# Patient Record
Sex: MALE | Race: WHITE | NOT HISPANIC OR LATINO | Employment: FULL TIME | URBAN - METROPOLITAN AREA
[De-identification: names, ages, dates, MRNs, and addresses within clinical notes are randomized per-mention and may not be internally consistent; named-entity substitution may affect disease eponyms.]

---

## 2017-10-10 ENCOUNTER — OFFICE VISIT (OUTPATIENT)
Dept: URGENT CARE | Facility: CLINIC | Age: 67
End: 2017-10-10
Payer: MEDICARE

## 2017-10-10 VITALS
TEMPERATURE: 98 F | HEART RATE: 99 BPM | DIASTOLIC BLOOD PRESSURE: 61 MMHG | OXYGEN SATURATION: 97 % | SYSTOLIC BLOOD PRESSURE: 92 MMHG

## 2017-10-10 DIAGNOSIS — S61.209A AVULSION, FINGER TIP, INITIAL ENCOUNTER: Primary | ICD-10-CM

## 2017-10-10 PROCEDURE — 99203 OFFICE O/P NEW LOW 30 MIN: CPT | Mod: 25,S$GLB,, | Performed by: PHYSICIAN ASSISTANT

## 2017-10-10 PROCEDURE — 12001 RPR S/N/AX/GEN/TRNK 2.5CM/<: CPT | Mod: S$GLB,,, | Performed by: PHYSICIAN ASSISTANT

## 2017-10-10 RX ORDER — ESCITALOPRAM OXALATE 10 MG/1
1 TABLET ORAL DAILY
COMMUNITY
Start: 2017-08-16 | End: 2017-10-31 | Stop reason: SDUPTHER

## 2017-10-10 RX ORDER — BUDESONIDE AND FORMOTEROL FUMARATE DIHYDRATE 160; 4.5 UG/1; UG/1
2 AEROSOL RESPIRATORY (INHALATION) EVERY 12 HOURS
COMMUNITY
Start: 2017-08-22

## 2017-10-10 RX ORDER — IRBESARTAN 300 MG/1
1 TABLET ORAL DAILY
Status: ON HOLD | COMMUNITY
Start: 2017-10-10 | End: 2020-06-30

## 2017-10-10 NOTE — LETTER
October 10, 2017      [facility] [place]  [address]  [place] [address]  Phone: [phone]  Fax: [phone]       Patient: Blaine Multani   YOB: 1950  Date of Visit: 10/10/2017    To Whom It May Concern:    Alden Multani  was at [facility] on 10/10/2017. He may return to work/school on 10/11/17 with restrictions. Limited Use left hand, keep wound covered, no direct pressure to 4th fingertip x 1 week.  If you have any questions or concerns, or if I can be of further assistance, please do not hesitate to contact me.    Sincerely,    CHELI Lal

## 2017-10-10 NOTE — PROGRESS NOTES
Subjective:       Patient ID: Blaine Multani is a 66 y.o. male.    Vitals:  temperature is 98.3 °F (36.8 °C). His blood pressure is 92/61 and his pulse is 99. His oxygen saturation is 97%.     Chief Complaint: Extremity Laceration (left ring finger)    Laceration to left ring finger      Review of Systems   Constitution: Negative for weakness and malaise/fatigue.   HENT: Negative for nosebleeds.    Cardiovascular: Negative for chest pain and syncope.   Respiratory: Negative for shortness of breath.    Skin:        Laceration to left ring finger   Musculoskeletal: Negative for back pain, joint pain and neck pain.   Gastrointestinal: Negative for abdominal pain.   Genitourinary: Negative for hematuria.   Neurological: Negative for dizziness and numbness.       Objective:      Physical Exam   Constitutional: He is oriented to person, place, and time. He appears well-developed and well-nourished.   HENT:   Head: Normocephalic and atraumatic.   Eyes: Conjunctivae and EOM are normal.   Cardiovascular: Normal rate, regular rhythm, normal heart sounds and intact distal pulses.    Pulmonary/Chest: Effort normal and breath sounds normal.   Musculoskeletal: Normal range of motion. He exhibits no tenderness.   Neurological: He is alert and oriented to person, place, and time.   Skin: Skin is warm and dry. No erythema.   Avulsion type laceration to the tip of the left 4th digit   Psychiatric: He has a normal mood and affect. His behavior is normal.     Laceration Repair  Date/Time: 10/10/2017 12:44 PM  Performed by: RASHEEDA RIOS  Authorized by: RASHEEDA RIOS   Consent Done: Yes  Consent: Verbal consent obtained.  Risks and benefits: risks, benefits and alternatives were discussed  Consent given by: patient  Patient understanding: patient states understanding of the procedure being performed  Patient consent: the patient's understanding of the procedure matches consent given  Procedure consent: procedure consent matches  procedure scheduled  Body area: upper extremity  Location details: left ring finger  Laceration length: 2 cm  Foreign bodies: no foreign bodies  Tendon involvement: none  Nerve involvement: superficial  Vascular damage: no  Anesthesia: digital block    Anesthesia:  Local Anesthetic: lidocaine 2% without epinephrine  Anesthetic total: 8 mL  Preparation: Patient was prepped and draped in the usual sterile fashion.  Irrigation solution: saline  Amount of cleaning: standard  Debridement: none  Wound skin closure material used: chemical cautery.  Approximation difficulty: simple  Dressing: pressure dressing and splint for protection  Patient tolerance: Patient tolerated the procedure well with no immediate complications        Assessment:       1. Avulsion, finger tip, initial encounter        Plan:         Avulsion, finger tip, initial encounter  -     LACERATION REPAIR

## 2017-10-10 NOTE — PATIENT INSTRUCTIONS
Skin Tear (Skin Avulsion)  A skin avulsion is a tearing of the top layer of skin. This commonly happens after a fall or other injury. It also tends to be more common in older people, or those taking blood thinners or steroids for long periods of time.  Home care  These guidelines will help you care for your wound at home:  · Keep the wound clean and dry for the first 24 to 48 hours, or as your healthcare provider advises.  · If there is a dressing or bandage, change it when it gets wet or dirty. Otherwise, leave it on for the first 24 hours, then change it once a day or as often as the doctor says.  · If stitches or staples were used, check the wound every day.  · After taking off the dressing, wash the area gently with soap and water. Clean as close to the stitches as you can. Avoid washing or rubbing the stitches directly.  · After 3 days you can keep the bandages off the wound, unless told otherwise, or there is continued drainage.  Allow the wound to be open to the air.  · Keep a thin layer of antibiotic ointment on the cut. This will keep the wound clean, make it easier to remove the stitches, and reduce scarring.  · If your wound is oozing, you can put a nonstick dressing over it. Then, reapply the bandage or dressing as you were told.  · You can shower as usual after the first 24 hours, but don't soak the area in water (no baths or swimming) until the stitches or staples are taken out.  · If surgical tape was used, keep the area clean and dry. If it becomes wet, blot it dry with a clean towel.  · If skin glue was used, don't put any creams, lotions, or antibiotic ointments on it. These can dissolve the glue. Usually the glue will flake off in about 5 to 10 days by itself. Try to resist picking it off before that so the wound doesn't open up. When it gets wet, pat it dry.  Here is some information about medicine:  · You may use over-the-counter medicine such as acetaminophen or ibuprofen to control pain,  unless another pain medicine was given. If you have chronic liver or kidney disease or ever had a stomach ulcer or gastrointestinal bleeding, talk with your doctor before using these medicines.  · If you were given antibiotics, take them until they are all used up. It is important to finish the antibiotics even if the wound looks better. This will ensure that the infection has cleared.  Follow-up care  Follow up with your healthcare provider, or as advised.  · Watch for any signs of infection, such as increasing redness, swelling, or pus coming out. If this happens, don't wait for your scheduled visit. Instead, see a doctor sooner.  · Stitches or staples are usually taken out within 5 to 14 days. This varies depending on what part of your body they are on, and the type of wound. The doctor will tell you how long stitches should be left in.   · If surgical tape was used, it is usually left on for 7 to 10 days. You can remove surgical tape after that unless you were told otherwise. If you try to remove it, and it is too hard, soaking can help. Surgical tape strips will eventually fall off on their own. If the edges of the cut pull apart, stop removing the tape or strips and follow up with your doctor  · As mentioned above, skin glue will flake off by itself in 5 to 10 days, so you don't need to pull it off.  If any X-rays were done, you will be notified of any changes that may affect your care.  When to seek medical advice  Call your healthcare provider right away if any of these occur:  · Increasing pain in the wound  · Redness, swelling, or pus coming from the wound  · Fever of 100.4ºF (38ºC) or higher, or as directed by your healthcare provider  · Sutures or staples come apart or fall out before your next appointment and the wound edges look as if they will re-open  · Surgical tape closures fall off before 7 days, and the wound edges look as if they will re-open  · Bleeding not controlled by direct pressure  Date  Last Reviewed: 9/1/2016  © 3330-9296 [employer] [address] [place] [address], [place] [address]. All rights reserved. This information is not intended as a substitute for professional medical care. Always follow your healthcare professional's instructions.

## 2017-11-09 PROBLEM — I73.9 PAD (PERIPHERAL ARTERY DISEASE): Status: ACTIVE | Noted: 2017-11-09

## 2017-11-09 PROBLEM — L97.929 NONHEALING ULCER OF LEFT LOWER EXTREMITY: Status: ACTIVE | Noted: 2017-11-09

## 2017-11-09 PROBLEM — I73.9 CLAUDICATION: Status: ACTIVE | Noted: 2017-11-09

## 2020-06-25 ENCOUNTER — HOSPITAL ENCOUNTER (INPATIENT)
Facility: HOSPITAL | Age: 70
LOS: 10 days | Discharge: HOME | DRG: 215 | End: 2020-07-05
Attending: HOSPITALIST | Admitting: HOSPITALIST
Payer: MEDICARE

## 2020-06-25 DIAGNOSIS — I25.10 CAD (CORONARY ARTERY DISEASE): ICD-10-CM

## 2020-06-25 DIAGNOSIS — Z01.818 PRE-OP EVALUATION: ICD-10-CM

## 2020-06-25 DIAGNOSIS — R07.9 CHEST PAIN: ICD-10-CM

## 2020-06-25 DIAGNOSIS — L97.929 NON-HEALING ULCER OF LOWER EXTREMITY, LEFT, WITH UNSPECIFIED SEVERITY: ICD-10-CM

## 2020-06-25 DIAGNOSIS — I21.4 NSTEMI (NON-ST ELEVATED MYOCARDIAL INFARCTION): Primary | ICD-10-CM

## 2020-06-25 DIAGNOSIS — D69.6 THROMBOCYTOPENIA: ICD-10-CM

## 2020-06-25 DIAGNOSIS — Z98.890 STATUS POST CARDIAC CATHETERIZATION: ICD-10-CM

## 2020-06-25 DIAGNOSIS — I10 HYPERTENSION: ICD-10-CM

## 2020-06-25 DIAGNOSIS — I20.0 UNSTABLE ANGINA PECTORIS: ICD-10-CM

## 2020-06-25 DIAGNOSIS — I73.9 PAD (PERIPHERAL ARTERY DISEASE): ICD-10-CM

## 2020-06-25 DIAGNOSIS — I10 ESSENTIAL HYPERTENSION: ICD-10-CM

## 2020-06-25 LAB
ALBUMIN SERPL BCP-MCNC: 3.4 G/DL (ref 3.5–5.2)
ALP SERPL-CCNC: 87 U/L (ref 55–135)
ALT SERPL W/O P-5'-P-CCNC: 21 U/L (ref 10–44)
ANION GAP SERPL CALC-SCNC: 10 MMOL/L (ref 8–16)
AST SERPL-CCNC: 26 U/L (ref 10–40)
BASOPHILS # BLD AUTO: 0.04 K/UL (ref 0–0.2)
BASOPHILS NFR BLD: 0.6 % (ref 0–1.9)
BILIRUB SERPL-MCNC: 1.1 MG/DL (ref 0.1–1)
BNP SERPL-MCNC: 1354 PG/ML (ref 0–99)
BUN SERPL-MCNC: 29 MG/DL (ref 6–30)
BUN SERPL-MCNC: 32 MG/DL (ref 8–23)
CALCIUM SERPL-MCNC: 8.8 MG/DL (ref 8.7–10.5)
CHLORIDE SERPL-SCNC: 102 MMOL/L (ref 95–110)
CHLORIDE SERPL-SCNC: 106 MMOL/L (ref 95–110)
CO2 SERPL-SCNC: 23 MMOL/L (ref 23–29)
CREAT SERPL-MCNC: 1.4 MG/DL (ref 0.5–1.4)
CREAT SERPL-MCNC: 1.5 MG/DL (ref 0.5–1.4)
DIFFERENTIAL METHOD: ABNORMAL
EOSINOPHIL # BLD AUTO: 0.1 K/UL (ref 0–0.5)
EOSINOPHIL NFR BLD: 1.8 % (ref 0–8)
ERYTHROCYTE [DISTWIDTH] IN BLOOD BY AUTOMATED COUNT: 15.2 % (ref 11.5–14.5)
EST. GFR  (AFRICAN AMERICAN): 58.8 ML/MIN/1.73 M^2
EST. GFR  (NON AFRICAN AMERICAN): 50.9 ML/MIN/1.73 M^2
GLUCOSE SERPL-MCNC: 107 MG/DL (ref 70–110)
GLUCOSE SERPL-MCNC: 107 MG/DL (ref 70–110)
HCT VFR BLD AUTO: 34.6 % (ref 40–54)
HCT VFR BLD CALC: 35 %PCV (ref 36–54)
HGB BLD-MCNC: 11.3 G/DL (ref 14–18)
IMM GRANULOCYTES # BLD AUTO: 0.02 K/UL (ref 0–0.04)
IMM GRANULOCYTES NFR BLD AUTO: 0.3 % (ref 0–0.5)
INR PPP: 1.8 (ref 0.8–1.2)
LYMPHOCYTES # BLD AUTO: 1.2 K/UL (ref 1–4.8)
LYMPHOCYTES NFR BLD: 18 % (ref 18–48)
MCH RBC QN AUTO: 34.9 PG (ref 27–31)
MCHC RBC AUTO-ENTMCNC: 32.7 G/DL (ref 32–36)
MCV RBC AUTO: 107 FL (ref 82–98)
MONOCYTES # BLD AUTO: 0.6 K/UL (ref 0.3–1)
MONOCYTES NFR BLD: 8.7 % (ref 4–15)
NEUTROPHILS # BLD AUTO: 4.8 K/UL (ref 1.8–7.7)
NEUTROPHILS NFR BLD: 70.6 % (ref 38–73)
NRBC BLD-RTO: 0 /100 WBC
PLATELET # BLD AUTO: 172 K/UL (ref 150–350)
PMV BLD AUTO: 10.9 FL (ref 9.2–12.9)
POC IONIZED CALCIUM: 1.22 MMOL/L (ref 1.06–1.42)
POC TCO2 (MEASURED): 24 MMOL/L (ref 23–29)
POTASSIUM BLD-SCNC: 3.9 MMOL/L (ref 3.5–5.1)
POTASSIUM SERPL-SCNC: 4 MMOL/L (ref 3.5–5.1)
PROT SERPL-MCNC: 6.4 G/DL (ref 6–8.4)
PROTHROMBIN TIME: 17.7 SEC (ref 9–12.5)
RBC # BLD AUTO: 3.24 M/UL (ref 4.6–6.2)
SAMPLE: ABNORMAL
SARS-COV-2 RDRP RESP QL NAA+PROBE: NEGATIVE
SODIUM BLD-SCNC: 139 MMOL/L (ref 136–145)
SODIUM SERPL-SCNC: 139 MMOL/L (ref 136–145)
TROPONIN I SERPL DL<=0.01 NG/ML-MCNC: 2.16 NG/ML (ref 0–0.03)
WBC # BLD AUTO: 6.79 K/UL (ref 3.9–12.7)

## 2020-06-25 PROCEDURE — 80053 COMPREHEN METABOLIC PANEL: CPT

## 2020-06-25 PROCEDURE — 84484 ASSAY OF TROPONIN QUANT: CPT

## 2020-06-25 PROCEDURE — 99285 EMERGENCY DEPT VISIT HI MDM: CPT | Mod: ,,, | Performed by: EMERGENCY MEDICINE

## 2020-06-25 PROCEDURE — 99285 EMERGENCY DEPT VISIT HI MDM: CPT | Mod: 25

## 2020-06-25 PROCEDURE — U0002 COVID-19 LAB TEST NON-CDC: HCPCS

## 2020-06-25 PROCEDURE — 86920 COMPATIBILITY TEST SPIN: CPT

## 2020-06-25 PROCEDURE — 85025 COMPLETE CBC W/AUTO DIFF WBC: CPT

## 2020-06-25 PROCEDURE — 93010 EKG 12-LEAD: ICD-10-PCS | Mod: ,,, | Performed by: INTERNAL MEDICINE

## 2020-06-25 PROCEDURE — 86850 RBC ANTIBODY SCREEN: CPT

## 2020-06-25 PROCEDURE — 83880 ASSAY OF NATRIURETIC PEPTIDE: CPT

## 2020-06-25 PROCEDURE — 93005 ELECTROCARDIOGRAM TRACING: CPT

## 2020-06-25 PROCEDURE — 99285 PR EMERGENCY DEPT VISIT,LEVEL V: ICD-10-PCS | Mod: ,,, | Performed by: EMERGENCY MEDICINE

## 2020-06-25 PROCEDURE — 93010 ELECTROCARDIOGRAM REPORT: CPT | Mod: ,,, | Performed by: INTERNAL MEDICINE

## 2020-06-25 PROCEDURE — 85610 PROTHROMBIN TIME: CPT

## 2020-06-25 PROCEDURE — 12000002 HC ACUTE/MED SURGE SEMI-PRIVATE ROOM

## 2020-06-25 RX ORDER — ASPIRIN 325 MG
325 TABLET ORAL
Status: DISCONTINUED | OUTPATIENT
Start: 2020-06-25 | End: 2020-06-25

## 2020-06-25 NOTE — Clinical Note
The site was marked. Prepped: groin, right chest and right neck. Prepped with: ChloraPrep. The site was clipped.

## 2020-06-25 NOTE — Clinical Note
Prepped: groin and left chest. Prepped with: ChloraPrep. The site was clipped. The patient was draped. LT axillary

## 2020-06-25 NOTE — Clinical Note
The catheter is inserted in to the  proximal left subclavian. THROMBECTOMY PERFORMED L SUBCLAVIAN- L AXILLARY ART. REMOVED.

## 2020-06-25 NOTE — Clinical Note
236 ml injected throughout the case. 164 mL total wasted during the case. 400 mL total used in the case.

## 2020-06-26 PROBLEM — I10 HTN (HYPERTENSION): Status: ACTIVE | Noted: 2020-06-26

## 2020-06-26 PROBLEM — F32.A DEPRESSION: Status: ACTIVE | Noted: 2020-06-26

## 2020-06-26 PROBLEM — I48.91 A-FIB: Status: ACTIVE | Noted: 2020-06-26

## 2020-06-26 LAB
ABO + RH BLD: NORMAL
ALBUMIN SERPL BCP-MCNC: 3.3 G/DL (ref 3.5–5.2)
ALBUMIN SERPL BCP-MCNC: 3.3 G/DL (ref 3.5–5.2)
ALLENS TEST: ABNORMAL
ALLENS TEST: ABNORMAL
ALP SERPL-CCNC: 83 U/L (ref 55–135)
ALP SERPL-CCNC: 85 U/L (ref 55–135)
ALT SERPL W/O P-5'-P-CCNC: 18 U/L (ref 10–44)
ALT SERPL W/O P-5'-P-CCNC: 19 U/L (ref 10–44)
ANION GAP SERPL CALC-SCNC: 10 MMOL/L (ref 8–16)
ANION GAP SERPL CALC-SCNC: 8 MMOL/L (ref 8–16)
APTT BLDCRRT: 31.7 SEC (ref 21–32)
APTT BLDCRRT: 44.6 SEC (ref 21–32)
APTT BLDCRRT: 68.1 SEC (ref 21–32)
ASCENDING AORTA: 3.36 CM
AST SERPL-CCNC: 23 U/L (ref 10–40)
AST SERPL-CCNC: 31 U/L (ref 10–40)
AV INDEX (PROSTH): 0.49
AV MEAN GRADIENT: 4 MMHG
AV PEAK GRADIENT: 5 MMHG
AV VALVE AREA: 1.53 CM2
AV VELOCITY RATIO: 0.47
BASOPHILS # BLD AUTO: 0.04 K/UL (ref 0–0.2)
BASOPHILS # BLD AUTO: 0.04 K/UL (ref 0–0.2)
BASOPHILS NFR BLD: 0.5 % (ref 0–1.9)
BASOPHILS NFR BLD: 0.6 % (ref 0–1.9)
BILIRUB SERPL-MCNC: 1.1 MG/DL (ref 0.1–1)
BILIRUB SERPL-MCNC: 1.3 MG/DL (ref 0.1–1)
BLD GP AB SCN CELLS X3 SERPL QL: NORMAL
BUN SERPL-MCNC: 30 MG/DL (ref 8–23)
BUN SERPL-MCNC: 33 MG/DL (ref 8–23)
CALCIUM SERPL-MCNC: 8.7 MG/DL (ref 8.7–10.5)
CALCIUM SERPL-MCNC: 8.8 MG/DL (ref 8.7–10.5)
CHLORIDE SERPL-SCNC: 104 MMOL/L (ref 95–110)
CHLORIDE SERPL-SCNC: 106 MMOL/L (ref 95–110)
CO2 SERPL-SCNC: 23 MMOL/L (ref 23–29)
CO2 SERPL-SCNC: 26 MMOL/L (ref 23–29)
CREAT SERPL-MCNC: 1.2 MG/DL (ref 0.5–1.4)
CREAT SERPL-MCNC: 1.3 MG/DL (ref 0.5–1.4)
CV ECHO LV RWT: 0.28 CM
DIFFERENTIAL METHOD: ABNORMAL
DIFFERENTIAL METHOD: ABNORMAL
DOP CALC AO PEAK VEL: 1.16 M/S
DOP CALC AO VTI: 20.31 CM
DOP CALC LVOT AREA: 3.1 CM2
DOP CALC LVOT DIAMETER: 2 CM
DOP CALC LVOT PEAK VEL: 0.54 M/S
DOP CALC LVOT STROKE VOLUME: 31.05 CM3
DOP CALCLVOT PEAK VEL VTI: 9.89 CM
ECHO LV POSTERIOR WALL: 0.77 CM (ref 0.6–1.1)
EOSINOPHIL # BLD AUTO: 0.1 K/UL (ref 0–0.5)
EOSINOPHIL # BLD AUTO: 0.1 K/UL (ref 0–0.5)
EOSINOPHIL NFR BLD: 0.9 % (ref 0–8)
EOSINOPHIL NFR BLD: 1.9 % (ref 0–8)
ERYTHROCYTE [DISTWIDTH] IN BLOOD BY AUTOMATED COUNT: 15.1 % (ref 11.5–14.5)
ERYTHROCYTE [DISTWIDTH] IN BLOOD BY AUTOMATED COUNT: 15.2 % (ref 11.5–14.5)
EST. GFR  (AFRICAN AMERICAN): >60 ML/MIN/1.73 M^2
EST. GFR  (AFRICAN AMERICAN): >60 ML/MIN/1.73 M^2
EST. GFR  (NON AFRICAN AMERICAN): 55.7 ML/MIN/1.73 M^2
EST. GFR  (NON AFRICAN AMERICAN): >60 ML/MIN/1.73 M^2
ESTIMATED AVG GLUCOSE: 108 MG/DL (ref 68–131)
FRACTIONAL SHORTENING: 18 % (ref 28–44)
GLUCOSE SERPL-MCNC: 101 MG/DL (ref 70–110)
GLUCOSE SERPL-MCNC: 120 MG/DL (ref 70–110)
HBA1C MFR BLD HPLC: 5.4 % (ref 4–5.6)
HCO3 UR-SCNC: 27.2 MMOL/L (ref 24–28)
HCO3 UR-SCNC: 27.9 MMOL/L (ref 24–28)
HCT VFR BLD AUTO: 33.3 % (ref 40–54)
HCT VFR BLD AUTO: 35.3 % (ref 40–54)
HGB BLD-MCNC: 10.9 G/DL (ref 14–18)
HGB BLD-MCNC: 11.1 G/DL (ref 14–18)
IMM GRANULOCYTES # BLD AUTO: 0.02 K/UL (ref 0–0.04)
IMM GRANULOCYTES # BLD AUTO: 0.03 K/UL (ref 0–0.04)
IMM GRANULOCYTES NFR BLD AUTO: 0.3 % (ref 0–0.5)
IMM GRANULOCYTES NFR BLD AUTO: 0.4 % (ref 0–0.5)
INR PPP: 1.8 (ref 0.8–1.2)
INTERVENTRICULAR SEPTUM: 0.89 CM (ref 0.6–1.1)
LA MAJOR: 5.73 CM
LA MINOR: 5.81 CM
LA WIDTH: 3.93 CM
LACTATE SERPL-SCNC: 1.7 MMOL/L (ref 0.5–2.2)
LDH SERPL L TO P-CCNC: 352 U/L (ref 110–260)
LEFT ATRIUM SIZE: 5.3 CM
LEFT ATRIUM VOLUME: 102.15 CM3
LEFT INTERNAL DIMENSION IN SYSTOLE: 4.59 CM (ref 2.1–4)
LEFT VENTRICLE DIASTOLIC VOLUME: 152.92 ML
LEFT VENTRICLE SYSTOLIC VOLUME: 96.86 ML
LEFT VENTRICULAR INTERNAL DIMENSION IN DIASTOLE: 5.59 CM (ref 3.5–6)
LEFT VENTRICULAR MASS: 172.33 G
LYMPHOCYTES # BLD AUTO: 0.8 K/UL (ref 1–4.8)
LYMPHOCYTES # BLD AUTO: 1.2 K/UL (ref 1–4.8)
LYMPHOCYTES NFR BLD: 10.1 % (ref 18–48)
LYMPHOCYTES NFR BLD: 19.1 % (ref 18–48)
MAGNESIUM SERPL-MCNC: 2 MG/DL (ref 1.6–2.6)
MCH RBC QN AUTO: 33.7 PG (ref 27–31)
MCH RBC QN AUTO: 34.3 PG (ref 27–31)
MCHC RBC AUTO-ENTMCNC: 31.4 G/DL (ref 32–36)
MCHC RBC AUTO-ENTMCNC: 32.7 G/DL (ref 32–36)
MCV RBC AUTO: 105 FL (ref 82–98)
MCV RBC AUTO: 107 FL (ref 82–98)
MONOCYTES # BLD AUTO: 0.6 K/UL (ref 0.3–1)
MONOCYTES # BLD AUTO: 0.6 K/UL (ref 0.3–1)
MONOCYTES NFR BLD: 7.5 % (ref 4–15)
MONOCYTES NFR BLD: 8.5 % (ref 4–15)
NEUTROPHILS # BLD AUTO: 4.5 K/UL (ref 1.8–7.7)
NEUTROPHILS # BLD AUTO: 6.1 K/UL (ref 1.8–7.7)
NEUTROPHILS NFR BLD: 69.6 % (ref 38–73)
NEUTROPHILS NFR BLD: 80.6 % (ref 38–73)
NRBC BLD-RTO: 0 /100 WBC
NRBC BLD-RTO: 0 /100 WBC
PCO2 BLDA: 44.5 MMHG (ref 35–45)
PCO2 BLDA: 45.9 MMHG (ref 35–45)
PH SMN: 7.38 [PH] (ref 7.35–7.45)
PH SMN: 7.41 [PH] (ref 7.35–7.45)
PHOSPHATE SERPL-MCNC: 3 MG/DL (ref 2.7–4.5)
PLATELET # BLD AUTO: 165 K/UL (ref 150–350)
PLATELET # BLD AUTO: 181 K/UL (ref 150–350)
PMV BLD AUTO: 11.4 FL (ref 9.2–12.9)
PMV BLD AUTO: 11.5 FL (ref 9.2–12.9)
PO2 BLDA: 30 MMHG (ref 40–60)
PO2 BLDA: 32 MMHG (ref 40–60)
POC BE: 2 MMOL/L
POC BE: 3 MMOL/L
POC SATURATED O2: 57 % (ref 95–100)
POC SATURATED O2: 61 % (ref 95–100)
POC TCO2: 29 MMOL/L (ref 24–29)
POC TCO2: 29 MMOL/L (ref 24–29)
POTASSIUM SERPL-SCNC: 3.9 MMOL/L (ref 3.5–5.1)
POTASSIUM SERPL-SCNC: 4.7 MMOL/L (ref 3.5–5.1)
PROT SERPL-MCNC: 6.2 G/DL (ref 6–8.4)
PROT SERPL-MCNC: 6.4 G/DL (ref 6–8.4)
PROTHROMBIN TIME: 17 SEC (ref 9–12.5)
RA MAJOR: 5.44 CM
RA PRESSURE: 15 MMHG
RA WIDTH: 4.23 CM
RBC # BLD AUTO: 3.18 M/UL (ref 4.6–6.2)
RBC # BLD AUTO: 3.29 M/UL (ref 4.6–6.2)
RIGHT VENTRICULAR END-DIASTOLIC DIMENSION: 3.36 CM
SAMPLE: ABNORMAL
SAMPLE: ABNORMAL
SINUS: 3.07 CM
SITE: ABNORMAL
SITE: ABNORMAL
SODIUM SERPL-SCNC: 138 MMOL/L (ref 136–145)
SODIUM SERPL-SCNC: 139 MMOL/L (ref 136–145)
STJ: 3.31 CM
TDI LATERAL: 0.08 M/S
TDI SEPTAL: 0.03 M/S
TDI: 0.06 M/S
TRICUSPID ANNULAR PLANE SYSTOLIC EXCURSION: 1.33 CM
TROPONIN I SERPL DL<=0.01 NG/ML-MCNC: 1.91 NG/ML (ref 0–0.03)
WBC # BLD AUTO: 6.44 K/UL (ref 3.9–12.7)
WBC # BLD AUTO: 7.55 K/UL (ref 3.9–12.7)

## 2020-06-26 PROCEDURE — 99152 MOD SED SAME PHYS/QHP 5/>YRS: CPT | Performed by: INTERNAL MEDICINE

## 2020-06-26 PROCEDURE — 84100 ASSAY OF PHOSPHORUS: CPT

## 2020-06-26 PROCEDURE — 92978 ENDOLUMINL IVUS OCT C 1ST: CPT | Mod: LD | Performed by: INTERNAL MEDICINE

## 2020-06-26 PROCEDURE — 83605 ASSAY OF LACTIC ACID: CPT

## 2020-06-26 PROCEDURE — 93503 INSERT/PLACE HEART CATHETER: CPT

## 2020-06-26 PROCEDURE — 36215 PLACE CATHETER IN ARTERY: CPT | Mod: 59,51,, | Performed by: INTERNAL MEDICINE

## 2020-06-26 PROCEDURE — C1874 STENT, COATED/COV W/DEL SYS: HCPCS | Performed by: INTERNAL MEDICINE

## 2020-06-26 PROCEDURE — 99152 PR MOD CONSCIOUS SEDATION, SAME PHYS, 5+ YRS, FIRST 15 MIN: ICD-10-PCS | Mod: ,,, | Performed by: INTERNAL MEDICINE

## 2020-06-26 PROCEDURE — 85347 COAGULATION TIME ACTIVATED: CPT | Performed by: INTERNAL MEDICINE

## 2020-06-26 PROCEDURE — 36215 PR PLACE CATH SELECTIVE ART,NECK: ICD-10-PCS | Mod: 59,51,, | Performed by: INTERNAL MEDICINE

## 2020-06-26 PROCEDURE — 33990 PR INSERT, VAD, PERCUT, LT HEART, ART ACCESS ONLY: ICD-10-PCS | Mod: 51,,, | Performed by: INTERNAL MEDICINE

## 2020-06-26 PROCEDURE — 25000242 PHARM REV CODE 250 ALT 637 W/ HCPCS: Performed by: STUDENT IN AN ORGANIZED HEALTH CARE EDUCATION/TRAINING PROGRAM

## 2020-06-26 PROCEDURE — 25000003 PHARM REV CODE 250: Performed by: STUDENT IN AN ORGANIZED HEALTH CARE EDUCATION/TRAINING PROGRAM

## 2020-06-26 PROCEDURE — 27800903 OPTIME MED/SURG SUP & DEVICES OTHER IMPLANTS: Performed by: INTERNAL MEDICINE

## 2020-06-26 PROCEDURE — 99900035 HC TECH TIME PER 15 MIN (STAT)

## 2020-06-26 PROCEDURE — 85610 PROTHROMBIN TIME: CPT

## 2020-06-26 PROCEDURE — 63600175 PHARM REV CODE 636 W HCPCS: Performed by: STUDENT IN AN ORGANIZED HEALTH CARE EDUCATION/TRAINING PROGRAM

## 2020-06-26 PROCEDURE — 92933 PR STENT & ATHERECTOMY: ICD-10-PCS | Mod: 51,LM,, | Performed by: INTERNAL MEDICINE

## 2020-06-26 PROCEDURE — 99223 1ST HOSP IP/OBS HIGH 75: CPT | Mod: AI,GC,, | Performed by: STUDENT IN AN ORGANIZED HEALTH CARE EDUCATION/TRAINING PROGRAM

## 2020-06-26 PROCEDURE — 93005 ELECTROCARDIOGRAM TRACING: CPT

## 2020-06-26 PROCEDURE — 85730 THROMBOPLASTIN TIME PARTIAL: CPT | Mod: 91

## 2020-06-26 PROCEDURE — C1894 INTRO/SHEATH, NON-LASER: HCPCS | Performed by: INTERNAL MEDICINE

## 2020-06-26 PROCEDURE — 75710 PR  ANGIO EXTREMITY UNILAT: ICD-10-PCS | Mod: 26,59,LT, | Performed by: INTERNAL MEDICINE

## 2020-06-26 PROCEDURE — C1751 CATH, INF, PER/CENT/MIDLINE: HCPCS

## 2020-06-26 PROCEDURE — 36215 PLACE CATHETER IN ARTERY: CPT | Performed by: INTERNAL MEDICINE

## 2020-06-26 PROCEDURE — C1725 CATH, TRANSLUMIN NON-LASER: HCPCS | Performed by: INTERNAL MEDICINE

## 2020-06-26 PROCEDURE — 27201423 OPTIME MED/SURG SUP & DEVICES STERILE SUPPLY: Performed by: INTERNAL MEDICINE

## 2020-06-26 PROCEDURE — 75710 ARTERY X-RAYS ARM/LEG: CPT | Mod: 59,LT | Performed by: INTERNAL MEDICINE

## 2020-06-26 PROCEDURE — 93451 PR RIGHT HEART CATH O2 SATURATION & CARDIAC OUTPUT: ICD-10-PCS | Mod: 26,51,, | Performed by: INTERNAL MEDICINE

## 2020-06-26 PROCEDURE — 84484 ASSAY OF TROPONIN QUANT: CPT

## 2020-06-26 PROCEDURE — 93010 EKG 12-LEAD: ICD-10-PCS | Mod: ,,, | Performed by: INTERNAL MEDICINE

## 2020-06-26 PROCEDURE — 85730 THROMBOPLASTIN TIME PARTIAL: CPT

## 2020-06-26 PROCEDURE — 92933 PRQ TRLML C ATHRC ST ANGIOP1: CPT | Mod: LC,,, | Performed by: INTERNAL MEDICINE

## 2020-06-26 PROCEDURE — 99152 MOD SED SAME PHYS/QHP 5/>YRS: CPT | Mod: ,,, | Performed by: INTERNAL MEDICINE

## 2020-06-26 PROCEDURE — C1894 INTRO/SHEATH, NON-LASER: HCPCS

## 2020-06-26 PROCEDURE — 33990 INSJ PERQ VAD L HRT ARTERIAL: CPT | Mod: 51,,, | Performed by: INTERNAL MEDICINE

## 2020-06-26 PROCEDURE — 75710 ARTERY X-RAYS ARM/LEG: CPT | Mod: 26,59,LT, | Performed by: INTERNAL MEDICINE

## 2020-06-26 PROCEDURE — 51702 INSERT TEMP BLADDER CATH: CPT

## 2020-06-26 PROCEDURE — 27000426 HC IMPELLA SET UP

## 2020-06-26 PROCEDURE — 25000003 PHARM REV CODE 250

## 2020-06-26 PROCEDURE — C9602 PERC D-E COR STENT ATHER S: HCPCS | Mod: LC | Performed by: INTERNAL MEDICINE

## 2020-06-26 PROCEDURE — 80053 COMPREHEN METABOLIC PANEL: CPT | Mod: 91

## 2020-06-26 PROCEDURE — 92978 PR IVUS, CORONARY, 1ST VESSEL: ICD-10-PCS | Mod: 26,LD,, | Performed by: INTERNAL MEDICINE

## 2020-06-26 PROCEDURE — C1887 CATHETER, GUIDING: HCPCS | Performed by: INTERNAL MEDICINE

## 2020-06-26 PROCEDURE — 93010 ELECTROCARDIOGRAM REPORT: CPT | Mod: ,,, | Performed by: INTERNAL MEDICINE

## 2020-06-26 PROCEDURE — 99223 PR INITIAL HOSPITAL CARE,LEVL III: ICD-10-PCS | Mod: AI,GC,, | Performed by: STUDENT IN AN ORGANIZED HEALTH CARE EDUCATION/TRAINING PROGRAM

## 2020-06-26 PROCEDURE — 83036 HEMOGLOBIN GLYCOSYLATED A1C: CPT

## 2020-06-26 PROCEDURE — 93451 RIGHT HEART CATH: CPT | Mod: 26,51,, | Performed by: INTERNAL MEDICINE

## 2020-06-26 PROCEDURE — 36415 COLL VENOUS BLD VENIPUNCTURE: CPT

## 2020-06-26 PROCEDURE — 20000000 HC ICU ROOM

## 2020-06-26 PROCEDURE — 33990 INSJ PERQ VAD L HRT ARTERIAL: CPT | Performed by: INTERNAL MEDICINE

## 2020-06-26 PROCEDURE — 63600175 PHARM REV CODE 636 W HCPCS: Performed by: INTERNAL MEDICINE

## 2020-06-26 PROCEDURE — 83735 ASSAY OF MAGNESIUM: CPT

## 2020-06-26 PROCEDURE — 27202094 HC TUBING, IMPELLA

## 2020-06-26 PROCEDURE — 83615 LACTATE (LD) (LDH) ENZYME: CPT

## 2020-06-26 PROCEDURE — 99153 MOD SED SAME PHYS/QHP EA: CPT | Performed by: INTERNAL MEDICINE

## 2020-06-26 PROCEDURE — 92978 ENDOLUMINL IVUS OCT C 1ST: CPT | Mod: 26,LD,, | Performed by: INTERNAL MEDICINE

## 2020-06-26 PROCEDURE — 80053 COMPREHEN METABOLIC PANEL: CPT

## 2020-06-26 PROCEDURE — 25000003 PHARM REV CODE 250: Performed by: INTERNAL MEDICINE

## 2020-06-26 PROCEDURE — C1724 CATH, TRANS ATHEREC,ROTATION: HCPCS | Performed by: INTERNAL MEDICINE

## 2020-06-26 PROCEDURE — C1760 CLOSURE DEV, VASC: HCPCS | Performed by: INTERNAL MEDICINE

## 2020-06-26 PROCEDURE — 93451 RIGHT HEART CATH: CPT | Performed by: INTERNAL MEDICINE

## 2020-06-26 PROCEDURE — C1751 CATH, INF, PER/CENT/MIDLINE: HCPCS | Performed by: INTERNAL MEDICINE

## 2020-06-26 PROCEDURE — 85025 COMPLETE CBC W/AUTO DIFF WBC: CPT | Mod: 91

## 2020-06-26 PROCEDURE — 25500020 PHARM REV CODE 255: Performed by: INTERNAL MEDICINE

## 2020-06-26 PROCEDURE — C1769 GUIDE WIRE: HCPCS | Performed by: INTERNAL MEDICINE

## 2020-06-26 PROCEDURE — C1753 CATH, INTRAVAS ULTRASOUND: HCPCS | Performed by: INTERNAL MEDICINE

## 2020-06-26 DEVICE — STENT RONYX40018UX RESOLUTE ONYX 4.00X18
Type: IMPLANTABLE DEVICE | Site: CORONARY | Status: FUNCTIONAL
Brand: RESOLUTE ONYX™

## 2020-06-26 DEVICE — STENT RONYX35018UX RESOLUTE ONYX 3.50X18
Type: IMPLANTABLE DEVICE | Site: CORONARY | Status: FUNCTIONAL
Brand: RESOLUTE ONYX™

## 2020-06-26 DEVICE — STENT RONYX35034UX RESOLUTE ONYX 3.50X34
Type: IMPLANTABLE DEVICE | Site: CORONARY | Status: FUNCTIONAL
Brand: RESOLUTE ONYX™

## 2020-06-26 DEVICE — STENT RONYX40008UX RESOLUTE ONYX 4.00X08
Type: IMPLANTABLE DEVICE | Site: CORONARY | Status: FUNCTIONAL
Brand: RESOLUTE ONYX™

## 2020-06-26 DEVICE — STENT RONYX30030UX RESOLUTE ONYX 3.00X30
Type: IMPLANTABLE DEVICE | Site: CORONARY | Status: FUNCTIONAL
Brand: RESOLUTE ONYX™

## 2020-06-26 DEVICE — STENT RONYX40022UX RESOLUTE ONYX 4.00X22
Type: IMPLANTABLE DEVICE | Site: CORONARY | Status: FUNCTIONAL
Brand: RESOLUTE ONYX™

## 2020-06-26 DEVICE — STENT RONYX30018UX RESOLUTE ONYX 3.00X18
Type: IMPLANTABLE DEVICE | Site: CORONARY | Status: FUNCTIONAL
Brand: RESOLUTE ONYX™

## 2020-06-26 RX ORDER — ATORVASTATIN CALCIUM 20 MG/1
80 TABLET, FILM COATED ORAL DAILY
Status: DISCONTINUED | OUTPATIENT
Start: 2020-06-26 | End: 2020-07-05 | Stop reason: HOSPADM

## 2020-06-26 RX ORDER — SODIUM CHLORIDE 0.9 % (FLUSH) 0.9 %
10 SYRINGE (ML) INJECTION
Status: DISCONTINUED | OUTPATIENT
Start: 2020-06-26 | End: 2020-07-05 | Stop reason: HOSPADM

## 2020-06-26 RX ORDER — GLUCAGON 1 MG
1 KIT INJECTION
Status: DISCONTINUED | OUTPATIENT
Start: 2020-06-26 | End: 2020-07-05 | Stop reason: HOSPADM

## 2020-06-26 RX ORDER — CLOPIDOGREL 300 MG/1
600 TABLET, FILM COATED ORAL ONCE
Status: COMPLETED | OUTPATIENT
Start: 2020-06-26 | End: 2020-06-26

## 2020-06-26 RX ORDER — CEFAZOLIN SODIUM 1 G/3ML
INJECTION, POWDER, FOR SOLUTION INTRAMUSCULAR; INTRAVENOUS
Status: DISCONTINUED | OUTPATIENT
Start: 2020-06-26 | End: 2020-06-26 | Stop reason: HOSPADM

## 2020-06-26 RX ORDER — IPRATROPIUM BROMIDE AND ALBUTEROL SULFATE 2.5; .5 MG/3ML; MG/3ML
3 SOLUTION RESPIRATORY (INHALATION) EVERY 6 HOURS PRN
Status: DISCONTINUED | OUTPATIENT
Start: 2020-06-26 | End: 2020-07-05 | Stop reason: HOSPADM

## 2020-06-26 RX ORDER — CARVEDILOL 12.5 MG/1
12.5 TABLET ORAL 2 TIMES DAILY
Status: DISCONTINUED | OUTPATIENT
Start: 2020-06-26 | End: 2020-06-26

## 2020-06-26 RX ORDER — DEXTROSE 50 % IN WATER (D50W) INTRAVENOUS SYRINGE
12.5
Status: DISCONTINUED | OUTPATIENT
Start: 2020-06-26 | End: 2020-07-05 | Stop reason: HOSPADM

## 2020-06-26 RX ORDER — IBUPROFEN 200 MG
16 TABLET ORAL
Status: DISCONTINUED | OUTPATIENT
Start: 2020-06-26 | End: 2020-07-05 | Stop reason: HOSPADM

## 2020-06-26 RX ORDER — OXYCODONE HYDROCHLORIDE 5 MG/1
5 TABLET ORAL EVERY 6 HOURS PRN
Status: DISCONTINUED | OUTPATIENT
Start: 2020-06-26 | End: 2020-07-01

## 2020-06-26 RX ORDER — NAPROXEN SODIUM 220 MG/1
81 TABLET, FILM COATED ORAL DAILY
Status: DISCONTINUED | OUTPATIENT
Start: 2020-06-27 | End: 2020-07-05 | Stop reason: HOSPADM

## 2020-06-26 RX ORDER — FENTANYL CITRATE 50 UG/ML
INJECTION, SOLUTION INTRAMUSCULAR; INTRAVENOUS
Status: DISCONTINUED | OUTPATIENT
Start: 2020-06-26 | End: 2020-06-26 | Stop reason: HOSPADM

## 2020-06-26 RX ORDER — SODIUM CHLORIDE 0.9 % (FLUSH) 0.9 %
10 SYRINGE (ML) INJECTION
Status: DISCONTINUED | OUTPATIENT
Start: 2020-06-26 | End: 2020-07-04

## 2020-06-26 RX ORDER — ATROPINE SULFATE 0.1 MG/ML
INJECTION INTRAVENOUS
Status: DISCONTINUED | OUTPATIENT
Start: 2020-06-26 | End: 2020-06-26 | Stop reason: HOSPADM

## 2020-06-26 RX ORDER — DEXTROSE 50 % IN WATER (D50W) INTRAVENOUS SYRINGE
25
Status: DISCONTINUED | OUTPATIENT
Start: 2020-06-26 | End: 2020-07-05 | Stop reason: HOSPADM

## 2020-06-26 RX ORDER — HEPARIN SODIUM 1000 [USP'U]/ML
INJECTION, SOLUTION INTRAVENOUS; SUBCUTANEOUS
Status: DISCONTINUED | OUTPATIENT
Start: 2020-06-26 | End: 2020-06-26 | Stop reason: HOSPADM

## 2020-06-26 RX ORDER — SODIUM NITROPRUSSIDE 25 MG/ML
INJECTION INTRAVENOUS
Status: COMPLETED
Start: 2020-06-26 | End: 2020-06-26

## 2020-06-26 RX ORDER — ASPIRIN 325 MG
325 TABLET ORAL DAILY
Status: DISCONTINUED | OUTPATIENT
Start: 2020-06-26 | End: 2020-06-26

## 2020-06-26 RX ORDER — DIPHENHYDRAMINE HCL 25 MG
50 CAPSULE ORAL
Status: DISCONTINUED | OUTPATIENT
Start: 2020-06-26 | End: 2020-06-26 | Stop reason: HOSPADM

## 2020-06-26 RX ORDER — ESCITALOPRAM OXALATE 5 MG/1
10 TABLET ORAL DAILY
Status: DISCONTINUED | OUTPATIENT
Start: 2020-06-26 | End: 2020-07-05 | Stop reason: HOSPADM

## 2020-06-26 RX ORDER — FLUTICASONE FUROATE AND VILANTEROL 100; 25 UG/1; UG/1
1 POWDER RESPIRATORY (INHALATION) DAILY
Status: DISCONTINUED | OUTPATIENT
Start: 2020-06-26 | End: 2020-07-05 | Stop reason: HOSPADM

## 2020-06-26 RX ORDER — MIDAZOLAM HYDROCHLORIDE 1 MG/ML
INJECTION, SOLUTION INTRAMUSCULAR; INTRAVENOUS
Status: DISCONTINUED | OUTPATIENT
Start: 2020-06-26 | End: 2020-06-26 | Stop reason: HOSPADM

## 2020-06-26 RX ORDER — IBUPROFEN 200 MG
24 TABLET ORAL
Status: DISCONTINUED | OUTPATIENT
Start: 2020-06-26 | End: 2020-07-05 | Stop reason: HOSPADM

## 2020-06-26 RX ORDER — CLOPIDOGREL BISULFATE 75 MG/1
75 TABLET ORAL DAILY
Status: DISCONTINUED | OUTPATIENT
Start: 2020-06-27 | End: 2020-07-05 | Stop reason: HOSPADM

## 2020-06-26 RX ORDER — HEPARIN SODIUM 200 [USP'U]/100ML
INJECTION, SOLUTION INTRAVENOUS
Status: DISCONTINUED | OUTPATIENT
Start: 2020-06-26 | End: 2020-07-01

## 2020-06-26 RX ORDER — NITROGLYCERIN 5 MG/ML
INJECTION, SOLUTION INTRAVENOUS
Status: DISCONTINUED | OUTPATIENT
Start: 2020-06-26 | End: 2020-06-26 | Stop reason: HOSPADM

## 2020-06-26 RX ORDER — LIDOCAINE HYDROCHLORIDE 20 MG/ML
INJECTION, SOLUTION INFILTRATION; PERINEURAL
Status: DISCONTINUED | OUTPATIENT
Start: 2020-06-26 | End: 2020-06-26 | Stop reason: HOSPADM

## 2020-06-26 RX ORDER — FUROSEMIDE 10 MG/ML
INJECTION INTRAMUSCULAR; INTRAVENOUS
Status: DISCONTINUED | OUTPATIENT
Start: 2020-06-26 | End: 2020-06-26 | Stop reason: HOSPADM

## 2020-06-26 RX ORDER — PANTOPRAZOLE SODIUM 40 MG/1
40 TABLET, DELAYED RELEASE ORAL DAILY
Status: DISCONTINUED | OUTPATIENT
Start: 2020-06-26 | End: 2020-07-05 | Stop reason: HOSPADM

## 2020-06-26 RX ORDER — SODIUM CHLORIDE 9 MG/ML
INJECTION, SOLUTION INTRAVENOUS ONCE
Status: COMPLETED | OUTPATIENT
Start: 2020-06-26 | End: 2020-06-26

## 2020-06-26 RX ADMIN — FLUTICASONE FUROATE AND VILANTEROL TRIFENATATE 1 PUFF: 100; 25 POWDER RESPIRATORY (INHALATION) at 09:06

## 2020-06-26 RX ADMIN — AMPICILLIN SODIUM AND SULBACTAM SODIUM 3 G: 2; 1 INJECTION, POWDER, FOR SOLUTION INTRAMUSCULAR; INTRAVENOUS at 10:06

## 2020-06-26 RX ADMIN — HEPARIN SODIUM: 5000 INJECTION INTRAVENOUS; SUBCUTANEOUS at 08:06

## 2020-06-26 RX ADMIN — ASPIRIN 325 MG ORAL TABLET 325 MG: 325 PILL ORAL at 09:06

## 2020-06-26 RX ADMIN — ATORVASTATIN CALCIUM 80 MG: 20 TABLET, FILM COATED ORAL at 09:06

## 2020-06-26 RX ADMIN — AMPICILLIN SODIUM AND SULBACTAM SODIUM 3 G: 2; 1 INJECTION, POWDER, FOR SOLUTION INTRAMUSCULAR; INTRAVENOUS at 11:06

## 2020-06-26 RX ADMIN — CARVEDILOL 12.5 MG: 12.5 TABLET, FILM COATED ORAL at 09:06

## 2020-06-26 RX ADMIN — PANTOPRAZOLE SODIUM 40 MG: 40 TABLET, DELAYED RELEASE ORAL at 09:06

## 2020-06-26 RX ADMIN — HUMAN ALBUMIN MICROSPHERES AND PERFLUTREN 0.66 MG: 10; .22 INJECTION, SOLUTION INTRAVENOUS at 09:06

## 2020-06-26 RX ADMIN — ESCITALOPRAM OXALATE 10 MG: 5 TABLET, FILM COATED ORAL at 09:06

## 2020-06-26 RX ADMIN — SODIUM NITROPRUSSIDE 50 MG: 25 INJECTION, SOLUTION, CONCENTRATE INTRAVENOUS at 08:06

## 2020-06-26 RX ADMIN — CLOPIDOGREL BISULFATE 600 MG: 300 TABLET, FILM COATED ORAL at 12:06

## 2020-06-26 RX ADMIN — OXYCODONE HYDROCHLORIDE 5 MG: 5 TABLET ORAL at 08:06

## 2020-06-26 RX ADMIN — SODIUM NITROPRUSSIDE 0.3 MCG/KG/MIN: 25 INJECTION, SOLUTION, CONCENTRATE INTRAVENOUS at 08:06

## 2020-06-26 RX ADMIN — DIPHENHYDRAMINE HYDROCHLORIDE 50 MG: 25 CAPSULE ORAL at 12:06

## 2020-06-26 NOTE — HPI
Blaine Multani is a 69 y.o. male with history of CAD s/p CABG x3 (1998), HFrEF (20%), CKD III, afib on coumadin, PVD, HTN, asthma, smoking, who presents as transfer for interventional cardiology evaluation of severe L main and three vessel disease / high risk PCI. Patient initially presented to [facility] 6/22 complaining of chest pain across his anterior chest, without radiations. Per patient pain had been intermittent for the prior three days but became constant the day prior prompting his presentation. Pain was described as pressure and burning pain, but he thought he had indigestion. The pain was exacerbated by walking. He had associated shortness of breath. He was given NTG and ASA by EMS pta and his chest pain improved. Patient also recently completed 7 day course keflex for dental abscess (R max, completed day of transfer). EKG without stemi; patient was not anticoagulated initially due to being therapeutic on warfarin, but then ACS protocol was initiated. He was taken for angiogram which revealed 80% distal L main stenosis, 80% pLAD stenosis, 80% LCx, RCA totally occluded, LIMAx1 & SVG x2 totally occluded. Patient was not felt to be a surgical candidate; subsequent venous mapping did not show viable bypass vessels. Patient was subsequently transferred to [facility] for high risk L main / circumflex stenting with Impella support. He is COVID negative. He currently denies chest pain or shortness of breath.

## 2020-06-26 NOTE — ASSESSMENT & PLAN NOTE
69 y.o. male with history of CAD s/p CABG x3 (1998), HFrEF (35-40%), CKD III, afib on coumadin, PVD, HTN, asthma, smoking, who presents as transfer for interventional cardiology evaluation of severe L main and three vessel disease / high risk PCI.   - LHC: 80% distal L main stenosis, 80% pLAD stenosis, 80% LCx, RCA totally occluded, LIMAx1 & SVG x2 totally occluded. Patient was not felt to be a surgical candidate; subsequent venous mapping did not show viable bypass vessels.   - transferred to [facility] for high risk L main / circumflex stenting with Impella support.     Plan:  - Interventional cardiology consulted, NPO  - f/u Echo  - Last received pm lovenox dose 100 mg @1640, will hold for possible intervention as above

## 2020-06-26 NOTE — HPI
69 y.o. male with history of CAD s/p CABG x3 (LIMA-LAD, SVG-OM, SVG-D in 1998), HFrEF (35-40%), CKD III, afib on coumadin (last dose on Sunday), PAD s/p L fem-pop (occluded), BL SFA stenting, HTN, asthma, smoking, who presents as transfer for interventional cardiology evaluation of severe L main and three vessel disease / high risk PCI.      Patient initially presented to [facility] 6/22 complaining of intermittent angina and WYLIE for four days. His angina resolved with ASA and NTG. He was found to have a NSTEMI and underwent LHC but was found to have dominant L circulation, 80% stenoses of the LM, pLAD, and pLCX. His RCA was totally occluded, as well as all his grafts. His descending aortography showed a 60-70% R iliac stenosis with mild disease in the remaining of the artery. He was found to have L iliac venous stent.     Venous mapping showed absent L greater saphenous vein, patent R greater saphenous vein from the groin to the distal thigh, BL calcified lesser saphenous veins, bypass grafts in the right thigh which both appear occluded with one possibly acutely occluded while the other appears to be chronically occluded.     He was kept on medical management, and has not had angina since admitted to OSH on Monday.Patient was [place] [facility] [place] L main / circumflex stenting with Impella support. He is COVID negative. He currently denies chest pain or shortness of breath and is lying comfortably in bed. Creatinine is 1.3

## 2020-06-26 NOTE — ED NOTES
Hob up 40 degrees.AAO . Denies any chest pain . Feels sob ( sat.s 97-98%). Waiting for admit bed. Cardiology here . Maintained on cardiac monitor in atrial fib .

## 2020-06-26 NOTE — ED PROVIDER NOTES
Encounter Date: 2020       History     Chief Complaint   Patient presents with    Tx for Cardiology Consult     Pt has hx of CABG, A-Fib. Pt accepted by MD Anthony for PCI. Pt denies CP.     COVID-19 Concerns     Pt tx from [facility] for Covid testing.      68 yo M with pmhx PAD, CAD s/p CABG (), CKD III, afib, HTN, HLD presents as transfer from a  for interventional cardiology.  Patient initially presented to  for chest pain and shortness of breath.  He had a catheterization performed  that revealed severe left main disease as well as multivessel CAD.  [place] decision [place] [facility] [place] Impella device.  Patient was sent to the emergency department for a COVID test.  He reports being chest pain and shortness of breath free.  No cough.  Otherwise complains of pain at his right maxillary molars from a dental abscess.        Review of patient's allergies indicates:  No Known Allergies  Past Medical History:   Diagnosis Date    A-fib     Asthma     Bronchitis     Colon polyps     Hemorrhoids     HLD (hyperlipidemia)     Hypertension     PVD (peripheral vascular disease)      Past Surgical History:   Procedure Laterality Date    angiogram with stents Left     leg    CHOLECYSTECTOMY      COLONOSCOPY W/ POLYPECTOMY      CORONARY ARTERY BYPASS GRAFT  1998    x3    TOTAL HIP ARTHROPLASTY Right      Family History   Problem Relation Age of Onset    Cancer Mother     Cancer Sister      Social History     Tobacco Use    Smoking status: Former Smoker     Quit date:      Years since quittin.4    Smokeless tobacco: Never Used   Substance Use Topics    Alcohol use: Yes     Alcohol/week: 12.0 standard drinks     Types: 12 Cans of beer per week    Drug use: No     Review of Systems   Constitutional: Negative for fever.   HENT: Negative for sore throat.         Positive for dentalgia   Respiratory: Negative for shortness of breath.    Cardiovascular:  Negative for chest pain.   Gastrointestinal: Negative for nausea.   Genitourinary: Negative for dysuria.   Musculoskeletal: Negative for back pain.   Skin: Negative for rash.   Neurological: Negative for weakness.   Hematological: Does not bruise/bleed easily.       Physical Exam     Initial Vitals [06/25/20 2233]   BP Pulse Resp Temp SpO2   115/70 65 16 98.3 °F (36.8 °C) 99 %      MAP       --         Physical Exam    Nursing note and vitals reviewed.  Constitutional: He appears well-developed and well-nourished. He is not diaphoretic. No distress.   HENT:   Head: Normocephalic and atraumatic.   Eyes: Conjunctivae and EOM are normal.   Neck: Normal range of motion. Neck supple. No JVD present.   Cardiovascular: Intact distal pulses. An irregularly irregular rhythm present.  Exam reveals no gallop and no friction rub.    No murmur heard.  Pulmonary/Chest: Breath sounds normal. No respiratory distress. He has no wheezes. He has no rhonchi. He has no rales. He exhibits no tenderness.   Well-healed midline sternotomy incision   Abdominal: Soft. Bowel sounds are normal. He exhibits no distension and no mass. There is no abdominal tenderness. There is no rebound and no guarding.   Musculoskeletal: Normal range of motion. No tenderness or edema.   Lymphadenopathy:     He has no cervical adenopathy.   Neurological: He is alert and oriented to person, place, and time. He has normal strength. No sensory deficit.   Skin: Skin is warm and dry. Capillary refill takes less than 2 seconds.   Psychiatric: He has a normal mood and affect.         ED Course   Procedures  Labs Reviewed   CBC W/ AUTO DIFFERENTIAL - Abnormal; Notable for the following components:       Result Value    RBC 3.24 (*)     Hemoglobin 11.3 (*)     Hematocrit 34.6 (*)     Mean Corpuscular Volume 107 (*)     Mean Corpuscular Hemoglobin 34.9 (*)     RDW 15.2 (*)     All other components within normal limits   ISTAT PROCEDURE - Abnormal; Notable for the  following components:    POC Creatinine 1.5 (*)     POC Hematocrit 35 (*)     All other components within normal limits   SARS-COV-2 RNA AMPLIFICATION, QUAL   COMPREHENSIVE METABOLIC PANEL   TROPONIN I   B-TYPE NATRIURETIC PEPTIDE   PROTIME-INR   TYPE & SCREEN   ISTAT CHEM8     EKG Readings: (Independently Interpreted)   Rhythm: Atrial Fibrillation. Heart Rate: 64. ST Segments: Normal ST Segments. T Waves Flipped: II, III, AVF, V4, V5 and V6. Axis: Right Axis Deviation.       Imaging Results    None          Medical Decision Making:   Initial Assessment:   70 yo M with pmhx PAD, CAD s/p CABG (1990s), CKD III, afib, HTN, HLD presents as transfer from a  for interventional cardiology eval of a Non-STEMI.  Differential Diagnosis:   Non-STEMI, COVID  Clinical Tests:   Lab Tests: Ordered  Radiological Study: Ordered  Medical Tests: Ordered  ED Management:  Discussed with Cardiology who recommend interventional cardiology evaluation in morning.  Patient is chest pain-free.  Patient will be admitted to hospital Medicine.                                 Clinical Impression:       ICD-10-CM ICD-9-CM   1. NSTEMI (non-ST elevated myocardial infarction)  I21.4 410.70   2. Chest pain  R07.9 786.50         Disposition:   Disposition: Admitted     ED Disposition Condition    Admit                           Jean Alexandra MD  06/25/20 0644

## 2020-06-26 NOTE — PLAN OF CARE
(Physician in Lead of Transfers)   Outside Transfer Acceptance Note / Regional Referral Center      Upon patient arrival to floor, please call extension [phone] (if no answer, this will flip to a beeper, so enter your call back number) for Hospital Medicine admit team assignment and for additional admit orders for the patient.  Do not page the attending physician associated with the patient on arrival (this physician may not be on duty at the time of arrival).  Rather, always call [phone] to reach the triage physician for orders and team assignment.      Transferring Physician: Dr. Londono     Accepting Physician: Meenu Tapia MD    Date of Acceptance: 06/25/2020    Transferring Facility: [facility]     Reason for Transfer: needs cardiology     Report from Transferring Physician/Hospital course: The patient is a 70 y/o male with PMH of CAD, CABG, PVD, a-fib, and tobacco use who presented with NSTEMI to . Transfer is being requested for cardiology services for cath with possible impella support. The case was discussed with Dr. JARRED Cruz in interventional cards and he would like the patient admitted to hospital medicine. He underwent LHC on 6/23 which showed severe left main and 3 vessel disease. Patient otherwise hemodynamically stable. Patient will need to come to the ER for covid testing then be admitted.       Labs & Radiographs: see transfer records      To Do List:   1) Telemetry  2) Interventional cardiology consult (Dr. Cruz)      Meenu Tapia MD  Hospital Medicine Staff

## 2020-06-26 NOTE — H&P
[facility] Hospital Medicine  History & Physical    Patient Name: Blaine Multani  MRN: [id number]  Admission Date: 6/25/2020  Attending Physician: Mansoor Deleon MD   Primary Care Provider: Nay Alcantar MD    [facility] Medicine Team: Networked reference to record PCT  Puma Santos MD     Patient information was obtained from patient, past medical records and ER records.     Subjective:     Principal Problem:NSTEMI (non-ST elevated myocardial infarction)    Chief Complaint:   Chief Complaint   Patient presents with    Tx for Cardiology Consult     Pt has hx of CABG, A-Fib. Pt accepted by MD Anthony for PCI. Pt denies CP.     COVID-19 Concerns     Pt tx from [facility] for Covid testing.         HPI: Blaine Multani is a 69 y.o. male with history of CAD s/p CABG x3 (1998), HFrEF (35-40%), CKD III, afib on coumadin, PVD, HTN, asthma, smoking, who presents as transfer for interventional cardiology evaluation of severe L main and three vessel disease / high risk PCI. Patient initially presented to [facility] 6/22 complaining of chest pain across his anterior chest, without radiations. Per patient pain had been intermittent for the prior three days but became constant the day prior prompting his presentation. Pain was described as pressure and burning pain, but he thought he had indigestion. The pain was exacerbated by walking. He had associated shortness of breath. He was given NTG and ASA by EMS pta and his chest pain improved. Patient also recently completed 7 day course keflex for dental abscess (R max, completed day of transfer). EKG without stemi; patient was not anticoagulated initially due to being therapeutic on warfarin, but then ACS protocol was initiated. He was taken for angiogram which revealed 80% distal L main stenosis, 80% pLAD stenosis, 80% LCx, RCA totally occluded, LIMAx1 & SVG x2 totally occluded. Patient was not felt to be a surgical candidate; subsequent  venous mapping did not show viable bypass vessels. Patient was subsequently transferred to [facility] for high risk L main / circumflex stenting with Impella support. He is COVID negative. He currently denies chest pain or shortness of breath.     Past Medical History:   Diagnosis Date    A-fib     Asthma     Bronchitis     Colon polyps     Hemorrhoids     HLD (hyperlipidemia)     Hypertension     PVD (peripheral vascular disease)        Past Surgical History:   Procedure Laterality Date    angiogram with stents Left     leg    CHOLECYSTECTOMY      COLONOSCOPY W/ POLYPECTOMY      CORONARY ARTERY BYPASS GRAFT  1998    x3    TOTAL HIP ARTHROPLASTY Right        Review of patient's allergies indicates:  No Known Allergies    No current facility-administered medications on file prior to encounter.      Current Outpatient Medications on File Prior to Encounter   Medication Sig    aspirin (ECOTRIN) 81 MG EC tablet Take 1 tablet (81 mg total) by mouth once daily.    atorvastatin (LIPITOR) 80 MG tablet Take 80 mg by mouth once daily.    benzonatate (TESSALON) 200 MG capsule Take 200 mg by mouth 3 (three) times daily as needed.    carvedilol (COREG) 3.125 MG tablet Take 3.125 mg by mouth 2 (two) times daily with meals.    cilostazol (PLETAL) 100 MG Tab Take 50 mg by mouth 2 (two) times daily.    clopidogrel (PLAVIX) 75 mg tablet Take 1 tablet (75 mg total) by mouth once daily.    escitalopram oxalate (LEXAPRO) 10 MG tablet Take 10 mg by mouth once daily.    irbesartan (AVAPRO) 300 MG tablet Take 1 tablet by mouth once daily.    omeprazole (PRILOSEC) 40 MG capsule Take 40 mg by mouth once daily.    phytonadione, vitamin K1, (MEPHYTON) 5 mg Tab take 1 tablet by mouth once    SYMBICORT 160-4.5 mcg/actuation HFAA     warfarin (COUMADIN) 5 MG tablet Take 7.5 mg by mouth once daily.     Family History     Problem Relation (Age of Onset)    Cancer Mother, Sister        Tobacco Use    Smoking status: Former Smoker      Quit date:      Years since quittin.4    Smokeless tobacco: Never Used   Substance and Sexual Activity    Alcohol use: Yes     Alcohol/week: 12.0 standard drinks     Types: 12 Cans of beer per week    Drug use: No    Sexual activity: Not on file     Review of Systems   Constitutional: Positive for activity change. Negative for chills and fever.   HENT: Negative for congestion and sore throat.    Respiratory: Negative for cough, chest tightness and shortness of breath.    Cardiovascular: Negative for chest pain and leg swelling.   Gastrointestinal: Negative for abdominal pain, nausea and vomiting.   Genitourinary: Negative for dysuria, frequency and urgency.   Musculoskeletal: Negative for arthralgias and myalgias.   Neurological: Negative for dizziness, light-headedness and headaches.   Psychiatric/Behavioral: Negative for agitation and confusion.     Objective:     Vital Signs (Most Recent):  Temp: 98.3 °F (36.8 °C) (20 2233)  Pulse: 61 (20 0000)  Resp: 16 (20 0000)  BP: (!) 140/73 (20 0000)  SpO2: 95 % (20 0000) Vital Signs (24h Range):  Temp:  [97.6 °F (36.4 °C)-98.9 °F (37.2 °C)] 98.3 °F (36.8 °C)  Pulse:  [61-72] 61  Resp:  [16-19] 16  SpO2:  [95 %-99 %] 95 %  BP: (104-140)/(68-79) 140/73        There is no height or weight on file to calculate BMI.    Physical Exam  Constitutional:       General: He is not in acute distress.     Appearance: Normal appearance. He is not diaphoretic.   HENT:      Head: Normocephalic and atraumatic.   Eyes:      Conjunctiva/sclera: Conjunctivae normal.   Cardiovascular:      Rate and Rhythm: Normal rate and regular rhythm.      Heart sounds: No murmur. No friction rub. No gallop.    Pulmonary:      Effort: Pulmonary effort is normal. No respiratory distress.      Breath sounds: Wheezing present.   Abdominal:      General: Abdomen is flat. There is no distension.      Palpations: Abdomen is soft.      Tenderness: There is no abdominal  tenderness.   Musculoskeletal: Normal range of motion.         General: No swelling or tenderness.   Skin:     General: Skin is warm and dry.      Comments: Trophic changes BLE   Neurological:      Mental Status: He is alert. Mental status is at baseline.            Assessment/Plan:     * NSTEMI (non-ST elevated myocardial infarction)  69 y.o. male with history of CAD s/p CABG x3 (1998), HFrEF (35-40%), CKD III, afib on coumadin, PVD, HTN, asthma, smoking, who presents as transfer for interventional cardiology evaluation of severe L main and three vessel disease / high risk PCI.   - LHC: 80% distal L main stenosis, 80% pLAD stenosis, 80% LCx, RCA totally occluded, LIMAx1 & SVG x2 totally occluded. Patient was not felt to be a surgical candidate; subsequent venous mapping did not show viable bypass vessels.   - transferred to [facility] for high risk L main / circumflex stenting with Impella support.     Plan:  - Interventional cardiology consulted, NPO  - f/u Echo  - Last received pm lovenox dose 100 mg @1640, will hold for possible intervention as above    Depression  Continue lexapro      A-fib  See PVD      HTN (hypertension)  Hold ACEi  Resume coreg as tolerated      PAD (peripheral artery disease)  Continue lipitor  Hold warfarin as above  Daily INR        VTE Risk Mitigation (From admission, onward)         Ordered     Place sequential compression device  Until discontinued      06/26/20 0022     IP VTE LOW RISK PATIENT  Once      06/26/20 0022                   Puma Santos MD  Department of Hospital Medicine   [facility]

## 2020-06-26 NOTE — HPI
69 y.o. male with history of CAD s/p CABG x3 (LIMA-LAD, SVG-OM, SVG-D in 1998), HFrEF (35-40%), CKD III, afib on coumadin (last dose on Sunday), PAD s/p L fem-pop (occluded), BL SFA stenting, HTN, asthma, smoking, who presents as transfer for interventional cardiology evaluation of severe L main and three vessel disease / high risk PCI.     Patient initially presented to [facility] 6/22 complaining of intermittent angina and WYLIE for four days. His angina resolved with ASA and NTG. He was found to have a NSTEMI and underwent LHC but was found to have dominant L circulation, 80% stenoses of the LM, pLAD, and pLCX. His RCA was totally occluded, as well as all his grafts. His descending aortography showed a 60-70% R iliac stenosis with mild disease in the remaining of the artery. He was found to have L iliac venous stent.    Venous mapping showed absent L greater saphenous vein, patent R greater saphenous vein from the groin to the distal thigh, BL calcified lesser saphenous veins, bypass grafts in the right thigh which both appear occluded with one possibly acutely occluded while the other appears to be chronically occluded.    He was kept on medical management, and has not had angina since admitted to OSH on Monday.Patient was subsequently transferred to [facility] for high risk L main / circumflex stenting with Impella support. He is COVID negative. He currently denies chest pain or shortness of breath and is lying comfortably in bed. Creatinine is 1.3

## 2020-06-26 NOTE — ED NOTES
Hourly rounding complete. Patient resting in stretcher and is in NAD at this time. Pt is awake alert and oriented x4. Pt updated on POC. Bed low and locked, SR up x2, call bell in patient reach. Pt remains on continuous cardiac monitor. Urinal at bedside.

## 2020-06-26 NOTE — SUBJECTIVE & OBJECTIVE
Past Medical History:   Diagnosis Date    A-fib     Asthma     Bronchitis     Colon polyps     Hemorrhoids     HLD (hyperlipidemia)     Hypertension     PVD (peripheral vascular disease)        Past Surgical History:   Procedure Laterality Date    angiogram with stents Left     leg    CHOLECYSTECTOMY      COLONOSCOPY W/ POLYPECTOMY      CORONARY ARTERY BYPASS GRAFT  1998    x3    TOTAL HIP ARTHROPLASTY Right        Review of patient's allergies indicates:  No Known Allergies    No current facility-administered medications on file prior to encounter.      Current Outpatient Medications on File Prior to Encounter   Medication Sig    aspirin (ECOTRIN) 81 MG EC tablet Take 1 tablet (81 mg total) by mouth once daily.    atorvastatin (LIPITOR) 80 MG tablet Take 80 mg by mouth once daily.    benzonatate (TESSALON) 200 MG capsule Take 200 mg by mouth 3 (three) times daily as needed.    carvedilol (COREG) 3.125 MG tablet Take 3.125 mg by mouth 2 (two) times daily with meals.    cilostazol (PLETAL) 100 MG Tab Take 50 mg by mouth 2 (two) times daily.    clopidogrel (PLAVIX) 75 mg tablet Take 1 tablet (75 mg total) by mouth once daily.    escitalopram oxalate (LEXAPRO) 10 MG tablet Take 10 mg by mouth once daily.    irbesartan (AVAPRO) 300 MG tablet Take 1 tablet by mouth once daily.    omeprazole (PRILOSEC) 40 MG capsule Take 40 mg by mouth once daily.    phytonadione, vitamin K1, (MEPHYTON) 5 mg Tab take 1 tablet by mouth once    SYMBICORT 160-4.5 mcg/actuation HFAA     warfarin (COUMADIN) 5 MG tablet Take 7.5 mg by mouth once daily.     Family History     Problem Relation (Age of Onset)    Cancer Mother, Sister        Tobacco Use    Smoking status: Former Smoker     Quit date:      Years since quittin.4    Smokeless tobacco: Never Used   Substance and Sexual Activity    Alcohol use: Yes     Alcohol/week: 12.0 standard drinks     Types: 12 Cans of beer per week    Drug use: No    Sexual  activity: Not on file     Review of Systems   Constitution: Negative for chills and decreased appetite.   HENT: Negative for congestion, ear discharge and ear pain.    Eyes: Negative for blurred vision and discharge.   Cardiovascular: Positive for dyspnea on exertion and orthopnea. Negative for chest pain, claudication and cyanosis.   Respiratory: Negative for cough, hemoptysis and shortness of breath.    Endocrine: Negative for cold intolerance and heat intolerance.   Skin: Negative for color change and nail changes.   Musculoskeletal: Negative for arthritis and back pain.   Gastrointestinal: Negative for bloating and abdominal pain.   Genitourinary: Negative for bladder incontinence and decreased libido.   Neurological: Negative for aphonia and brief paralysis.   Psychiatric/Behavioral: Negative for altered mental status.     Objective:     Vital Signs (Most Recent):  Temp: 97.7 °F (36.5 °C) (06/26/20 1113)  Pulse: 62 (06/26/20 1113)  Resp: 14 (06/26/20 1113)  BP: 132/89 (06/26/20 1113)  SpO2: 98 % (06/26/20 1113) Vital Signs (24h Range):  Temp:  [97.7 °F (36.5 °C)-98.9 °F (37.2 °C)] 97.7 °F (36.5 °C)  Pulse:  [61-72] 62  Resp:  [14-20] 14  SpO2:  [95 %-99 %] 98 %  BP: (110-156)/(68-99) 132/89        There is no height or weight on file to calculate BMI.    SpO2: 98 %  O2 Device (Oxygen Therapy): nasal cannula      Intake/Output Summary (Last 24 hours) at 6/26/2020 1327  Last data filed at 6/26/2020 1216  Gross per 24 hour   Intake 100 ml   Output --   Net 100 ml       Lines/Drains/Airways     Peripheral Intravenous Line                 Peripheral IV - Single Lumen 06/25/20 2255 20 G Left Antecubital less than 1 day         Peripheral IV - Single Lumen 06/26/20 0002 22 G Left Hand less than 1 day                Physical Exam   Constitutional: He is oriented to person, place, and time. He appears well-developed and well-nourished.   HENT:   Head: Normocephalic and atraumatic.   Nose: Nose normal.   Mouth/Throat: No  oropharyngeal exudate.   Eyes: Right eye exhibits no discharge. Left eye exhibits no discharge. No scleral icterus.   Neck: Normal range of motion. Neck supple. No JVD present.   Cardiovascular: Normal rate, regular rhythm, S1 normal and S2 normal. Exam reveals no gallop, no S3, no S4, no distant heart sounds, no friction rub, no midsystolic click and no opening snap.   No murmur heard.  Pulses:       Radial pulses are 2+ on the right side and 2+ on the left side.        Femoral pulses are 2+ on the right side and 2+ on the left side.  Pulmonary/Chest: Effort normal and breath sounds normal. No respiratory distress. He has no wheezes. He has no rales. He exhibits no tenderness.   Abdominal: Soft. Bowel sounds are normal. He exhibits no distension. There is no abdominal tenderness. There is no rebound.   Musculoskeletal: Normal range of motion.         General: No tenderness, deformity or edema.   Lymphadenopathy:     He has no cervical adenopathy.   Neurological: He is alert and oriented to person, place, and time. No cranial nerve deficit.   Skin: Skin is warm and dry.   Chronic venous statis changes on BLE, superficial dilated veins noted   Psychiatric: He has a normal mood and affect. His behavior is normal.       Significant Labs:   CMP   Recent Labs   Lab 06/25/20 2310 06/26/20  0322    139   K 4.0 3.9    106   CO2 23 23    101   BUN 32* 33*   CREATININE 1.4 1.3   CALCIUM 8.8 8.8   PROT 6.4 6.2   ALBUMIN 3.4* 3.3*   BILITOT 1.1* 1.1*   ALKPHOS 87 83   AST 26 23   ALT 21 18   ANIONGAP 10 10   ESTGFRAFRICA 58.8* >60.0   EGFRNONAA 50.9* 55.7*   , CBC   Recent Labs   Lab 06/25/20 2310 06/26/20  0322   WBC 6.79  --  6.44   HGB 11.3*  --  10.9*   HCT 34.6*   < > 33.3*     --  165    < > = values in this interval not displayed.   , Troponin   Recent Labs   Lab 06/25/20 2310 06/26/20  0322   TROPONINI 2.159* 1.911*    and All pertinent lab results from the last 24 hours have been  reviewed.

## 2020-06-26 NOTE — CONSULTS
Consult received, unable to see patient as he is in cath lab this afternoon. Recent dental abscess, concerns by cardio that he will need impella after high risk PCI, requesting abx recommendations. Discussed w/ primary, recommend starting unasyn. Will see patient for formal consult tomorrow.      Mikala Davis DO  General ID  Infectious Disease Fellow  C: [phone]  P: [phone]

## 2020-06-26 NOTE — ASSESSMENT & PLAN NOTE
69 y.o. male with history of CAD s/p CABG x3 (LIMA-LAD, SVG-OM, SVG-D in 1998), HFrEF (35-40%), CKD III, afib on coumadin (last dose on Sunday), PAD s/p L fem-pop (occluded), BL SFA stenting, HTN, asthma, smoking, who presents as transfer for interventional cardiology evaluation of severe L main and three vessel disease / high risk PCI     LHC at OSH: Dominant L circulation, 80% stenoses of the LM, pLAD, and pLCX. His RCA was totally occluded, as well as all his grafts. Multivessel CAD s/p CABG with all grafts occluded    -Continue BB, high-intensity statin  -Start ASA (with loading dose 325 mg)  -Load with plavix 600 once, followed by plavix 75 daily  -Control BP to a goal of <130/80  - On schedule for LHC +PCI and possible Impella today

## 2020-06-26 NOTE — ASSESSMENT & PLAN NOTE
69 y.o. male with history of CAD s/p CABG x3 (LIMA-LAD, SVG-OM, SVG-D in 1998), HFrEF (35-40%), CKD III, afib on coumadin (last dose on Sunday), PAD s/p L fem-pop (occluded), BL SFA stenting, HTN, asthma, smoking, who presents as transfer for interventional cardiology evaluation of severe L main and three vessel disease / high risk PCI    Multivessel CAD s/p CABG with all grafts occluded    Dominant L circulation, 80% stenoses of the LM, pLAD, and pLCX. His RCA was totally occluded, as well as all his grafts. His descending aortography showed a 60-70% R iliac stenosis with mild disease in the remaining of the artery. He was found to have L iliac venous stent.    -Continue BB, high-intensity statin  -Start ASA (with loading dose  -Control BP to a goal of <130/80  -Keep NPO  -Rest of plan pending rounds

## 2020-06-26 NOTE — BRIEF OP NOTE
Brief Operative Note:    : Bruno Cruz MD     [facility]     All Operators: Surgeon(s):  MD Vince Ochoa MD Madhav Prakash Upadhyaya, MD Adrian Jose Da Silva de Abreu, MD     Preoperative Diagnosis: NSTEMI (non-ST elevated myocardial infarction) [I21.4]  CAD (coronary artery disease) [I25.10]  PAD (peripheral artery disease) [I73.9]  Non-healing ulcer of lower extremity, left, with unspecified severity [L97.929]     Postop Diagnosis: NSTEMI (non-ST elevated myocardial infarction) [I21.4]  CAD (coronary artery disease) [I25.10]  PAD (peripheral artery disease) [I73.9]  Non-healing ulcer of lower extremity, left, with unspecified severity [L97.929]    Treatments/Procedures: Procedure(s) (LRB):  INSERTION, IMPELLA (N/A)  Left heart cath (Left)  Stent, Drug Eluting, Multi Vessel, Coronary    Findings: High-degree stenosis of the distal LM, proximal LAD and LCx which received PCI. See catheterization report for full details.    Estimated Blood loss: 20 cc    Specimens removed: No    Recommendations: No  -DCed BB  -Continue DAPT and high-intensity statin  -Heparin for Impella  -Dopplers of LUE Q1h. If no Doppler is detected, call directly either Dr Bruno Cruz or on-call Interventional fellow   -Consulted Cardiac Rehab     Check out given to on call fellow

## 2020-06-26 NOTE — CONSULTS
[facility]  Interventional Cardiology  Consult Note    Patient Name: Blaine Multani  MRN: [id number]  Admission Date: 6/25/2020  Hospital Length of Stay: 1 days  Code Status: No Order   Attending Provider: Mansoor Deleon MD  Consulting Provider: Christian Rubio MD  Primary Care Physician: Nay Alcantar MD  Principal Problem:NSTEMI (non-ST elevated myocardial infarction)    Patient information was obtained from patient, past medical records and ER records.     Inpatient consult to Interventional Cardiology  Consult performed by: Christian Rubio MD  Consult ordered by: Puma Santos MD        Subjective:     Chief Complaint:  NSTEMI due to multi-vessel CAD     HPI:  69 y.o. male with history of CAD s/p CABG x3 (LIMA-LAD, SVG-OM, SVG-D in 1998), HFrEF (35-40%), CKD III, afib on coumadin (last dose on Sunday), PAD s/p L fem-pop (occluded), BL SFA stenting, HTN, asthma, smoking, who presents as transfer for interventional cardiology evaluation of severe L main and three vessel disease / high risk PCI.     Patient initially presented to [facility] 6/22 complaining of intermittent angina and WYLIE for four days. His angina resolved with ASA and NTG. He was found to have a NSTEMI and underwent LHC but was found to have dominant L circulation, 80% stenoses of the LM, pLAD, and pLCX. His RCA was totally occluded, as well as all his grafts. His descending aortography showed a 60-70% R iliac stenosis with mild disease in the remaining of the artery. He was found to have L iliac venous stent.    Venous mapping showed absent L greater saphenous vein, patent R greater saphenous vein from the groin to the distal thigh, BL calcified lesser saphenous veins, bypass grafts in the right thigh which both appear occluded with one possibly acutely occluded while the other appears to be chronically occluded.    He was kept on medical management, and has not had angina  since admitted to OSH on Monday.Patient was subsequently transferred to [facility] for high risk L main / circumflex stenting with Impella support. He is COVID negative. He currently denies chest pain or shortness of breath and is lying comfortably in bed. Creatinine is 1.3    Past Medical History:   Diagnosis Date    A-fib     Asthma     Bronchitis     Colon polyps     Hemorrhoids     HLD (hyperlipidemia)     Hypertension     PVD (peripheral vascular disease)        Past Surgical History:   Procedure Laterality Date    angiogram with stents Left     leg    CHOLECYSTECTOMY      COLONOSCOPY W/ POLYPECTOMY      CORONARY ARTERY BYPASS GRAFT  1998    x3    TOTAL HIP ARTHROPLASTY Right        Review of patient's allergies indicates:  No Known Allergies    (Not in a hospital admission)    Family History     Problem Relation (Age of Onset)    Cancer Mother, Sister        Tobacco Use    Smoking status: Former Smoker     Quit date:      Years since quittin.4    Smokeless tobacco: Never Used   Substance and Sexual Activity    Alcohol use: Yes     Alcohol/week: 12.0 standard drinks     Types: 12 Cans of beer per week    Drug use: No    Sexual activity: Not on file     Review of Systems   Constitution: Negative for chills and decreased appetite.   HENT: Negative for congestion, ear discharge and ear pain.    Eyes: Negative for blurred vision and discharge.   Cardiovascular: Positive for chest pain and dyspnea on exertion. Negative for claudication and cyanosis.   Respiratory: Negative for cough and hemoptysis.    Endocrine: Negative for cold intolerance and heat intolerance.   Skin: Negative for color change and nail changes.   Musculoskeletal: Negative for arthritis and back pain.   Gastrointestinal: Negative for bloating and abdominal pain.   Genitourinary: Negative for bladder incontinence and decreased libido.   Neurological: Negative for aphonia and brief paralysis.     Objective:     Vital Signs (Most  Recent):  Temp: 98.1 °F (36.7 °C) (06/26/20 0723)  Pulse: 66 (06/26/20 0723)  Resp: 20 (06/26/20 0723)  BP: (!) 142/88 (06/26/20 0723)  SpO2: 98 % (06/26/20 0723) Vital Signs (24h Range):  Temp:  [97.6 °F (36.4 °C)-98.9 °F (37.2 °C)] 98.1 °F (36.7 °C)  Pulse:  [61-72] 66  Resp:  [16-20] 20  SpO2:  [95 %-99 %] 98 %  BP: (104-144)/(68-89) 142/88        There is no height or weight on file to calculate BMI.    SpO2: 98 %  O2 Device (Oxygen Therapy): room air    No intake or output data in the 24 hours ending 06/26/20 0826    Lines/Drains/Airways     Peripheral Intravenous Line                 Peripheral IV - Single Lumen 06/25/20 2255 20 G Left Antecubital less than 1 day         Peripheral IV - Single Lumen 06/26/20 0002 22 G Left Hand less than 1 day                Physical Exam   Constitutional: He is oriented to person, place, and time. He appears well-developed and well-nourished.   HENT:   Head: Normocephalic and atraumatic.   Nose: Nose normal.   Mouth/Throat: No oropharyngeal exudate.   Eyes: Right eye exhibits no discharge. Left eye exhibits no discharge. No scleral icterus.   Neck: Normal range of motion. Neck supple. No JVD present.   Cardiovascular: Normal rate, regular rhythm, S1 normal and S2 normal. Exam reveals no gallop, no S3, no S4, no distant heart sounds, no friction rub, no midsystolic click and no opening snap.   No murmur heard.  Pulses:       Radial pulses are 2+ on the right side and 2+ on the left side.        Femoral pulses are 2+ on the right side and 2+ on the left side.       L DP and R PT Dopplers are soft.   Pulmonary/Chest: Effort normal and breath sounds normal. No respiratory distress. He has no wheezes. He has no rales. He exhibits no tenderness.   Abdominal: Soft. Bowel sounds are normal. He exhibits no distension. There is no abdominal tenderness. There is no rebound.   Musculoskeletal: Normal range of motion.         General: No tenderness, deformity or edema.   Lymphadenopathy:      He has no cervical adenopathy.   Neurological: He is alert and oriented to person, place, and time. No cranial nerve deficit.   Skin: Skin is warm and dry.   Psychiatric: He has a normal mood and affect. His behavior is normal.       Significant Labs: All pertinent lab results from the last 24 hours have been reviewed.    Significant Imaging: All pertinent images from the last 24h have been reviewed.    Assessment and Plan:     * NSTEMI (non-ST elevated myocardial infarction)  69 y.o. male with history of CAD s/p CABG x3 (LIMA-LAD, SVG-OM, SVG-D in 1998), HFrEF (35-40%), CKD III, afib on coumadin (last dose on Sunday), PAD s/p L fem-pop (occluded), BL SFA stenting, HTN, asthma, smoking, who presents as transfer for interventional cardiology evaluation of severe L main and three vessel disease / high risk PCI    Multivessel CAD s/p CABG with all grafts occluded    Dominant L circulation, 80% stenoses of the LM, pLAD, and pLCX. His RCA was totally occluded, as well as all his grafts. His descending aortography showed a 60-70% R iliac stenosis with mild disease in the remaining of the artery. He was found to have L iliac venous stent.    -Continue BB, high-intensity statin  -Start ASA (with loading dose 325 mg)  -Load with plavix 600 once, followed by plavix 75 daily  -Control BP to a goal of <130/80  -Keep NPO  -Planning to pursue LHC +PCI and Impella later today        VTE Risk Mitigation (From admission, onward)         Ordered     Place sequential compression device  Until discontinued      06/26/20 0022     IP VTE LOW RISK PATIENT  Once      06/26/20 0022                Christian Aldana MD  Interventional Cardiology   [facility]

## 2020-06-26 NOTE — ED NOTES
Received call from cath lab, states coming to ED to get patient and to give benadryl and plavix, awaiting meds from pharmacy.

## 2020-06-26 NOTE — SUBJECTIVE & OBJECTIVE
Past Medical History:   Diagnosis Date    A-fib     Asthma     Bronchitis     Colon polyps     Hemorrhoids     HLD (hyperlipidemia)     Hypertension     PVD (peripheral vascular disease)        Past Surgical History:   Procedure Laterality Date    angiogram with stents Left     leg    CHOLECYSTECTOMY      COLONOSCOPY W/ POLYPECTOMY      CORONARY ARTERY BYPASS GRAFT  1998    x3    TOTAL HIP ARTHROPLASTY Right        Review of patient's allergies indicates:  No Known Allergies    No current facility-administered medications on file prior to encounter.      Current Outpatient Medications on File Prior to Encounter   Medication Sig    aspirin (ECOTRIN) 81 MG EC tablet Take 1 tablet (81 mg total) by mouth once daily.    atorvastatin (LIPITOR) 80 MG tablet Take 80 mg by mouth once daily.    benzonatate (TESSALON) 200 MG capsule Take 200 mg by mouth 3 (three) times daily as needed.    carvedilol (COREG) 3.125 MG tablet Take 3.125 mg by mouth 2 (two) times daily with meals.    cilostazol (PLETAL) 100 MG Tab Take 50 mg by mouth 2 (two) times daily.    clopidogrel (PLAVIX) 75 mg tablet Take 1 tablet (75 mg total) by mouth once daily.    escitalopram oxalate (LEXAPRO) 10 MG tablet Take 10 mg by mouth once daily.    irbesartan (AVAPRO) 300 MG tablet Take 1 tablet by mouth once daily.    omeprazole (PRILOSEC) 40 MG capsule Take 40 mg by mouth once daily.    phytonadione, vitamin K1, (MEPHYTON) 5 mg Tab take 1 tablet by mouth once    SYMBICORT 160-4.5 mcg/actuation HFAA     warfarin (COUMADIN) 5 MG tablet Take 7.5 mg by mouth once daily.     Family History     Problem Relation (Age of Onset)    Cancer Mother, Sister        Tobacco Use    Smoking status: Former Smoker     Quit date:      Years since quittin.4    Smokeless tobacco: Never Used   Substance and Sexual Activity    Alcohol use: Yes     Alcohol/week: 12.0 standard drinks     Types: 12 Cans of beer per week    Drug use: No    Sexual  activity: Not on file     Review of Systems   Constitutional: Positive for activity change. Negative for chills and fever.   HENT: Negative for congestion and sore throat.    Respiratory: Negative for cough, chest tightness and shortness of breath.    Cardiovascular: Negative for chest pain and leg swelling.   Gastrointestinal: Negative for abdominal pain, nausea and vomiting.   Genitourinary: Negative for dysuria, frequency and urgency.   Musculoskeletal: Negative for arthralgias and myalgias.   Neurological: Negative for dizziness, light-headedness and headaches.   Psychiatric/Behavioral: Negative for agitation and confusion.     Objective:     Vital Signs (Most Recent):  Temp: 98.3 °F (36.8 °C) (06/25/20 2233)  Pulse: 61 (06/26/20 0000)  Resp: 16 (06/26/20 0000)  BP: (!) 140/73 (06/26/20 0000)  SpO2: 95 % (06/26/20 0000) Vital Signs (24h Range):  Temp:  [97.6 °F (36.4 °C)-98.9 °F (37.2 °C)] 98.3 °F (36.8 °C)  Pulse:  [61-72] 61  Resp:  [16-19] 16  SpO2:  [95 %-99 %] 95 %  BP: (104-140)/(68-79) 140/73        There is no height or weight on file to calculate BMI.    Physical Exam  Constitutional:       General: He is not in acute distress.     Appearance: Normal appearance. He is not diaphoretic.   HENT:      Head: Normocephalic and atraumatic.   Eyes:      Conjunctiva/sclera: Conjunctivae normal.   Cardiovascular:      Rate and Rhythm: Normal rate and regular rhythm.      Heart sounds: No murmur. No friction rub. No gallop.    Pulmonary:      Effort: Pulmonary effort is normal. No respiratory distress.      Breath sounds: Wheezing present.   Abdominal:      General: Abdomen is flat. There is no distension.      Palpations: Abdomen is soft.      Tenderness: There is no abdominal tenderness.   Musculoskeletal: Normal range of motion.         General: No swelling or tenderness.   Skin:     General: Skin is warm and dry.      Comments: Trophic changes BLE   Neurological:      Mental Status: He is alert. Mental status  is at baseline.

## 2020-06-26 NOTE — ED NOTES
Care assumed.    Patient identifiers verified verbally with patient and correct for Blaine Multani.    LOC/ APPEARANCE: The patient is AAOx4. Pt is speaking appropriately, no slurred speech. Pt in hospital gown. Continuous cardiac monitor on patient. Pt is clean and well groomed. No JVD visible. Pt updated on POC. Bed low and locked with side rails up x2, call bell in pt reach. Pt c/o right jaw pain s/t dental abscess, requesting pain medication, will page team.  SKIN: Skin is warm dry and intact, and color is consistent with ethnicity. Capillary refill <3 seconds. Mucus membranes moist, acyanotic.  RESPIRATORY: Airway is open and patent. Respirations-spontaneous, unlabored, regular rate, equal bilaterally on inspiration and expiration. No accessory muscle use noted. Lungs clear to auscultation in all fields bilaterally anterior and posterior.   CARDIAC: Patient has regular heart rate.  No peripheral edema noted, and patient has no c/o chest pain. Peripheral pulses present equal and strong throughout.  ABDOMEN: Soft and non-tender to palpation with no distention noted.   NEUROLOGIC: Eyes open spontaneously and facial expression symmetrical. Pt behavior appropriate to situation, and pt follows commands. Pt reports sensation present in all extremities when touched with a finger, denies any numbness or tingling. PERRLA  MUSCULOSKELETAL: Spontaneous movement noted to all extremities.

## 2020-06-26 NOTE — ASSESSMENT & PLAN NOTE
[id number] of 3. Conferring a stroke risk of 3.2% per year    - currently with rate controlled afib  - holding warfarin for cath

## 2020-06-26 NOTE — SUBJECTIVE & OBJECTIVE
Past Medical History:   Diagnosis Date    A-fib     Asthma     Bronchitis     Colon polyps     Hemorrhoids     HLD (hyperlipidemia)     Hypertension     PVD (peripheral vascular disease)        Past Surgical History:   Procedure Laterality Date    angiogram with stents Left     leg    CHOLECYSTECTOMY      COLONOSCOPY W/ POLYPECTOMY      CORONARY ARTERY BYPASS GRAFT  1998    x3    TOTAL HIP ARTHROPLASTY Right        Review of patient's allergies indicates:  No Known Allergies    (Not in a hospital admission)    Family History     Problem Relation (Age of Onset)    Cancer Mother, Sister        Tobacco Use    Smoking status: Former Smoker     Quit date:      Years since quittin.4    Smokeless tobacco: Never Used   Substance and Sexual Activity    Alcohol use: Yes     Alcohol/week: 12.0 standard drinks     Types: 12 Cans of beer per week    Drug use: No    Sexual activity: Not on file     Review of Systems   Constitution: Negative for chills and decreased appetite.   HENT: Negative for congestion, ear discharge and ear pain.    Eyes: Negative for blurred vision and discharge.   Cardiovascular: Positive for chest pain and dyspnea on exertion. Negative for claudication and cyanosis.   Respiratory: Negative for cough and hemoptysis.    Endocrine: Negative for cold intolerance and heat intolerance.   Skin: Negative for color change and nail changes.   Musculoskeletal: Negative for arthritis and back pain.   Gastrointestinal: Negative for bloating and abdominal pain.   Genitourinary: Negative for bladder incontinence and decreased libido.   Neurological: Negative for aphonia and brief paralysis.     Objective:     Vital Signs (Most Recent):  Temp: 98.1 °F (36.7 °C) (20)  Pulse: 66 (20)  Resp: 20 (20)  BP: (!) 142/88 (20)  SpO2: 98 % (20) Vital Signs (24h Range):  Temp:  [97.6 °F (36.4 °C)-98.9 °F (37.2 °C)] 98.1 °F (36.7 °C)  Pulse:  [61-72]  66  Resp:  [16-20] 20  SpO2:  [95 %-99 %] 98 %  BP: (104-144)/(68-89) 142/88        There is no height or weight on file to calculate BMI.    SpO2: 98 %  O2 Device (Oxygen Therapy): room air    No intake or output data in the 24 hours ending 06/26/20 0826    Lines/Drains/Airways     Peripheral Intravenous Line                 Peripheral IV - Single Lumen 06/25/20 2255 20 G Left Antecubital less than 1 day         Peripheral IV - Single Lumen 06/26/20 0002 22 G Left Hand less than 1 day                Physical Exam   Constitutional: He is oriented to person, place, and time. He appears well-developed and well-nourished.   HENT:   Head: Normocephalic and atraumatic.   Nose: Nose normal.   Mouth/Throat: No oropharyngeal exudate.   Eyes: Right eye exhibits no discharge. Left eye exhibits no discharge. No scleral icterus.   Neck: Normal range of motion. Neck supple. No JVD present.   Cardiovascular: Normal rate, regular rhythm, S1 normal and S2 normal. Exam reveals no gallop, no S3, no S4, no distant heart sounds, no friction rub, no midsystolic click and no opening snap.   No murmur heard.  Pulses:       Radial pulses are 2+ on the right side and 2+ on the left side.        Femoral pulses are 2+ on the right side and 2+ on the left side.       Dorsalis pedis pulses are 1+ on the right side and 1+ on the left side.        Posterior tibial pulses are 1+ on the right side and 1+ on the left side.   Pulmonary/Chest: Effort normal and breath sounds normal. No respiratory distress. He has no wheezes. He has no rales. He exhibits no tenderness.   Abdominal: Soft. Bowel sounds are normal. He exhibits no distension. There is no abdominal tenderness. There is no rebound.   Musculoskeletal: Normal range of motion.         General: No tenderness, deformity or edema.   Lymphadenopathy:     He has no cervical adenopathy.   Neurological: He is alert and oriented to person, place, and time. No cranial nerve deficit.   Skin: Skin is  warm and dry.   Psychiatric: He has a normal mood and affect. His behavior is normal.       Significant Labs: All pertinent lab results from the last 24 hours have been reviewed.    Significant Imaging: All pertinent images from the last 24h have been reviewed.

## 2020-06-26 NOTE — H&P
[facility]  Cardiology  History and Physical     Patient Name: Blaine Multani  MRN: [id number]  Admission Date: 6/25/2020  Code Status: Full Code   Attending Provider: London Loera III, MD   Primary Care Physician: Nay Alcantar MD  Principal Problem:NSTEMI (non-ST elevated myocardial infarction)    Patient information was obtained from patient, past medical records and ER records.     Subjective:     Chief Complaint:  Chest pain      HPI:  69 y.o. male with history of CAD s/p CABG x3 (LIMA-LAD, SVG-OM, SVG-D in 1998), HFrEF (35-40%), CKD III, afib on coumadin (last dose on Sunday), PAD s/p L fem-pop (occluded), BL SFA stenting, HTN, asthma, smoking, who presents as transfer for interventional cardiology evaluation of severe L main and three vessel disease / high risk PCI.      Patient initially presented to [facility] 6/22 complaining of intermittent angina and WYLIE for four days. His angina resolved with ASA and NTG. He was found to have a NSTEMI and underwent LHC but was found to have dominant L circulation, 80% stenoses of the LM, pLAD, and pLCX. His RCA was totally occluded, as well as all his grafts. His descending aortography showed a 60-70% R iliac stenosis with mild disease in the remaining of the artery. He was found to have L iliac venous stent.     Venous mapping showed absent L greater saphenous vein, patent R greater saphenous vein from the groin to the distal thigh, BL calcified lesser saphenous veins, bypass grafts in the right thigh which both appear occluded with one possibly acutely occluded while the other appears to be chronically occluded.     He was kept on medical management, and has not had angina since admitted to OSH on Monday.Patient was subsequently transferred to [facility] for high risk L main / circumflex stenting with Impella support. He is COVID negative. He currently denies chest pain or shortness of breath and is lying comfortably in bed. Creatinine is  1.3    Past Medical History:   Diagnosis Date    A-fib     Asthma     Bronchitis     Colon polyps     Hemorrhoids     HLD (hyperlipidemia)     Hypertension     PVD (peripheral vascular disease)        Past Surgical History:   Procedure Laterality Date    angiogram with stents Left     leg    CHOLECYSTECTOMY      COLONOSCOPY W/ POLYPECTOMY      CORONARY ARTERY BYPASS GRAFT  1998    x3    TOTAL HIP ARTHROPLASTY Right        Review of patient's allergies indicates:  No Known Allergies    No current facility-administered medications on file prior to encounter.      Current Outpatient Medications on File Prior to Encounter   Medication Sig    aspirin (ECOTRIN) 81 MG EC tablet Take 1 tablet (81 mg total) by mouth once daily.    atorvastatin (LIPITOR) 80 MG tablet Take 80 mg by mouth once daily.    benzonatate (TESSALON) 200 MG capsule Take 200 mg by mouth 3 (three) times daily as needed.    carvedilol (COREG) 3.125 MG tablet Take 3.125 mg by mouth 2 (two) times daily with meals.    cilostazol (PLETAL) 100 MG Tab Take 50 mg by mouth 2 (two) times daily.    clopidogrel (PLAVIX) 75 mg tablet Take 1 tablet (75 mg total) by mouth once daily.    escitalopram oxalate (LEXAPRO) 10 MG tablet Take 10 mg by mouth once daily.    irbesartan (AVAPRO) 300 MG tablet Take 1 tablet by mouth once daily.    omeprazole (PRILOSEC) 40 MG capsule Take 40 mg by mouth once daily.    phytonadione, vitamin K1, (MEPHYTON) 5 mg Tab take 1 tablet by mouth once    SYMBICORT 160-4.5 mcg/actuation HFAA     warfarin (COUMADIN) 5 MG tablet Take 7.5 mg by mouth once daily.     Family History     Problem Relation (Age of Onset)    Cancer Mother, Sister        Tobacco Use    Smoking status: Former Smoker     Quit date:      Years since quittin.4    Smokeless tobacco: Never Used   Substance and Sexual Activity    Alcohol use: Yes     Alcohol/week: 12.0 standard drinks     Types: 12 Cans of beer per week    Drug use: No     Sexual activity: Not on file     Review of Systems   Constitution: Negative for chills and decreased appetite.   HENT: Negative for congestion, ear discharge and ear pain.    Eyes: Negative for blurred vision and discharge.   Cardiovascular: Positive for dyspnea on exertion and orthopnea. Negative for chest pain, claudication and cyanosis.   Respiratory: Negative for cough, hemoptysis and shortness of breath.    Endocrine: Negative for cold intolerance and heat intolerance.   Skin: Negative for color change and nail changes.   Musculoskeletal: Negative for arthritis and back pain.   Gastrointestinal: Negative for bloating and abdominal pain.   Genitourinary: Negative for bladder incontinence and decreased libido.   Neurological: Negative for aphonia and brief paralysis.   Psychiatric/Behavioral: Negative for altered mental status.     Objective:     Vital Signs (Most Recent):  Temp: 97.7 °F (36.5 °C) (06/26/20 1113)  Pulse: 62 (06/26/20 1113)  Resp: 14 (06/26/20 1113)  BP: 132/89 (06/26/20 1113)  SpO2: 98 % (06/26/20 1113) Vital Signs (24h Range):  Temp:  [97.7 °F (36.5 °C)-98.9 °F (37.2 °C)] 97.7 °F (36.5 °C)  Pulse:  [61-72] 62  Resp:  [14-20] 14  SpO2:  [95 %-99 %] 98 %  BP: (110-156)/(68-99) 132/89        There is no height or weight on file to calculate BMI.    SpO2: 98 %  O2 Device (Oxygen Therapy): nasal cannula      Intake/Output Summary (Last 24 hours) at 6/26/2020 1327  Last data filed at 6/26/2020 1216  Gross per 24 hour   Intake 100 ml   Output --   Net 100 ml       Lines/Drains/Airways     Peripheral Intravenous Line                 Peripheral IV - Single Lumen 06/25/20 2255 20 G Left Antecubital less than 1 day         Peripheral IV - Single Lumen 06/26/20 0002 22 G Left Hand less than 1 day                Physical Exam   Constitutional: He is oriented to person, place, and time. He appears well-developed and well-nourished.   HENT:   Head: Normocephalic and atraumatic.   Nose: Nose normal.    Mouth/Throat: No oropharyngeal exudate.   Eyes: Right eye exhibits no discharge. Left eye exhibits no discharge. No scleral icterus.   Neck: Normal range of motion. Neck supple. No JVD present.   Cardiovascular: Normal rate, regular rhythm, S1 normal and S2 normal. Exam reveals no gallop, no S3, no S4, no distant heart sounds, no friction rub, no midsystolic click and no opening snap.   No murmur heard.  Pulses:       Radial pulses are 2+ on the right side and 2+ on the left side.        Femoral pulses are 2+ on the right side and 2+ on the left side.  Pulmonary/Chest: Effort normal and breath sounds normal. No respiratory distress. He has no wheezes. He has no rales. He exhibits no tenderness.   Abdominal: Soft. Bowel sounds are normal. He exhibits no distension. There is no abdominal tenderness. There is no rebound.   Musculoskeletal: Normal range of motion.         General: No tenderness, deformity or edema.   Lymphadenopathy:     He has no cervical adenopathy.   Neurological: He is alert and oriented to person, place, and time. No cranial nerve deficit.   Skin: Skin is warm and dry.   Chronic venous statis changes on BLE, superficial dilated veins noted   Psychiatric: He has a normal mood and affect. His behavior is normal.       Significant Labs:   CMP   Recent Labs   Lab 06/25/20 2310 06/26/20  0322    139   K 4.0 3.9    106   CO2 23 23    101   BUN 32* 33*   CREATININE 1.4 1.3   CALCIUM 8.8 8.8   PROT 6.4 6.2   ALBUMIN 3.4* 3.3*   BILITOT 1.1* 1.1*   ALKPHOS 87 83   AST 26 23   ALT 21 18   ANIONGAP 10 10   ESTGFRAFRICA 58.8* >60.0   EGFRNONAA 50.9* 55.7*   , CBC   Recent Labs   Lab 06/25/20 2310 06/26/20  0322   WBC 6.79  --  6.44   HGB 11.3*  --  10.9*   HCT 34.6*   < > 33.3*     --  165    < > = values in this interval not displayed.   , Troponin   Recent Labs   Lab 06/25/20 2310 06/26/20  0322   TROPONINI 2.159* 1.911*    and All pertinent lab results from the last 24 hours  have been reviewed.        Assessment and Plan:     * NSTEMI (non-ST elevated myocardial infarction)  69 y.o. male with history of CAD s/p CABG x3 (LIMA-LAD, SVG-OM, SVG-D in 1998), HFrEF (35-40%), CKD III, afib on coumadin (last dose on Sunday), PAD s/p L fem-pop (occluded), BL SFA stenting, HTN, asthma, smoking, who presents as transfer for interventional cardiology evaluation of severe L main and three vessel disease / high risk PCI     LHC at OSH: Dominant L circulation, 80% stenoses of the LM, pLAD, and pLCX. His RCA was totally occluded, as well as all his grafts. Multivessel CAD s/p CABG with all grafts occluded    -Continue BB, high-intensity statin  -Start ASA (with loading dose 325 mg)  -Load with plavix 600 once, followed by plavix 75 daily  -Control BP to a goal of <130/80  - On schedule for LHC +PCI and possible Impella today    Depression  - Continue lexapro    A-fib  [id number] of 3. Conferring a stroke risk of 3.2% per year    - currently with rate controlled afib  - holding warfarin for cath    HTN (hypertension)  - BP stable   - continue coreg  - holding arb    PAD (peripheral artery disease)  - Continue asa and lipitor        VTE Risk Mitigation (From admission, onward)         Ordered     heparin infusion 1,000 units/500 ml in 0.9% NaCl (pressure line flush)  Intra-op continuous PRN      06/26/20 1328     IP VTE LOW RISK PATIENT  Once      06/26/20 1114     Place TATA hose  Until discontinued      06/26/20 1114     Place sequential compression device  Until discontinued      06/26/20 0022                Brenden Dyson MD  Cardiology   [facility]

## 2020-06-27 PROBLEM — K04.7 DENTAL ABSCESS: Status: ACTIVE | Noted: 2020-06-27

## 2020-06-27 LAB
ALBUMIN SERPL BCP-MCNC: 3.2 G/DL (ref 3.5–5.2)
ALLENS TEST: ABNORMAL
ALP SERPL-CCNC: 78 U/L (ref 55–135)
ALT SERPL W/O P-5'-P-CCNC: 16 U/L (ref 10–44)
ANION GAP SERPL CALC-SCNC: 10 MMOL/L (ref 8–16)
APTT BLDCRRT: 29.7 SEC (ref 21–32)
APTT BLDCRRT: 30.5 SEC (ref 21–32)
APTT BLDCRRT: 40.1 SEC (ref 21–32)
APTT BLDCRRT: 53 SEC (ref 21–32)
AST SERPL-CCNC: 37 U/L (ref 10–40)
BASOPHILS # BLD AUTO: 0.02 K/UL (ref 0–0.2)
BASOPHILS # BLD AUTO: 0.03 K/UL (ref 0–0.2)
BASOPHILS # BLD AUTO: 0.03 K/UL (ref 0–0.2)
BASOPHILS NFR BLD: 0.3 % (ref 0–1.9)
BASOPHILS NFR BLD: 0.4 % (ref 0–1.9)
BASOPHILS NFR BLD: 0.4 % (ref 0–1.9)
BILIRUB SERPL-MCNC: 1.9 MG/DL (ref 0.1–1)
BILIRUB SERPL-MCNC: 1.9 MG/DL (ref 0.1–1)
BLD PROD TYP BPU: NORMAL
BLOOD UNIT EXPIRATION DATE: NORMAL
BLOOD UNIT TYPE CODE: 6200
BLOOD UNIT TYPE: NORMAL
BUN SERPL-MCNC: 30 MG/DL (ref 8–23)
CALCIUM SERPL-MCNC: 8.4 MG/DL (ref 8.7–10.5)
CHLORIDE SERPL-SCNC: 103 MMOL/L (ref 95–110)
CO2 SERPL-SCNC: 26 MMOL/L (ref 23–29)
CODING SYSTEM: NORMAL
CREAT SERPL-MCNC: 1.2 MG/DL (ref 0.5–1.4)
DELSYS: ABNORMAL
DIFFERENTIAL METHOD: ABNORMAL
DISPENSE STATUS: NORMAL
EOSINOPHIL # BLD AUTO: 0 K/UL (ref 0–0.5)
EOSINOPHIL # BLD AUTO: 0 K/UL (ref 0–0.5)
EOSINOPHIL # BLD AUTO: 0.1 K/UL (ref 0–0.5)
EOSINOPHIL NFR BLD: 0.1 % (ref 0–8)
EOSINOPHIL NFR BLD: 0.4 % (ref 0–8)
EOSINOPHIL NFR BLD: 1.5 % (ref 0–8)
ERYTHROCYTE [DISTWIDTH] IN BLOOD BY AUTOMATED COUNT: 15.1 % (ref 11.5–14.5)
ERYTHROCYTE [DISTWIDTH] IN BLOOD BY AUTOMATED COUNT: 15.1 % (ref 11.5–14.5)
ERYTHROCYTE [DISTWIDTH] IN BLOOD BY AUTOMATED COUNT: 16.2 % (ref 11.5–14.5)
EST. GFR  (AFRICAN AMERICAN): >60 ML/MIN/1.73 M^2
EST. GFR  (NON AFRICAN AMERICAN): >60 ML/MIN/1.73 M^2
FIO2: 28
FLOW: 2
GLUCOSE SERPL-MCNC: 105 MG/DL (ref 70–110)
HCO3 UR-SCNC: 28.3 MMOL/L (ref 24–28)
HCO3 UR-SCNC: 28.3 MMOL/L (ref 24–28)
HCO3 UR-SCNC: 28.4 MMOL/L (ref 24–28)
HCO3 UR-SCNC: 28.4 MMOL/L (ref 24–28)
HCO3 UR-SCNC: 28.7 MMOL/L (ref 24–28)
HCT VFR BLD AUTO: 27.5 % (ref 40–54)
HCT VFR BLD AUTO: 28.5 % (ref 40–54)
HCT VFR BLD AUTO: 29.8 % (ref 40–54)
HGB BLD-MCNC: 8.7 G/DL (ref 14–18)
HGB BLD-MCNC: 9.5 G/DL (ref 14–18)
HGB BLD-MCNC: 9.9 G/DL (ref 14–18)
IMM GRANULOCYTES # BLD AUTO: 0.01 K/UL (ref 0–0.04)
IMM GRANULOCYTES # BLD AUTO: 0.03 K/UL (ref 0–0.04)
IMM GRANULOCYTES # BLD AUTO: 0.04 K/UL (ref 0–0.04)
IMM GRANULOCYTES NFR BLD AUTO: 0.1 % (ref 0–0.5)
IMM GRANULOCYTES NFR BLD AUTO: 0.4 % (ref 0–0.5)
IMM GRANULOCYTES NFR BLD AUTO: 0.6 % (ref 0–0.5)
INR PPP: 1.4 (ref 0.8–1.2)
LACTATE SERPL-SCNC: 1 MMOL/L (ref 0.5–2.2)
LDH SERPL L TO P-CCNC: 449 U/L (ref 110–260)
LYMPHOCYTES # BLD AUTO: 0.6 K/UL (ref 1–4.8)
LYMPHOCYTES # BLD AUTO: 0.7 K/UL (ref 1–4.8)
LYMPHOCYTES # BLD AUTO: 0.9 K/UL (ref 1–4.8)
LYMPHOCYTES NFR BLD: 10.5 % (ref 18–48)
LYMPHOCYTES NFR BLD: 12.1 % (ref 18–48)
LYMPHOCYTES NFR BLD: 6.7 % (ref 18–48)
MAGNESIUM SERPL-MCNC: 1.8 MG/DL (ref 1.6–2.6)
MCH RBC QN AUTO: 33.6 PG (ref 27–31)
MCH RBC QN AUTO: 33.6 PG (ref 27–31)
MCH RBC QN AUTO: 34.6 PG (ref 27–31)
MCHC RBC AUTO-ENTMCNC: 31.6 G/DL (ref 32–36)
MCHC RBC AUTO-ENTMCNC: 33.2 G/DL (ref 32–36)
MCHC RBC AUTO-ENTMCNC: 33.3 G/DL (ref 32–36)
MCV RBC AUTO: 101 FL (ref 82–98)
MCV RBC AUTO: 104 FL (ref 82–98)
MCV RBC AUTO: 106 FL (ref 82–98)
MODE: ABNORMAL
MONOCYTES # BLD AUTO: 0.7 K/UL (ref 0.3–1)
MONOCYTES # BLD AUTO: 0.8 K/UL (ref 0.3–1)
MONOCYTES # BLD AUTO: 0.8 K/UL (ref 0.3–1)
MONOCYTES NFR BLD: 10.1 % (ref 4–15)
MONOCYTES NFR BLD: 11.2 % (ref 4–15)
MONOCYTES NFR BLD: 9 % (ref 4–15)
NEUTROPHILS # BLD AUTO: 5.3 K/UL (ref 1.8–7.7)
NEUTROPHILS # BLD AUTO: 5.5 K/UL (ref 1.8–7.7)
NEUTROPHILS # BLD AUTO: 7 K/UL (ref 1.8–7.7)
NEUTROPHILS NFR BLD: 74.2 % (ref 38–73)
NEUTROPHILS NFR BLD: 78.6 % (ref 38–73)
NEUTROPHILS NFR BLD: 83.4 % (ref 38–73)
NRBC BLD-RTO: 0 /100 WBC
NUM UNITS TRANS PACKED RBC: NORMAL
PCO2 BLDA: 41.3 MMHG (ref 35–45)
PCO2 BLDA: 43.3 MMHG (ref 35–45)
PCO2 BLDA: 44.7 MMHG (ref 35–45)
PCO2 BLDA: 44.7 MMHG (ref 35–45)
PCO2 BLDA: 45 MMHG (ref 35–45)
PH SMN: 7.41 [PH] (ref 7.35–7.45)
PH SMN: 7.41 [PH] (ref 7.35–7.45)
PH SMN: 7.42 [PH] (ref 7.35–7.45)
PH SMN: 7.42 [PH] (ref 7.35–7.45)
PH SMN: 7.44 [PH] (ref 7.35–7.45)
PHOSPHATE SERPL-MCNC: 4.3 MG/DL (ref 2.7–4.5)
PLATELET # BLD AUTO: 122 K/UL (ref 150–350)
PLATELET # BLD AUTO: 136 K/UL (ref 150–350)
PLATELET # BLD AUTO: 153 K/UL (ref 150–350)
PMV BLD AUTO: 10.8 FL (ref 9.2–12.9)
PMV BLD AUTO: 10.8 FL (ref 9.2–12.9)
PMV BLD AUTO: 11.4 FL (ref 9.2–12.9)
PO2 BLDA: 28 MMHG (ref 40–60)
PO2 BLDA: 29 MMHG (ref 40–60)
PO2 BLDA: 32 MMHG (ref 40–60)
POC BE: 4 MMOL/L
POC SATURATED O2: 52 % (ref 95–100)
POC SATURATED O2: 54 % (ref 95–100)
POC SATURATED O2: 54 % (ref 95–100)
POC SATURATED O2: 58 % (ref 95–100)
POC SATURATED O2: 62 % (ref 95–100)
POC TCO2: 30 MMOL/L (ref 24–29)
POTASSIUM SERPL-SCNC: 3.8 MMOL/L (ref 3.5–5.1)
PROT SERPL-MCNC: 5.9 G/DL (ref 6–8.4)
PROTHROMBIN TIME: 14.2 SEC (ref 9–12.5)
RBC # BLD AUTO: 2.59 M/UL (ref 4.6–6.2)
RBC # BLD AUTO: 2.83 M/UL (ref 4.6–6.2)
RBC # BLD AUTO: 2.86 M/UL (ref 4.6–6.2)
SAMPLE: ABNORMAL
SITE: ABNORMAL
SODIUM SERPL-SCNC: 139 MMOL/L (ref 136–145)
WBC # BLD AUTO: 7.02 K/UL (ref 3.9–12.7)
WBC # BLD AUTO: 7.17 K/UL (ref 3.9–12.7)
WBC # BLD AUTO: 8.37 K/UL (ref 3.9–12.7)

## 2020-06-27 PROCEDURE — 27000203 HC IMPELLA ADD'L DAY (CL)

## 2020-06-27 PROCEDURE — 94640 AIRWAY INHALATION TREATMENT: CPT

## 2020-06-27 PROCEDURE — 83735 ASSAY OF MAGNESIUM: CPT

## 2020-06-27 PROCEDURE — 93503 INSERT/PLACE HEART CATHETER: CPT

## 2020-06-27 PROCEDURE — 25000242 PHARM REV CODE 250 ALT 637 W/ HCPCS: Performed by: STUDENT IN AN ORGANIZED HEALTH CARE EDUCATION/TRAINING PROGRAM

## 2020-06-27 PROCEDURE — 85025 COMPLETE CBC W/AUTO DIFF WBC: CPT

## 2020-06-27 PROCEDURE — 82803 BLOOD GASES ANY COMBINATION: CPT

## 2020-06-27 PROCEDURE — 25000003 PHARM REV CODE 250: Performed by: STUDENT IN AN ORGANIZED HEALTH CARE EDUCATION/TRAINING PROGRAM

## 2020-06-27 PROCEDURE — 85730 THROMBOPLASTIN TIME PARTIAL: CPT | Mod: 91

## 2020-06-27 PROCEDURE — P9016 RBC LEUKOCYTES REDUCED: HCPCS

## 2020-06-27 PROCEDURE — 83615 LACTATE (LD) (LDH) ENZYME: CPT

## 2020-06-27 PROCEDURE — 84100 ASSAY OF PHOSPHORUS: CPT

## 2020-06-27 PROCEDURE — C1751 CATH, INF, PER/CENT/MIDLINE: HCPCS

## 2020-06-27 PROCEDURE — 85730 THROMBOPLASTIN TIME PARTIAL: CPT

## 2020-06-27 PROCEDURE — 36415 COLL VENOUS BLD VENIPUNCTURE: CPT

## 2020-06-27 PROCEDURE — 20000000 HC ICU ROOM

## 2020-06-27 PROCEDURE — 80053 COMPREHEN METABOLIC PANEL: CPT

## 2020-06-27 PROCEDURE — 83605 ASSAY OF LACTIC ACID: CPT

## 2020-06-27 PROCEDURE — 63600175 PHARM REV CODE 636 W HCPCS: Performed by: STUDENT IN AN ORGANIZED HEALTH CARE EDUCATION/TRAINING PROGRAM

## 2020-06-27 PROCEDURE — 27202094 HC TUBING, IMPELLA

## 2020-06-27 PROCEDURE — 85610 PROTHROMBIN TIME: CPT

## 2020-06-27 PROCEDURE — 27000221 HC OXYGEN, UP TO 24 HOURS

## 2020-06-27 PROCEDURE — 99900035 HC TECH TIME PER 15 MIN (STAT)

## 2020-06-27 PROCEDURE — 63600175 PHARM REV CODE 636 W HCPCS: Performed by: INTERNAL MEDICINE

## 2020-06-27 PROCEDURE — 99223 1ST HOSP IP/OBS HIGH 75: CPT | Mod: GC,,, | Performed by: INTERNAL MEDICINE

## 2020-06-27 PROCEDURE — C1894 INTRO/SHEATH, NON-LASER: HCPCS

## 2020-06-27 PROCEDURE — 99223 PR INITIAL HOSPITAL CARE,LEVL III: ICD-10-PCS | Mod: GC,,, | Performed by: INTERNAL MEDICINE

## 2020-06-27 PROCEDURE — 51702 INSERT TEMP BLADDER CATH: CPT

## 2020-06-27 PROCEDURE — 82600 ASSAY OF CYANIDE: CPT

## 2020-06-27 PROCEDURE — 94761 N-INVAS EAR/PLS OXIMETRY MLT: CPT

## 2020-06-27 RX ORDER — HEPARIN SODIUM 10000 [USP'U]/100ML
7 INJECTION, SOLUTION INTRAVENOUS CONTINUOUS
Status: DISCONTINUED | OUTPATIENT
Start: 2020-06-27 | End: 2020-06-30

## 2020-06-27 RX ORDER — TALC
6 POWDER (GRAM) TOPICAL NIGHTLY PRN
Status: DISCONTINUED | OUTPATIENT
Start: 2020-06-27 | End: 2020-07-05 | Stop reason: HOSPADM

## 2020-06-27 RX ORDER — MAGNESIUM SULFATE HEPTAHYDRATE 40 MG/ML
2 INJECTION, SOLUTION INTRAVENOUS ONCE
Status: COMPLETED | OUTPATIENT
Start: 2020-06-27 | End: 2020-06-27

## 2020-06-27 RX ORDER — HYDROCODONE BITARTRATE AND ACETAMINOPHEN 500; 5 MG/1; MG/1
TABLET ORAL
Status: DISCONTINUED | OUTPATIENT
Start: 2020-06-27 | End: 2020-07-05 | Stop reason: HOSPADM

## 2020-06-27 RX ORDER — FUROSEMIDE 10 MG/ML
40 INJECTION INTRAMUSCULAR; INTRAVENOUS ONCE
Status: COMPLETED | OUTPATIENT
Start: 2020-06-27 | End: 2020-06-27

## 2020-06-27 RX ORDER — POTASSIUM CHLORIDE 20 MEQ/1
20 TABLET, EXTENDED RELEASE ORAL ONCE
Status: COMPLETED | OUTPATIENT
Start: 2020-06-27 | End: 2020-06-27

## 2020-06-27 RX ADMIN — ATORVASTATIN CALCIUM 80 MG: 20 TABLET, FILM COATED ORAL at 09:06

## 2020-06-27 RX ADMIN — CLOPIDOGREL BISULFATE 75 MG: 75 TABLET ORAL at 09:06

## 2020-06-27 RX ADMIN — PANTOPRAZOLE SODIUM 40 MG: 40 TABLET, DELAYED RELEASE ORAL at 09:06

## 2020-06-27 RX ADMIN — OXYCODONE HYDROCHLORIDE 5 MG: 5 TABLET ORAL at 07:06

## 2020-06-27 RX ADMIN — ESCITALOPRAM OXALATE 10 MG: 5 TABLET, FILM COATED ORAL at 09:06

## 2020-06-27 RX ADMIN — AMPICILLIN SODIUM AND SULBACTAM SODIUM 3 G: 2; 1 INJECTION, POWDER, FOR SOLUTION INTRAMUSCULAR; INTRAVENOUS at 11:06

## 2020-06-27 RX ADMIN — POTASSIUM CHLORIDE 20 MEQ: 1500 TABLET, EXTENDED RELEASE ORAL at 06:06

## 2020-06-27 RX ADMIN — MAGNESIUM SULFATE IN WATER 2 G: 40 INJECTION, SOLUTION INTRAVENOUS at 06:06

## 2020-06-27 RX ADMIN — FUROSEMIDE 40 MG: 10 INJECTION, SOLUTION INTRAMUSCULAR; INTRAVENOUS at 01:06

## 2020-06-27 RX ADMIN — ASPIRIN 81 MG CHEWABLE TABLET 81 MG: 81 TABLET CHEWABLE at 09:06

## 2020-06-27 RX ADMIN — CEFTRIAXONE 2 G: 2 INJECTION, SOLUTION INTRAVENOUS at 01:06

## 2020-06-27 RX ADMIN — Medication 6 MG: at 07:06

## 2020-06-27 RX ADMIN — SODIUM CHLORIDE 250 ML: 9 INJECTION, SOLUTION INTRAVENOUS at 10:06

## 2020-06-27 RX ADMIN — HEPARIN SODIUM AND DEXTROSE 8 UNITS/KG/HR: 10000; 5 INJECTION INTRAVENOUS at 08:06

## 2020-06-27 RX ADMIN — FLUTICASONE FUROATE AND VILANTEROL TRIFENATATE 1 PUFF: 100; 25 POWDER RESPIRATORY (INHALATION) at 08:06

## 2020-06-27 RX ADMIN — SODIUM CHLORIDE 250 ML: 0.9 INJECTION, SOLUTION INTRAVENOUS at 10:06

## 2020-06-27 RX ADMIN — SODIUM NITROPRUSSIDE 1 MCG/KG/MIN: 25 INJECTION, SOLUTION, CONCENTRATE INTRAVENOUS at 09:06

## 2020-06-27 RX ADMIN — AMPICILLIN SODIUM AND SULBACTAM SODIUM 3 G: 2; 1 INJECTION, POWDER, FOR SOLUTION INTRAMUSCULAR; INTRAVENOUS at 03:06

## 2020-06-27 NOTE — PROGRESS NOTES
[facility]  Cardiology  Progress Note    Patient Name: Blaine Multani  MRN: [id number]  Admission Date: 6/25/2020  Hospital Length of Stay: 2 days  Code Status: Full Code   Attending Physician: London Loera III, MD   Primary Care Physician: Nay Alcantar MD  Expected Discharge Date:   Principal Problem:NSTEMI (non-ST elevated myocardial infarction)    Subjective:     Hospital Course:   NSTEMI and underwent LHC at OSH but was found to have dominant L circulation, 80% stenoses of the LM, pLAD, and pLCX. His RCA was totally occluded, as well as all his grafts. Transferred here for higher level of care. Repeat LHC on 6/26: High-degree stenosis of the distal LM, proximal LAD and LCx which received PCI. Impella placed. Post procedure HD consistent with cardiogenic shock and a high systemic vascular resistance. Impella was increased to P6 and he started afterload reduction with sodium nitroprusside.    Interval History:   Patient did well overnight. Left radial pulse biphasic with doppler. Biphasic and strong left DP, left PT, and right DP. Right TP absent (even prior to impella)     CVP 9, MVo2 54 --> CO 6.3, CI 2.97, , wedge pressure 13.      Decreased nipride from 1 to 0.7 mcg/kg/min. aPTT 40.1, increasing systemic heparin from 8 U/hr to 10 U/hr. Goal aPTT 45-65.    Notified by nursing staff regarding significant blood loss via peripheral IV (left AC vein) through which systemic heparin was infusing. Suction alarm occurred breifly x 3, give IVFs and 1u pRBC. Bedside echo demonstrates well seated device in the LV cavity (NOT touching septum or MV apparatus) and measuring ~ 3.8 cm from annulus to inlet area. MVO2 of 65% and stable MAPs so impella decreased from P6 to P5.       Review of Systems   Constitution: Negative for chills and decreased appetite.   HENT: Negative for congestion, ear discharge and ear pain.    Eyes: Negative for blurred vision and discharge.   Cardiovascular:  Positive for orthopnea. Negative for chest pain, claudication, cyanosis and dyspnea on exertion.   Respiratory: Negative for cough, hemoptysis and shortness of breath.    Endocrine: Negative for cold intolerance and heat intolerance.   Skin: Negative for color change and nail changes.   Musculoskeletal: Negative for arthritis and back pain.   Gastrointestinal: Negative for bloating and abdominal pain.   Genitourinary: Negative for bladder incontinence and decreased libido.   Neurological: Negative for aphonia and brief paralysis.   Psychiatric/Behavioral: Negative for altered mental status.     Objective:     Vital Signs (Most Recent):  Temp: 98.7 °F (37.1 °C) (06/27/20 1500)  Pulse: 78 (06/27/20 1600)  Resp: 20 (06/27/20 1600)  BP: 109/68 (06/27/20 1600)  SpO2: (!) 92 % (06/27/20 1600) Vital Signs (24h Range):  Temp:  [96.6 °F (35.9 °C)-98.7 °F (37.1 °C)] 98.7 °F (37.1 °C)  Pulse:  [67-87] 78  Resp:  [11-29] 20  SpO2:  [90 %-100 %] 92 %  BP: ()/(56-99) 109/68  Arterial Line BP: ()/(50-85) 102/59     Weight: 89 kg (196 lb 3.4 oz)  Body mass index is 27.37 kg/m².     SpO2: (!) 92 %  O2 Device (Oxygen Therapy): nasal cannula      Intake/Output Summary (Last 24 hours) at 6/27/2020 1657  Last data filed at 6/27/2020 1600  Gross per 24 hour   Intake 3286.09 ml   Output 4950 ml   Net -1663.91 ml       Lines/Drains/Airways     Central Venous Catheter Line            Introducer 06/26/20 right femoral vein;right femoral 1 day    Pulmonary Artery Catheter Assessment  06/26/20 1502 right femoral vein;right femoral 1 day          Drain                 Sheath 06/26/20 1300 Left lateral 1 day         Urethral Catheter 06/26/20 1805 16 Fr. less than 1 day          Peripheral Intravenous Line                 Peripheral IV - Single Lumen 06/26/20 0002 22 G Left Hand 1 day         Peripheral IV - Single Lumen 06/27/20 1043 20 G Right;Posterior Hand less than 1 day                Physical Exam   Constitutional: He is  oriented to person, place, and time. He appears well-developed and well-nourished.   HENT:   Head: Normocephalic and atraumatic.   Nose: Nose normal.   Mouth/Throat: No oropharyngeal exudate.   Right upper molars with poor dentition and likely dental caries. No evidence of abscess    Eyes: Right eye exhibits no discharge. Left eye exhibits no discharge. No scleral icterus.   Neck: Normal range of motion. Neck supple. No JVD present.   Cardiovascular: Normal rate, regular rhythm, S1 normal and S2 normal. Exam reveals no gallop, no S3, no S4, no distant heart sounds, no friction rub, no midsystolic click and no opening snap.   No murmur heard.  L axillary impella in place, Left radial pulse biphasic with doppler. Biphasic and strong left DP, left PT, and right DP. Right TP absent   Pulmonary/Chest: Effort normal and breath sounds normal. No respiratory distress. He has no wheezes. He has no rales. He exhibits no tenderness.   Abdominal: Soft. Bowel sounds are normal. He exhibits no distension. There is no abdominal tenderness. There is no rebound.   Musculoskeletal: Normal range of motion.         General: No tenderness, deformity or edema.   Lymphadenopathy:     He has no cervical adenopathy.   Neurological: He is alert and oriented to person, place, and time. No cranial nerve deficit.   Skin: Skin is warm and dry.   Chronic venous statis changes on BLE, superficial dilated veins noted. superficial skin tear to left elbow.    Right femoral swan adrianna   Psychiatric: He has a normal mood and affect. His behavior is normal.       Significant Labs:   CMP   Recent Labs   Lab 06/26/20  0322 06/26/20  1945 06/27/20  0330    138 139   K 3.9 4.7 3.8    104 103   CO2 23 26 26    120* 105   BUN 33* 30* 30*   CREATININE 1.3 1.2 1.2   CALCIUM 8.8 8.7 8.4*   PROT 6.2 6.4 5.9*   ALBUMIN 3.3* 3.3* 3.2*   BILITOT 1.1* 1.3* 1.9*  1.9*   ALKPHOS 83 85 78   AST 23 31 37   ALT 18 19 16   ANIONGAP 10 8 10   ESTGFRAFRICA  >60.0 >60.0 >60.0   EGFRNONAA 55.7* >60.0 >60.0   , CBC   Recent Labs   Lab 06/27/20  0330 06/27/20  1002 06/27/20  1505   WBC 8.37 7.02 7.17   HGB 9.9* 8.7* 9.5*   HCT 29.8* 27.5* 28.5*    136* 122*   , INR   Recent Labs   Lab 06/25/20  2310 06/26/20  0322 06/27/20  0330   INR 1.8* 1.8* 1.4*   , Troponin   Recent Labs   Lab 06/25/20  2310 06/26/20  0322   TROPONINI 2.159* 1.911*    and All pertinent lab results from the last 24 hours have been reviewed.    Assessment and Plan:       * NSTEMI (non-ST elevated myocardial infarction)  69 y.o. male with history of CAD s/p CABG x3 (LIMA-LAD, SVG-OM, SVG-D in 1998), HFrEF (35-40%), CKD III, afib on coumadin (last dose on Sunday), PAD s/p L fem-pop (occluded), BL SFA stenting, HTN, asthma, smoking, who presents as transfer for interventional cardiology evaluation of severe L main and three vessel disease / high risk PCI. NSTEMI and underwent LHC at OSH but was found to have dominant L circulation, 80% stenoses of the LM, pLAD, and pLCX. His RCA was totally occluded, as well as all his grafts. Transferred here for higher level of care. Repeat LHC on 6/26: High-degree stenosis of the distal LM, proximal LAD and LCx which received PCI. Impella placed. Post procedure HD consistent with cardiogenic shock and a high systemic vascular resistance. Impella was increased to P6 and he started afterload reduction with sodium nitroprusside.     - impella at P5  - good extremity pulses, q1 hour nursing checks with doppler   - On 6/27 at 1500: CO 6.3, CI 2.97, , wedge pressure 13. Decreased nipride from 1 to 0.7 mcg/kg/min.   - Purge aPTT not therapeutic this morning, started on systemic heparin. Goal aPTT 45-65.   -Continue BB, high-intensity statin  -Continue ASA   - continue plavix 75 daily  -Control BP to a goal of <130/80    Dental abscess  Right upper molars with poor dentition and likely dental caries. No evidence of abscess     - ID consulted: changed unasyn to  ceftriaxone 2g Q24H x 7 days.     Depression  - Continue lexapro    A-fib  [id number] of 3. Conferring a stroke risk of 3.2% per year    - currently with rate controlled afib  - on heparin drip, holding home warfarin     HTN (hypertension)  - holding home BP meds    PAD (peripheral artery disease)  - Continue asa and lipitor        VTE Risk Mitigation (From admission, onward)         Ordered     heparin 25,000 units in dextrose 5% 250 mL (100 units/mL) infusion (heparin infusion - NO NOMOGRAM)  Continuous      06/27/20 0819     heparin (porcine) 25,000 Units in dextrose 5 % 500 mL infusion  Continuous      06/26/20 2016     heparin infusion 1,000 units/500 ml in 0.9% NaCl (pressure line flush)  Intra-op continuous PRN      06/26/20 1328     IP VTE LOW RISK PATIENT  Once      06/26/20 1114     Place TATA hose  Until discontinued      06/26/20 1114     Place sequential compression device  Until discontinued      06/26/20 0022                Brenden Dyson MD  Cardiology  [facility]

## 2020-06-27 NOTE — ASSESSMENT & PLAN NOTE
Right upper molars with poor dentition and likely dental caries. No evidence of abscess     - ID consulted: changed unasyn to ceftriaxone 2g Q24H x 7 days.

## 2020-06-27 NOTE — ASSESSMENT & PLAN NOTE
[id number] of 3. Conferring a stroke risk of 3.2% per year    - currently with rate controlled afib  - on heparin drip, holding home warfarin

## 2020-06-27 NOTE — CONSULTS
[facility] [address]  Infectious Disease  Consult Note    Patient Name: Blaine Multani  MRN: [id number]  Admission Date: 6/25/2020  Hospital Length of Stay: 2 days  Attending Physician: London Loera III, MD  Primary Care Provider: Nay Alcantar MD     Isolation Status: No active isolations    Patient information was obtained from patient and ER records.      Consults  Assessment/Plan:     Dental abscess  69M PMH significant for severe CAD s/p CABG, admitted for LM and triple vessel high risk PCI. Recent dental infection, completed 7 days of keflex w/o complete resolution of symptoms. Started on unasyn on admission. Multiple lines and impella in place post-PCI on 6/26. ID consulted for recommendations.    Recommendations:  - change unasyn to ceftriaxone 2g Q24H  - continue IV antibiotics while impella is in place  - recommend 7 days total of abx, OK to transition to PO augmentin once cardiac devices are removed  - needs follow up w/ dentist after hospital discharge    Will sign off, please call back w/ questions        Thank you for your consult. I will sign off. Please contact us if you have any additional questions.      Mikala Davis DO  General ID  Infectious Disease Fellow  C: [phone]  P: [phone]      Subjective:     Principal Problem: NSTEMI (non-ST elevated myocardial infarction)    HPI: 69M PMH CAD s/p CABG, HFrEF, CKD, AF on coumadin, transferred to [facility] for high risk PCI for LM and triple vessel disease from . He was recently treated for a R maxillary dental abscess w/ a 7 day course of keflex. He was taken for cath yesterday, after PCI, impella was placed. ID consulted for assistance w/ abx given recent dental abscess in the setting of high risk PCI and cardiac support device. He did have an episode of bleeding this AM after an IV was inadvertently pulled, which is now controlled. He states his tooth has continued to cause him pain, mostly when biting down. He feels this has  improved over the last day on unasyn. No fevers or chills. WBC 7.     Past Medical History:   Diagnosis Date    A-fib     Asthma     Bronchitis     Colon polyps     Hemorrhoids     HLD (hyperlipidemia)     Hypertension     PVD (peripheral vascular disease)        Past Surgical History:   Procedure Laterality Date    angiogram with stents Left     leg    CHOLECYSTECTOMY      COLONOSCOPY W/ POLYPECTOMY      CORONARY ARTERY BYPASS GRAFT  1998    x3    TOTAL HIP ARTHROPLASTY Right        Review of patient's allergies indicates:  No Known Allergies    Medications:  Medications Prior to Admission   Medication Sig    aspirin (ECOTRIN) 81 MG EC tablet Take 1 tablet (81 mg total) by mouth once daily.    atorvastatin (LIPITOR) 80 MG tablet Take 80 mg by mouth once daily.    benzonatate (TESSALON) 200 MG capsule Take 200 mg by mouth 3 (three) times daily as needed.    carvedilol (COREG) 3.125 MG tablet Take 3.125 mg by mouth 2 (two) times daily with meals.    cilostazol (PLETAL) 100 MG Tab Take 50 mg by mouth 2 (two) times daily.    clopidogrel (PLAVIX) 75 mg tablet Take 1 tablet (75 mg total) by mouth once daily.    escitalopram oxalate (LEXAPRO) 10 MG tablet Take 10 mg by mouth once daily.    irbesartan (AVAPRO) 300 MG tablet Take 1 tablet by mouth once daily.    omeprazole (PRILOSEC) 40 MG capsule Take 40 mg by mouth once daily.    phytonadione, vitamin K1, (MEPHYTON) 5 mg Tab take 1 tablet by mouth once    SYMBICORT 160-4.5 mcg/actuation HFAA     warfarin (COUMADIN) 5 MG tablet Take 7.5 mg by mouth once daily.     Antibiotics (From admission, onward)    Start     Stop Route Frequency Ordered    06/26/20 1039  ampicillin-sulbactam 3 g in sodium chloride 0.9 % 100 mL IVPB (ready to mix system)      -- IV Every 6 hours (non-standard times) 06/26/20 1039        Antifungals (From admission, onward)    None        Antivirals (From admission, onward)    None             There is no immunization history on  file for this patient.    Family History     Problem Relation (Age of Onset)    Cancer Mother, Sister        Social History     Socioeconomic History    Marital status:      Spouse name: Not on file    Number of children: Not on file    Years of education: Not on file    Highest education level: Not on file   Occupational History    Not on file   Social Needs    Financial resource strain: Not on file    Food insecurity     Worry: Not on file     Inability: Not on file    Transportation needs     Medical: Not on file     Non-medical: Not on file   Tobacco Use    Smoking status: Former Smoker     Quit date:      Years since quittin.4    Smokeless tobacco: Never Used   Substance and Sexual Activity    Alcohol use: Yes     Alcohol/week: 12.0 standard drinks     Types: 12 Cans of beer per week    Drug use: No    Sexual activity: Not on file   Lifestyle    Physical activity     Days per week: Not on file     Minutes per session: Not on file    Stress: Not on file   Relationships    Social connections     Talks on phone: Not on file     Gets together: Not on file     Attends Anglican service: Not on file     Active member of club or organization: Not on file     Attends meetings of clubs or organizations: Not on file     Relationship status: Not on file   Other Topics Concern    Not on file   Social History Narrative    Not on file     Review of Systems   Constitutional: Negative for activity change, appetite change, chills and fever.   HENT: Positive for dental problem. Negative for congestion, ear pain and rhinorrhea.    Eyes: Negative for pain and discharge.   Respiratory: Negative for cough and shortness of breath.    Cardiovascular: Negative for chest pain and leg swelling.   Gastrointestinal: Negative for abdominal distention, abdominal pain, constipation, diarrhea, nausea and vomiting.   Genitourinary: Negative for difficulty urinating and dysuria.   Musculoskeletal: Negative for  arthralgias, back pain and joint swelling.   Skin: Negative for rash and wound.   Neurological: Negative for dizziness, light-headedness and headaches.   Psychiatric/Behavioral: Negative for behavioral problems and confusion.     Objective:     Vital Signs (Most Recent):  Temp: 98.5 °F (36.9 °C) (06/27/20 1110)  Pulse: 73 (06/27/20 1200)  Resp: 15 (06/27/20 1200)  BP: 105/61 (06/27/20 1200)  SpO2: 95 % (06/27/20 1200) Vital Signs (24h Range):  Temp:  [96.6 °F (35.9 °C)-98.7 °F (37.1 °C)] 98.5 °F (36.9 °C)  Pulse:  [72-87] 73  Resp:  [11-29] 15  SpO2:  [92 %-100 %] 95 %  BP: ()/(56-99) 105/61  Arterial Line BP: ()/(50-85) 99/51     Weight: 89 kg (196 lb 3.4 oz)  Body mass index is 27.37 kg/m².    Estimated Creatinine Clearance: 61.9 mL/min (based on SCr of 1.2 mg/dL).    Physical Exam  Constitutional:       General: He is not in acute distress.     Appearance: He is well-developed. He is not diaphoretic.   HENT:      Head: Atraumatic.      Comments: Poor dentition w/ R upper tooth w/ evidence of caries and decay     Right Ear: External ear normal.      Left Ear: External ear normal.      Nose: Nose normal.   Neck:      Musculoskeletal: Normal range of motion and neck supple.   Cardiovascular:      Heart sounds: Normal heart sounds. No murmur.      Comments: impella present  Pulmonary:      Effort: Pulmonary effort is normal. No respiratory distress.      Breath sounds: Normal breath sounds. No wheezing.   Abdominal:      General: Bowel sounds are normal. There is no distension.      Palpations: Abdomen is soft.      Tenderness: There is no abdominal tenderness.   Musculoskeletal: Normal range of motion.         General: No deformity.   Skin:     General: Skin is warm and dry.      Findings: No erythema or rash.      Comments: Multiple lines in place    Neurological:      Mental Status: He is alert and oriented to person, place, and time.      Cranial Nerves: No cranial nerve deficit.   Psychiatric:          Behavior: Behavior normal.         Significant Labs:   CBC:   Recent Labs   Lab 06/26/20 1945 06/27/20  0330 06/27/20  1002   WBC 7.55 8.37 7.02   HGB 11.1* 9.9* 8.7*   HCT 35.3* 29.8* 27.5*    153 136*     CMP:   Recent Labs   Lab 06/26/20  0322 06/26/20 1945 06/27/20  0330    138 139   K 3.9 4.7 3.8    104 103   CO2 23 26 26    120* 105   BUN 33* 30* 30*   CREATININE 1.3 1.2 1.2   CALCIUM 8.8 8.7 8.4*   PROT 6.2 6.4 5.9*   ALBUMIN 3.3* 3.3* 3.2*   BILITOT 1.1* 1.3* 1.9*  1.9*   ALKPHOS 83 85 78   AST 23 31 37   ALT 18 19 16   ANIONGAP 10 8 10   EGFRNONAA 55.7* >60.0 >60.0     Microbiology Results (last 7 days)     ** No results found for the last 168 hours. **          Significant Imaging: I have reviewed all pertinent imaging results/findings within the past 24 hours.

## 2020-06-27 NOTE — NURSING
Pt transported from the cath lab with three nurses from the cath lab. Upon assessment it was noted pt did not have a pulse to the LUE, which is also the site where an arterial sheath and Impella are located. Pt does have sensation to the LUE and he is able to squeeze my hand with a strong grasp. Difficulty obtaining pulses using a Doppler to the bilateral lower extremities. Three nurses attempted to get pulses and were unsuccessful. Dr ZACK Small and Dr Cruz were at the bedside and also assessed pt and his pulses. Dr Cruz ordered a heating blanket, neurovascular checks, and a SaO2 monitor placed on the LUE.Placed a pressure dressing aboved the Impella site as ordered by Dr Small. Obtained ordered labs and applied heating blanket. Patients progress will continuously be monitored.

## 2020-06-27 NOTE — NURSING
10:00 AM: Left AC 18 G found pulled out, site bleeding. Manual pressure held, CBC sent. Suction alarm on Impella. AYLA Latif MD notified and 250 NS bolus ordered.     10:22 AM: Bleeding from IV site stopped. Another suction alarm. AYLA Latif MD notified and another 250 BS bolus ordered.     11:23 AM: Fluid boluses complete and blood transfusion infusing. 3rd suction alarm, AYLA Latif notified and coming to bedside.

## 2020-06-27 NOTE — ASSESSMENT & PLAN NOTE
69 y.o. male with history of CAD s/p CABG x3 (LIMA-LAD, SVG-OM, SVG-D in 1998), HFrEF (35-40%), CKD III, afib on coumadin (last dose on Sunday), PAD s/p L fem-pop (occluded), BL SFA stenting, HTN, asthma, smoking, who presents as transfer for interventional cardiology evaluation of severe L main and three vessel disease / high risk PCI. NSTEMI and underwent LHC at OSH but was found to have dominant L circulation, 80% stenoses of the LM, pLAD, and pLCX. His RCA was totally occluded, as well as all his grafts. Transferred here for higher level of care. Repeat LHC on 6/26: High-degree stenosis of the distal LM, proximal LAD and LCx which received PCI. Impella placed. Post procedure HD consistent with cardiogenic shock and a high systemic vascular resistance. Impella was increased to P6 and he started afterload reduction with sodium nitroprusside.     - impella at P5  - good extremity pulses, q1 hour nursing checks with doppler   - On 6/27: CO 6.3, CI 2.97, , wedge pressure 13. Decreased nipride from 1 to 0.7 mcg/kg/min.   - Purge aPTT not therapeutic this morning, started on systemic heparin. Goal aPTT 45-65.   -Continue BB, high-intensity statin  -Continue ASA   -Load with plavix 600 once, followed by plavix 75 daily  -Control BP to a goal of <130/80

## 2020-06-27 NOTE — PLAN OF CARE
CMICU DAILY GOALS       A: Awake    RASS: Goal -    Actual - RASS (Sánchez Agitation-Sedation Scale): -3-->moderate sedation   Restraint necessity:    B: Breath   SBT: NA   C: Coordinate A & B, analgesics/sedatives   Pain: managed    SAT: NA  D: Delirium   CAM-ICU:    E: Early Mobility   MOVE Screen: Fail   Activity: Activity Management: bedrest maintained per order  FAS: Feeding/Nutrition   Diet order:  ,   Fluid restriction:    T: Thrombus   DVT prophylaxis:    H: HOB Elevation   Head of Bed (HOB): HOB flat, HOB not elevated due to instrumentation present  U: Ulcer Prophylaxis   GI: no  G: Glucose control   managed    S: Skin   Bundle compliance: yes   Bathing/Skin Care: back care, bath, chlorhexidine, bath, complete, dressed/undressed, foot care, incontinence care, linen changed Date: 06/27/2020[unfilled]  B: Bowel Function   no issues   I: Indwelling Catheters   Alberto necessity:     CVC necessity: Yes   IPAD offered: No  D: De-escalation Antibx   No  Plan for the day   Wean Nipride              Wean Impella              Control pain              Monitor pedal pulses  Family/Goals of care/Code Status   Code Status: Full Code     Pt admitted at change of shift to CMICU from cath lab. Pt remains drowsy but oriented x4. Overnight SvO2 52-54%. Pt started on Nipride, infusion was titrated up per MD order. By the end of the shift able to Doppler LUE pulse and 3/4 pulses in bilateral lower extremities, Dr Cruz and Dr Small are aware. UOP remained >200ml/h after administering 40mg Lasix. Administered pain medication for pain to the bilateral lower extremities. Pt tolerated PO medications without difficulty. Replaced electrolytes as ordered. VS and assessment per flow sheet, patient progressing towards goals as tolerated, plan of care reviewed with Blaine Multani, all concerns addressed, will continue to monitor.

## 2020-06-27 NOTE — HPI
69M PMH CAD s/p CABG, HFrEF, CKD, AF on coumadin, [place] [facility] for LM and triple vessel disease from . He was recently treated for a R maxillary dental abscess w/ a 7 day course of keflex. He was taken for cath yesterday, after PCI, impella was placed. ID consulted for assistance w/ abx given recent dental abscess in the setting of high risk PCI and cardiac support device. He did have an episode of bleeding this AM after an IV was inadvertently pulled, which is now controlled. He states his tooth has continued to cause him pain, mostly when biting down. He feels this has improved over the last day on unasyn. No fevers or chills. WBC 7.

## 2020-06-27 NOTE — CARE UPDATE
CVP 9, MVo2 54 --> CO 6.3, CI 2.97, , wedge pressure 13.     Decreased nipride from 1 to 0.7 mcg/kg/min. aPTT 40.1, increasing systemic heparin from 8 U/hr to 10 U/hr. Goal aPTT 45-65.

## 2020-06-27 NOTE — PLAN OF CARE
[id number]: Pt transferred from cath lab to ICU. Immediately post-procedure SvO2 33, CVP 14, CO 3.23, CI 1.5, SCR 2100    2000: Nipride started at 0.3 mcg/kg/min    2100: SvO2 57, CVP 9, MAP 97 calculated CO 5.03, CI 2.38, SVR 1399. PA 40/24/31 calculated Nasir 1.14. UOP  250-300cc/hr    2130: nipride increased to 1.0 mcg/kg/min.   Labs: CBC, CMP wnl, LA 1.7. PTT 68.1. Will repeat PTT in 2 hours and start heparin as needed. Goal PTT 45-65    2330: SvO2 61, CVP 7, MAP 90s. Calculated CO 5.57, CI: 2.64, SVR 1163. PA 57/25 Nasir 4.57  UOP still remains 200-240/hr.  Left radial pulses are dopplerable. Left leg has DP & PT pulses (dopplerable). Right leg has PT pulse (dopplerable)  Will increase nipride to 1.3 mcg/kg/min.  PTT 44.6. Will continue to monitor and aim for PTT goal 45-65    6/27/20  0130: pt given IV lasix 40mg x1 due to decreased UOP < /hr    0430 SvO2 54, CVP 7, MAP 72s. Calculated CO 5.15, CI: 2.44, SVR 1102. PA 38/18 Nasir 2.86  -230/hr. Pulses unchanged  AM PTT pending. LA 1.7 -> 1.0.  Will continue to monitor and decreased nipride to 1.0 mcg/kg/min when SVR < 1000.     Plan of care discussed with Dr. Cruz.    María Small MD  Cardiology, PGY IV  Pager: [phone]

## 2020-06-27 NOTE — NURSING
Upon assessment at this hour, able to Doppler LUE pulse. LUE is warm and pt still has sensation and strength. Able to Doppler bilateral dorsalis pedis and LLE posterior tibial. Dr Josh Cruz at the bedside assessing pt and verifying pulses.

## 2020-06-27 NOTE — ASSESSMENT & PLAN NOTE
69M PMH significant for severe CAD s/p CABG, admitted for LM and triple vessel high risk PCI. Recent dental infection, completed 7 days of keflex w/o complete resolution of symptoms. Started on unasyn on admission. Multiple lines and impella in place post-PCI on 6/26. ID consulted for recommendations.    Recommendations:  - change unasyn to ceftriaxone 2g Q24H  - continue IV antibiotics while impella is in place  - recommend 7 days total of abx, OK to transition to PO augmentin once cardiac devices are removed  - needs follow up w/ dentist after hospital discharge    Will sign off, please call back w/ questions

## 2020-06-27 NOTE — SUBJECTIVE & OBJECTIVE
Interval History:   Patient did well overnight. Left radial pulse biphasic with doppler. Biphasic and strong left DP, left PT, and right DP. Right TP absent (even prior to impella)     CVP 9, MVo2 54 --> CO 6.3, CI 2.97, , wedge pressure 13.      Decreased nipride from 1 to 0.7 mcg/kg/min. aPTT 40.1, increasing systemic heparin from 8 U/hr to 10 U/hr. Goal aPTT 45-65.    Notified by nursing staff regarding significant blood loss via peripheral IV (left AC vein) through which systemic heparin was infusing. Suction alarm occurred breifly x 3, give IVFs and 1u pRBC. Bedside echo demonstrates well seated device in the LV cavity (NOT touching septum or MV apparatus) and measuring ~ 3.8 cm from annulus to inlet area. MVO2 of 65% and stable MAPs so impella decreased from P6 to P5.       Review of Systems   Constitution: Negative for chills and decreased appetite.   HENT: Negative for congestion, ear discharge and ear pain.    Eyes: Negative for blurred vision and discharge.   Cardiovascular: Positive for orthopnea. Negative for chest pain, claudication, cyanosis and dyspnea on exertion.   Respiratory: Negative for cough, hemoptysis and shortness of breath.    Endocrine: Negative for cold intolerance and heat intolerance.   Skin: Negative for color change and nail changes.   Musculoskeletal: Negative for arthritis and back pain.   Gastrointestinal: Negative for bloating and abdominal pain.   Genitourinary: Negative for bladder incontinence and decreased libido.   Neurological: Negative for aphonia and brief paralysis.   Psychiatric/Behavioral: Negative for altered mental status.     Objective:     Vital Signs (Most Recent):  Temp: 98.7 °F (37.1 °C) (06/27/20 1500)  Pulse: 78 (06/27/20 1600)  Resp: 20 (06/27/20 1600)  BP: 109/68 (06/27/20 1600)  SpO2: (!) 92 % (06/27/20 1600) Vital Signs (24h Range):  Temp:  [96.6 °F (35.9 °C)-98.7 °F (37.1 °C)] 98.7 °F (37.1 °C)  Pulse:  [67-87] 78  Resp:  [11-29] 20  SpO2:  [90  %-100 %] 92 %  BP: ()/(56-99) 109/68  Arterial Line BP: ()/(50-85) 102/59     Weight: 89 kg (196 lb 3.4 oz)  Body mass index is 27.37 kg/m².     SpO2: (!) 92 %  O2 Device (Oxygen Therapy): nasal cannula      Intake/Output Summary (Last 24 hours) at 6/27/2020 1657  Last data filed at 6/27/2020 1600  Gross per 24 hour   Intake 3286.09 ml   Output 4950 ml   Net -1663.91 ml       Lines/Drains/Airways     Central Venous Catheter Line            Introducer 06/26/20 right femoral vein;right femoral 1 day    Pulmonary Artery Catheter Assessment  06/26/20 1502 right femoral vein;right femoral 1 day          Drain                 Sheath 06/26/20 1300 Left lateral 1 day         Urethral Catheter 06/26/20 1805 16 Fr. less than 1 day          Peripheral Intravenous Line                 Peripheral IV - Single Lumen 06/26/20 0002 22 G Left Hand 1 day         Peripheral IV - Single Lumen 06/27/20 1043 20 G Right;Posterior Hand less than 1 day                Physical Exam   Constitutional: He is oriented to person, place, and time. He appears well-developed and well-nourished.   HENT:   Head: Normocephalic and atraumatic.   Nose: Nose normal.   Mouth/Throat: No oropharyngeal exudate.   Right upper molars with poor dentition and likely dental caries. No evidence of abscess    Eyes: Right eye exhibits no discharge. Left eye exhibits no discharge. No scleral icterus.   Neck: Normal range of motion. Neck supple. No JVD present.   Cardiovascular: Normal rate, regular rhythm, S1 normal and S2 normal. Exam reveals no gallop, no S3, no S4, no distant heart sounds, no friction rub, no midsystolic click and no opening snap.   No murmur heard.  L axillary impella in place, Left radial pulse biphasic with doppler. Biphasic and strong left DP, left PT, and right DP. Right TP absent   Pulmonary/Chest: Effort normal and breath sounds normal. No respiratory distress. He has no wheezes. He has no rales. He exhibits no tenderness.    Abdominal: Soft. Bowel sounds are normal. He exhibits no distension. There is no abdominal tenderness. There is no rebound.   Musculoskeletal: Normal range of motion.         General: No tenderness, deformity or edema.   Lymphadenopathy:     He has no cervical adenopathy.   Neurological: He is alert and oriented to person, place, and time. No cranial nerve deficit.   Skin: Skin is warm and dry.   Chronic venous statis changes on BLE, superficial dilated veins noted. superficial skin tear to left elbow.    Right femoral swan adrianna   Psychiatric: He has a normal mood and affect. His behavior is normal.       Significant Labs:   CMP   Recent Labs   Lab 06/26/20 0322 06/26/20 1945 06/27/20 0330    138 139   K 3.9 4.7 3.8    104 103   CO2 23 26 26    120* 105   BUN 33* 30* 30*   CREATININE 1.3 1.2 1.2   CALCIUM 8.8 8.7 8.4*   PROT 6.2 6.4 5.9*   ALBUMIN 3.3* 3.3* 3.2*   BILITOT 1.1* 1.3* 1.9*  1.9*   ALKPHOS 83 85 78   AST 23 31 37   ALT 18 19 16   ANIONGAP 10 8 10   ESTGFRAFRICA >60.0 >60.0 >60.0   EGFRNONAA 55.7* >60.0 >60.0   , CBC   Recent Labs   Lab 06/27/20  0330 06/27/20  1002 06/27/20  1505   WBC 8.37 7.02 7.17   HGB 9.9* 8.7* 9.5*   HCT 29.8* 27.5* 28.5*    136* 122*   , INR   Recent Labs   Lab 06/25/20 2310 06/26/20 0322 06/27/20  0330   INR 1.8* 1.8* 1.4*   , Troponin   Recent Labs   Lab 06/25/20 2310 06/26/20 0322   TROPONINI 2.159* 1.911*    and All pertinent lab results from the last 24 hours have been reviewed.

## 2020-06-27 NOTE — CARE UPDATE
CCU update plan of care  6/27/20, 8:30 AM      Patient assessed at bedside this morning. Reports he is chest pain free. Describes intermittent periods of shortness of breath but currently comfortable and w/o dyspnea. Further denies LUE discomfort. L radial pulse dopplerable. LLE DP/PT pulses dopplerable. RLE PT pulse dopplerable; RLE DP absent.     L axillary impella in place at P6 ; patient also on Nipride 1.3 mcg/kg/min     Hemodynamics assessed:  CVP 8, PA 36/16, SVO2 58, MAP 70     Calculated CO 6.29, CI 2.98,     Plan:  - Decrease Nipride to 1 mcg/kg/min  - Continue Impella support at P6  - Repeat CVP, SVO2 in 4 hours (around 12:30 pm)  - Systemic heparin infusion added this morning due to subtherapeutic PTT (goal PTT 45-65)  - Monitor pulses Q1H (millie L radial) and alert IC stat for changes     Juliane Latif MD (ext [phone])  Cardiology Fellow, PGY-5      Discussed with Dr Cruz

## 2020-06-27 NOTE — HOSPITAL COURSE
NSTEMI and underwent LHC at OSH but was found to have dominant L circulation, 80% stenoses of the LM, pLAD, and pLCX. His RCA was totally occluded, as well as all his grafts. Transferred here for higher level of care. Repeat LHC on 6/26: High-degree stenosis of the distal LM, proximal LAD and LCx underwent complex PCI. Impella placed. Post procedure HD consistent with cardiogenic shock and a high systemic vascular resistance. He was started afterload reduction with nitroprusside. Impella settings slowly weaned while tirating nitroprusside gtt. On 6/30, impella removal. Patient tolerated well. Heparin stopped. Weaned off nitroprusside, started on hydralazine-isosorbide dinitrate, Coreg, Spironolactone. US renal artery obtained, but study was inconclusive. Stepped down from ICU on 7/3. PT/OT consulted, recommended HH. Eliquis restarted in addition to ASA + plavix. Plan for triple therapy x 1 month, followed by Eliquis + plavix for 1 year. Defer Entresto to be started as an outpatient. Transfused pRBCs for anemia. Patient to f/u with Dr. Cruz in 2 weeks. Discussed results and plan with patient. Patient verbalized understanding and agreed to plan. Patient stable for discharge. Discharged with [facility].

## 2020-06-27 NOTE — SUBJECTIVE & OBJECTIVE
Past Medical History:   Diagnosis Date    A-fib     Asthma     Bronchitis     Colon polyps     Hemorrhoids     HLD (hyperlipidemia)     Hypertension     PVD (peripheral vascular disease)        Past Surgical History:   Procedure Laterality Date    angiogram with stents Left     leg    CHOLECYSTECTOMY      COLONOSCOPY W/ POLYPECTOMY      CORONARY ARTERY BYPASS GRAFT  1998    x3    TOTAL HIP ARTHROPLASTY Right        Review of patient's allergies indicates:  No Known Allergies    Medications:  Medications Prior to Admission   Medication Sig    aspirin (ECOTRIN) 81 MG EC tablet Take 1 tablet (81 mg total) by mouth once daily.    atorvastatin (LIPITOR) 80 MG tablet Take 80 mg by mouth once daily.    benzonatate (TESSALON) 200 MG capsule Take 200 mg by mouth 3 (three) times daily as needed.    carvedilol (COREG) 3.125 MG tablet Take 3.125 mg by mouth 2 (two) times daily with meals.    cilostazol (PLETAL) 100 MG Tab Take 50 mg by mouth 2 (two) times daily.    clopidogrel (PLAVIX) 75 mg tablet Take 1 tablet (75 mg total) by mouth once daily.    escitalopram oxalate (LEXAPRO) 10 MG tablet Take 10 mg by mouth once daily.    irbesartan (AVAPRO) 300 MG tablet Take 1 tablet by mouth once daily.    omeprazole (PRILOSEC) 40 MG capsule Take 40 mg by mouth once daily.    phytonadione, vitamin K1, (MEPHYTON) 5 mg Tab take 1 tablet by mouth once    SYMBICORT 160-4.5 mcg/actuation HFAA     warfarin (COUMADIN) 5 MG tablet Take 7.5 mg by mouth once daily.     Antibiotics (From admission, onward)    Start     Stop Route Frequency Ordered    06/26/20 1039  ampicillin-sulbactam 3 g in sodium chloride 0.9 % 100 mL IVPB (ready to mix system)      -- IV Every 6 hours (non-standard times) 06/26/20 1039        Antifungals (From admission, onward)    None        Antivirals (From admission, onward)    None             There is no immunization history on file for this patient.    Family History     Problem Relation (Age of  Onset)    Cancer Mother, Sister        Social History     Socioeconomic History    Marital status:      Spouse name: Not on file    Number of children: Not on file    Years of education: Not on file    Highest education level: Not on file   Occupational History    Not on file   Social Needs    Financial resource strain: Not on file    Food insecurity     Worry: Not on file     Inability: Not on file    Transportation needs     Medical: Not on file     Non-medical: Not on file   Tobacco Use    Smoking status: Former Smoker     Quit date:      Years since quittin.4    Smokeless tobacco: Never Used   Substance and Sexual Activity    Alcohol use: Yes     Alcohol/week: 12.0 standard drinks     Types: 12 Cans of beer per week    Drug use: No    Sexual activity: Not on file   Lifestyle    Physical activity     Days per week: Not on file     Minutes per session: Not on file    Stress: Not on file   Relationships    Social connections     Talks on phone: Not on file     Gets together: Not on file     Attends Latter day service: Not on file     Active member of club or organization: Not on file     Attends meetings of clubs or organizations: Not on file     Relationship status: Not on file   Other Topics Concern    Not on file   Social History Narrative    Not on file     Review of Systems   Constitutional: Negative for activity change, appetite change, chills and fever.   HENT: Positive for dental problem. Negative for congestion, ear pain and rhinorrhea.    Eyes: Negative for pain and discharge.   Respiratory: Negative for cough and shortness of breath.    Cardiovascular: Negative for chest pain and leg swelling.   Gastrointestinal: Negative for abdominal distention, abdominal pain, constipation, diarrhea, nausea and vomiting.   Genitourinary: Negative for difficulty urinating and dysuria.   Musculoskeletal: Negative for arthralgias, back pain and joint swelling.   Skin: Negative for rash and  wound.   Neurological: Negative for dizziness, light-headedness and headaches.   Psychiatric/Behavioral: Negative for behavioral problems and confusion.     Objective:     Vital Signs (Most Recent):  Temp: 98.5 °F (36.9 °C) (06/27/20 1110)  Pulse: 73 (06/27/20 1200)  Resp: 15 (06/27/20 1200)  BP: 105/61 (06/27/20 1200)  SpO2: 95 % (06/27/20 1200) Vital Signs (24h Range):  Temp:  [96.6 °F (35.9 °C)-98.7 °F (37.1 °C)] 98.5 °F (36.9 °C)  Pulse:  [72-87] 73  Resp:  [11-29] 15  SpO2:  [92 %-100 %] 95 %  BP: ()/(56-99) 105/61  Arterial Line BP: ()/(50-85) 99/51     Weight: 89 kg (196 lb 3.4 oz)  Body mass index is 27.37 kg/m².    Estimated Creatinine Clearance: 61.9 mL/min (based on SCr of 1.2 mg/dL).    Physical Exam  Constitutional:       General: He is not in acute distress.     Appearance: He is well-developed. He is not diaphoretic.   HENT:      Head: Atraumatic.      Comments: Poor dentition w/ R upper tooth w/ evidence of caries and decay     Right Ear: External ear normal.      Left Ear: External ear normal.      Nose: Nose normal.   Neck:      Musculoskeletal: Normal range of motion and neck supple.   Cardiovascular:      Heart sounds: Normal heart sounds. No murmur.      Comments: impella present  Pulmonary:      Effort: Pulmonary effort is normal. No respiratory distress.      Breath sounds: Normal breath sounds. No wheezing.   Abdominal:      General: Bowel sounds are normal. There is no distension.      Palpations: Abdomen is soft.      Tenderness: There is no abdominal tenderness.   Musculoskeletal: Normal range of motion.         General: No deformity.   Skin:     General: Skin is warm and dry.      Findings: No erythema or rash.      Comments: Multiple lines in place    Neurological:      Mental Status: He is alert and oriented to person, place, and time.      Cranial Nerves: No cranial nerve deficit.   Psychiatric:         Behavior: Behavior normal.         Significant Labs:   CBC:   Recent  Labs   Lab 06/26/20 1945 06/27/20  0330 06/27/20  1002   WBC 7.55 8.37 7.02   HGB 11.1* 9.9* 8.7*   HCT 35.3* 29.8* 27.5*    153 136*     CMP:   Recent Labs   Lab 06/26/20  0322 06/26/20 1945 06/27/20  0330    138 139   K 3.9 4.7 3.8    104 103   CO2 23 26 26    120* 105   BUN 33* 30* 30*   CREATININE 1.3 1.2 1.2   CALCIUM 8.8 8.7 8.4*   PROT 6.2 6.4 5.9*   ALBUMIN 3.3* 3.3* 3.2*   BILITOT 1.1* 1.3* 1.9*  1.9*   ALKPHOS 83 85 78   AST 23 31 37   ALT 18 19 16   ANIONGAP 10 8 10   EGFRNONAA 55.7* >60.0 >60.0     Microbiology Results (last 7 days)     ** No results found for the last 168 hours. **          Significant Imaging: I have reviewed all pertinent imaging results/findings within the past 24 hours.

## 2020-06-27 NOTE — CARE UPDATE
CCU update plan of care   6/27/20, 10 AM - 11:30 AM (delayed entry note)      ~10 AM: Notified by nursing staff regarding significant blood loss via peripheral IV (left AC vein) through which systemic heparin was infusing. Peripheral IV found pulled out. Bed sheet and gown profoundly saturated with blood. IV replaced to other site. Manual compression held over L AC until hemostasis achieved. Dr Cruz notified --> advised to NOT apply compression band/device over L AC area to avoid causing any issues with distal hypoperfusion to LUE. Of note transient suction alarm occurred on Impella. Ordered 250 cc IVF bolus x1 STAT and 1 U PRBCs STAT.     ~10:30 AM: Notified by nursing staff that 2nd transient suction alarm occurred on device (lasted briefly, seconds). Ordered additional 250 cc IVF bolus x1 STAT while awaiting PRBCs.    ~11:30 AM: Fluid bolus infusions completed and PRBC transfusion infusing. Patient had 3rd suction alarm on device (lasted briefly, seconds). Bedside echo demonstrates well seated device in the LV cavity (NOT touching septum or MV apparatus) and measuring ~ 3.8 cm from annulus to inlet area. Estimated CVP based on IVC (2.2 cm and >50% collapse with sniff) is ~ 8 mmHg ; confirmed to be ~9 by Liguori Ibeth catheter. Suspect suction alarms secondary to preload dependence. Discussed with Dr Cruz --> due to stable MAP 70-75s and estimated SVO2 ~ 65% on flow track monitor decreased Impella support to P5 from P6. Continue to monitor hemodynamics closely, repeat CVP, SVO2 in 2 hours. Also with recheck CBC at 3-4 PM to ensure stable H/H following acute blood loss from PIV site.      Continue to monitor UE and LE pulses Q1H and alert IC Fellow or Dr Cruz with any changes/concerns.     Juliane Latif MD   Cardiology Fellow, PGY-5       Discussed with Dr Cruz.     Signed out to on call CCU fellow.

## 2020-06-28 PROBLEM — D62 ACUTE BLOOD LOSS ANEMIA: Status: ACTIVE | Noted: 2020-06-28

## 2020-06-28 PROBLEM — E78.5 DYSLIPIDEMIA: Status: ACTIVE | Noted: 2020-06-28

## 2020-06-28 PROBLEM — I50.21 ACUTE SYSTOLIC CONGESTIVE HEART FAILURE: Status: ACTIVE | Noted: 2020-06-28

## 2020-06-28 LAB
ABO + RH BLD: NORMAL
ALBUMIN SERPL BCP-MCNC: 2.6 G/DL (ref 3.5–5.2)
ALLENS TEST: ABNORMAL
ALP SERPL-CCNC: 64 U/L (ref 55–135)
ALT SERPL W/O P-5'-P-CCNC: 14 U/L (ref 10–44)
ANION GAP SERPL CALC-SCNC: 7 MMOL/L (ref 8–16)
APTT BLDCRRT: 38.7 SEC (ref 21–32)
APTT BLDCRRT: 41 SEC (ref 21–32)
APTT BLDCRRT: 56.1 SEC (ref 21–32)
APTT BLDCRRT: 63.4 SEC (ref 21–32)
APTT BLDCRRT: 77.9 SEC (ref 21–32)
AST SERPL-CCNC: 39 U/L (ref 10–40)
BASOPHILS # BLD AUTO: 0.03 K/UL (ref 0–0.2)
BASOPHILS NFR BLD: 0.4 % (ref 0–1.9)
BILIRUB SERPL-MCNC: 1.1 MG/DL (ref 0.1–1)
BILIRUB SERPL-MCNC: 1.1 MG/DL (ref 0.1–1)
BLD GP AB SCN CELLS X3 SERPL QL: NORMAL
BUN SERPL-MCNC: 25 MG/DL (ref 8–23)
CALCIUM SERPL-MCNC: 7.5 MG/DL (ref 8.7–10.5)
CHLORIDE SERPL-SCNC: 103 MMOL/L (ref 95–110)
CO2 SERPL-SCNC: 26 MMOL/L (ref 23–29)
CREAT SERPL-MCNC: 1 MG/DL (ref 0.5–1.4)
DELSYS: ABNORMAL
DIFFERENTIAL METHOD: ABNORMAL
EOSINOPHIL # BLD AUTO: 0.1 K/UL (ref 0–0.5)
EOSINOPHIL NFR BLD: 2.1 % (ref 0–8)
ERYTHROCYTE [DISTWIDTH] IN BLOOD BY AUTOMATED COUNT: 16.2 % (ref 11.5–14.5)
EST. GFR  (AFRICAN AMERICAN): >60 ML/MIN/1.73 M^2
EST. GFR  (NON AFRICAN AMERICAN): >60 ML/MIN/1.73 M^2
FLOW: 2
GLUCOSE SERPL-MCNC: 104 MG/DL (ref 70–110)
HCO3 UR-SCNC: 24.9 MMOL/L (ref 24–28)
HCO3 UR-SCNC: 25.2 MMOL/L (ref 24–28)
HCO3 UR-SCNC: 25.5 MMOL/L (ref 24–28)
HCO3 UR-SCNC: 25.5 MMOL/L (ref 24–28)
HCO3 UR-SCNC: 25.9 MMOL/L (ref 24–28)
HCO3 UR-SCNC: 26.1 MMOL/L (ref 24–28)
HCO3 UR-SCNC: 27 MMOL/L (ref 24–28)
HCT VFR BLD AUTO: 27.3 % (ref 40–54)
HGB BLD-MCNC: 8.9 G/DL (ref 14–18)
IMM GRANULOCYTES # BLD AUTO: 0.02 K/UL (ref 0–0.04)
IMM GRANULOCYTES NFR BLD AUTO: 0.3 % (ref 0–0.5)
INR PPP: 1.4 (ref 0.8–1.2)
LDH SERPL L TO P-CCNC: 395 U/L (ref 110–260)
LYMPHOCYTES # BLD AUTO: 1.1 K/UL (ref 1–4.8)
LYMPHOCYTES NFR BLD: 15.5 % (ref 18–48)
MAGNESIUM SERPL-MCNC: 2.2 MG/DL (ref 1.6–2.6)
MCH RBC QN AUTO: 34 PG (ref 27–31)
MCHC RBC AUTO-ENTMCNC: 32.6 G/DL (ref 32–36)
MCV RBC AUTO: 104 FL (ref 82–98)
MODE: ABNORMAL
MONOCYTES # BLD AUTO: 0.8 K/UL (ref 0.3–1)
MONOCYTES NFR BLD: 11.2 % (ref 4–15)
NEUTROPHILS # BLD AUTO: 4.8 K/UL (ref 1.8–7.7)
NEUTROPHILS NFR BLD: 70.5 % (ref 38–73)
NRBC BLD-RTO: 0 /100 WBC
PCO2 BLDA: 39.6 MMHG (ref 35–45)
PCO2 BLDA: 40.2 MMHG (ref 35–45)
PCO2 BLDA: 40.3 MMHG (ref 35–45)
PCO2 BLDA: 41.5 MMHG (ref 35–45)
PCO2 BLDA: 41.7 MMHG (ref 35–45)
PCO2 BLDA: 45 MMHG (ref 35–45)
PCO2 BLDA: 48.1 MMHG (ref 35–45)
PH SMN: 7.36 [PH] (ref 7.35–7.45)
PH SMN: 7.37 [PH] (ref 7.35–7.45)
PH SMN: 7.39 [PH] (ref 7.35–7.45)
PH SMN: 7.4 [PH] (ref 7.35–7.45)
PH SMN: 7.41 [PH] (ref 7.35–7.45)
PHOSPHATE SERPL-MCNC: 3.6 MG/DL (ref 2.7–4.5)
PLATELET # BLD AUTO: 109 K/UL (ref 150–350)
PMV BLD AUTO: 11.3 FL (ref 9.2–12.9)
PO2 BLDA: 23 MMHG (ref 40–60)
PO2 BLDA: 25 MMHG (ref 40–60)
PO2 BLDA: 26 MMHG (ref 40–60)
PO2 BLDA: 27 MMHG (ref 40–60)
PO2 BLDA: 27 MMHG (ref 40–60)
PO2 BLDA: 30 MMHG (ref 40–60)
PO2 BLDA: 33 MMHG (ref 40–60)
POC BE: 0 MMOL/L
POC BE: 0 MMOL/L
POC BE: 1 MMOL/L
POC SATURATED O2: 42 % (ref 95–100)
POC SATURATED O2: 45 % (ref 95–100)
POC SATURATED O2: 48 % (ref 95–100)
POC SATURATED O2: 49 % (ref 95–100)
POC SATURATED O2: 51 % (ref 95–100)
POC SATURATED O2: 56 % (ref 95–100)
POC SATURATED O2: 61 % (ref 95–100)
POC TCO2: 26 MMOL/L (ref 24–29)
POC TCO2: 26 MMOL/L (ref 24–29)
POC TCO2: 27 MMOL/L (ref 24–29)
POC TCO2: 28 MMOL/L (ref 24–29)
POTASSIUM SERPL-SCNC: 3.7 MMOL/L (ref 3.5–5.1)
POTASSIUM SERPL-SCNC: 4.2 MMOL/L (ref 3.5–5.1)
PROT SERPL-MCNC: 5 G/DL (ref 6–8.4)
PROTHROMBIN TIME: 14 SEC (ref 9–12.5)
RBC # BLD AUTO: 2.62 M/UL (ref 4.6–6.2)
SAMPLE: ABNORMAL
SITE: ABNORMAL
SODIUM SERPL-SCNC: 136 MMOL/L (ref 136–145)
WBC # BLD AUTO: 6.76 K/UL (ref 3.9–12.7)

## 2020-06-28 PROCEDURE — 25000003 PHARM REV CODE 250: Performed by: STUDENT IN AN ORGANIZED HEALTH CARE EDUCATION/TRAINING PROGRAM

## 2020-06-28 PROCEDURE — 94640 AIRWAY INHALATION TREATMENT: CPT

## 2020-06-28 PROCEDURE — 63600175 PHARM REV CODE 636 W HCPCS: Performed by: INTERNAL MEDICINE

## 2020-06-28 PROCEDURE — 83615 LACTATE (LD) (LDH) ENZYME: CPT

## 2020-06-28 PROCEDURE — 86850 RBC ANTIBODY SCREEN: CPT

## 2020-06-28 PROCEDURE — 99233 SBSQ HOSP IP/OBS HIGH 50: CPT | Mod: ,,, | Performed by: INTERNAL MEDICINE

## 2020-06-28 PROCEDURE — 99233 SBSQ HOSP IP/OBS HIGH 50: CPT | Mod: GC,,, | Performed by: INTERNAL MEDICINE

## 2020-06-28 PROCEDURE — 27000221 HC OXYGEN, UP TO 24 HOURS

## 2020-06-28 PROCEDURE — 63600175 PHARM REV CODE 636 W HCPCS: Performed by: STUDENT IN AN ORGANIZED HEALTH CARE EDUCATION/TRAINING PROGRAM

## 2020-06-28 PROCEDURE — 99233 PR SUBSEQUENT HOSPITAL CARE,LEVL III: ICD-10-PCS | Mod: GC,,, | Performed by: INTERNAL MEDICINE

## 2020-06-28 PROCEDURE — 20000000 HC ICU ROOM

## 2020-06-28 PROCEDURE — 25000003 PHARM REV CODE 250: Performed by: INTERNAL MEDICINE

## 2020-06-28 PROCEDURE — 36415 COLL VENOUS BLD VENIPUNCTURE: CPT

## 2020-06-28 PROCEDURE — 85730 THROMBOPLASTIN TIME PARTIAL: CPT

## 2020-06-28 PROCEDURE — 84132 ASSAY OF SERUM POTASSIUM: CPT

## 2020-06-28 PROCEDURE — 83735 ASSAY OF MAGNESIUM: CPT

## 2020-06-28 PROCEDURE — 99233 PR SUBSEQUENT HOSPITAL CARE,LEVL III: ICD-10-PCS | Mod: ,,, | Performed by: INTERNAL MEDICINE

## 2020-06-28 PROCEDURE — 99900035 HC TECH TIME PER 15 MIN (STAT)

## 2020-06-28 PROCEDURE — 85025 COMPLETE CBC W/AUTO DIFF WBC: CPT

## 2020-06-28 PROCEDURE — 27000203 HC IMPELLA ADD'L DAY (CL)

## 2020-06-28 PROCEDURE — 82803 BLOOD GASES ANY COMBINATION: CPT

## 2020-06-28 PROCEDURE — 80053 COMPREHEN METABOLIC PANEL: CPT

## 2020-06-28 PROCEDURE — 94761 N-INVAS EAR/PLS OXIMETRY MLT: CPT

## 2020-06-28 PROCEDURE — 84100 ASSAY OF PHOSPHORUS: CPT

## 2020-06-28 PROCEDURE — 85610 PROTHROMBIN TIME: CPT

## 2020-06-28 RX ORDER — LANOLIN ALCOHOL/MO/W.PET/CERES
800 CREAM (GRAM) TOPICAL
Status: DISCONTINUED | OUTPATIENT
Start: 2020-06-28 | End: 2020-07-04

## 2020-06-28 RX ORDER — POTASSIUM CHLORIDE 20 MEQ/15ML
40 SOLUTION ORAL
Status: DISCONTINUED | OUTPATIENT
Start: 2020-06-28 | End: 2020-07-04

## 2020-06-28 RX ORDER — ACETAMINOPHEN 325 MG/1
650 TABLET ORAL EVERY 6 HOURS PRN
Status: DISCONTINUED | OUTPATIENT
Start: 2020-06-28 | End: 2020-07-05 | Stop reason: HOSPADM

## 2020-06-28 RX ORDER — AMOXICILLIN 250 MG
1 CAPSULE ORAL DAILY PRN
Status: DISCONTINUED | OUTPATIENT
Start: 2020-06-28 | End: 2020-07-05 | Stop reason: HOSPADM

## 2020-06-28 RX ORDER — CALCIUM CARBONATE 200(500)MG
500 TABLET,CHEWABLE ORAL DAILY PRN
Status: DISCONTINUED | OUTPATIENT
Start: 2020-06-28 | End: 2020-07-05 | Stop reason: HOSPADM

## 2020-06-28 RX ORDER — POTASSIUM CHLORIDE 20 MEQ/15ML
60 SOLUTION ORAL
Status: DISCONTINUED | OUTPATIENT
Start: 2020-06-28 | End: 2020-07-04

## 2020-06-28 RX ORDER — POTASSIUM CHLORIDE 20 MEQ/1
40 TABLET, EXTENDED RELEASE ORAL ONCE
Status: COMPLETED | OUTPATIENT
Start: 2020-06-28 | End: 2020-06-28

## 2020-06-28 RX ORDER — SODIUM,POTASSIUM PHOSPHATES 280-250MG
2 POWDER IN PACKET (EA) ORAL
Status: DISCONTINUED | OUTPATIENT
Start: 2020-06-28 | End: 2020-07-04

## 2020-06-28 RX ORDER — ONDANSETRON 8 MG/1
8 TABLET, ORALLY DISINTEGRATING ORAL EVERY 8 HOURS PRN
Status: DISCONTINUED | OUTPATIENT
Start: 2020-06-28 | End: 2020-07-05 | Stop reason: HOSPADM

## 2020-06-28 RX ADMIN — POTASSIUM CHLORIDE 40 MEQ: 1500 TABLET, EXTENDED RELEASE ORAL at 05:06

## 2020-06-28 RX ADMIN — ESCITALOPRAM OXALATE 10 MG: 5 TABLET, FILM COATED ORAL at 09:06

## 2020-06-28 RX ADMIN — OXYCODONE HYDROCHLORIDE 5 MG: 5 TABLET ORAL at 06:06

## 2020-06-28 RX ADMIN — PANTOPRAZOLE SODIUM 40 MG: 40 TABLET, DELAYED RELEASE ORAL at 09:06

## 2020-06-28 RX ADMIN — ATORVASTATIN CALCIUM 80 MG: 20 TABLET, FILM COATED ORAL at 09:06

## 2020-06-28 RX ADMIN — SODIUM NITROPRUSSIDE 0.4 MCG/KG/MIN: 25 INJECTION, SOLUTION, CONCENTRATE INTRAVENOUS at 06:06

## 2020-06-28 RX ADMIN — OXYCODONE HYDROCHLORIDE 5 MG: 5 TABLET ORAL at 07:06

## 2020-06-28 RX ADMIN — Medication 6 MG: at 07:06

## 2020-06-28 RX ADMIN — CLOPIDOGREL BISULFATE 75 MG: 75 TABLET ORAL at 09:06

## 2020-06-28 RX ADMIN — HEPARIN SODIUM: 5000 INJECTION INTRAVENOUS; SUBCUTANEOUS at 01:06

## 2020-06-28 RX ADMIN — OXYCODONE HYDROCHLORIDE 5 MG: 5 TABLET ORAL at 01:06

## 2020-06-28 RX ADMIN — CEFTRIAXONE 2 G: 2 INJECTION, SOLUTION INTRAVENOUS at 01:06

## 2020-06-28 RX ADMIN — ASPIRIN 81 MG CHEWABLE TABLET 81 MG: 81 TABLET CHEWABLE at 09:06

## 2020-06-28 RX ADMIN — SODIUM CHLORIDE 500 ML: 0.9 INJECTION, SOLUTION INTRAVENOUS at 06:06

## 2020-06-28 RX ADMIN — FLUTICASONE FUROATE AND VILANTEROL TRIFENATATE 1 PUFF: 100; 25 POWDER RESPIRATORY (INHALATION) at 08:06

## 2020-06-28 NOTE — SIGNIFICANT EVENT
Attempted to wean Impella CP this morning from P4 to P3 and Nipride from 0.4 to 0.2 mcg/kg/min. CO on Impella console dropped after change, and repeat MvO2 was 42 (down from 56 this morning). Will increase Impella support back to P4 and Nipride back to 0.4 mcg/kg/min. Continue to monitor.    Signed:  Dennis Daniels MD  Interventional Cardiology Fellow - PGY7  Pager: [phone]  6/28/2020 11:42 AM

## 2020-06-28 NOTE — NURSING
1815: Impella having suction alarms. LILIANA Metcalf MD notified, at bedside and ordered 500 cc NS bolus.       1835: Bolus infusing. Impella continues to have suction alarms. Impella position in aorta alarm, LILIANA Metcalf notified. No new orders.

## 2020-06-28 NOTE — ASSESSMENT & PLAN NOTE
[id number] of 3. Conferring a stroke risk of 3.2% per year    - currently with rate controlled afib  - on heparin drip, holding home warfarin

## 2020-06-28 NOTE — CARE UPDATE
MVO2 56, CVP 9, MAP 76, SpO2 94% --> CO 6.77, CI 3.2, .     Overnight, pulses have been unchanged (left radial pulse biphasic on doppler). 250 ml NaCl bolus x 2 for suction alarm and impella decreased to P4 from P5. Alarm going off again this morning but self resolved without intervention.     IC updated on current hemodynamics. Will hold off on further fluids.

## 2020-06-28 NOTE — PROGRESS NOTES
[facility]  Cardiology  Progress Note    Patient Name: Blaine Multani  MRN: [id number]  Admission Date: 6/25/2020  Hospital Length of Stay: 3 days  Code Status: Full Code   Attending Physician: London Loera III, MD   Primary Care Physician: Nay Alcantar MD  Expected Discharge Date:   Principal Problem:NSTEMI (non-ST elevated myocardial infarction)    Subjective:     Hospital Course:   NSTEMI and underwent LHC at OSH but was found to have dominant L circulation, 80% stenoses of the LM, pLAD, and pLCX. His RCA was totally occluded, as well as all his grafts. Transferred here for higher level of care. Repeat LHC on 6/26: High-degree stenosis of the distal LM, proximal LAD and LCx which received PCI. Impella placed. Post procedure HD consistent with cardiogenic shock and a high systemic vascular resistance. Impella was increased to P6 and he started afterload reduction with sodium nitroprusside. On 6/28, pt was stable and weaned down to P4, continued on nitro drip.     Interval History:  Overnight, pulses have been unchanged (left radial pulse biphasic on doppler). 250 ml NaCl bolus x 2 for suction alarm and impella decreased to P4 from P5. Alarm going off again this morning but self resolved without intervention.      This afternoon IC attempted to wean Impella from P4 to P3 and Nipride from 0.4 to 0.2 mcg/kg/min; however, CO and MVO2 dropped dropped. Impella increased back to P4 and Nipride back to 0.4 mcg/kg/min.     At 1430: MVO2 49, CVP 9, MAP 70, Spo2 93 --> Co 5.85, CI 2.77,      Continued on P4 and nipride at 0.4 mcg/kg/min. No more suction alarms this afternoon. Continues to have good left radial pulse.     Fever recorded overnight. WBC WNL. Per nursing pts temp was taken through swan adrianna while bear hugger was on - apparently warmed to palpate pulses better. No evidence of worsening infection. On ctx for dental luis miguel. Will hold off on repeating infectious workup    Review of  Systems   Constitution: Negative for chills and decreased appetite.   HENT: Negative for congestion, ear discharge and ear pain.    Eyes: Negative for blurred vision and discharge.   Cardiovascular: Negative for chest pain, claudication, cyanosis, dyspnea on exertion and orthopnea.   Respiratory: Negative for cough, hemoptysis and shortness of breath.    Endocrine: Negative for cold intolerance and heat intolerance.   Skin: Negative for color change and nail changes.   Musculoskeletal: Negative for arthritis and back pain.   Gastrointestinal: Negative for bloating and abdominal pain.   Genitourinary: Negative for bladder incontinence and decreased libido.   Neurological: Negative for aphonia and brief paralysis.   Psychiatric/Behavioral: Negative for altered mental status.     Objective:     Vital Signs (Most Recent):  Temp: 98.2 °F (36.8 °C) (06/28/20 1500)  Pulse: 75 (06/28/20 1600)  Resp: (!) 23 (06/28/20 1600)  BP: 112/63 (06/27/20 1900)  SpO2: (!) 93 % (06/28/20 1600) Vital Signs (24h Range):  Temp:  [97.8 °F (36.6 °C)-100.9 °F (38.3 °C)] 98.2 °F (36.8 °C)  Pulse:  [64-77] 75  Resp:  [11-27] 23  SpO2:  [93 %-99 %] 93 %  BP: (104-112)/(62-66) 112/63  Arterial Line BP: ()/(51-64) 105/53     Weight: 89 kg (196 lb 3.4 oz)  Body mass index is 27.37 kg/m².     SpO2: (!) 93 %  O2 Device (Oxygen Therapy): room air      Intake/Output Summary (Last 24 hours) at 6/28/2020 1652  Last data filed at 6/28/2020 1600  Gross per 24 hour   Intake 1910.78 ml   Output 955 ml   Net 955.78 ml       Lines/Drains/Airways     Central Venous Catheter Line            Introducer 06/26/20 right femoral vein;right femoral 2 days    Pulmonary Artery Catheter Assessment  06/26/20 1502 right femoral vein;right femoral 2 days          Drain                 Sheath 06/26/20 1300 Left lateral 2 days         Urethral Catheter 06/26/20 1805 16 Fr. 1 day          Peripheral Intravenous Line                 Peripheral IV - Single Lumen 06/26/20  0002 22 G Left Hand 2 days         Peripheral IV - Single Lumen 06/27/20 1043 20 G Right;Posterior Hand 1 day                Physical Exam   Constitutional: He is oriented to person, place, and time. He appears well-developed and well-nourished.   HENT:   Head: Normocephalic and atraumatic.   Nose: Nose normal.   Mouth/Throat: No oropharyngeal exudate.   Right upper molars with poor dentition and likely dental caries. No evidence of abscess    Eyes: Right eye exhibits no discharge. Left eye exhibits no discharge. No scleral icterus.   Neck: Normal range of motion. Neck supple. No JVD present.   Cardiovascular: Normal rate, regular rhythm, S1 normal and S2 normal. Exam reveals no gallop, no S3, no S4, no distant heart sounds, no friction rub, no midsystolic click and no opening snap.   No murmur heard.  Pulses:       Radial pulses are 2+ on the right side and 2+ on the left side.        Femoral pulses are 2+ on the right side and 2+ on the left side.  L axillary impella in place. Some blood oozing around insertion site with small overlying hematoma. Left radial pulse biphasic with doppler. Biphasic and strong left DP, left PT, and right DP. Right TP absent (unchanged from prior to impella)   Pulmonary/Chest: Effort normal and breath sounds normal. No respiratory distress. He has no wheezes. He has no rales. He exhibits no tenderness.   Abdominal: Soft. Bowel sounds are normal. He exhibits no distension. There is no abdominal tenderness. There is no rebound.   Musculoskeletal: Normal range of motion.         General: No tenderness, deformity or edema.   Lymphadenopathy:     He has no cervical adenopathy.   Neurological: He is alert and oriented to person, place, and time. No cranial nerve deficit.   Skin: Skin is warm and dry.   Chronic venous statis changes on BLE, superficial dilated veins noted. superficial skin tear to left elbow.    Right femoral swan adrianna   Psychiatric: He has a normal mood and affect. His  behavior is normal.       Significant Labs:   Blood Culture: No results for input(s): LABBLOO in the last 48 hours., BMP:   Recent Labs   Lab 06/26/20 1945 06/27/20 0330 06/28/20  0105   * 105 104    139 136   K 4.7 3.8 3.7    103 103   CO2 26 26 26   BUN 30* 30* 25*   CREATININE 1.2 1.2 1.0   CALCIUM 8.7 8.4* 7.5*   MG  --  1.8 2.2   , CMP   Recent Labs   Lab 06/26/20 1945 06/27/20 0330 06/28/20  0105    139 136   K 4.7 3.8 3.7    103 103   CO2 26 26 26   * 105 104   BUN 30* 30* 25*   CREATININE 1.2 1.2 1.0   CALCIUM 8.7 8.4* 7.5*   PROT 6.4 5.9* 5.0*   ALBUMIN 3.3* 3.2* 2.6*   BILITOT 1.3* 1.9*  1.9* 1.1*  1.1*   ALKPHOS 85 78 64   AST 31 37 39   ALT 19 16 14   ANIONGAP 8 10 7*   ESTGFRAFRICA >60.0 >60.0 >60.0   EGFRNONAA >60.0 >60.0 >60.0   , INR   Recent Labs   Lab 06/27/20 0330 06/28/20 0105   INR 1.4* 1.4*    and All pertinent lab results from the last 24 hours have been reviewed.      Assessment and Plan:       * NSTEMI (non-ST elevated myocardial infarction)  69 y.o. male with history of CAD s/p CABG x3 (LIMA-LAD, SVG-OM, SVG-D in 1998), HFrEF (35-40%), CKD III, afib on coumadin (last dose on Sunday), PAD s/p L fem-pop (occluded), BL SFA stenting, HTN, asthma, smoking, who presents as transfer for interventional cardiology evaluation of severe L main and three vessel disease / high risk PCI. Presented with NSTEMI and underwent LHC at OSH but was found to have dominant L circulation, 80% stenoses of the LM, pLAD, and pLCX. His RCA was totally occluded, as well as all his grafts. Transferred here for higher level of care. Repeat LHC on 6/26 at [facility]: High-degree stenosis of the distal LM, proximal LAD and LCx which received PCI. Impella placed. Post procedure HD consistent with cardiogenic shock and a high systemic vascular resistance. Impella was increased to P6 and he started afterload reduction with sodium nitroprusside.     - please see interval history   -  impella at P4, weaning as tolerated.   - good extremity pulses, q1 hour nursing checks with doppler   - aPTT not therapeutic on purge, started on systemic heparin. Goal aPTT 45-60.   -Continue BB, high-intensity statin  -Continue ASA   -Loaded with plavix 600 once, followed by plavix 75 daily  -Control BP to a goal of <130/80    Dental abscess  Right upper molars with poor dentition and likely dental caries. No evidence of abscess     - ID consulted: changed unasyn to ceftriaxone 2g Q24H x 7 days.     Depression  - Continue lexapro    A-fib  [id number] of 3. Conferring a stroke risk of 3.2% per year    - currently with rate controlled afib  - on heparin drip, holding home warfarin     HTN (hypertension)  - holding home BP meds    PAD (peripheral artery disease)  - Continue asa and lipitor        VTE Risk Mitigation (From admission, onward)         Ordered     heparin 25,000 units in dextrose 5% 250 mL (100 units/mL) infusion (heparin infusion - NO NOMOGRAM)  Continuous      06/27/20 0819     heparin (porcine) 25,000 Units in dextrose 5 % 500 mL infusion  Continuous      06/26/20 2016     heparin infusion 1,000 units/500 ml in 0.9% NaCl (pressure line flush)  Intra-op continuous PRN      06/26/20 1328     IP VTE LOW RISK PATIENT  Once      06/26/20 1114     Place TATA hose  Until discontinued      06/26/20 1114     Place sequential compression device  Until discontinued      06/26/20 0022                Brenden Dyson MD  Cardiology  [facility]

## 2020-06-28 NOTE — SUBJECTIVE & OBJECTIVE
Interval History:  Overnight, pulses have been unchanged (left radial pulse biphasic on doppler). 250 ml NaCl bolus x 2 for suction alarm and impella decreased to P4 from P5. Alarm going off again this morning but self resolved without intervention.      This afternoon IC attempted to wean Impella from P4 to P3 and Nipride from 0.4 to 0.2 mcg/kg/min; however, CO and MVO2 dropped dropped. Impella increased back to P4 and Nipride back to 0.4 mcg/kg/min.     At 1430: MVO2 49, CVP 9, MAP 70, Spo2 93 --> Co 5.85, CI 2.77,      Continued on P4 and nipride at 0.4 mcg/kg/min. No more suction alarms this afternoon. Continues to have good left radial pulse.     Fever recorded overnight. WBC WNL. Per nursing pts temp was taken through swan adrianna while bear hugger was on - apparently warmed to palpate pulses better. No evidence of worsening infection. On ctx for dental nabil. Will hold off on repeating infectious workup    Review of Systems   Constitution: Negative for chills and decreased appetite.   HENT: Negative for congestion, ear discharge and ear pain.    Eyes: Negative for blurred vision and discharge.   Cardiovascular: Negative for chest pain, claudication, cyanosis, dyspnea on exertion and orthopnea.   Respiratory: Negative for cough, hemoptysis and shortness of breath.    Endocrine: Negative for cold intolerance and heat intolerance.   Skin: Negative for color change and nail changes.   Musculoskeletal: Negative for arthritis and back pain.   Gastrointestinal: Negative for bloating and abdominal pain.   Genitourinary: Negative for bladder incontinence and decreased libido.   Neurological: Negative for aphonia and brief paralysis.   Psychiatric/Behavioral: Negative for altered mental status.     Objective:     Vital Signs (Most Recent):  Temp: 98.2 °F (36.8 °C) (06/28/20 1500)  Pulse: 75 (06/28/20 1600)  Resp: (!) 23 (06/28/20 1600)  BP: 112/63 (06/27/20 1900)  SpO2: (!) 93 % (06/28/20 1600) Vital Signs (24h  Range):  Temp:  [97.8 °F (36.6 °C)-100.9 °F (38.3 °C)] 98.2 °F (36.8 °C)  Pulse:  [64-77] 75  Resp:  [11-27] 23  SpO2:  [93 %-99 %] 93 %  BP: (104-112)/(62-66) 112/63  Arterial Line BP: ()/(51-64) 105/53     Weight: 89 kg (196 lb 3.4 oz)  Body mass index is 27.37 kg/m².     SpO2: (!) 93 %  O2 Device (Oxygen Therapy): room air      Intake/Output Summary (Last 24 hours) at 6/28/2020 1652  Last data filed at 6/28/2020 1600  Gross per 24 hour   Intake 1910.78 ml   Output 955 ml   Net 955.78 ml       Lines/Drains/Airways     Central Venous Catheter Line            Introducer 06/26/20 right femoral vein;right femoral 2 days    Pulmonary Artery Catheter Assessment  06/26/20 1502 right femoral vein;right femoral 2 days          Drain                 Sheath 06/26/20 1300 Left lateral 2 days         Urethral Catheter 06/26/20 1805 16 Fr. 1 day          Peripheral Intravenous Line                 Peripheral IV - Single Lumen 06/26/20 0002 22 G Left Hand 2 days         Peripheral IV - Single Lumen 06/27/20 1043 20 G Right;Posterior Hand 1 day                Physical Exam   Constitutional: He is oriented to person, place, and time. He appears well-developed and well-nourished.   HENT:   Head: Normocephalic and atraumatic.   Nose: Nose normal.   Mouth/Throat: No oropharyngeal exudate.   Right upper molars with poor dentition and likely dental caries. No evidence of abscess    Eyes: Right eye exhibits no discharge. Left eye exhibits no discharge. No scleral icterus.   Neck: Normal range of motion. Neck supple. No JVD present.   Cardiovascular: Normal rate, regular rhythm, S1 normal and S2 normal. Exam reveals no gallop, no S3, no S4, no distant heart sounds, no friction rub, no midsystolic click and no opening snap.   No murmur heard.  Pulses:       Radial pulses are 2+ on the right side and 2+ on the left side.        Femoral pulses are 2+ on the right side and 2+ on the left side.  L axillary impella in place. Some blood  oozing around insertion site with small overlying hematoma. Left radial pulse biphasic with doppler. Biphasic and strong left DP, left PT, and right DP. Right TP absent (unchanged from prior to impella)   Pulmonary/Chest: Effort normal and breath sounds normal. No respiratory distress. He has no wheezes. He has no rales. He exhibits no tenderness.   Abdominal: Soft. Bowel sounds are normal. He exhibits no distension. There is no abdominal tenderness. There is no rebound.   Musculoskeletal: Normal range of motion.         General: No tenderness, deformity or edema.   Lymphadenopathy:     He has no cervical adenopathy.   Neurological: He is alert and oriented to person, place, and time. No cranial nerve deficit.   Skin: Skin is warm and dry.   Chronic venous statis changes on BLE, superficial dilated veins noted. superficial skin tear to left elbow.    Right femoral swan adrianna   Psychiatric: He has a normal mood and affect. His behavior is normal.       Significant Labs:   Blood Culture: No results for input(s): LABBLOO in the last 48 hours., BMP:   Recent Labs   Lab 06/26/20 1945 06/27/20 0330 06/28/20  0105   * 105 104    139 136   K 4.7 3.8 3.7    103 103   CO2 26 26 26   BUN 30* 30* 25*   CREATININE 1.2 1.2 1.0   CALCIUM 8.7 8.4* 7.5*   MG  --  1.8 2.2   , CMP   Recent Labs   Lab 06/26/20 1945 06/27/20 0330 06/28/20  0105    139 136   K 4.7 3.8 3.7    103 103   CO2 26 26 26   * 105 104   BUN 30* 30* 25*   CREATININE 1.2 1.2 1.0   CALCIUM 8.7 8.4* 7.5*   PROT 6.4 5.9* 5.0*   ALBUMIN 3.3* 3.2* 2.6*   BILITOT 1.3* 1.9*  1.9* 1.1*  1.1*   ALKPHOS 85 78 64   AST 31 37 39   ALT 19 16 14   ANIONGAP 8 10 7*   ESTGFRAFRICA >60.0 >60.0 >60.0   EGFRNONAA >60.0 >60.0 >60.0   , INR   Recent Labs   Lab 06/27/20 0330 06/28/20  0105   INR 1.4* 1.4*    and All pertinent lab results from the last 24 hours have been reviewed.

## 2020-06-28 NOTE — NURSING
Notified Dr. Daniels patient was complaining of numbness/tingling in left fingers. Left radial pulse dopplerable. Pt stated numbness/tingling has resolved.

## 2020-06-28 NOTE — ASSESSMENT & PLAN NOTE
69 y.o. male with history of CAD s/p CABG x3 (LIMA-LAD, SVG-OM, SVG-D in 1998), HFrEF (35-40%), CKD III, afib on coumadin (last dose on Sunday), PAD s/p L fem-pop (occluded), BL SFA stenting, HTN, asthma, smoking, who presents as transfer for interventional cardiology evaluation of severe L main and three vessel disease / high risk PCI. NSTEMI and underwent LHC at OSH but was found to have dominant L circulation, 80% stenoses of the LM, pLAD, and pLCX. His RCA was totally occluded, as well as all his grafts. Transferred here for higher level of care. Repeat LHC on 6/26: High-degree stenosis of the distal LM, proximal LAD and LCx which received PCI. Impella placed. Post procedure HD consistent with cardiogenic shock and a high systemic vascular resistance. Impella was increased to P6 and he started afterload reduction with sodium nitroprusside.     - please see interval history   - impella at P4, weaning as tolerated.   - good extremity pulses, q1 hour nursing checks with doppler   - aPTT not therapeutic on purge, started on systemic heparin. Goal aPTT 45-60.   -Continue BB, high-intensity statin  -Continue ASA   -Loaded with plavix 600 once, followed by plavix 75 daily  -Control BP to a goal of <130/80

## 2020-06-28 NOTE — CARE UPDATE
MVO2 49, CVP 9, MAP 70, Spo2 93 --> Co 5.85, CI 2.77,     Currently on P4, nipride at 0.4 mcg/kg/min. No suction alarms this afternoon. Continues to have good left radial pulse.

## 2020-06-28 NOTE — PLAN OF CARE
[facility] DAILY GOALS       A: Awake    RASS: Goal -    Actual - RASS (Sánchez Agitation-Sedation Scale): 0-->alert and calm   Restraint necessity:    B: Breath   SBT: Not intubated   C: Coordinate A & B, analgesics/sedatives   Pain: managed    SAT: Not intubated  D: Delirium   CAM-ICU: Overall CAM-ICU: Negative  E: Early Mobility   MOVE Screen: Fail   Activity: Activity Management: bedrest maintained per order  FAS: Feeding/Nutrition   Diet order: Diet/Nutrition Received: low saturated fat/low cholesterol, 2 gram sodium,   Fluid restriction:    T: Thrombus   DVT prophylaxis: VTE Required Core Measure: Pharmacological prophylaxis initiated/maintained  H: HOB Elevation   Head of Bed (HOB): HOB at 15 degrees  U: Ulcer Prophylaxis   GI: no  G: Glucose control   managed    S: Skin   Bundle compliance: yes   Bathing/Skin Care: bath, partial, dressed/undressed, linen changed Date: [unfilled]  B: Bowel Function   constipation   I: Indwelling Catheters   Alberto necessity:      Urethral Catheter 06/26/20 1805 16 Fr.-Reason for Continuing Urinary Catheterization: Critically ill in ICU requiring intensive monitoring   CVC necessity: Yes   IPAD offered: No  D: De-escalation Antibx   No  Plan for the day   Weaned impella from p6 to p5, nipride weaned to 0.7  Family/Goals of care/Code Status   Code Status: Full Code     No acute events throughout day, VS and assessment per flow sheet, patient progressing towards goals as tolerated, plan of care reviewed with Blaine Multani and family, all concerns addressed, will continue to monitor.

## 2020-06-28 NOTE — PROGRESS NOTES
CCU Cross Cover:    Latest Hemodynamics:   svO2) 2 62 -61  CO) 5.62  CI)  2.66  SVR)  1025    Nipride decreased to 0.4 around midnight. Pulses have been unchanged. 250 ml NaCl bolus x 2 for suction alarm and impella decreased to P4 from P5. Has tolerated overnight. Maps remain >70. Additional alarm this morning with another bolus of 250ml.     Discussed with Dr. Cruz.    Estrellita Nolan MD  Cardiology Fellow  PGY 4  [facility] - [phone]

## 2020-06-29 LAB
ALBUMIN SERPL BCP-MCNC: 2.8 G/DL (ref 3.5–5.2)
ALLENS TEST: ABNORMAL
ALP SERPL-CCNC: 69 U/L (ref 55–135)
ALT SERPL W/O P-5'-P-CCNC: 16 U/L (ref 10–44)
ANION GAP SERPL CALC-SCNC: 8 MMOL/L (ref 8–16)
APTT BLDCRRT: 46.7 SEC (ref 21–32)
APTT BLDCRRT: 46.9 SEC (ref 21–32)
APTT BLDCRRT: 50.4 SEC (ref 21–32)
AST SERPL-CCNC: 36 U/L (ref 10–40)
BASOPHILS # BLD AUTO: 0.02 K/UL (ref 0–0.2)
BASOPHILS # BLD AUTO: 0.02 K/UL (ref 0–0.2)
BASOPHILS NFR BLD: 0.3 % (ref 0–1.9)
BASOPHILS NFR BLD: 0.3 % (ref 0–1.9)
BILIRUB SERPL-MCNC: 1.3 MG/DL (ref 0.1–1)
BILIRUB SERPL-MCNC: 1.3 MG/DL (ref 0.1–1)
BUN SERPL-MCNC: 20 MG/DL (ref 8–23)
CALCIUM SERPL-MCNC: 7.7 MG/DL (ref 8.7–10.5)
CHLORIDE SERPL-SCNC: 103 MMOL/L (ref 95–110)
CO2 SERPL-SCNC: 25 MMOL/L (ref 23–29)
CREAT SERPL-MCNC: 1 MG/DL (ref 0.5–1.4)
DELSYS: ABNORMAL
DIFFERENTIAL METHOD: ABNORMAL
DIFFERENTIAL METHOD: ABNORMAL
EOSINOPHIL # BLD AUTO: 0.2 K/UL (ref 0–0.5)
EOSINOPHIL # BLD AUTO: 0.2 K/UL (ref 0–0.5)
EOSINOPHIL NFR BLD: 2.2 % (ref 0–8)
EOSINOPHIL NFR BLD: 3 % (ref 0–8)
ERYTHROCYTE [DISTWIDTH] IN BLOOD BY AUTOMATED COUNT: 15.9 % (ref 11.5–14.5)
ERYTHROCYTE [DISTWIDTH] IN BLOOD BY AUTOMATED COUNT: 15.9 % (ref 11.5–14.5)
EST. GFR  (AFRICAN AMERICAN): >60 ML/MIN/1.73 M^2
EST. GFR  (NON AFRICAN AMERICAN): >60 ML/MIN/1.73 M^2
GLUCOSE SERPL-MCNC: 103 MG/DL (ref 70–110)
HCO3 UR-SCNC: 26.8 MMOL/L (ref 24–28)
HCO3 UR-SCNC: 27 MMOL/L (ref 24–28)
HCO3 UR-SCNC: 27.6 MMOL/L (ref 24–28)
HCO3 UR-SCNC: 27.7 MMOL/L (ref 24–28)
HCO3 UR-SCNC: 30 MMOL/L (ref 24–28)
HCO3 UR-SCNC: 30.9 MMOL/L (ref 24–28)
HCT VFR BLD AUTO: 27 % (ref 40–54)
HCT VFR BLD AUTO: 27.7 % (ref 40–54)
HGB BLD-MCNC: 8.8 G/DL (ref 14–18)
HGB BLD-MCNC: 9 G/DL (ref 14–18)
IMM GRANULOCYTES # BLD AUTO: 0.03 K/UL (ref 0–0.04)
IMM GRANULOCYTES # BLD AUTO: 0.03 K/UL (ref 0–0.04)
IMM GRANULOCYTES NFR BLD AUTO: 0.4 % (ref 0–0.5)
IMM GRANULOCYTES NFR BLD AUTO: 0.4 % (ref 0–0.5)
INR PPP: 1.2 (ref 0.8–1.2)
LDH SERPL L TO P-CCNC: 297 U/L (ref 110–260)
LYMPHOCYTES # BLD AUTO: 0.7 K/UL (ref 1–4.8)
LYMPHOCYTES # BLD AUTO: 1.1 K/UL (ref 1–4.8)
LYMPHOCYTES NFR BLD: 10.9 % (ref 18–48)
LYMPHOCYTES NFR BLD: 14.7 % (ref 18–48)
MAGNESIUM SERPL-MCNC: 2.1 MG/DL (ref 1.6–2.6)
MCH RBC QN AUTO: 33.5 PG (ref 27–31)
MCH RBC QN AUTO: 33.7 PG (ref 27–31)
MCHC RBC AUTO-ENTMCNC: 32.5 G/DL (ref 32–36)
MCHC RBC AUTO-ENTMCNC: 32.6 G/DL (ref 32–36)
MCV RBC AUTO: 103 FL (ref 82–98)
MCV RBC AUTO: 103 FL (ref 82–98)
MODE: ABNORMAL
MONOCYTES # BLD AUTO: 0.8 K/UL (ref 0.3–1)
MONOCYTES # BLD AUTO: 0.8 K/UL (ref 0.3–1)
MONOCYTES NFR BLD: 10.9 % (ref 4–15)
MONOCYTES NFR BLD: 12.1 % (ref 4–15)
NEUTROPHILS # BLD AUTO: 4.9 K/UL (ref 1.8–7.7)
NEUTROPHILS # BLD AUTO: 5.2 K/UL (ref 1.8–7.7)
NEUTROPHILS NFR BLD: 71.5 % (ref 38–73)
NEUTROPHILS NFR BLD: 73.3 % (ref 38–73)
NRBC BLD-RTO: 0 /100 WBC
NRBC BLD-RTO: 0 /100 WBC
PCO2 BLDA: 40.2 MMHG (ref 35–45)
PCO2 BLDA: 40.8 MMHG (ref 35–45)
PCO2 BLDA: 41.2 MMHG (ref 35–45)
PCO2 BLDA: 42.3 MMHG (ref 35–45)
PCO2 BLDA: 42.7 MMHG (ref 35–45)
PCO2 BLDA: 43.2 MMHG (ref 35–45)
PH SMN: 7.4 [PH] (ref 7.35–7.45)
PH SMN: 7.41 [PH] (ref 7.35–7.45)
PH SMN: 7.44 [PH] (ref 7.35–7.45)
PH SMN: 7.44 [PH] (ref 7.35–7.45)
PH SMN: 7.47 [PH] (ref 7.35–7.45)
PH SMN: 7.47 [PH] (ref 7.35–7.45)
PHOSPHATE SERPL-MCNC: 3.1 MG/DL (ref 2.7–4.5)
PLATELET # BLD AUTO: 89 K/UL (ref 150–350)
PLATELET # BLD AUTO: 97 K/UL (ref 150–350)
PMV BLD AUTO: 11.1 FL (ref 9.2–12.9)
PMV BLD AUTO: 11.7 FL (ref 9.2–12.9)
PO2 BLDA: 24 MMHG (ref 40–60)
PO2 BLDA: 25 MMHG (ref 40–60)
PO2 BLDA: 25 MMHG (ref 40–60)
PO2 BLDA: 26 MMHG (ref 40–60)
PO2 BLDA: 27 MMHG (ref 40–60)
PO2 BLDA: 27 MMHG (ref 40–60)
POC ACTIVATED CLOTTING TIME K: 213 SEC (ref 74–137)
POC ACTIVATED CLOTTING TIME K: 219 SEC (ref 74–137)
POC ACTIVATED CLOTTING TIME K: 219 SEC (ref 74–137)
POC ACTIVATED CLOTTING TIME K: 241 SEC (ref 74–137)
POC ACTIVATED CLOTTING TIME K: 246 SEC (ref 74–137)
POC ACTIVATED CLOTTING TIME K: 257 SEC (ref 74–137)
POC BE: 2 MMOL/L
POC BE: 2 MMOL/L
POC BE: 3 MMOL/L
POC BE: 3 MMOL/L
POC BE: 6 MMOL/L
POC BE: 7 MMOL/L
POC SATURATED O2: 42 % (ref 95–100)
POC SATURATED O2: 45 % (ref 95–100)
POC SATURATED O2: 48 % (ref 95–100)
POC SATURATED O2: 51 % (ref 95–100)
POC SATURATED O2: 52 % (ref 95–100)
POC SATURATED O2: 54 % (ref 95–100)
POC TCO2: 28 MMOL/L (ref 24–29)
POC TCO2: 28 MMOL/L (ref 24–29)
POC TCO2: 29 MMOL/L (ref 24–29)
POC TCO2: 29 MMOL/L (ref 24–29)
POC TCO2: 31 MMOL/L (ref 24–29)
POC TCO2: 32 MMOL/L (ref 24–29)
POTASSIUM SERPL-SCNC: 3.9 MMOL/L (ref 3.5–5.1)
PROT SERPL-MCNC: 5.4 G/DL (ref 6–8.4)
PROTHROMBIN TIME: 12.3 SEC (ref 9–12.5)
RBC # BLD AUTO: 2.61 M/UL (ref 4.6–6.2)
RBC # BLD AUTO: 2.69 M/UL (ref 4.6–6.2)
SAMPLE: ABNORMAL
SITE: ABNORMAL
SODIUM SERPL-SCNC: 136 MMOL/L (ref 136–145)
WBC # BLD AUTO: 6.72 K/UL (ref 3.9–12.7)
WBC # BLD AUTO: 7.28 K/UL (ref 3.9–12.7)

## 2020-06-29 PROCEDURE — 25000003 PHARM REV CODE 250: Performed by: STUDENT IN AN ORGANIZED HEALTH CARE EDUCATION/TRAINING PROGRAM

## 2020-06-29 PROCEDURE — 85730 THROMBOPLASTIN TIME PARTIAL: CPT

## 2020-06-29 PROCEDURE — 20000000 HC ICU ROOM

## 2020-06-29 PROCEDURE — 99900035 HC TECH TIME PER 15 MIN (STAT)

## 2020-06-29 PROCEDURE — 82803 BLOOD GASES ANY COMBINATION: CPT

## 2020-06-29 PROCEDURE — 82600 ASSAY OF CYANIDE: CPT

## 2020-06-29 PROCEDURE — 94640 AIRWAY INHALATION TREATMENT: CPT

## 2020-06-29 PROCEDURE — 63600175 PHARM REV CODE 636 W HCPCS: Performed by: INTERNAL MEDICINE

## 2020-06-29 PROCEDURE — 27000203 HC IMPELLA ADD'L DAY (CL)

## 2020-06-29 PROCEDURE — 63600175 PHARM REV CODE 636 W HCPCS

## 2020-06-29 PROCEDURE — 84100 ASSAY OF PHOSPHORUS: CPT

## 2020-06-29 PROCEDURE — 85025 COMPLETE CBC W/AUTO DIFF WBC: CPT

## 2020-06-29 PROCEDURE — 94761 N-INVAS EAR/PLS OXIMETRY MLT: CPT

## 2020-06-29 PROCEDURE — 80053 COMPREHEN METABOLIC PANEL: CPT

## 2020-06-29 PROCEDURE — 83615 LACTATE (LD) (LDH) ENZYME: CPT

## 2020-06-29 PROCEDURE — 85610 PROTHROMBIN TIME: CPT

## 2020-06-29 PROCEDURE — 63600175 PHARM REV CODE 636 W HCPCS: Performed by: STUDENT IN AN ORGANIZED HEALTH CARE EDUCATION/TRAINING PROGRAM

## 2020-06-29 PROCEDURE — 83735 ASSAY OF MAGNESIUM: CPT

## 2020-06-29 PROCEDURE — 99232 SBSQ HOSP IP/OBS MODERATE 35: CPT | Mod: GC,,, | Performed by: INTERNAL MEDICINE

## 2020-06-29 PROCEDURE — 99232 PR SUBSEQUENT HOSPITAL CARE,LEVL II: ICD-10-PCS | Mod: GC,,, | Performed by: INTERNAL MEDICINE

## 2020-06-29 RX ORDER — FUROSEMIDE 10 MG/ML
INJECTION INTRAMUSCULAR; INTRAVENOUS
Status: COMPLETED
Start: 2020-06-29 | End: 2020-06-29

## 2020-06-29 RX ORDER — POTASSIUM CHLORIDE 20 MEQ/1
20 TABLET, EXTENDED RELEASE ORAL ONCE
Status: COMPLETED | OUTPATIENT
Start: 2020-06-29 | End: 2020-06-29

## 2020-06-29 RX ORDER — FUROSEMIDE 10 MG/ML
40 INJECTION INTRAMUSCULAR; INTRAVENOUS ONCE
Status: COMPLETED | OUTPATIENT
Start: 2020-06-29 | End: 2020-06-29

## 2020-06-29 RX ADMIN — ESCITALOPRAM OXALATE 10 MG: 5 TABLET, FILM COATED ORAL at 09:06

## 2020-06-29 RX ADMIN — PANTOPRAZOLE SODIUM 40 MG: 40 TABLET, DELAYED RELEASE ORAL at 09:06

## 2020-06-29 RX ADMIN — Medication 6 MG: at 08:06

## 2020-06-29 RX ADMIN — POTASSIUM CHLORIDE 20 MEQ: 1500 TABLET, EXTENDED RELEASE ORAL at 07:06

## 2020-06-29 RX ADMIN — OXYCODONE HYDROCHLORIDE 5 MG: 5 TABLET ORAL at 01:06

## 2020-06-29 RX ADMIN — FUROSEMIDE 40 MG: 10 INJECTION, SOLUTION INTRAMUSCULAR; INTRAVENOUS at 01:06

## 2020-06-29 RX ADMIN — FUROSEMIDE 40 MG: 10 INJECTION INTRAMUSCULAR; INTRAVENOUS at 01:06

## 2020-06-29 RX ADMIN — FUROSEMIDE 40 MG: 10 INJECTION, SOLUTION INTRAMUSCULAR; INTRAVENOUS at 12:06

## 2020-06-29 RX ADMIN — CEFTRIAXONE 2 G: 2 INJECTION, SOLUTION INTRAVENOUS at 01:06

## 2020-06-29 RX ADMIN — CLOPIDOGREL BISULFATE 75 MG: 75 TABLET ORAL at 09:06

## 2020-06-29 RX ADMIN — ATORVASTATIN CALCIUM 80 MG: 20 TABLET, FILM COATED ORAL at 09:06

## 2020-06-29 RX ADMIN — OXYCODONE HYDROCHLORIDE 5 MG: 5 TABLET ORAL at 06:06

## 2020-06-29 RX ADMIN — FLUTICASONE FUROATE AND VILANTEROL TRIFENATATE 1 PUFF: 100; 25 POWDER RESPIRATORY (INHALATION) at 09:06

## 2020-06-29 RX ADMIN — ASPIRIN 81 MG CHEWABLE TABLET 81 MG: 81 TABLET CHEWABLE at 09:06

## 2020-06-29 RX ADMIN — SODIUM NITROPRUSSIDE 0.9 MCG/KG/MIN: 25 INJECTION, SOLUTION, CONCENTRATE INTRAVENOUS at 01:06

## 2020-06-29 NOTE — PROGRESS NOTES
Repeat hemodynamics for 1500    CVP 8. Over 1 L urine output since 40 mg Lasix IV at 1230.   SVO2 52. Remains on Nipride 0.9 mcg/kg/min.

## 2020-06-29 NOTE — CARE UPDATE
Hemodynamics at 1500:  CVP 8, SVO2 52  CO 5.78, CI 2.74,     CVP improved from 13 to 8 after lasix 40 IV. Continue nipride at 0.9 mcg/kg/min. Will decrease impella from P3 to P2. Repeat HD in 1 hour    After pump is removed will hold on ace/arb as patient with high MAP and low CO concerning for renal artery stenosis.     Discussed with Dr. Cruz

## 2020-06-29 NOTE — PROGRESS NOTES
[facility]  Cardiology  Progress Note    Patient Name: Blaine Multani  MRN: [id number]  Admission Date: 6/25/2020  Hospital Length of Stay: 4 days  Code Status: Full Code   Attending Physician: London Loera III, MD   Primary Care Physician: Nay Alcantar MD  Expected Discharge Date:   Principal Problem:NSTEMI (non-ST elevated myocardial infarction)    Subjective:     Hospital Course:   NSTEMI and underwent LHC at OSH but was found to have dominant L circulation, 80% stenoses of the LM, pLAD, and pLCX. His RCA was totally occluded, as well as all his grafts. Transferred here for higher level of care. Repeat LHC on 6/26: High-degree stenosis of the distal LM, proximal LAD and LCx which received PCI. Impella placed. Post procedure HD consistent with cardiogenic shock and a high systemic vascular resistance. Impella was increased to P6 and he started afterload reduction with nitroprusside. Patient continued to be weaned down on impella settings while tirating nitroprusside gtt. Plans for possible impella removal tomorrow     Interval History:   Overnight fellow notified of suction alarms. He was given 500 cc NS bolus. Later, noted to have Impella position in aorta alarm after having a bath. Bedside ECHO revealed Impella inlet to aortic annulus measurement of 2.9cm. Impella was advanced under ECHO guidance to a depth of 61cm (approximately 0.5cm). Repeat bedside ECHO confirmed  Impella inlet to aortic annulus measurement of 3.5cm.     Hemodynamics at 1500:  CVP 8, SVO2 52  CO 5.78, CI 2.74,      CVP improved from 13 to 8 after lasix 40 IV. Continue nipride at 0.9 mcg/kg/min. Continue impella at P3    2.5L UOP yesterday and overnight, net negative 1L for admit. Continued to have good pulses on doppler (please see physical exam). No chest pain. Labs with no evidence of hemolysis. H/H stable       Review of Systems   Constitution: Negative for chills and decreased appetite.   HENT:  Negative for congestion, ear discharge and ear pain.    Eyes: Negative for blurred vision and discharge.   Cardiovascular: Negative for chest pain, claudication, cyanosis, dyspnea on exertion and orthopnea.   Respiratory: Negative for cough, hemoptysis and shortness of breath.    Endocrine: Negative for cold intolerance and heat intolerance.   Skin: Negative for color change and nail changes.   Musculoskeletal: Negative for arthritis and back pain.   Gastrointestinal: Negative for bloating and abdominal pain.   Genitourinary: Negative for bladder incontinence and decreased libido.   Neurological: Negative for aphonia and brief paralysis.   Psychiatric/Behavioral: Negative for altered mental status.     Objective:     Vital Signs (Most Recent):  Temp: 98.3 °F (36.8 °C) (06/29/20 1500)  Pulse: 74 (06/29/20 1506)  Resp: 19 (06/29/20 1506)  BP: 112/63 (06/27/20 1900)  SpO2: 98 % (06/29/20 1506) Vital Signs (24h Range):  Temp:  [98.3 °F (36.8 °C)-99.9 °F (37.7 °C)] 98.3 °F (36.8 °C)  Pulse:  [66-80] 74  Resp:  [12-33] 19  SpO2:  [93 %-99 %] 98 %  Arterial Line BP: (105-142)/(53-93) 112/62     Weight: 89 kg (196 lb 3.4 oz)  Body mass index is 27.37 kg/m².     SpO2: 98 %  O2 Device (Oxygen Therapy): room air      Intake/Output Summary (Last 24 hours) at 6/29/2020 1533  Last data filed at 6/29/2020 1500  Gross per 24 hour   Intake 2367.96 ml   Output 4040 ml   Net -1672.04 ml       Lines/Drains/Airways     Central Venous Catheter Line            Introducer 06/26/20 right femoral vein;right femoral 3 days    Pulmonary Artery Catheter Assessment  06/26/20 1502 right femoral vein;right femoral 3 days          Drain                 Sheath 06/26/20 1300 Left lateral 3 days         Urethral Catheter 06/26/20 1805 16 Fr. 2 days          Line                 VAD 06/26/20 1700 Impella 2 days          Peripheral Intravenous Line                 Peripheral IV - Single Lumen 06/26/20 0002 22 G Left Hand 3 days         Peripheral IV -  Single Lumen 06/27/20 1043 20 G Right;Posterior Hand 2 days                Physical Exam   Constitutional: He is oriented to person, place, and time. He appears well-developed and well-nourished.   HENT:   Head: Normocephalic and atraumatic.   Nose: Nose normal.   Mouth/Throat: No oropharyngeal exudate.   Right upper molars with poor dentition and likely dental caries. No evidence of abscess    Eyes: Right eye exhibits no discharge. Left eye exhibits no discharge. No scleral icterus.   Neck: Normal range of motion. Neck supple. No JVD present.   Cardiovascular: Normal rate, regular rhythm, S1 normal and S2 normal. Exam reveals no gallop, no S3, no S4, no distant heart sounds, no friction rub, no midsystolic click and no opening snap.   No murmur heard.  L axillary impella in place. Left radial pulse biphasic with doppler. Biphasic and strong left DP, left PT, and right DP. Right TP absent (unchanged from prior to impella)   Pulmonary/Chest: Effort normal and breath sounds normal. No respiratory distress. He has no wheezes. He has no rales. He exhibits no tenderness.   Abdominal: Soft. Bowel sounds are normal. He exhibits no distension. There is no abdominal tenderness. There is no rebound.   Musculoskeletal: Normal range of motion.         General: No tenderness, deformity or edema.   Lymphadenopathy:     He has no cervical adenopathy.   Neurological: He is alert and oriented to person, place, and time. No cranial nerve deficit.   Skin: Skin is warm and dry.   Chronic venous statis changes on BLE, superficial dilated veins noted. superficial skin tear to left elbow.    Right femoral swan adrianna   Psychiatric: He has a normal mood and affect. His behavior is normal.       Significant Labs:   Blood Culture: No results for input(s): LABBLOO in the last 48 hours., CMP   Recent Labs   Lab 06/28/20  0105 06/28/20 2037 06/29/20 0229     --  136   K 3.7 4.2 3.9     --  103   CO2 26  --  25     --  103    BUN 25*  --  20   CREATININE 1.0  --  1.0   CALCIUM 7.5*  --  7.7*   PROT 5.0*  --  5.4*   ALBUMIN 2.6*  --  2.8*   BILITOT 1.1*  1.1*  --  1.3*  1.3*   ALKPHOS 64  --  69   AST 39  --  36   ALT 14  --  16   ANIONGAP 7*  --  8   ESTGFRAFRICA >60.0  --  >60.0   EGFRNONAA >60.0  --  >60.0   , CBC   Recent Labs   Lab 06/28/20  0105 06/29/20 0229 06/29/20  0911   WBC 6.76 7.28 6.72   HGB 8.9* 9.0* 8.8*   HCT 27.3* 27.7* 27.0*   * 97* 89*   , INR   Recent Labs   Lab 06/28/20 0105 06/29/20 0229   INR 1.4* 1.2    and All pertinent lab results from the last 24 hours have been reviewed.      Assessment and Plan:       * NSTEMI (non-ST elevated myocardial infarction)  69 y.o. male with history of CAD s/p CABG x3 (LIMA-LAD, SVG-OM, SVG-D in 1998), HFrEF (35-40%), CKD III, afib on coumadin (last dose on Sunday), PAD s/p L fem-pop (occluded), BL SFA stenting, HTN, asthma, smoking, who presents as transfer for interventional cardiology evaluation of severe L main and three vessel disease / high risk PCI. NSTEMI and underwent LHC at OSH but was found to have dominant L circulation, 80% stenoses of the LM, pLAD, and pLCX. His RCA was totally occluded, as well as all his grafts. Transferred here for higher level of care. Repeat LHC on 6/26: High-degree stenosis of the distal LM, proximal LAD and LCx which received PCI. Impella placed. Post procedure HD consistent with cardiogenic shock and a high systemic vascular resistance. Impella was increased to P6 and he started afterload reduction with sodium nitroprusside.     - please see interval history    - impella at P3, nitroprusside at 0.9 mg/kg/min  - Patient continued to be weaned down on impella settings while tirating nitroprusside gtt. Plans for possible impella removal tomorrow   - good extremity pulses, q1 hour nursing checks with doppler   - aPTT not therapeutic on purge, started on systemic heparin. Goal aPTT 45-60.   -Continue BB, high-intensity  statin  -Continue ASA   -Loaded with plavix 600 once, followed by plavix 75 daily  -Control BP to a goal of <130/80    Dental abscess  Right upper molars with poor dentition and likely dental caries. No evidence of abscess     - ID consulted: changed unasyn to ceftriaxone 2g Q24H x 7 days.     Depression  - Continue lexapro    A-fib  [id number] of 3. Conferring a stroke risk of 3.2% per year    - currently with rate controlled afib  - on heparin drip, holding home warfarin     HTN (hypertension)  - holding home BP meds    PAD (peripheral artery disease)  - Continue asa and lipitor        VTE Risk Mitigation (From admission, onward)         Ordered     heparin 25,000 units in dextrose 5% 250 mL (100 units/mL) infusion (heparin infusion - NO NOMOGRAM)  Continuous      06/27/20 0819     heparin (porcine) 25,000 Units in dextrose 5 % 500 mL infusion  Continuous      06/26/20 2016     heparin infusion 1,000 units/500 ml in 0.9% NaCl (pressure line flush)  Intra-op continuous PRN      06/26/20 1328     IP VTE LOW RISK PATIENT  Once      06/26/20 1114     Place TATA hose  Until discontinued      06/26/20 1114     Place sequential compression device  Until discontinued      06/26/20 0022                Brenden Dyson MD  Cardiology  [facility]

## 2020-06-29 NOTE — ASSESSMENT & PLAN NOTE
Impella management discussed above;  If PA diastolic >20 at midnight 6/28/20 then rec lasix 40mg IV  Strict I/O

## 2020-06-29 NOTE — ASSESSMENT & PLAN NOTE
69 y.o. male with history of CAD s/p CABG x3 (LIMA-LAD, SVG-OM, SVG-D in 1998), HFrEF (35-40%), CKD III, afib on coumadin (last dose on Sunday), PAD s/p L fem-pop (occluded), BL SFA stenting, HTN, asthma, smoking, who presents as transfer for interventional cardiology evaluation of severe L main and three vessel disease / high risk PCI. NSTEMI and underwent LHC at OSH but was found to have dominant L circulation, 80% stenoses of the LM, pLAD, and pLCX. His RCA was totally occluded, as well as all his grafts. Transferred here for higher level of care. Repeat LHC on 6/26: High-degree stenosis of the distal LM, proximal LAD and LCx which received PCI. Impella placed. Post procedure HD consistent with cardiogenic shock and a high systemic vascular resistance. Impella was increased to P6 and he started afterload reduction with sodium nitroprusside.     - please see interval history    - impella at P3, nitroprusside at 0.6 mg/kg/min  - Patient continued to be weaned down on impella settings while tirating nitroprusside gtt. Plans for possible impella removal tomorrow   - good extremity pulses, q1 hour nursing checks with doppler   - aPTT not therapeutic on purge, started on systemic heparin. Goal aPTT 45-60.   -Continue BB, high-intensity statin  -Continue ASA   -Loaded with plavix 600 once, followed by plavix 75 daily  -Control BP to a goal of <130/80

## 2020-06-29 NOTE — ASSESSMENT & PLAN NOTE
Patient was transferred from [facility] after presenting there with an NSTEMI. Coronary angiography there demonstrating all his bypass grafts were occluded and he had high grade LM, pLAD, mLAD, pLCx, mLCx, and dLCx stenoes with a left dominant circulation.  On 6/26/20 he underwent successful multi-vessel PCI with Impella support (Impella was placed via left axillary artery due to severe LE PAD).  -continue EC ASA 81mg po qday  -continue Plavix 75mg po qday  -continue PPI for stress ulcer prophylaxis while on MCS and DAPT  -rec hold beta-blocker while on Impella support  -on 6/27/20 Impella was weaned from p6 to 4  -in the morning of 6/28/20 Impella was weaned from p4 to p3, but MVO2 dropped so power was increased back to P4; this evening hemodynamics appeared favorable so Impella was again decreased to P3; no signfiicant change in hemodynamics at time of this note  -continue nipride for afterload reduction; once Impella is explanted, then transition to captopril TID

## 2020-06-29 NOTE — PROGRESS NOTES
Morning SVO2 42 with Impella at P3. Increased Nipride from 0.4 to 0.6 at 0900. Repeat SVO2 at 1200 is 45. Cardiology rounding at bedside with patient. Team notified about steadily decreasing urine output (see I/O flowsheet).

## 2020-06-29 NOTE — SUBJECTIVE & OBJECTIVE
Interval History:Patient denies angina or shorntess of breath.  Reports mild soresness at left axillary artery access site when he elevates his left arm, but can adduct and abduct the arm without pain.  Denies any loss of strength or sensation in the left arm or hand.    Objective:     Vital Signs (Most Recent):  Temp: 98.9 °F (37.2 °C) (06/28/20 1900)  Pulse: 73 (06/28/20 2100)  Resp: 20 (06/28/20 2100)  BP: 112/63 (06/27/20 1900)  SpO2: 95 % (06/28/20 2100) Vital Signs (24h Range):  Temp:  [97.8 °F (36.6 °C)-100.5 °F (38.1 °C)] 98.9 °F (37.2 °C)  Pulse:  [64-79] 73  Resp:  [11-27] 20  SpO2:  [93 %-99 %] 95 %  Arterial Line BP: ()/(53-70) 110/67     Weight: 89 kg (196 lb 3.4 oz)  Body mass index is 27.37 kg/m².    SpO2: 95 %  O2 Device (Oxygen Therapy): room air      Intake/Output Summary (Last 24 hours) at 6/28/2020 2254  Last data filed at 6/28/2020 2100  Gross per 24 hour   Intake 2704.75 ml   Output 870 ml   Net 1834.75 ml       Lines/Drains/Airways     Central Venous Catheter Line            Introducer 06/26/20 right femoral vein;right femoral 2 days    Pulmonary Artery Catheter Assessment  06/26/20 1502 right femoral vein;right femoral 2 days          Drain                 Sheath 06/26/20 1300 Left lateral 2 days         Urethral Catheter 06/26/20 1805 16 Fr. 2 days          Line                 VAD 06/26/20 1700 Impella 2 days          Peripheral Intravenous Line                 Peripheral IV - Single Lumen 06/26/20 0002 22 G Left Hand 2 days         Peripheral IV - Single Lumen 06/27/20 1043 20 G Right;Posterior Hand 1 day                Physical Exam   Constitutional: He appears well-developed and well-nourished.   Eyes: No scleral icterus.   Neck: No JVD present.   Cardiovascular: S1 normal and S2 normal.   Impella access at  left axillary artery clean and dry; no hematoma  [place] access via right CFV clean and dry; no hematoma  Left radial present by doppler and 1+ to palpation  Left dp and pt  present by doppler  Right dp present by doppler  Right pt absent by doppler   Pulmonary/Chest: Effort normal and breath sounds normal.   Abdominal: Soft. Bowel sounds are normal. He exhibits no distension. There is no guarding.   Genitourinary:    Genitourinary Comments: Alberto in penis     Musculoskeletal:         General: No edema.   Skin: Skin is warm and dry.   Bilateral venous stasis dermatitis on both feet and lower legs to the knees   Psychiatric: He has a normal mood and affect. His behavior is normal.

## 2020-06-29 NOTE — CARE UPDATE
MVo2 51, CVP 9--> CO 5.66, CI 2.68, SVR 1068    Impella currently at P3. Nipride at 0.6 mcg/kg/min.

## 2020-06-29 NOTE — ASSESSMENT & PLAN NOTE
Patient had macrocytic anemia on admission; however on 6/27/20 he bled from a peripheral IV site after the IV accidentally was pulled out; he sufficiently bled from his IV site to require a transfusion

## 2020-06-29 NOTE — ASSESSMENT & PLAN NOTE
Patient has severe PAD including the LE extremities and PAD of the upper extremities also  Impella was placed via left axillary artery due to bilateral ikaic artery stenoses as well as severe bilateral infrainguinal PAD  -continue to assess left radial and bilateral pedeal pulses q1hr; call interventional cardiolofy immediately for any change in pulses

## 2020-06-29 NOTE — SUBJECTIVE & OBJECTIVE
Interval History:   Overnight fellow notified of suction alarms. He was given 500 cc NS bolus. Later, noted to have Impella position in aorta alarm after having a bath. Bedside ECHO revealed Impella inlet to aortic annulus measurement of 2.9cm. Impella was advanced under ECHO guidance to a depth of 61cm (approximately 0.5cm). Repeat bedside ECHO confirmed  Impella inlet to aortic annulus measurement of 3.5cm.     Hemodynamics at 1500:  CVP 8, SVO2 52  CO 5.78, CI 2.74,      CVP improved from 13 to 8 after lasix 40 IV. Continue nipride at 0.9 mcg/kg/min. Continue impella at P3    2.5L UOP yesterday and overnight, net negative 1L for admit. Continued to have good pulses on doppler. Please see physical exam. No chest pain. Labs with no evidence of hemolysis. H/H stable       Review of Systems   Constitution: Negative for chills and decreased appetite.   HENT: Negative for congestion, ear discharge and ear pain.    Eyes: Negative for blurred vision and discharge.   Cardiovascular: Negative for chest pain, claudication, cyanosis, dyspnea on exertion and orthopnea.   Respiratory: Negative for cough, hemoptysis and shortness of breath.    Endocrine: Negative for cold intolerance and heat intolerance.   Skin: Negative for color change and nail changes.   Musculoskeletal: Negative for arthritis and back pain.   Gastrointestinal: Negative for bloating and abdominal pain.   Genitourinary: Negative for bladder incontinence and decreased libido.   Neurological: Negative for aphonia and brief paralysis.   Psychiatric/Behavioral: Negative for altered mental status.     Objective:     Vital Signs (Most Recent):  Temp: 98.3 °F (36.8 °C) (06/29/20 1500)  Pulse: 74 (06/29/20 1506)  Resp: 19 (06/29/20 1506)  BP: 112/63 (06/27/20 1900)  SpO2: 98 % (06/29/20 1506) Vital Signs (24h Range):  Temp:  [98.3 °F (36.8 °C)-99.9 °F (37.7 °C)] 98.3 °F (36.8 °C)  Pulse:  [66-80] 74  Resp:  [12-33] 19  SpO2:  [93 %-99 %] 98 %  Arterial Line  BP: (105-142)/(53-93) 112/62     Weight: 89 kg (196 lb 3.4 oz)  Body mass index is 27.37 kg/m².     SpO2: 98 %  O2 Device (Oxygen Therapy): room air      Intake/Output Summary (Last 24 hours) at 6/29/2020 1533  Last data filed at 6/29/2020 1500  Gross per 24 hour   Intake 2367.96 ml   Output 4040 ml   Net -1672.04 ml       Lines/Drains/Airways     Central Venous Catheter Line            Introducer 06/26/20 right femoral vein;right femoral 3 days    Pulmonary Artery Catheter Assessment  06/26/20 1502 right femoral vein;right femoral 3 days          Drain                 Sheath 06/26/20 1300 Left lateral 3 days         Urethral Catheter 06/26/20 1805 16 Fr. 2 days          Line                 VAD 06/26/20 1700 Impella 2 days          Peripheral Intravenous Line                 Peripheral IV - Single Lumen 06/26/20 0002 22 G Left Hand 3 days         Peripheral IV - Single Lumen 06/27/20 1043 20 G Right;Posterior Hand 2 days                Physical Exam   Constitutional: He is oriented to person, place, and time. He appears well-developed and well-nourished.   HENT:   Head: Normocephalic and atraumatic.   Nose: Nose normal.   Mouth/Throat: No oropharyngeal exudate.   Right upper molars with poor dentition and likely dental caries. No evidence of abscess    Eyes: Right eye exhibits no discharge. Left eye exhibits no discharge. No scleral icterus.   Neck: Normal range of motion. Neck supple. No JVD present.   Cardiovascular: Normal rate, regular rhythm, S1 normal and S2 normal. Exam reveals no gallop, no S3, no S4, no distant heart sounds, no friction rub, no midsystolic click and no opening snap.   No murmur heard.  L axillary impella in place. Left radial pulse biphasic with doppler. Biphasic and strong left DP, left PT, and right DP. Right TP absent (unchanged from prior to impella)   Pulmonary/Chest: Effort normal and breath sounds normal. No respiratory distress. He has no wheezes. He has no rales. He exhibits no  tenderness.   Abdominal: Soft. Bowel sounds are normal. He exhibits no distension. There is no abdominal tenderness. There is no rebound.   Musculoskeletal: Normal range of motion.         General: No tenderness, deformity or edema.   Lymphadenopathy:     He has no cervical adenopathy.   Neurological: He is alert and oriented to person, place, and time. No cranial nerve deficit.   Skin: Skin is warm and dry.   Chronic venous statis changes on BLE, superficial dilated veins noted. superficial skin tear to left elbow.    Right femoral swan adrianna   Psychiatric: He has a normal mood and affect. His behavior is normal.       Significant Labs:   Blood Culture: No results for input(s): LABBLOO in the last 48 hours., CMP   Recent Labs   Lab 06/28/20 0105 06/28/20 2037 06/29/20 0229     --  136   K 3.7 4.2 3.9     --  103   CO2 26  --  25     --  103   BUN 25*  --  20   CREATININE 1.0  --  1.0   CALCIUM 7.5*  --  7.7*   PROT 5.0*  --  5.4*   ALBUMIN 2.6*  --  2.8*   BILITOT 1.1*  1.1*  --  1.3*  1.3*   ALKPHOS 64  --  69   AST 39  --  36   ALT 14  --  16   ANIONGAP 7*  --  8   ESTGFRAFRICA >60.0  --  >60.0   EGFRNONAA >60.0  --  >60.0   , CBC   Recent Labs   Lab 06/28/20 0105 06/29/20 0229 06/29/20  0911   WBC 6.76 7.28 6.72   HGB 8.9* 9.0* 8.8*   HCT 27.3* 27.7* 27.0*   * 97* 89*   , INR   Recent Labs   Lab 06/28/20 0105 06/29/20 0229   INR 1.4* 1.2    and All pertinent lab results from the last 24 hours have been reviewed.

## 2020-06-29 NOTE — PROGRESS NOTES
[facility]  Interventional Cardiology  Progress Note    Patient Name: Blaine Multani  MRN: [id number]  Admission Date: 6/25/2020  Hospital Length of Stay: 3 days  Code Status: Full Code   Attending Physician: London Loera III, MD   Primary Care Physician: Nay Alcantar MD  Principal Problem:NSTEMI (non-ST elevated myocardial infarction)    Subjective:     Interval History:Patient denies angina or shorntess of breath.  Reports mild soresness at left axillary artery access site when he elevates his left arm, but can adduct and abduct the arm without pain.  Denies any loss of strength or sensation in the left arm or hand.    Objective:     Vital Signs (Most Recent):  Temp: 98.9 °F (37.2 °C) (06/28/20 1900)  Pulse: 73 (06/28/20 2100)  Resp: 20 (06/28/20 2100)  BP: 112/63 (06/27/20 1900)  SpO2: 95 % (06/28/20 2100) Vital Signs (24h Range):  Temp:  [97.8 °F (36.6 °C)-100.5 °F (38.1 °C)] 98.9 °F (37.2 °C)  Pulse:  [64-79] 73  Resp:  [11-27] 20  SpO2:  [93 %-99 %] 95 %  Arterial Line BP: ()/(53-70) 110/67     Weight: 89 kg (196 lb 3.4 oz)  Body mass index is 27.37 kg/m².    SpO2: 95 %  O2 Device (Oxygen Therapy): room air      Intake/Output Summary (Last 24 hours) at 6/28/2020 2254  Last data filed at 6/28/2020 2100  Gross per 24 hour   Intake 2704.75 ml   Output 870 ml   Net 1834.75 ml       Lines/Drains/Airways     Central Venous Catheter Line            Introducer 06/26/20 right femoral vein;right femoral 2 days    Pulmonary Artery Catheter Assessment  06/26/20 1502 right femoral vein;right femoral 2 days          Drain                 Sheath 06/26/20 1300 Left lateral 2 days         Urethral Catheter 06/26/20 1805 16 Fr. 2 days          Line                 VAD 06/26/20 1700 Impella 2 days          Peripheral Intravenous Line                 Peripheral IV - Single Lumen 06/26/20 0002 22 G Left Hand 2 days         Peripheral IV - Single Lumen 06/27/20 1043 20 G Right;Posterior Hand 1 day                 Physical Exam   Constitutional: He appears well-developed and well-nourished.   Eyes: No scleral icterus.   Neck: No JVD present.   Cardiovascular: S1 normal and S2 normal.   Impella access at  left axillary artery clean and dry; no hematoma  [place]-Ibeth access via right CFV clean and dry; no hematoma  Left radial present by doppler and 1+ to palpation  Left dp and pt present by doppler  Right dp present by doppler  Right pt absent by doppler   Pulmonary/Chest: Effort normal and breath sounds normal.   Abdominal: Soft. Bowel sounds are normal. He exhibits no distension. There is no guarding.   Genitourinary:    Genitourinary Comments: Alberto in penis     Musculoskeletal:         General: No edema.   Skin: Skin is warm and dry.   Bilateral venous stasis dermatitis on both feet and lower legs to the knees   Psychiatric: He has a normal mood and affect. His behavior is normal.         Assessment and Plan:     Patient is a 69 y.o. male presenting with:    * NSTEMI (non-ST elevated myocardial infarction)  Patient was transferred from [facility] after presenting there with an NSTEMI. Coronary angiography there demonstrating all his bypass grafts were occluded and he had high grade LM, pLAD, mLAD, pLCx, mLCx, and dLCx stenoes with a left dominant circulation.  On 6/26/20 he underwent successful multi-vessel PCI with Impella support (Impella was placed via left axillary artery due to severe LE PAD).  -continue EC ASA 81mg po qday  -continue Plavix 75mg po qday  -continue PPI for stress ulcer prophylaxis while on MCS and DAPT  -rec hold beta-blocker while on Impella support  -on 6/27/20 Impella was weaned from p6 to 4  -in the morning of 6/28/20 Impella was weaned from p4 to p3, but MVO2 dropped so power was increased back to P4; this evening hemodynamics appeared favorable so Impella was again decreased to P3; no signfiicant change in hemodynamics at time of this note  -continue nipride for afterload reduction; once  Impella is explanted, then transition to captopril TID        Acute systolic congestive heart failure  Impella management discussed above;  If PA diastolic >20 at midnight 6/28/20 then rec lasix 40mg IV  Strict I/O    PAD (peripheral artery disease)  Patient has severe PAD including the LE extremities and PAD of the upper extremities also  Impella was placed via left axillary artery due to bilateral ikaic artery stenoses as well as severe bilateral infrainguinal PAD  -continue to assess left radial and bilateral pedeal pulses q1hr; call interventional cardiolofy immediately for any change in pulses    A-fib  Adequate rate control at present  -patient is on heparin IV gtt    Dental abscess  ID consult appreciated    Dyslipidemia  -continue high intensity statin as patient presented with an NSTEMI    Acute blood loss anemia  Patient had macrocytic anemia on admission; however on 6/27/20 he bled from a peripheral IV site after the IV accidentally was pulled out; he sufficiently bled from his IV site to require a transfusion    All of the patient's questions were answered.

## 2020-06-29 NOTE — PLAN OF CARE
CM at bedside to discuss discharge planning assessment.   Patient lives with his wife in a raised home with seven steps to entrance.  Independent with ADL and did not use medical equipment. Explained CM services during patient's stay in hospital.        06/29/20 1220   Discharge Assessment   Assessment Type Discharge Planning Assessment   Confirmed/corrected address and phone number on facesheet? Yes   Assessment information obtained from? Patient   Prior to hospitilization cognitive status: Alert/Oriented   Prior to hospitalization functional status: Independent   Current cognitive status: Alert/Oriented   Current Functional Status: Needs Assistance   Facility Arrived From: home   Lives With spouse   Able to Return to Prior Arrangements yes   Is patient able to care for self after discharge? Yes   Who are your caregiver(s) and their phone number(s)? self   Patient's perception of discharge disposition home or selfcare   Readmission Within the Last 30 Days no previous admission in last 30 days   Patient currently being followed by outpatient case management? No   Equipment Currently Used at Home none   Do you have any problems affording any of your prescribed medications? No   Is the patient taking medications as prescribed? yes   Does the patient have transportation home? Yes   Transportation Anticipated family or friend will provide   Does the patient receive services at the [facility]mad[facility] Clinic?   (patient is tested every 2 weeks with [facility])   Discharge Plan A Home with family   Discharge Plan B Home Health   DME Needed Upon Discharge    (TBD)   Patient/Family in Agreement with Plan yes     Nay Alcantar MD  [address] / [place] [address]    [id number] - [place] - [address]  [address]  [place] [address]  Phone: [phone] Fax: [phone]  Extended Emergency Contact Information  Primary Emergency Contact: Belinda Multani  Address: [address]  [address] [place]  Home Phone: [phone]  Mobile Phone: [phone]  Relation: Spouse

## 2020-06-29 NOTE — CARE UPDATE
Notified of Impella having suction alarms. Ordered 500 cc NS bolus.      Later, noted to have Impella position in aorta alarm after having a bath.     Bedside ECHO revealed Impella inlet to aortic annulus measurement of 2.9cm.    Impella was advanced under ECHO guidance to a depth of 61cm (approximately 0.5cm).  Repeat bedside ECHO confirmed  Impella inlet to aortic annulus measurement of 3.5cm.     IC fellow and staff updated.     Fran Metcalf

## 2020-06-30 PROBLEM — I50.42 CHRONIC COMBINED SYSTOLIC AND DIASTOLIC HEART FAILURE: Status: ACTIVE | Noted: 2020-06-30

## 2020-06-30 PROBLEM — D69.6 THROMBOCYTOPENIA: Status: ACTIVE | Noted: 2020-06-30

## 2020-06-30 LAB
ALBUMIN SERPL BCP-MCNC: 2.6 G/DL (ref 3.5–5.2)
ALLENS TEST: ABNORMAL
ALP SERPL-CCNC: 74 U/L (ref 55–135)
ALT SERPL W/O P-5'-P-CCNC: 15 U/L (ref 10–44)
ANION GAP SERPL CALC-SCNC: 9 MMOL/L (ref 8–16)
AST SERPL-CCNC: 28 U/L (ref 10–40)
BASOPHILS # BLD AUTO: 0.03 K/UL (ref 0–0.2)
BASOPHILS NFR BLD: 0.4 % (ref 0–1.9)
BILIRUB SERPL-MCNC: 1.1 MG/DL (ref 0.1–1)
BILIRUB SERPL-MCNC: 1.1 MG/DL (ref 0.1–1)
BUN SERPL-MCNC: 18 MG/DL (ref 8–23)
CALCIUM SERPL-MCNC: 8 MG/DL (ref 8.7–10.5)
CHLORIDE SERPL-SCNC: 99 MMOL/L (ref 95–110)
CO2 SERPL-SCNC: 26 MMOL/L (ref 23–29)
CREAT SERPL-MCNC: 1 MG/DL (ref 0.5–1.4)
CYANIDE BLD-MCNC: <10 UG/DL
DELSYS: ABNORMAL
DIFFERENTIAL METHOD: ABNORMAL
EOSINOPHIL # BLD AUTO: 0.1 K/UL (ref 0–0.5)
EOSINOPHIL NFR BLD: 1.3 % (ref 0–8)
ERYTHROCYTE [DISTWIDTH] IN BLOOD BY AUTOMATED COUNT: 15.6 % (ref 11.5–14.5)
EST. GFR  (AFRICAN AMERICAN): >60 ML/MIN/1.73 M^2
EST. GFR  (NON AFRICAN AMERICAN): >60 ML/MIN/1.73 M^2
GLUCOSE SERPL-MCNC: 102 MG/DL (ref 70–110)
HCO3 UR-SCNC: 25.9 MMOL/L (ref 24–28)
HCO3 UR-SCNC: 26 MMOL/L (ref 24–28)
HCO3 UR-SCNC: 26.4 MMOL/L (ref 24–28)
HCO3 UR-SCNC: 27.9 MMOL/L (ref 24–28)
HCO3 UR-SCNC: 28.1 MMOL/L (ref 24–28)
HCT VFR BLD AUTO: 26.1 % (ref 40–54)
HGB BLD-MCNC: 8.4 G/DL (ref 14–18)
IMM GRANULOCYTES # BLD AUTO: 0.03 K/UL (ref 0–0.04)
IMM GRANULOCYTES NFR BLD AUTO: 0.4 % (ref 0–0.5)
INR PPP: 1.4 (ref 0.8–1.2)
LDH SERPL L TO P-CCNC: 356 U/L (ref 110–260)
LYMPHOCYTES # BLD AUTO: 0.8 K/UL (ref 1–4.8)
LYMPHOCYTES NFR BLD: 11.4 % (ref 18–48)
MAGNESIUM SERPL-MCNC: 2 MG/DL (ref 1.6–2.6)
MCH RBC QN AUTO: 33.3 PG (ref 27–31)
MCHC RBC AUTO-ENTMCNC: 32.2 G/DL (ref 32–36)
MCV RBC AUTO: 104 FL (ref 82–98)
MODE: ABNORMAL
MONOCYTES # BLD AUTO: 0.8 K/UL (ref 0.3–1)
MONOCYTES NFR BLD: 10.7 % (ref 4–15)
NEUTROPHILS # BLD AUTO: 5.3 K/UL (ref 1.8–7.7)
NEUTROPHILS NFR BLD: 75.8 % (ref 38–73)
NRBC BLD-RTO: 0 /100 WBC
PCO2 BLDA: 35.4 MMHG (ref 35–45)
PCO2 BLDA: 41.9 MMHG (ref 35–45)
PCO2 BLDA: 42 MMHG (ref 35–45)
PCO2 BLDA: 43.2 MMHG (ref 35–45)
PCO2 BLDA: 44.2 MMHG (ref 35–45)
PH SMN: 7.38 [PH] (ref 7.35–7.45)
PH SMN: 7.41 [PH] (ref 7.35–7.45)
PH SMN: 7.42 [PH] (ref 7.35–7.45)
PH SMN: 7.43 [PH] (ref 7.35–7.45)
PH SMN: 7.47 [PH] (ref 7.35–7.45)
PHOSPHATE SERPL-MCNC: 3.7 MG/DL (ref 2.7–4.5)
PLATELET # BLD AUTO: 78 K/UL (ref 150–350)
PMV BLD AUTO: 11.4 FL (ref 9.2–12.9)
PO2 BLDA: 25 MMHG (ref 40–60)
PO2 BLDA: 26 MMHG (ref 40–60)
PO2 BLDA: 27 MMHG (ref 40–60)
POC ACTIVATED CLOTTING TIME K: 213 SEC (ref 74–137)
POC BE: 1 MMOL/L
POC BE: 2 MMOL/L
POC BE: 2 MMOL/L
POC BE: 3 MMOL/L
POC BE: 4 MMOL/L
POC SATURATED O2: 43 % (ref 95–100)
POC SATURATED O2: 50 % (ref 95–100)
POC SATURATED O2: 52 % (ref 95–100)
POC TCO2: 27 MMOL/L (ref 24–29)
POC TCO2: 27 MMOL/L (ref 24–29)
POC TCO2: 28 MMOL/L (ref 24–29)
POC TCO2: 29 MMOL/L (ref 24–29)
POC TCO2: 29 MMOL/L (ref 24–29)
POTASSIUM SERPL-SCNC: 3.9 MMOL/L (ref 3.5–5.1)
PROT SERPL-MCNC: 5.3 G/DL (ref 6–8.4)
PROTHROMBIN TIME: 13.7 SEC (ref 9–12.5)
RBC # BLD AUTO: 2.52 M/UL (ref 4.6–6.2)
SAMPLE: ABNORMAL
SARS-COV-2 RDRP RESP QL NAA+PROBE: NEGATIVE
SITE: ABNORMAL
SODIUM SERPL-SCNC: 134 MMOL/L (ref 136–145)
WBC # BLD AUTO: 7.01 K/UL (ref 3.9–12.7)

## 2020-06-30 PROCEDURE — 36215 PR PLACE CATH SELECTIVE ART,NECK: ICD-10-PCS | Mod: 59,51,, | Performed by: INTERNAL MEDICINE

## 2020-06-30 PROCEDURE — 83615 LACTATE (LD) (LDH) ENZYME: CPT

## 2020-06-30 PROCEDURE — 33992 PR REMOVAL, VAD, PERCUT, LT HEART, ART/VENOUS CANNUL: ICD-10-PCS | Mod: 51,,, | Performed by: INTERNAL MEDICINE

## 2020-06-30 PROCEDURE — 63600175 PHARM REV CODE 636 W HCPCS: Performed by: STUDENT IN AN ORGANIZED HEALTH CARE EDUCATION/TRAINING PROGRAM

## 2020-06-30 PROCEDURE — 25000003 PHARM REV CODE 250: Performed by: STUDENT IN AN ORGANIZED HEALTH CARE EDUCATION/TRAINING PROGRAM

## 2020-06-30 PROCEDURE — 75710 ARTERY X-RAYS ARM/LEG: CPT | Mod: 59 | Performed by: INTERNAL MEDICINE

## 2020-06-30 PROCEDURE — 36415 COLL VENOUS BLD VENIPUNCTURE: CPT

## 2020-06-30 PROCEDURE — 36215 PLACE CATHETER IN ARTERY: CPT | Mod: 59 | Performed by: INTERNAL MEDICINE

## 2020-06-30 PROCEDURE — 25000003 PHARM REV CODE 250: Performed by: INTERNAL MEDICINE

## 2020-06-30 PROCEDURE — 37246 PR TRANSLML BALLOON ANGIO, OPEN/PERC, INITIAL ART: ICD-10-PCS | Mod: LT,,, | Performed by: INTERNAL MEDICINE

## 2020-06-30 PROCEDURE — 82600 ASSAY OF CYANIDE: CPT

## 2020-06-30 PROCEDURE — 75710 ARTERY X-RAYS ARM/LEG: CPT | Mod: 26,59,, | Performed by: INTERNAL MEDICINE

## 2020-06-30 PROCEDURE — 80053 COMPREHEN METABOLIC PANEL: CPT

## 2020-06-30 PROCEDURE — C1769 GUIDE WIRE: HCPCS | Performed by: INTERNAL MEDICINE

## 2020-06-30 PROCEDURE — 82803 BLOOD GASES ANY COMBINATION: CPT

## 2020-06-30 PROCEDURE — 20000000 HC ICU ROOM

## 2020-06-30 PROCEDURE — 37186 SEC ART THROMBECTOMY ADD-ON: CPT | Mod: ,,, | Performed by: INTERNAL MEDICINE

## 2020-06-30 PROCEDURE — 63600175 PHARM REV CODE 636 W HCPCS: Performed by: INTERNAL MEDICINE

## 2020-06-30 PROCEDURE — U0002 COVID-19 LAB TEST NON-CDC: HCPCS

## 2020-06-30 PROCEDURE — 33992 RMVL PERQ LEFT HEART VAD: CPT | Mod: 51,,, | Performed by: INTERNAL MEDICINE

## 2020-06-30 PROCEDURE — 99152 MOD SED SAME PHYS/QHP 5/>YRS: CPT | Performed by: INTERNAL MEDICINE

## 2020-06-30 PROCEDURE — C1760 CLOSURE DEV, VASC: HCPCS | Performed by: INTERNAL MEDICINE

## 2020-06-30 PROCEDURE — 37186 SEC ART THROMBECTOMY ADD-ON: CPT | Performed by: INTERNAL MEDICINE

## 2020-06-30 PROCEDURE — 99232 PR SUBSEQUENT HOSPITAL CARE,LEVL II: ICD-10-PCS | Mod: GC,,, | Performed by: INTERNAL MEDICINE

## 2020-06-30 PROCEDURE — 33992 RMVL PERQ LEFT HEART VAD: CPT | Performed by: INTERNAL MEDICINE

## 2020-06-30 PROCEDURE — 94761 N-INVAS EAR/PLS OXIMETRY MLT: CPT

## 2020-06-30 PROCEDURE — 63600175 PHARM REV CODE 636 W HCPCS

## 2020-06-30 PROCEDURE — C1894 INTRO/SHEATH, NON-LASER: HCPCS | Performed by: INTERNAL MEDICINE

## 2020-06-30 PROCEDURE — 27201423 OPTIME MED/SURG SUP & DEVICES STERILE SUPPLY: Performed by: INTERNAL MEDICINE

## 2020-06-30 PROCEDURE — 85610 PROTHROMBIN TIME: CPT

## 2020-06-30 PROCEDURE — 27000203 HC IMPELLA ADD'L DAY (CL)

## 2020-06-30 PROCEDURE — 37246 TRLUML BALO ANGIOP 1ST ART: CPT | Mod: LT | Performed by: INTERNAL MEDICINE

## 2020-06-30 PROCEDURE — 99900035 HC TECH TIME PER 15 MIN (STAT)

## 2020-06-30 PROCEDURE — C1887 CATHETER, GUIDING: HCPCS | Performed by: INTERNAL MEDICINE

## 2020-06-30 PROCEDURE — 84100 ASSAY OF PHOSPHORUS: CPT

## 2020-06-30 PROCEDURE — 93503 INSERT/PLACE HEART CATHETER: CPT | Performed by: INTERNAL MEDICINE

## 2020-06-30 PROCEDURE — 83735 ASSAY OF MAGNESIUM: CPT

## 2020-06-30 PROCEDURE — 99153 MOD SED SAME PHYS/QHP EA: CPT | Performed by: INTERNAL MEDICINE

## 2020-06-30 PROCEDURE — C1757 CATH, THROMBECTOMY/EMBOLECT: HCPCS | Performed by: INTERNAL MEDICINE

## 2020-06-30 PROCEDURE — C1751 CATH, INF, PER/CENT/MIDLINE: HCPCS | Performed by: INTERNAL MEDICINE

## 2020-06-30 PROCEDURE — 93503 PR INSERT/PLACE FLOW DIRECT CATH: ICD-10-PCS | Mod: ,,, | Performed by: INTERNAL MEDICINE

## 2020-06-30 PROCEDURE — 99152 PR MOD CONSCIOUS SEDATION, SAME PHYS, 5+ YRS, FIRST 15 MIN: ICD-10-PCS | Mod: ,,, | Performed by: INTERNAL MEDICINE

## 2020-06-30 PROCEDURE — 99152 MOD SED SAME PHYS/QHP 5/>YRS: CPT | Mod: ,,, | Performed by: INTERNAL MEDICINE

## 2020-06-30 PROCEDURE — 75710 PR  ANGIO EXTREMITY UNILAT: ICD-10-PCS | Mod: 26,59,, | Performed by: INTERNAL MEDICINE

## 2020-06-30 PROCEDURE — 36215 PLACE CATHETER IN ARTERY: CPT | Mod: 59,51,, | Performed by: INTERNAL MEDICINE

## 2020-06-30 PROCEDURE — 37246 TRLUML BALO ANGIOP 1ST ART: CPT | Mod: LT,,, | Performed by: INTERNAL MEDICINE

## 2020-06-30 PROCEDURE — 93503 INSERT/PLACE HEART CATHETER: CPT | Mod: ,,, | Performed by: INTERNAL MEDICINE

## 2020-06-30 PROCEDURE — 37186 PR SECONDARY ART THROMBECTOMY, INCL GUID & RX INJ, ADD ON CODE: ICD-10-PCS | Mod: ,,, | Performed by: INTERNAL MEDICINE

## 2020-06-30 PROCEDURE — C1725 CATH, TRANSLUMIN NON-LASER: HCPCS | Performed by: INTERNAL MEDICINE

## 2020-06-30 PROCEDURE — 99232 SBSQ HOSP IP/OBS MODERATE 35: CPT | Mod: GC,,, | Performed by: INTERNAL MEDICINE

## 2020-06-30 PROCEDURE — 85025 COMPLETE CBC W/AUTO DIFF WBC: CPT

## 2020-06-30 DEVICE — DEVICE CLOSURE ANGIOSEAL 8FR: Type: IMPLANTABLE DEVICE | Site: ARTERIAL | Status: FUNCTIONAL

## 2020-06-30 RX ORDER — FUROSEMIDE 10 MG/ML
40 INJECTION INTRAMUSCULAR; INTRAVENOUS ONCE
Status: COMPLETED | OUTPATIENT
Start: 2020-06-30 | End: 2020-06-30

## 2020-06-30 RX ORDER — FAMOTIDINE 20 MG/1
20 TABLET, FILM COATED ORAL DAILY
Status: ON HOLD | COMMUNITY
End: 2021-08-11 | Stop reason: HOSPADM

## 2020-06-30 RX ORDER — LIDOCAINE HYDROCHLORIDE 20 MG/ML
INJECTION, SOLUTION INFILTRATION; PERINEURAL
Status: DISCONTINUED | OUTPATIENT
Start: 2020-06-30 | End: 2020-06-30 | Stop reason: HOSPADM

## 2020-06-30 RX ORDER — MIDAZOLAM HYDROCHLORIDE 1 MG/ML
INJECTION, SOLUTION INTRAMUSCULAR; INTRAVENOUS
Status: DISCONTINUED | OUTPATIENT
Start: 2020-06-30 | End: 2020-06-30 | Stop reason: HOSPADM

## 2020-06-30 RX ORDER — FENTANYL CITRATE 50 UG/ML
INJECTION, SOLUTION INTRAMUSCULAR; INTRAVENOUS
Status: DISCONTINUED | OUTPATIENT
Start: 2020-06-30 | End: 2020-06-30 | Stop reason: HOSPADM

## 2020-06-30 RX ORDER — SERTRALINE HYDROCHLORIDE 50 MG/1
50 TABLET, FILM COATED ORAL NIGHTLY
COMMUNITY

## 2020-06-30 RX ORDER — FUROSEMIDE 20 MG/1
20 TABLET ORAL DAILY
Status: ON HOLD | COMMUNITY
End: 2020-07-04 | Stop reason: SDUPTHER

## 2020-06-30 RX ORDER — DIPHENHYDRAMINE HCL 50 MG
50 CAPSULE ORAL ONCE
Status: COMPLETED | OUTPATIENT
Start: 2020-06-30 | End: 2020-06-30

## 2020-06-30 RX ORDER — FUROSEMIDE 10 MG/ML
INJECTION INTRAMUSCULAR; INTRAVENOUS
Status: COMPLETED
Start: 2020-06-30 | End: 2020-06-30

## 2020-06-30 RX ORDER — ROSUVASTATIN CALCIUM 40 MG/1
40 TABLET, COATED ORAL NIGHTLY
Status: ON HOLD | COMMUNITY
End: 2021-01-16 | Stop reason: SDUPTHER

## 2020-06-30 RX ORDER — LOSARTAN POTASSIUM 100 MG/1
100 TABLET ORAL DAILY
Status: ON HOLD | COMMUNITY
End: 2020-07-04 | Stop reason: HOSPADM

## 2020-06-30 RX ADMIN — SODIUM NITROPRUSSIDE 0.9 MCG/KG/MIN: 25 INJECTION, SOLUTION, CONCENTRATE INTRAVENOUS at 09:06

## 2020-06-30 RX ADMIN — FUROSEMIDE 40 MG: 10 INJECTION, SOLUTION INTRAMUSCULAR; INTRAVENOUS at 06:06

## 2020-06-30 RX ADMIN — Medication 6 MG: at 10:06

## 2020-06-30 RX ADMIN — ACETAMINOPHEN 650 MG: 325 TABLET ORAL at 06:06

## 2020-06-30 RX ADMIN — HEPARIN SODIUM: 5000 INJECTION INTRAVENOUS; SUBCUTANEOUS at 03:06

## 2020-06-30 RX ADMIN — FUROSEMIDE 40 MG: 10 INJECTION INTRAMUSCULAR; INTRAVENOUS at 06:06

## 2020-06-30 RX ADMIN — DIPHENHYDRAMINE HYDROCHLORIDE 50 MG: 50 CAPSULE ORAL at 11:06

## 2020-06-30 RX ADMIN — OXYCODONE HYDROCHLORIDE 5 MG: 5 TABLET ORAL at 09:06

## 2020-06-30 RX ADMIN — ESCITALOPRAM OXALATE 10 MG: 5 TABLET, FILM COATED ORAL at 08:06

## 2020-06-30 RX ADMIN — PANTOPRAZOLE SODIUM 40 MG: 40 TABLET, DELAYED RELEASE ORAL at 08:06

## 2020-06-30 RX ADMIN — SODIUM NITROPRUSSIDE 0.9 MCG/KG/MIN: 25 INJECTION, SOLUTION, CONCENTRATE INTRAVENOUS at 12:06

## 2020-06-30 RX ADMIN — ATORVASTATIN CALCIUM 80 MG: 20 TABLET, FILM COATED ORAL at 08:06

## 2020-06-30 RX ADMIN — HEPARIN SODIUM AND DEXTROSE 9 UNITS/KG/HR: 10000; 5 INJECTION INTRAVENOUS at 03:06

## 2020-06-30 RX ADMIN — SODIUM NITROPRUSSIDE 0.9 MCG/KG/MIN: 25 INJECTION, SOLUTION, CONCENTRATE INTRAVENOUS at 07:06

## 2020-06-30 RX ADMIN — ASPIRIN 81 MG CHEWABLE TABLET 81 MG: 81 TABLET CHEWABLE at 08:06

## 2020-06-30 RX ADMIN — CLOPIDOGREL BISULFATE 75 MG: 75 TABLET ORAL at 08:06

## 2020-06-30 RX ADMIN — CEFTRIAXONE 2 G: 2 INJECTION, SOLUTION INTRAVENOUS at 05:06

## 2020-06-30 RX ADMIN — OXYCODONE HYDROCHLORIDE 5 MG: 5 TABLET ORAL at 12:06

## 2020-06-30 RX ADMIN — FLUTICASONE FUROATE AND VILANTEROL TRIFENATATE 1 PUFF: 100; 25 POWDER RESPIRATORY (INHALATION) at 08:06

## 2020-06-30 NOTE — SUBJECTIVE & OBJECTIVE
Interval History: Patient reports feeling well today, denies chest pain or dyspnea. Did not sleep very well overnight but reports no specific symptoms or concerns.    Objective:     Vital Signs (Most Recent):  Temp: 98.3 °F (36.8 °C) (06/30/20 0700)  Pulse: 79 (06/30/20 0800)  Resp: (!) 32 (06/30/20 0800)  BP: 112/63 (06/27/20 1900)  SpO2: 98 % (06/30/20 0800) Vital Signs (24h Range):  Temp:  [98.3 °F (36.8 °C)-98.6 °F (37 °C)] 98.3 °F (36.8 °C)  Pulse:  [66-81] 79  Resp:  [12-32] 32  SpO2:  [88 %-98 %] 98 %  Arterial Line BP: (105-142)/(58-93) 122/67     Weight: 89 kg (196 lb 3.4 oz)  Body mass index is 27.37 kg/m².    SpO2: 98 %  O2 Device (Oxygen Therapy): (P) room air      Intake/Output Summary (Last 24 hours) at 6/30/2020 0816  Last data filed at 6/30/2020 0800  Gross per 24 hour   Intake 1744.19 ml   Output 3546 ml   Net -1801.81 ml       Lines/Drains/Airways     Central Venous Catheter Line            Introducer 06/26/20 right femoral vein;right femoral 4 days    Pulmonary Artery Catheter Assessment  06/26/20 1502 right femoral vein;right femoral 3 days          Drain                 Sheath 06/26/20 1300 Left lateral 3 days         Urethral Catheter 06/26/20 1805 16 Fr. 3 days          Line                 VAD 06/26/20 1700 Impella 3 days          Peripheral Intravenous Line                 Peripheral IV - Single Lumen 06/26/20 0002 22 G Left Hand 4 days         Peripheral IV - Single Lumen 06/27/20 1043 20 G Right;Posterior Hand 2 days                Physical Exam   Constitutional: He is oriented to person, place, and time. He appears well-developed and well-nourished. No distress.   HENT:   Head: Normocephalic and atraumatic.   Neck: No JVD present.   Cardiovascular: Normal rate, regular rhythm, normal heart sounds and intact distal pulses. Exam reveals no gallop and no friction rub.   No murmur heard.  Pulses:       Radial pulses are 2+ on the right side and 0 on the left side.        Femoral pulses are 0  on the right side and 0 on the left side.       Dorsalis pedis pulses are 0 on the right side and 0 on the left side.        Posterior tibial pulses are 0 on the right side and 0 on the left side.   Pulses present by doppler   Pulmonary/Chest: Effort normal and breath sounds normal. No respiratory distress. He has no wheezes. He has no rales.   Abdominal: Soft. Bowel sounds are normal. He exhibits no distension. There is no abdominal tenderness.   Musculoskeletal:         General: No edema.   Neurological: He is alert and oriented to person, place, and time.   Skin: He is not diaphoretic.       Significant Labs:     Recent Results (from the past 24 hour(s))   APTT    Collection Time: 06/29/20  8:40 AM   Result Value Ref Range    aPTT 46.9 (H) 21.0 - 32.0 sec   ISTAT PROCEDURE    Collection Time: 06/29/20  8:46 AM   Result Value Ref Range    POC PH 7.403 7.35 - 7.45    POC PCO2 43.2 35 - 45 mmHg    POC PO2 24 (LL) 40 - 60 mmHg    POC HCO3 27.0 24 - 28 mmol/L    POC BE 2 -2 to 2 mmol/L    POC SATURATED O2 42 (L) 95 - 100 %    POC TCO2 28 24 - 29 mmol/L    Sample VENOUS     Site Other     Allens Test N/A     [employer] Room Air     Mode SPONT    CBC auto differential    Collection Time: 06/29/20  9:11 AM   Result Value Ref Range    WBC 6.72 3.90 - 12.70 K/uL    RBC 2.61 (L) 4.60 - 6.20 M/uL    Hemoglobin 8.8 (L) 14.0 - 18.0 g/dL    Hematocrit 27.0 (L) 40.0 - 54.0 %    Mean Corpuscular Volume 103 (H) 82 - 98 fL    Mean Corpuscular Hemoglobin 33.7 (H) 27.0 - 31.0 pg    Mean Corpuscular Hemoglobin Conc 32.6 32.0 - 36.0 g/dL    RDW 15.9 (H) 11.5 - 14.5 %    Platelets 89 (L) 150 - 350 K/uL    MPV 11.1 9.2 - 12.9 fL    Immature Granulocytes 0.4 0.0 - 0.5 %    Gran # (ANC) 4.9 1.8 - 7.7 K/uL    Immature Grans (Abs) 0.03 0.00 - 0.04 K/uL    Lymph # 0.7 (L) 1.0 - 4.8 K/uL    Mono # 0.8 0.3 - 1.0 K/uL    Eos # 0.2 0.0 - 0.5 K/uL    Baso # 0.02 0.00 - 0.20 K/uL    nRBC 0 0 /100 WBC    Gran% 73.3 (H) 38.0 - 73.0 %    Lymph% 10.9 (L)  18.0 - 48.0 %    Mono% 12.1 4.0 - 15.0 %    Eosinophil% 3.0 0.0 - 8.0 %    Basophil% 0.3 0.0 - 1.9 %    Differential Method Automated    ISTAT PROCEDURE    Collection Time: 06/29/20 12:11 PM   Result Value Ref Range    POC PH 7.406 7.35 - 7.45    POC PCO2 42.7 35 - 45 mmHg    POC PO2 25 (LL) 40 - 60 mmHg    POC HCO3 26.8 24 - 28 mmol/L    POC BE 2 -2 to 2 mmol/L    POC SATURATED O2 45 (L) 95 - 100 %    POC TCO2 28 24 - 29 mmol/L    Sample VENOUS     Site Other     Allens Test N/A     [employer] Room Air     Mode SPONT    ISTAT PROCEDURE    Collection Time: 06/29/20  3:06 PM   Result Value Ref Range    POC PH 7.436 7.35 - 7.45    POC PCO2 41.2 35 - 45 mmHg    POC PO2 27 (LL) 40 - 60 mmHg    POC HCO3 27.7 24 - 28 mmol/L    POC BE 3 -2 to 2 mmol/L    POC SATURATED O2 52 (L) 95 - 100 %    POC TCO2 29 24 - 29 mmol/L    Sample VENOUS     Site Other     Allens Test N/A     [employer] Room Air     Mode SPONT    APTT    Collection Time: 06/29/20  7:56 PM   Result Value Ref Range    aPTT 46.7 (H) 21.0 - 32.0 sec   ISTAT PROCEDURE    Collection Time: 06/29/20  8:02 PM   Result Value Ref Range    POC PH 7.471 (H) 7.35 - 7.45    POC PCO2 42.3 35 - 45 mmHg    POC PO2 25 (LL) 40 - 60 mmHg    POC HCO3 30.9 (H) 24 - 28 mmol/L    POC BE 7 -2 to 2 mmol/L    POC SATURATED O2 48 (L) 95 - 100 %    POC TCO2 32 (H) 24 - 29 mmol/L    Sample VENOUS     Site Other     Allens Test N/A    ISTAT PROCEDURE    Collection Time: 06/29/20 10:38 PM   Result Value Ref Range    POC PH 7.474 (H) 7.35 - 7.45    POC PCO2 40.8 35 - 45 mmHg    POC PO2 27 (LL) 40 - 60 mmHg    POC HCO3 30.0 (H) 24 - 28 mmol/L    POC BE 6 -2 to 2 mmol/L    POC SATURATED O2 54 (L) 95 - 100 %    POC TCO2 31 (H) 24 - 29 mmol/L    Sample VENOUS     Site Other     Allens Test N/A    Comprehensive Metabolic Panel (CMP)    Collection Time: 06/30/20  3:07 AM   Result Value Ref Range    Sodium 134 (L) 136 - 145 mmol/L    Potassium 3.9 3.5 - 5.1 mmol/L    Chloride 99 95 - 110 mmol/L    CO2 26 23  - 29 mmol/L    Glucose 102 70 - 110 mg/dL    BUN, Bld 18 8 - 23 mg/dL    Creatinine 1.0 0.5 - 1.4 mg/dL    Calcium 8.0 (L) 8.7 - 10.5 mg/dL    Total Protein 5.3 (L) 6.0 - 8.4 g/dL    Albumin 2.6 (L) 3.5 - 5.2 g/dL    Total Bilirubin 1.1 (H) 0.1 - 1.0 mg/dL    Alkaline Phosphatase 74 55 - 135 U/L    AST 28 10 - 40 U/L    ALT 15 10 - 44 U/L    Anion Gap 9 8 - 16 mmol/L    eGFR if African American >60.0 >60 mL/min/1.73 m^2    eGFR if non African American >60.0 >60 mL/min/1.73 m^2   Magnesium    Collection Time: 06/30/20  3:07 AM   Result Value Ref Range    Magnesium 2.0 1.6 - 2.6 mg/dL   Phosphorus    Collection Time: 06/30/20  3:07 AM   Result Value Ref Range    Phosphorus 3.7 2.7 - 4.5 mg/dL   PT/INR    Collection Time: 06/30/20  3:07 AM   Result Value Ref Range    Prothrombin Time 13.7 (H) 9.0 - 12.5 sec    INR 1.4 (H) 0.8 - 1.2   CBC with Automated Differential    Collection Time: 06/30/20  3:07 AM   Result Value Ref Range    WBC 7.01 3.90 - 12.70 K/uL    RBC 2.52 (L) 4.60 - 6.20 M/uL    Hemoglobin 8.4 (L) 14.0 - 18.0 g/dL    Hematocrit 26.1 (L) 40.0 - 54.0 %    Mean Corpuscular Volume 104 (H) 82 - 98 fL    Mean Corpuscular Hemoglobin 33.3 (H) 27.0 - 31.0 pg    Mean Corpuscular Hemoglobin Conc 32.2 32.0 - 36.0 g/dL    RDW 15.6 (H) 11.5 - 14.5 %    Platelets 78 (L) 150 - 350 K/uL    MPV 11.4 9.2 - 12.9 fL    Immature Granulocytes 0.4 0.0 - 0.5 %    Gran # (ANC) 5.3 1.8 - 7.7 K/uL    Immature Grans (Abs) 0.03 0.00 - 0.04 K/uL    Lymph # 0.8 (L) 1.0 - 4.8 K/uL    Mono # 0.8 0.3 - 1.0 K/uL    Eos # 0.1 0.0 - 0.5 K/uL    Baso # 0.03 0.00 - 0.20 K/uL    nRBC 0 0 /100 WBC    Gran% 75.8 (H) 38.0 - 73.0 %    Lymph% 11.4 (L) 18.0 - 48.0 %    Mono% 10.7 4.0 - 15.0 %    Eosinophil% 1.3 0.0 - 8.0 %    Basophil% 0.4 0.0 - 1.9 %    Differential Method Automated    Lactate dehydrogenase    Collection Time: 06/30/20  3:07 AM   Result Value Ref Range     (H) 110 - 260 U/L   Bilirubin, total    Collection Time: 06/30/20  3:07  AM   Result Value Ref Range    Total Bilirubin 1.1 (H) 0.1 - 1.0 mg/dL   ISTAT PROCEDURE    Collection Time: 06/30/20  4:23 AM   Result Value Ref Range    POC PH 7.434 7.35 - 7.45    POC PCO2 42.0 35 - 45 mmHg    POC PO2 26 (LL) 40 - 60 mmHg    POC HCO3 28.1 (H) 24 - 28 mmol/L    POC BE 4 -2 to 2 mmol/L    POC SATURATED O2 50 (L) 95 - 100 %    POC TCO2 29 24 - 29 mmol/L    Sample VENOUS     Site Other     Allens Test N/A          Significant Imaging:     I have reviewed all pertinent imaging studies

## 2020-06-30 NOTE — PLAN OF CARE
Notified per Zhang @ [facility] that she received a notification for the patient and he is to get a LifeVest.  Needs the facesheet, echo, cath report and MD note to start the referral process faxed to [phone].  Called and talked to Dr Latif to verify that LifeVest ordered.  All information requested above faxed to number listed above.  Received fax confirmations via email.

## 2020-06-30 NOTE — PROGRESS NOTES
06/30/2020  Wanda Knowles    Current provider:  London Loera III, MD      I, Wanda Knowles, rounded on Blaine Multani to ensure all mechanical assist device settings (Impella) were appropriate and all parameters were within limits.  I was able to ensure all back up equipment was present, the staff had no issues, and the Perfusion Department daily rounding was complete.    9:29 AM

## 2020-06-30 NOTE — PROGRESS NOTES
[facility]  Interventional Cardiology  Progress Note    Patient Name: Blaine Multani  MRN: [id number]  Admission Date: 6/25/2020  Hospital Length of Stay: 5 days  Code Status: Full Code   Attending Physician: London Loera III, MD   Primary Care Physician: Nay Alcantar MD  Principal Problem:NSTEMI (non-ST elevated myocardial infarction)    Subjective:     Interval History: Patient reports feeling well today, denies chest pain or dyspnea. Did not sleep very well overnight but reports no specific symptoms or concerns.    Objective:     Vital Signs (Most Recent):  Temp: 98.3 °F (36.8 °C) (06/30/20 0700)  Pulse: 79 (06/30/20 0800)  Resp: (!) 32 (06/30/20 0800)  BP: 112/63 (06/27/20 1900)  SpO2: 98 % (06/30/20 0800) Vital Signs (24h Range):  Temp:  [98.3 °F (36.8 °C)-98.6 °F (37 °C)] 98.3 °F (36.8 °C)  Pulse:  [66-81] 79  Resp:  [12-32] 32  SpO2:  [88 %-98 %] 98 %  Arterial Line BP: (105-142)/(58-93) 122/67     Weight: 89 kg (196 lb 3.4 oz)  Body mass index is 27.37 kg/m².    SpO2: 98 %  O2 Device (Oxygen Therapy): (P) room air      Intake/Output Summary (Last 24 hours) at 6/30/2020 0816  Last data filed at 6/30/2020 0800  Gross per 24 hour   Intake 1744.19 ml   Output 3546 ml   Net -1801.81 ml       Lines/Drains/Airways     Central Venous Catheter Line            Introducer 06/26/20 right femoral vein;right femoral 4 days    Pulmonary Artery Catheter Assessment  06/26/20 1502 right femoral vein;right femoral 3 days          Drain                 Sheath 06/26/20 1300 Left lateral 3 days         Urethral Catheter 06/26/20 1805 16 Fr. 3 days          Line                 VAD 06/26/20 1700 Impella 3 days          Peripheral Intravenous Line                 Peripheral IV - Single Lumen 06/26/20 0002 22 G Left Hand 4 days         Peripheral IV - Single Lumen 06/27/20 1043 20 G Right;Posterior Hand 2 days                Physical Exam   Constitutional: He is oriented to person, place, and time. He  appears well-developed and well-nourished. No distress.   HENT:   Head: Normocephalic and atraumatic.   Neck: No JVD present.   Cardiovascular: Normal rate, regular rhythm, normal heart sounds and intact distal pulses. Exam reveals no gallop and no friction rub.   No murmur heard.  Pulses:       Radial pulses are 2+ on the right side and 0 on the left side.        Femoral pulses are 0 on the right side and 0 on the left side.       Dorsalis pedis pulses are 0 on the right side and 0 on the left side.        Posterior tibial pulses are 0 on the right side and 0 on the left side.   Pulses present by doppler   Pulmonary/Chest: Effort normal and breath sounds normal. No respiratory distress. He has no wheezes. He has no rales.   Abdominal: Soft. Bowel sounds are normal. He exhibits no distension. There is no abdominal tenderness.   Musculoskeletal:         General: No edema.   Neurological: He is alert and oriented to person, place, and time.   Skin: He is not diaphoretic.       Significant Labs:     Recent Results (from the past 24 hour(s))   APTT    Collection Time: 06/29/20  8:40 AM   Result Value Ref Range    aPTT 46.9 (H) 21.0 - 32.0 sec   ISTAT PROCEDURE    Collection Time: 06/29/20  8:46 AM   Result Value Ref Range    POC PH 7.403 7.35 - 7.45    POC PCO2 43.2 35 - 45 mmHg    POC PO2 24 (LL) 40 - 60 mmHg    POC HCO3 27.0 24 - 28 mmol/L    POC BE 2 -2 to 2 mmol/L    POC SATURATED O2 42 (L) 95 - 100 %    POC TCO2 28 24 - 29 mmol/L    Sample VENOUS     Site Other     Allens Test N/A     [employer] Room Air     Mode SPONT    CBC auto differential    Collection Time: 06/29/20  9:11 AM   Result Value Ref Range    WBC 6.72 3.90 - 12.70 K/uL    RBC 2.61 (L) 4.60 - 6.20 M/uL    Hemoglobin 8.8 (L) 14.0 - 18.0 g/dL    Hematocrit 27.0 (L) 40.0 - 54.0 %    Mean Corpuscular Volume 103 (H) 82 - 98 fL    Mean Corpuscular Hemoglobin 33.7 (H) 27.0 - 31.0 pg    Mean Corpuscular Hemoglobin Conc 32.6 32.0 - 36.0 g/dL    RDW 15.9 (H) 11.5  - 14.5 %    Platelets 89 (L) 150 - 350 K/uL    MPV 11.1 9.2 - 12.9 fL    Immature Granulocytes 0.4 0.0 - 0.5 %    Gran # (ANC) 4.9 1.8 - 7.7 K/uL    Immature Grans (Abs) 0.03 0.00 - 0.04 K/uL    Lymph # 0.7 (L) 1.0 - 4.8 K/uL    Mono # 0.8 0.3 - 1.0 K/uL    Eos # 0.2 0.0 - 0.5 K/uL    Baso # 0.02 0.00 - 0.20 K/uL    nRBC 0 0 /100 WBC    Gran% 73.3 (H) 38.0 - 73.0 %    Lymph% 10.9 (L) 18.0 - 48.0 %    Mono% 12.1 4.0 - 15.0 %    Eosinophil% 3.0 0.0 - 8.0 %    Basophil% 0.3 0.0 - 1.9 %    Differential Method Automated    ISTAT PROCEDURE    Collection Time: 06/29/20 12:11 PM   Result Value Ref Range    POC PH 7.406 7.35 - 7.45    POC PCO2 42.7 35 - 45 mmHg    POC PO2 25 (LL) 40 - 60 mmHg    POC HCO3 26.8 24 - 28 mmol/L    POC BE 2 -2 to 2 mmol/L    POC SATURATED O2 45 (L) 95 - 100 %    POC TCO2 28 24 - 29 mmol/L    Sample VENOUS     Site Other     Allens Test N/A     [employer] Room Air     Mode SPONT    ISTAT PROCEDURE    Collection Time: 06/29/20  3:06 PM   Result Value Ref Range    POC PH 7.436 7.35 - 7.45    POC PCO2 41.2 35 - 45 mmHg    POC PO2 27 (LL) 40 - 60 mmHg    POC HCO3 27.7 24 - 28 mmol/L    POC BE 3 -2 to 2 mmol/L    POC SATURATED O2 52 (L) 95 - 100 %    POC TCO2 29 24 - 29 mmol/L    Sample VENOUS     Site Other     Allens Test N/A     [employer] Room Air     Mode SPONT    APTT    Collection Time: 06/29/20  7:56 PM   Result Value Ref Range    aPTT 46.7 (H) 21.0 - 32.0 sec   ISTAT PROCEDURE    Collection Time: 06/29/20  8:02 PM   Result Value Ref Range    POC PH 7.471 (H) 7.35 - 7.45    POC PCO2 42.3 35 - 45 mmHg    POC PO2 25 (LL) 40 - 60 mmHg    POC HCO3 30.9 (H) 24 - 28 mmol/L    POC BE 7 -2 to 2 mmol/L    POC SATURATED O2 48 (L) 95 - 100 %    POC TCO2 32 (H) 24 - 29 mmol/L    Sample VENOUS     Site Other     Allens Test N/A    ISTAT PROCEDURE    Collection Time: 06/29/20 10:38 PM   Result Value Ref Range    POC PH 7.474 (H) 7.35 - 7.45    POC PCO2 40.8 35 - 45 mmHg    POC PO2 27 (LL) 40 - 60 mmHg    POC HCO3  30.0 (H) 24 - 28 mmol/L    POC BE 6 -2 to 2 mmol/L    POC SATURATED O2 54 (L) 95 - 100 %    POC TCO2 31 (H) 24 - 29 mmol/L    Sample VENOUS     Site Other     Allens Test N/A    Comprehensive Metabolic Panel (CMP)    Collection Time: 06/30/20  3:07 AM   Result Value Ref Range    Sodium 134 (L) 136 - 145 mmol/L    Potassium 3.9 3.5 - 5.1 mmol/L    Chloride 99 95 - 110 mmol/L    CO2 26 23 - 29 mmol/L    Glucose 102 70 - 110 mg/dL    BUN, Bld 18 8 - 23 mg/dL    Creatinine 1.0 0.5 - 1.4 mg/dL    Calcium 8.0 (L) 8.7 - 10.5 mg/dL    Total Protein 5.3 (L) 6.0 - 8.4 g/dL    Albumin 2.6 (L) 3.5 - 5.2 g/dL    Total Bilirubin 1.1 (H) 0.1 - 1.0 mg/dL    Alkaline Phosphatase 74 55 - 135 U/L    AST 28 10 - 40 U/L    ALT 15 10 - 44 U/L    Anion Gap 9 8 - 16 mmol/L    eGFR if African American >60.0 >60 mL/min/1.73 m^2    eGFR if non African American >60.0 >60 mL/min/1.73 m^2   Magnesium    Collection Time: 06/30/20  3:07 AM   Result Value Ref Range    Magnesium 2.0 1.6 - 2.6 mg/dL   Phosphorus    Collection Time: 06/30/20  3:07 AM   Result Value Ref Range    Phosphorus 3.7 2.7 - 4.5 mg/dL   PT/INR    Collection Time: 06/30/20  3:07 AM   Result Value Ref Range    Prothrombin Time 13.7 (H) 9.0 - 12.5 sec    INR 1.4 (H) 0.8 - 1.2   CBC with Automated Differential    Collection Time: 06/30/20  3:07 AM   Result Value Ref Range    WBC 7.01 3.90 - 12.70 K/uL    RBC 2.52 (L) 4.60 - 6.20 M/uL    Hemoglobin 8.4 (L) 14.0 - 18.0 g/dL    Hematocrit 26.1 (L) 40.0 - 54.0 %    Mean Corpuscular Volume 104 (H) 82 - 98 fL    Mean Corpuscular Hemoglobin 33.3 (H) 27.0 - 31.0 pg    Mean Corpuscular Hemoglobin Conc 32.2 32.0 - 36.0 g/dL    RDW 15.6 (H) 11.5 - 14.5 %    Platelets 78 (L) 150 - 350 K/uL    MPV 11.4 9.2 - 12.9 fL    Immature Granulocytes 0.4 0.0 - 0.5 %    Gran # (ANC) 5.3 1.8 - 7.7 K/uL    Immature Grans (Abs) 0.03 0.00 - 0.04 K/uL    Lymph # 0.8 (L) 1.0 - 4.8 K/uL    Mono # 0.8 0.3 - 1.0 K/uL    Eos # 0.1 0.0 - 0.5 K/uL    Baso # 0.03 0.00  - 0.20 K/uL    nRBC 0 0 /100 WBC    Gran% 75.8 (H) 38.0 - 73.0 %    Lymph% 11.4 (L) 18.0 - 48.0 %    Mono% 10.7 4.0 - 15.0 %    Eosinophil% 1.3 0.0 - 8.0 %    Basophil% 0.4 0.0 - 1.9 %    Differential Method Automated    Lactate dehydrogenase    Collection Time: 06/30/20  3:07 AM   Result Value Ref Range     (H) 110 - 260 U/L   Bilirubin, total    Collection Time: 06/30/20  3:07 AM   Result Value Ref Range    Total Bilirubin 1.1 (H) 0.1 - 1.0 mg/dL   ISTAT PROCEDURE    Collection Time: 06/30/20  4:23 AM   Result Value Ref Range    POC PH 7.434 7.35 - 7.45    POC PCO2 42.0 35 - 45 mmHg    POC PO2 26 (LL) 40 - 60 mmHg    POC HCO3 28.1 (H) 24 - 28 mmol/L    POC BE 4 -2 to 2 mmol/L    POC SATURATED O2 50 (L) 95 - 100 %    POC TCO2 29 24 - 29 mmol/L    Sample VENOUS     Site Other     Allens Test N/A          Significant Imaging:     I have reviewed all pertinent imaging studies      Assessment and Plan:     Patient is a 69 y.o. male presenting with:    * NSTEMI (non-ST elevated myocardial infarction)  Left axillary impella removal. Relocation of [place] catheter from right CFV to IJ.     1. Left axillary Impella removal, replacement of [place] catheter  2. Antiplatelets: ASA, plavix  3. Access: Right CFA, Right IJ  4. Pt is a POLY candidate and understands the importance of taking plavix for at least one year, understands that in case of receiving a drug coated stent the failure to comply with dual anti-platelet therapy is likely to result in stent clothing, heart attack and death.   5. The risks, benefits, and alternatives of vascular angiography and possible intervention as well as [place] catheter placement were discussed with the patient. All questions were answered and informed consent was obtained. I had a detailed discussion with the patient regarding risk of stroke, MI, bleeding access site complications including limb loss, allergy, kidney failure including dialysis and death.  6. The patient  understands the risks and benefits and wishes to go ahead with the procedure.  7. All patient's questions were answered          VTE Risk Mitigation (From admission, onward)         Ordered     heparin 25,000 units in dextrose 5% 250 mL (100 units/mL) infusion (heparin infusion - NO NOMOGRAM)  Continuous      06/27/20 0819     heparin (porcine) 25,000 Units in dextrose 5 % 500 mL infusion  Continuous      06/26/20 2016     heparin infusion 1,000 units/500 ml in 0.9% NaCl (pressure line flush)  Intra-op continuous PRN      06/26/20 1328     IP VTE LOW RISK PATIENT  Once      06/26/20 1114     Place TATA hose  Until discontinued      06/26/20 1114     Place sequential compression device  Until discontinued      06/26/20 0022                Mansoor Cassidy MD  Interventional Cardiology  [facility]

## 2020-06-30 NOTE — CARE UPDATE
CCU Care Update:    SvO2 decreased to 48 from 52 after decreasing Impella from P3 -> P2. Ordered repeat SvO2 at 2030 and was 54.    Plan:  - Maintain Impella at P2  - Monitor hemodynamics closely    Lisa Bello MD  Cardiology - PGY4  Pager [phone]

## 2020-06-30 NOTE — SUBJECTIVE & OBJECTIVE
Interval History:  Overnight, SvO2 decreased to 48 from 52 after decreasing Impella from P3 -> P2.    HD at 5AM this morning on P2: MVO2 50, MAP 74, CVP 8-->CO 5.9, CI 2.8,     Good pulses by doppler this morning. MAPs of 80 on 0.9 of nipride. Impella removed today, pt tolerated well.     No chest pain. Still making good urine with 3.5L UOP yesterday.         Review of Systems   Constitution: Negative for chills and decreased appetite.   HENT: Negative for congestion, ear discharge and ear pain.    Eyes: Negative for blurred vision and discharge.   Cardiovascular: Negative for chest pain, claudication, cyanosis, dyspnea on exertion and orthopnea.   Respiratory: Negative for cough, hemoptysis and shortness of breath.    Endocrine: Negative for cold intolerance and heat intolerance.   Skin: Negative for color change and nail changes.   Musculoskeletal: Negative for arthritis and back pain.   Gastrointestinal: Negative for bloating and abdominal pain.   Genitourinary: Negative for bladder incontinence and decreased libido.   Neurological: Negative for aphonia and brief paralysis.   Psychiatric/Behavioral: Negative for altered mental status.     Objective:     Vital Signs (Most Recent):  Temp: 98.5 °F (36.9 °C) (06/30/20 1100)  Pulse: 72 (06/30/20 1200)  Resp: (!) 22 (06/30/20 1200)  BP: 112/63 (06/27/20 1900)  SpO2: 98 % (06/30/20 1200) Vital Signs (24h Range):  Temp:  [98.3 °F (36.8 °C)-98.6 °F (37 °C)] 98.5 °F (36.9 °C)  Pulse:  [70-81] 72  Resp:  [14-37] 22  SpO2:  [88 %-98 %] 98 %  Arterial Line BP: (106-129)/(57-73) 111/57     Weight: 89 kg (196 lb 3.4 oz)  Body mass index is 27.37 kg/m².     SpO2: 98 %  O2 Device (Oxygen Therapy): room air      Intake/Output Summary (Last 24 hours) at 6/30/2020 1516  Last data filed at 6/30/2020 1100  Gross per 24 hour   Intake 994.4 ml   Output 2106 ml   Net -1111.6 ml       Lines/Drains/Airways     Central Venous Catheter Line            Pulmonary Artery Catheter  Assessment  06/30/20 1329 right internal jugular less than 1 day          Drain                 Urethral Catheter 06/26/20 1805 16 Fr. 3 days          Peripheral Intravenous Line                 Peripheral IV - Single Lumen 06/26/20 0002 22 G Left Hand 4 days         Peripheral IV - Single Lumen 06/27/20 1043 20 G Right;Posterior Hand 3 days                Physical Exam   Constitutional: He is oriented to person, place, and time. He appears well-developed and well-nourished.   HENT:   Head: Normocephalic and atraumatic.   Nose: Nose normal.   Mouth/Throat: No oropharyngeal exudate.   Right upper molars with poor dentition and likely dental caries. No evidence of abscess    Eyes: Right eye exhibits no discharge. Left eye exhibits no discharge. No scleral icterus.   Neck: Normal range of motion. Neck supple. No JVD present.   Cardiovascular: Normal rate, regular rhythm, S1 normal and S2 normal. Exam reveals no gallop, no S3, no S4, no distant heart sounds, no friction rub, no midsystolic click and no opening snap.   No murmur heard.  L axillary impella in place. Left radial pulse biphasic with doppler. Biphasic and strong left DP, left PT, and right DP. Right TP absent (unchanged from prior to impella)   Pulmonary/Chest: Effort normal and breath sounds normal. No respiratory distress. He has no wheezes. He has no rales. He exhibits no tenderness.   Abdominal: Soft. Bowel sounds are normal. He exhibits no distension. There is no abdominal tenderness. There is no rebound.   Musculoskeletal: Normal range of motion.         General: No tenderness, deformity or edema.   Lymphadenopathy:     He has no cervical adenopathy.   Neurological: He is alert and oriented to person, place, and time. No cranial nerve deficit.   Skin: Skin is warm and dry.   Chronic venous statis changes on BLE, superficial dilated veins noted. superficial skin tear to left elbow.    Right femoral swan adrianna   Psychiatric: He has a normal mood and  affect. His behavior is normal.       Significant Labs:   CMP   Recent Labs   Lab 06/28/20 2037 06/29/20 0229 06/30/20 0307   NA  --  136 134*   K 4.2 3.9 3.9   CL  --  103 99   CO2  --  25 26   GLU  --  103 102   BUN  --  20 18   CREATININE  --  1.0 1.0   CALCIUM  --  7.7* 8.0*   PROT  --  5.4* 5.3*   ALBUMIN  --  2.8* 2.6*   BILITOT  --  1.3*  1.3* 1.1*  1.1*   ALKPHOS  --  69 74   AST  --  36 28   ALT  --  16 15   ANIONGAP  --  8 9   ESTGFRAFRICA  --  >60.0 >60.0   EGFRNONAA  --  >60.0 >60.0   , CBC   Recent Labs   Lab 06/29/20 0229 06/29/20  0911 06/30/20 0307   WBC 7.28 6.72 7.01   HGB 9.0* 8.8* 8.4*   HCT 27.7* 27.0* 26.1*   PLT 97* 89* 78*   , INR   Recent Labs   Lab 06/29/20 0229 06/30/20 0307   INR 1.2 1.4*    and All pertinent lab results from the last 24 hours have been reviewed.

## 2020-06-30 NOTE — ASSESSMENT & PLAN NOTE
69 y.o. male with history of CAD s/p CABG x3 (LIMA-LAD, SVG-OM, SVG-D in 1998), HFrEF (35-40%), CKD III, afib on coumadin (last dose on Sunday), PAD s/p L fem-pop (occluded), BL SFA stenting, HTN, asthma, smoking, who presents as transfer for interventional cardiology evaluation of severe L main and three vessel disease / high risk PCI. NSTEMI and underwent LHC at OSH but was found to have dominant L circulation, 80% stenoses of the LM, pLAD, and pLCX. His RCA was totally occluded, as well as all his grafts. Transferred here for higher level of care. Repeat LHC on 6/26: High-degree stenosis of the distal LM, proximal LAD and LCx which received PCI. Impella placed. Post procedure HD consistent with cardiogenic shock and a high systemic vascular resistance. Impella was increased to P6 and he started afterload reduction with sodium nitroprusside.     - please see interval history    - On 6/30, impella removal. Patient tolerated well. Heparin stopped. Will transition from nitroprusside to hydralazine. holding  ace/arb as patient with high MAP and low CO concerning for renal artery stenosis. Will obtain RA US tomorrow morning.   - continue nitroprusside at 0.9 mg/kg/min. Transition to hydralazine tomorrow   - good extremity pulses, q1 hour nursing checks with doppler   - heparin discontinued.   -Continue BB, high-intensity statin  -Continue ASA   -Loaded with plavix 600 once, followed by plavix 75 daily  -Control BP to a goal of <130/80

## 2020-06-30 NOTE — ASSESSMENT & PLAN NOTE
Left axillary impella removal. Relocation of [place] catheter from right CFV to IJ.     1. Left axillary Impella removal, replacement of [place] catheter  2. Antiplatelets: ASA, plavix  3. Access: Right CFA, Right IJ  4. Pt is a POLY candidate and understands the importance of taking plavix for at least one year, understands that in case of receiving a drug coated stent the failure to comply with dual anti-platelet therapy is likely to result in stent clothing, heart attack and death.   5. The risks, benefits, and alternatives of vascular angiography and possible intervention as well as [place] catheter placement were discussed with the patient. All questions were answered and informed consent was obtained. I had a detailed discussion with the patient regarding risk of stroke, MI, bleeding access site complications including limb loss, allergy, kidney failure including dialysis and death.  6. The patient understands the risks and benefits and wishes to go ahead with the procedure.  7. All patient's questions were answered

## 2020-06-30 NOTE — BRIEF OP NOTE
"Post Cath Note  Referring Physician: London Loera III, MD  Procedure: Impella, Removal (Right), PTA, Subclavian, Thrombectomy, Upper Arterial, INSERTION, CATHETER, SWAN-DONTRELL, WITH IMAGING GUIDANCE       Access: Right CFA    See full report for further details    Intervention:     Successful removal of left axillary Impella and thrombus evacuation with penumbra device.     Closure device: Angioseal    Post Cath Exam:   /63   Pulse 72   Temp 98.5 °F (36.9 °C) (Oral)   Resp (!) 22   Ht 5' 11" (1.803 m)   Wt 89 kg (196 lb 3.4 oz)   SpO2 98%   BMI 27.37 kg/m²   No unusual pain, hematoma, thrill or bruit at vascular access site.  Distal pulse present without signs of ischemia.    Recommendations:   - Routine post-cath care  - Transfer back to CCU  - Check left radial pulse every hour for the next 24 hours, call interventional cardiology immediately with any changes    Mansoor Cassidy MD  PGY-V      "

## 2020-06-30 NOTE — ASSESSMENT & PLAN NOTE
6/26/20 TTE  · Eccentric left ventricular hypertrophy.  · Severely decreased left ventricular systolic function with global hypokinesis, worst in the apical segments. The estimated ejection fraction is 20%.  · Moderately reduced right ventricular systolic function.  · Left ventricular diastolic dysfunction.  · Mild aortic valve stenosis. Aortic valve area is 1.53 cm2; peak velocity is 1.16 m/s; mean gradient is 4 mmHg.  · Mild mitral regurgitation.  · Elevated central venous pressure (15 mmHg).     -Continue BB, high-intensity statin, ASA, and statin  - holding on ACE given concern for renal artery stenosis  - will arrange for lifevest on discharge. Needs 2D echo in 3 months on optimal GDMT to assess EF recovery prior to consideration of permanent ICD

## 2020-06-30 NOTE — CARE UPDATE
At 1700 today: MVO2 50, CVP 10, MAP 88, SpO2 --> CO 6.0, CI 2.8, SVR 1040. On nipride at 0.9 mcg/kg/min

## 2020-06-30 NOTE — ASSESSMENT & PLAN NOTE
[id number] of 3. Conferring a stroke risk of 3.2% per year    - s/p impella removal. plts slowly dropping since admit. 172--> 78. Holding warfarin today, dcd heparin gtt. Will send HIT panel

## 2020-06-30 NOTE — NURSING
Evening SVO2 at 48 post impella change to P2. CVP 10. Notified cariology. Will repeat SVO2 at 0000. WCTM.

## 2020-06-30 NOTE — PROGRESS NOTES
[facility]  Cardiology  Progress Note    Patient Name: Blaine Multani  MRN: [id number]  Admission Date: 6/25/2020  Hospital Length of Stay: 5 days  Code Status: Full Code   Attending Physician: London Loera III, MD   Primary Care Physician: Nay Alcantar MD  Expected Discharge Date: 7/4/2020  Principal Problem:NSTEMI (non-ST elevated myocardial infarction)    Subjective:     Hospital Course:   NSTEMI and underwent LHC at OSH but was found to have dominant L circulation, 80% stenoses of the LM, pLAD, and pLCX. His RCA was totally occluded, as well as all his grafts. Transferred here for higher level of care. Repeat LHC on 6/26: High-degree stenosis of the distal LM, proximal LAD and LCx which received PCI. Impella placed. Post procedure HD consistent with cardiogenic shock and a high systemic vascular resistance. He was started afterload reduction with nitroprusside. Impella settings slowly weaned while tirating nitroprusside gtt. On 6/30, impella removal. Patient tolerated well. Heparin stopped. Will transition from nitroprusside to hydralazine. holding  ace/arb as patient with high MAP and low CO concerning for renal artery stenosis. Will obtain RA US tomorrow morning.       Interval History:  Overnight, SvO2 decreased to 48 from 52 after decreasing Impella from P3 -> P2.    HD at 5AM this morning on P2: MVO2 50, MAP 74, CVP 8-->CO 5.9, CI 2.8,     Good pulses by doppler this morning. MAPs of 80 on 0.9 of nipride. Impella removed today, pt tolerated well.     No chest pain. Still making good urine with 3.5L UOP yesterday.         Review of Systems   Constitution: Negative for chills and decreased appetite.   HENT: Negative for congestion, ear discharge and ear pain.    Eyes: Negative for blurred vision and discharge.   Cardiovascular: Negative for chest pain, claudication, cyanosis, dyspnea on exertion and orthopnea.   Respiratory: Negative for cough, hemoptysis and shortness of  breath.    Endocrine: Negative for cold intolerance and heat intolerance.   Skin: Negative for color change and nail changes.   Musculoskeletal: Negative for arthritis and back pain.   Gastrointestinal: Negative for bloating and abdominal pain.   Genitourinary: Negative for bladder incontinence and decreased libido.   Neurological: Negative for aphonia and brief paralysis.   Psychiatric/Behavioral: Negative for altered mental status.     Objective:     Vital Signs (Most Recent):  Temp: 98.5 °F (36.9 °C) (06/30/20 1100)  Pulse: 72 (06/30/20 1200)  Resp: (!) 22 (06/30/20 1200)  BP: 112/63 (06/27/20 1900)  SpO2: 98 % (06/30/20 1200) Vital Signs (24h Range):  Temp:  [98.3 °F (36.8 °C)-98.6 °F (37 °C)] 98.5 °F (36.9 °C)  Pulse:  [70-81] 72  Resp:  [14-37] 22  SpO2:  [88 %-98 %] 98 %  Arterial Line BP: (106-129)/(57-73) 111/57     Weight: 89 kg (196 lb 3.4 oz)  Body mass index is 27.37 kg/m².     SpO2: 98 %  O2 Device (Oxygen Therapy): room air      Intake/Output Summary (Last 24 hours) at 6/30/2020 1516  Last data filed at 6/30/2020 1100  Gross per 24 hour   Intake 994.4 ml   Output 2106 ml   Net -1111.6 ml       Lines/Drains/Airways     Central Venous Catheter Line            Pulmonary Artery Catheter Assessment  06/30/20 1329 right internal jugular less than 1 day          Drain                 Urethral Catheter 06/26/20 1805 16 Fr. 3 days          Peripheral Intravenous Line                 Peripheral IV - Single Lumen 06/26/20 0002 22 G Left Hand 4 days         Peripheral IV - Single Lumen 06/27/20 1043 20 G Right;Posterior Hand 3 days                Physical Exam   Constitutional: He is oriented to person, place, and time. He appears well-developed and well-nourished.   HENT:   Head: Normocephalic and atraumatic.   Nose: Nose normal.   Mouth/Throat: No oropharyngeal exudate.   Right upper molars with poor dentition and likely dental caries. No evidence of abscess    Eyes: Right eye exhibits no discharge. Left eye  exhibits no discharge. No scleral icterus.   Neck: Normal range of motion. Neck supple. No JVD present.   Cardiovascular: Normal rate, regular rhythm, S1 normal and S2 normal. Exam reveals no gallop, no S3, no S4, no distant heart sounds, no friction rub, no midsystolic click and no opening snap.   No murmur heard.  L axillary impella in place. Left radial pulse biphasic with doppler. Biphasic and strong left DP, left PT, and right DP. Right TP absent (unchanged from prior to impella)   Pulmonary/Chest: Effort normal and breath sounds normal. No respiratory distress. He has no wheezes. He has no rales. He exhibits no tenderness.   Abdominal: Soft. Bowel sounds are normal. He exhibits no distension. There is no abdominal tenderness. There is no rebound.   Musculoskeletal: Normal range of motion.         General: No tenderness, deformity or edema.   Lymphadenopathy:     He has no cervical adenopathy.   Neurological: He is alert and oriented to person, place, and time. No cranial nerve deficit.   Skin: Skin is warm and dry.   Chronic venous statis changes on BLE, superficial dilated veins noted. superficial skin tear to left elbow.    Right femoral swan adrianna   Psychiatric: He has a normal mood and affect. His behavior is normal.       Significant Labs:   CMP   Recent Labs   Lab 06/28/20 2037 06/29/20 0229 06/30/20  0307   NA  --  136 134*   K 4.2 3.9 3.9   CL  --  103 99   CO2  --  25 26   GLU  --  103 102   BUN  --  20 18   CREATININE  --  1.0 1.0   CALCIUM  --  7.7* 8.0*   PROT  --  5.4* 5.3*   ALBUMIN  --  2.8* 2.6*   BILITOT  --  1.3*  1.3* 1.1*  1.1*   ALKPHOS  --  69 74   AST  --  36 28   ALT  --  16 15   ANIONGAP  --  8 9   ESTGFRAFRICA  --  >60.0 >60.0   EGFRNONAA  --  >60.0 >60.0   , CBC   Recent Labs   Lab 06/29/20 0229 06/29/20  0911 06/30/20  0307   WBC 7.28 6.72 7.01   HGB 9.0* 8.8* 8.4*   HCT 27.7* 27.0* 26.1*   PLT 97* 89* 78*   , INR   Recent Labs   Lab 06/29/20 0229 06/30/20  0307   INR 1.2 1.4*     and All pertinent lab results from the last 24 hours have been reviewed.      Assessment and Plan:       * NSTEMI (non-ST elevated myocardial infarction)  69 y.o. male with history of CAD s/p CABG x3 (LIMA-LAD, SVG-OM, SVG-D in 1998), HFrEF (35-40%), CKD III, afib on coumadin (last dose on Sunday), PAD s/p L fem-pop (occluded), BL SFA stenting, HTN, asthma, smoking, who presents as transfer for interventional cardiology evaluation of severe L main and three vessel disease / high risk PCI. NSTEMI and underwent LHC at OSH but was found to have dominant L circulation, 80% stenoses of the LM, pLAD, and pLCX. His RCA was totally occluded, as well as all his grafts. Transferred here for higher level of care. Repeat LHC on 6/26: High-degree stenosis of the distal LM, proximal LAD and LCx which received PCI. Impella placed. Post procedure HD consistent with cardiogenic shock and a high systemic vascular resistance. Impella was increased to P6 and he started afterload reduction with sodium nitroprusside.     - please see interval history    - On 6/30, impella removed. Patient tolerated well. Heparin stopped. Will transition from nitroprusside to hydralazine tomorrow. holding  ace/arb as patient with high MAP and low CO concerning for renal artery stenosis. Will obtain RA US tomorrow morning.   - continue nitroprusside at 0.9 mg/kg/min. Transition to hydralazine tomorrow   - good extremity pulses, q1 hour nursing checks with doppler   - heparin discontinued.   -Continue BB, high-intensity statin  -Continue ASA   -Loaded with plavix 600 once, followed by plavix 75 daily  -Control BP to a goal of <130/80    Thrombocytopenia  - please see afib     Chronic combined systolic and diastolic heart failure  6/26/20 TTE  · Eccentric left ventricular hypertrophy.  · Severely decreased left ventricular systolic function with global hypokinesis, worst in the apical segments. The estimated ejection fraction is 20%.  · Moderately reduced  right ventricular systolic function.  · Left ventricular diastolic dysfunction.  · Mild aortic valve stenosis. Aortic valve area is 1.53 cm2; peak velocity is 1.16 m/s; mean gradient is 4 mmHg.  · Mild mitral regurgitation.  · Elevated central venous pressure (15 mmHg).     -Continue BB, high-intensity statin, ASA, and statin  - holding on ACE given concern for renal artery stenosis  - will arrange for lifevest on discharge. Needs 2D echo in 3 months on optimal GDMT to assess EF recovery prior to consideration of permanent ICD    Dental abscess  Right upper molars with poor dentition and likely dental caries. No evidence of abscess     - ID consulted: changed unasyn to ceftriaxone 2g Q24H x 7 days.     Depression  - Continue lexapro    A-fib  [id number] of 3. Conferring a stroke risk of 3.2% per year    - s/p impella removal. plts slowly dropping since admit. 172--> 78. Holding warfarin today, dcd heparin gtt. Will send HIT ab and serotonin assay     HTN (hypertension)  - holding home BP meds    PAD (peripheral artery disease)  - Continue asa and lipitor        VTE Risk Mitigation (From admission, onward)         Ordered     heparin infusion 1,000 units/500 ml in 0.9% NaCl (pressure line flush)  Intra-op continuous PRN      06/26/20 1328     IP VTE LOW RISK PATIENT  Once      06/26/20 1114     Place TATA hose  Until discontinued      06/26/20 1114     Place sequential compression device  Until discontinued      06/26/20 0022                Brenden Dyson MD  Cardiology  [facility]

## 2020-07-01 ENCOUNTER — CLINICAL SUPPORT (OUTPATIENT)
Dept: CARDIOLOGY | Facility: CLINIC | Age: 70
DRG: 215 | End: 2020-07-01
Attending: INTERNAL MEDICINE
Payer: MEDICARE

## 2020-07-01 PROBLEM — I48.0 PAROXYSMAL ATRIAL FIBRILLATION: Status: ACTIVE | Noted: 2020-06-26

## 2020-07-01 PROBLEM — I25.5 CARDIOMYOPATHY, ISCHEMIC: Status: ACTIVE | Noted: 2020-07-01

## 2020-07-01 LAB
ALBUMIN SERPL BCP-MCNC: 2.5 G/DL (ref 3.5–5.2)
ALLENS TEST: ABNORMAL
ALP SERPL-CCNC: 75 U/L (ref 55–135)
ALT SERPL W/O P-5'-P-CCNC: 15 U/L (ref 10–44)
ANION GAP SERPL CALC-SCNC: 7 MMOL/L (ref 8–16)
AST SERPL-CCNC: 26 U/L (ref 10–40)
BASOPHILS # BLD AUTO: 0.02 K/UL (ref 0–0.2)
BASOPHILS NFR BLD: 0.3 % (ref 0–1.9)
BILIRUB SERPL-MCNC: 1 MG/DL (ref 0.1–1)
BILIRUB SERPL-MCNC: 1 MG/DL (ref 0.1–1)
BUN SERPL-MCNC: 16 MG/DL (ref 8–23)
CALCIUM SERPL-MCNC: 8 MG/DL (ref 8.7–10.5)
CHLORIDE SERPL-SCNC: 101 MMOL/L (ref 95–110)
CO2 SERPL-SCNC: 26 MMOL/L (ref 23–29)
CREAT SERPL-MCNC: 1 MG/DL (ref 0.5–1.4)
DIFFERENTIAL METHOD: ABNORMAL
EOSINOPHIL # BLD AUTO: 0.1 K/UL (ref 0–0.5)
EOSINOPHIL NFR BLD: 1.4 % (ref 0–8)
ERYTHROCYTE [DISTWIDTH] IN BLOOD BY AUTOMATED COUNT: 15.9 % (ref 11.5–14.5)
EST. GFR  (AFRICAN AMERICAN): >60 ML/MIN/1.73 M^2
EST. GFR  (NON AFRICAN AMERICAN): >60 ML/MIN/1.73 M^2
GLUCOSE SERPL-MCNC: 106 MG/DL (ref 70–110)
HCO3 UR-SCNC: 26.8 MMOL/L (ref 24–28)
HCT VFR BLD AUTO: 24.5 % (ref 40–54)
HGB BLD-MCNC: 7.9 G/DL (ref 14–18)
IMM GRANULOCYTES # BLD AUTO: 0.03 K/UL (ref 0–0.04)
IMM GRANULOCYTES NFR BLD AUTO: 0.4 % (ref 0–0.5)
INR PPP: 1.2 (ref 0.8–1.2)
LDH SERPL L TO P-CCNC: 350 U/L (ref 110–260)
LYMPHOCYTES # BLD AUTO: 0.7 K/UL (ref 1–4.8)
LYMPHOCYTES NFR BLD: 9.8 % (ref 18–48)
MAGNESIUM SERPL-MCNC: 2.1 MG/DL (ref 1.6–2.6)
MCH RBC QN AUTO: 33.2 PG (ref 27–31)
MCHC RBC AUTO-ENTMCNC: 32.2 G/DL (ref 32–36)
MCV RBC AUTO: 103 FL (ref 82–98)
MONOCYTES # BLD AUTO: 0.8 K/UL (ref 0.3–1)
MONOCYTES NFR BLD: 11.8 % (ref 4–15)
NEUTROPHILS # BLD AUTO: 5.3 K/UL (ref 1.8–7.7)
NEUTROPHILS NFR BLD: 76.3 % (ref 38–73)
NRBC BLD-RTO: 0 /100 WBC
OHS CV US RIGHT RENAL HIGHEST EDV: 17
OHS CV US RIGHT RENAL HIGHEST PSV: 82
PCO2 BLDA: 38.3 MMHG (ref 35–45)
PH SMN: 7.45 [PH] (ref 7.35–7.45)
PHOSPHATE SERPL-MCNC: 3.9 MG/DL (ref 2.7–4.5)
PLATELET # BLD AUTO: 76 K/UL (ref 150–350)
PMV BLD AUTO: 12.3 FL (ref 9.2–12.9)
PO2 BLDA: 27 MMHG (ref 40–60)
POC BE: 3 MMOL/L
POC SATURATED O2: 54 % (ref 95–100)
POC TCO2: 28 MMOL/L (ref 24–29)
POTASSIUM SERPL-SCNC: 4 MMOL/L (ref 3.5–5.1)
PROT SERPL-MCNC: 5.3 G/DL (ref 6–8.4)
PROTHROMBIN TIME: 11.8 SEC (ref 9–12.5)
RBC # BLD AUTO: 2.38 M/UL (ref 4.6–6.2)
RIGHT RENAL DIST DIAS: 17 CM/S
RIGHT RENAL DIST SYS: 82 CM/S
RIGHT RENAL ULTRASOUND ACCELERATION TIME MEASUREMENT 1: 94 MS
RIGHT RENAL ULTRASOUND ACCELERATION TIME MEASUREMENT 2: 77 MS
RIGHT RENAL ULTRASOUND ACCELERATION TIME MEASUREMENT 3: 71 MS
RIGHT RENAL ULTRASOUND ACCELERATION TIME MEASUREMENT AVERAGE: 94 MS
RIGHT RENAL ULTRASOUND KIDNEY SIZE MEASUREMENT 1: 13.9 CM
RIGHT RENAL ULTRASOUND KIDNEY SIZE MEASUREMENT 2: 138 CM
RIGHT RENAL ULTRASOUND KIDNEY SIZE MEASUREMENT AVERAGE: 138 CM
RIGHT RENAL ULTRASOUND RESISTIVE INDEX MEASUREMENT 1: 0.57
RIGHT RENAL ULTRASOUND RESISTIVE INDEX MEASUREMENT 2: 0.63
RIGHT RENAL ULTRASOUND RESISTIVE INDEX MEASUREMENT 3: 0.74
RIGHT RENAL ULTRASOUND RESISTIVE INDEX MEASUREMENT AVERAGE: 0.74
SAMPLE: ABNORMAL
SITE: ABNORMAL
SODIUM SERPL-SCNC: 134 MMOL/L (ref 136–145)
WBC # BLD AUTO: 6.96 K/UL (ref 3.9–12.7)

## 2020-07-01 PROCEDURE — 63600175 PHARM REV CODE 636 W HCPCS: Performed by: STUDENT IN AN ORGANIZED HEALTH CARE EDUCATION/TRAINING PROGRAM

## 2020-07-01 PROCEDURE — 25000003 PHARM REV CODE 250: Performed by: INTERNAL MEDICINE

## 2020-07-01 PROCEDURE — 83735 ASSAY OF MAGNESIUM: CPT

## 2020-07-01 PROCEDURE — 25000003 PHARM REV CODE 250: Performed by: STUDENT IN AN ORGANIZED HEALTH CARE EDUCATION/TRAINING PROGRAM

## 2020-07-01 PROCEDURE — 94761 N-INVAS EAR/PLS OXIMETRY MLT: CPT

## 2020-07-01 PROCEDURE — 99232 SBSQ HOSP IP/OBS MODERATE 35: CPT | Mod: ,,, | Performed by: INTERNAL MEDICINE

## 2020-07-01 PROCEDURE — 99900035 HC TECH TIME PER 15 MIN (STAT)

## 2020-07-01 PROCEDURE — 36415 COLL VENOUS BLD VENIPUNCTURE: CPT

## 2020-07-01 PROCEDURE — 25000242 PHARM REV CODE 250 ALT 637 W/ HCPCS: Performed by: STUDENT IN AN ORGANIZED HEALTH CARE EDUCATION/TRAINING PROGRAM

## 2020-07-01 PROCEDURE — 83615 LACTATE (LD) (LDH) ENZYME: CPT

## 2020-07-01 PROCEDURE — 82247 BILIRUBIN TOTAL: CPT

## 2020-07-01 PROCEDURE — 84100 ASSAY OF PHOSPHORUS: CPT

## 2020-07-01 PROCEDURE — 85610 PROTHROMBIN TIME: CPT

## 2020-07-01 PROCEDURE — 93975 VASCULAR STUDY: CPT | Mod: 26,,, | Performed by: INTERNAL MEDICINE

## 2020-07-01 PROCEDURE — 20000000 HC ICU ROOM

## 2020-07-01 PROCEDURE — 99232 PR SUBSEQUENT HOSPITAL CARE,LEVL II: ICD-10-PCS | Mod: ,,, | Performed by: INTERNAL MEDICINE

## 2020-07-01 PROCEDURE — 99233 PR SUBSEQUENT HOSPITAL CARE,LEVL III: ICD-10-PCS | Mod: ,,, | Performed by: INTERNAL MEDICINE

## 2020-07-01 PROCEDURE — 93975 CV US RENAL ARTERY STENOSIS HYPERTENSION COMPLETE (CUPID ONLY): ICD-10-PCS | Mod: 26,,, | Performed by: INTERNAL MEDICINE

## 2020-07-01 PROCEDURE — 82600 ASSAY OF CYANIDE: CPT

## 2020-07-01 PROCEDURE — 99233 SBSQ HOSP IP/OBS HIGH 50: CPT | Mod: ,,, | Performed by: INTERNAL MEDICINE

## 2020-07-01 PROCEDURE — 85025 COMPLETE CBC W/AUTO DIFF WBC: CPT

## 2020-07-01 PROCEDURE — 93975 VASCULAR STUDY: CPT

## 2020-07-01 PROCEDURE — 80053 COMPREHEN METABOLIC PANEL: CPT

## 2020-07-01 RX ORDER — FUROSEMIDE 40 MG/1
40 TABLET ORAL DAILY
Status: DISCONTINUED | OUTPATIENT
Start: 2020-07-01 | End: 2020-07-05 | Stop reason: HOSPADM

## 2020-07-01 RX ORDER — OXYCODONE HYDROCHLORIDE 5 MG/1
5 TABLET ORAL EVERY 4 HOURS PRN
Status: DISCONTINUED | OUTPATIENT
Start: 2020-07-01 | End: 2020-07-05 | Stop reason: HOSPADM

## 2020-07-01 RX ORDER — ISOSORBIDE DINITRATE 10 MG/1
20 TABLET ORAL EVERY 8 HOURS
Status: DISCONTINUED | OUTPATIENT
Start: 2020-07-01 | End: 2020-07-01

## 2020-07-01 RX ORDER — HYDRALAZINE HYDROCHLORIDE 50 MG/1
100 TABLET, FILM COATED ORAL EVERY 8 HOURS
Status: DISCONTINUED | OUTPATIENT
Start: 2020-07-01 | End: 2020-07-02

## 2020-07-01 RX ORDER — ISOSORBIDE DINITRATE 10 MG/1
40 TABLET ORAL EVERY 8 HOURS
Status: DISCONTINUED | OUTPATIENT
Start: 2020-07-01 | End: 2020-07-02

## 2020-07-01 RX ORDER — HYDRALAZINE HYDROCHLORIDE 50 MG/1
50 TABLET, FILM COATED ORAL EVERY 8 HOURS
Status: DISCONTINUED | OUTPATIENT
Start: 2020-07-01 | End: 2020-07-01

## 2020-07-01 RX ORDER — HYDRALAZINE HYDROCHLORIDE 25 MG/1
25 TABLET, FILM COATED ORAL EVERY 8 HOURS
Status: DISCONTINUED | OUTPATIENT
Start: 2020-07-01 | End: 2020-07-01

## 2020-07-01 RX ADMIN — CEFTRIAXONE 2 G: 2 INJECTION, SOLUTION INTRAVENOUS at 05:07

## 2020-07-01 RX ADMIN — ISOSORBIDE DINITRATE 20 MG: 10 TABLET ORAL at 02:07

## 2020-07-01 RX ADMIN — HYDRALAZINE HYDROCHLORIDE 50 MG: 50 TABLET, FILM COATED ORAL at 10:07

## 2020-07-01 RX ADMIN — FLUTICASONE FUROATE AND VILANTEROL TRIFENATATE 1 PUFF: 100; 25 POWDER RESPIRATORY (INHALATION) at 09:07

## 2020-07-01 RX ADMIN — ISOSORBIDE DINITRATE 40 MG: 10 TABLET ORAL at 09:07

## 2020-07-01 RX ADMIN — CLOPIDOGREL BISULFATE 75 MG: 75 TABLET ORAL at 09:07

## 2020-07-01 RX ADMIN — ACETAMINOPHEN 650 MG: 325 TABLET ORAL at 11:07

## 2020-07-01 RX ADMIN — FUROSEMIDE 40 MG: 40 TABLET ORAL at 10:07

## 2020-07-01 RX ADMIN — ACETAMINOPHEN 650 MG: 325 TABLET ORAL at 07:07

## 2020-07-01 RX ADMIN — SODIUM NITROPRUSSIDE 0.9 MCG/KG/MIN: 25 INJECTION, SOLUTION, CONCENTRATE INTRAVENOUS at 05:07

## 2020-07-01 RX ADMIN — ESCITALOPRAM OXALATE 10 MG: 5 TABLET, FILM COATED ORAL at 09:07

## 2020-07-01 RX ADMIN — OXYCODONE HYDROCHLORIDE 5 MG: 5 TABLET ORAL at 06:07

## 2020-07-01 RX ADMIN — Medication 6 MG: at 09:07

## 2020-07-01 RX ADMIN — HYDRALAZINE HYDROCHLORIDE 50 MG: 50 TABLET, FILM COATED ORAL at 02:07

## 2020-07-01 RX ADMIN — HYDRALAZINE HYDROCHLORIDE 100 MG: 50 TABLET, FILM COATED ORAL at 09:07

## 2020-07-01 RX ADMIN — ATORVASTATIN CALCIUM 80 MG: 20 TABLET, FILM COATED ORAL at 09:07

## 2020-07-01 RX ADMIN — OXYCODONE HYDROCHLORIDE 5 MG: 5 TABLET ORAL at 03:07

## 2020-07-01 RX ADMIN — SODIUM NITROPRUSSIDE 0.6 MCG/KG/MIN: 25 INJECTION, SOLUTION, CONCENTRATE INTRAVENOUS at 02:07

## 2020-07-01 RX ADMIN — PANTOPRAZOLE SODIUM 40 MG: 40 TABLET, DELAYED RELEASE ORAL at 09:07

## 2020-07-01 RX ADMIN — OXYCODONE HYDROCHLORIDE 5 MG: 5 TABLET ORAL at 09:07

## 2020-07-01 RX ADMIN — ASPIRIN 81 MG CHEWABLE TABLET 81 MG: 81 TABLET CHEWABLE at 09:07

## 2020-07-01 NOTE — SUBJECTIVE & OBJECTIVE
Interval History: NAEON. Impella removed yesterday, HD stable since.     Current gtts:  - nitroprusside 0.9    Review of Systems   Constitution: Negative for chills, diaphoresis, fever, weight gain and weight loss.   HENT: Negative for congestion and sore throat.    Eyes: Negative for visual disturbance.   Cardiovascular: Negative for chest pain, dyspnea on exertion, leg swelling, orthopnea, palpitations, paroxysmal nocturnal dyspnea and syncope.   Respiratory: Negative for cough, shortness of breath and wheezing.    Skin: Negative for rash.   Musculoskeletal: Positive for joint pain (shoulder, arm arthralgias). Negative for myalgias.   Gastrointestinal: Negative for abdominal pain, constipation, diarrhea, melena, nausea and vomiting.   Genitourinary: Negative for dysuria, frequency and hematuria.   Neurological: Negative for dizziness, headaches, light-headedness and numbness.   Psychiatric/Behavioral: Negative for altered mental status.     Objective:     Vital Signs (Most Recent):  Temp: 99.2 °F (37.3 °C) (07/01/20 1100)  Pulse: 81 (07/01/20 1200)  Resp: 15 (07/01/20 1200)  BP: 108/69 (07/01/20 1200)  SpO2: 97 % (07/01/20 1200) Vital Signs (24h Range):  Temp:  [97.5 °F (36.4 °C)-100.4 °F (38 °C)] 99.2 °F (37.3 °C)  Pulse:  [73-98] 81  Resp:  [13-43] 15  SpO2:  [91 %-98 %] 97 %  BP: (102-177)/(55-92) 108/69     Weight: 93.4 kg (205 lb 14.6 oz)  Body mass index is 28.72 kg/m².     SpO2: 97 %  O2 Device (Oxygen Therapy): room air      Intake/Output Summary (Last 24 hours) at 7/1/2020 1335  Last data filed at 7/1/2020 1200  Gross per 24 hour   Intake 785.36 ml   Output 2855 ml   Net -2069.64 ml       Lines/Drains/Airways     Central Venous Catheter Line            Pulmonary Artery Catheter Assessment  06/30/20 1329 right internal jugular 1 day          Drain                 Urethral Catheter 06/26/20 1805 16 Fr. 4 days                Physical Exam   Constitutional: He is oriented to person, place, and time. He  appears well-developed and well-nourished. No distress.   HENT:   Head: Normocephalic and atraumatic.   Mouth/Throat: Oropharynx is clear and moist. No oropharyngeal exudate.   Eyes: Pupils are equal, round, and reactive to light. Conjunctivae are normal. No scleral icterus.   Neck: Neck supple. No JVD present. No tracheal deviation present.   Cardiovascular: Normal rate, regular rhythm, normal heart sounds and intact distal pulses.   No murmur heard.  Pulmonary/Chest: Effort normal and breath sounds normal. No respiratory distress. He has no wheezes. He has no rales.   R Swanz in place, no erythema/tenderness   Abdominal: Soft. Bowel sounds are normal. He exhibits no distension. There is no abdominal tenderness. There is no rebound.   Musculoskeletal:         General: No edema.   Neurological: He is alert and oriented to person, place, and time. He exhibits normal muscle tone.   Skin: Skin is warm and dry. No rash noted. He is not diaphoretic.   Stasis dermatitis in BLE   Psychiatric: He has a normal mood and affect. His behavior is normal.       Significant Labs: All pertinent lab results from the last 24 hours have been reviewed.    Significant Imaging: Reviewed

## 2020-07-01 NOTE — PROGRESS NOTES
[facility]  Cardiology  Progress Note    Patient Name: Blaine Multani  MRN: [id number]  Admission Date: 6/25/2020  Hospital Length of Stay: 6 days  Code Status: Full Code   Attending Physician: London Loera III, MD   Primary Care Physician: Nay Alcantar MD  Expected Discharge Date: 7/4/2020  Principal Problem:NSTEMI (non-ST elevated myocardial infarction)    Subjective:     Hospital Course:   NSTEMI and underwent LHC at OSH but was found to have dominant L circulation, 80% stenoses of the LM, pLAD, and pLCX. His RCA was totally occluded, as well as all his grafts. Transferred here for higher level of care. Repeat LHC on 6/26: High-degree stenosis of the distal LM, proximal LAD and LCx which received PCI. Impella placed. Post procedure HD consistent with cardiogenic shock and a high systemic vascular resistance. He was started afterload reduction with nitroprusside. Impella settings slowly weaned while tirating nitroprusside gtt. On 6/30, impella removal. Patient tolerated well. Heparin stopped. Will transition from nitroprusside to hydralazine. holding  ace/arb as patient with high MAP and low CO concerning for renal artery stenosis. US renal artery obtained.      Interval History: NAEON. Impella removed yesterday, HD stable since.     Current gtts:  - nitroprusside 0.9    Review of Systems   Constitution: Negative for chills, diaphoresis, fever, weight gain and weight loss.   HENT: Negative for congestion and sore throat.    Eyes: Negative for visual disturbance.   Cardiovascular: Negative for chest pain, dyspnea on exertion, leg swelling, orthopnea, palpitations, paroxysmal nocturnal dyspnea and syncope.   Respiratory: Negative for cough, shortness of breath and wheezing.    Skin: Negative for rash.   Musculoskeletal: Positive for joint pain (shoulder, arm arthralgias). Negative for myalgias.   Gastrointestinal: Negative for abdominal pain, constipation, diarrhea, melena, nausea and  vomiting.   Genitourinary: Negative for dysuria, frequency and hematuria.   Neurological: Negative for dizziness, headaches, light-headedness and numbness.   Psychiatric/Behavioral: Negative for altered mental status.     Objective:     Vital Signs (Most Recent):  Temp: 99.2 °F (37.3 °C) (07/01/20 1100)  Pulse: 81 (07/01/20 1200)  Resp: 15 (07/01/20 1200)  BP: 108/69 (07/01/20 1200)  SpO2: 97 % (07/01/20 1200) Vital Signs (24h Range):  Temp:  [97.5 °F (36.4 °C)-100.4 °F (38 °C)] 99.2 °F (37.3 °C)  Pulse:  [73-98] 81  Resp:  [13-43] 15  SpO2:  [91 %-98 %] 97 %  BP: (102-177)/(55-92) 108/69     Weight: 93.4 kg (205 lb 14.6 oz)  Body mass index is 28.72 kg/m².     SpO2: 97 %  O2 Device (Oxygen Therapy): room air      Intake/Output Summary (Last 24 hours) at 7/1/2020 1335  Last data filed at 7/1/2020 1200  Gross per 24 hour   Intake 785.36 ml   Output 2855 ml   Net -2069.64 ml       Lines/Drains/Airways     Central Venous Catheter Line            Pulmonary Artery Catheter Assessment  06/30/20 1329 right internal jugular 1 day          Drain                 Urethral Catheter 06/26/20 1805 16 Fr. 4 days                Physical Exam   Constitutional: He is oriented to person, place, and time. He appears well-developed and well-nourished. No distress.   HENT:   Head: Normocephalic and atraumatic.   Mouth/Throat: Oropharynx is clear and moist. No oropharyngeal exudate.   Eyes: Pupils are equal, round, and reactive to light. Conjunctivae are normal. No scleral icterus.   Neck: Neck supple. No JVD present. No tracheal deviation present.   Cardiovascular: Normal rate, regular rhythm, normal heart sounds and intact distal pulses.   No murmur heard.  Pulmonary/Chest: Effort normal and breath sounds normal. No respiratory distress. He has no wheezes. He has no rales.   R Swanz in place, no erythema/tenderness   Abdominal: Soft. Bowel sounds are normal. He exhibits no distension. There is no abdominal tenderness. There is no  rebound.   Musculoskeletal:         General: No edema.   Neurological: He is alert and oriented to person, place, and time. He exhibits normal muscle tone.   Skin: Skin is warm and dry. No rash noted. He is not diaphoretic.   Stasis dermatitis in BLE   Psychiatric: He has a normal mood and affect. His behavior is normal.       Significant Labs: All pertinent lab results from the last 24 hours have been reviewed.    Significant Imaging: Reviewed    Assessment and Plan:     * NSTEMI (non-ST elevated myocardial infarction)  69 y.o. male with history of CAD s/p CABG x3 (LIMA-LAD, SVG-OM, SVG-D in 1998), HFrEF, CKD III, afib on coumadin (last dose on Sunday), PAD s/p L fem-pop (occluded), BL SFA stenting, HTN, asthma, smoking, who presents as transfer for interventional cardiology evaluation of severe L main and three vessel disease / high risk PCI. NSTEMI and underwent LHC at OSH but was found to have dominant L circulation, 80% stenoses of the LM, pLAD, and pLCX. His RCA was totally occluded, as well as all his grafts. Transferred here for higher level of care. Repeat LHC on 6/26: High-degree stenosis of the distal LM, proximal LAD and LCx which received PCI. Impella placed. Post procedure HD consistent with cardiogenic shock and a high systemic vascular resistance. Impella was increased to P6 and he started afterload reduction with sodium nitroprusside. Heparin gtt discontinued. S/p Impella removal on 6/30.     Plan:  - Transition to hydralazine + isosorbide dintrate, wean nitroprusside gtt as tolerated  - US renal artery stenosis (high MAP with low CO)  - Distal pulse check q1hr  - Continue atorvastatin 80mg  - Hold BB until clinically stable  - Continue ASA, plavix  - Control BP to a goal of <130/80    Thrombocytopenia  Please see Paroxysmal atrial fibrillation    Acute blood loss anemia  Monitor with CBCs daily  Transfuse with goal Hb > 7    Dyslipidemia  Continue atorvastatin 80mg    Acute systolic congestive  heart failure  Results for orders placed during the hospital encounter of 06/25/20   Echo Color Flow Doppler? Yes    Narrative · Eccentric left ventricular hypertrophy.  · Severely decreased left ventricular systolic function with global   hypokinesis, worst in the apical segments. The estimated ejection fraction   is 20%.  · Moderately reduced right ventricular systolic function.  · Left ventricular diastolic dysfunction.  · Mild aortic valve stenosis. Aortic valve area is 1.53 cm2; peak velocity   is 1.16 m/s; mean gradient is 4 mmHg.  · Mild mitral regurgitation.  · Elevated central venous pressure (15 mmHg).        Plan:  - Lasix 40mg daily   - Optimize GDMT once clinically stable  - Will Lifevest upon discharge       Dental abscess  Right upper molars with poor dentition and likely dental caries. No evidence of abscess   Unasyn > CTX, will complete therapy today    Depression  Continue lexapro    Paroxysmal atrial fibrillation  [id number] of 3. Conferring a stroke risk of 3.2% per year  S/p impella removal. plts slowly dropping since admit. 172--> 78  Holding warfarin today, d/c'd heparin gtt  Discussed possibly transition to DOAC upon discharge  F/u LUCIO panel    HTN (hypertension)  Hold home meds    PAD (peripheral artery disease)  Continue ASA and atorvastatin        VTE Risk Mitigation (From admission, onward)         Ordered     IP VTE LOW RISK PATIENT  Once      06/26/20 1114     Place TATA hose  Until discontinued      06/26/20 1114     Place sequential compression device  Until discontinued      06/26/20 0022                Erik Valentino MD  Cardiology  [facility]

## 2020-07-01 NOTE — PLAN OF CARE
Brief CCU Note    Notified re borderline temp 100.4 for which gave APAP. [place] patient under heating blanket, repeat 98F (10 min after APAP, doubt masking). No infectious symptoms, WBC 7k. Will hold off on culturing and empiric abx for now.    S/p IV Lasix 40mg, placed L arm in sling following discussion with Dr. Cruz. Checked hemodynamics: MAP 80; CVP 8; MVO2 52% (prior 50%); CO 6.2; CI 3.1; . Holding off on further changes for now.      Demarco Crawford MD  PGY-4, Cardiology  Pager [phone]

## 2020-07-01 NOTE — ASSESSMENT & PLAN NOTE
6/26/20 TTE  · Eccentric left ventricular hypertrophy.  · Severely decreased left ventricular systolic function with global hypokinesis, worst in the apical segments. The estimated ejection fraction is 20%.  · Moderately reduced right ventricular systolic function.  · Left ventricular diastolic dysfunction.  · Mild aortic valve stenosis. Aortic valve area is 1.53 cm2; peak velocity is 1.16 m/s; mean gradient is 4 mmHg.  · Mild mitral regurgitation.  · Elevated central venous pressure (15 mmHg).     -Continue BB, high-intensity statin, ASA, and statin  - holding on ACE given concern for renal artery stenosis  - EF 20%. Patient will need lifevest upon discharge. Needs 2D echo in 3 months on optimal GDMT to assess EF recovery prior to consideration of permanent ICD

## 2020-07-01 NOTE — PLAN OF CARE
CMICU DAILY GOALS       A: Awake    RASS: Goal -  0   Actual - RASS (Sánchez Agitation-Sedation Scale): -1-->drowsy   Restraint necessity:    B: Breath   SBT: NA   C: Coordinate A & B, analgesics/sedatives   Pain: managed    SAT: NA  D: Delirium   CAM-ICU: Overall CAM-ICU: Negative  E: Early Mobility   MOVE Screen:  NA   Activity: Activity Management: activity adjusted per tolerance  FAS: Feeding/Nutrition   Diet order: Diet/Nutrition Received: NPO,   Fluid restriction:    T: Thrombus   DVT prophylaxis: VTE Required Core Measure: Pharmacological prophylaxis initiated/maintained  H: HOB Elevation   Head of Bed (HOB): HOB at 30 degrees  U: Ulcer Prophylaxis   GI: yes  G: Glucose control   NA  S: Skin   Bundle compliance: yes   Bathing/Skin Care: back care, bath, chlorhexidine, bath, complete, dressed/undressed, linen changed Date: 6/30    B: Bowel Function   no issues   I: Indwelling Catheters   Alberto necessity:      Urethral Catheter 06/26/20 1805 16 Fr.-Reason for Continuing Urinary Catheterization: Critically ill in ICU requiring intensive monitoring   CVC necessity: Yes   IPAD offered: Yes  D: De-escalation Antibx   No  Plan for the day   Transition off IV nipride. Rento-dora US today.   Family/Goals of care/Code Status   Code Status: Full Code     No acute events throughout day, VS and assessment per flow sheet, patient progressing towards goals as tolerated, plan of care reviewed with Blaine Multani and family, all concerns addressed, will continue to monitor.

## 2020-07-01 NOTE — SUBJECTIVE & OBJECTIVE
Interval History: Impella removed yesterday evening, no complications. Complaining of neck/shoulder pain this morning.      Current gtts:  - nitroprusside 0.9    Hemodynamics:  - ***    Review of Systems   Constitutional: Negative for chills and fever.   HENT: Negative for congestion, sore throat and trouble swallowing.    Eyes: Negative for visual disturbance.   Respiratory: Negative for cough, shortness of breath and wheezing.    Cardiovascular: Negative for chest pain, palpitations and leg swelling.   Gastrointestinal: Negative for abdominal pain, blood in stool, constipation, diarrhea, nausea and vomiting.   Genitourinary: Negative for dysuria and hematuria.   Musculoskeletal: Positive for arthralgias (neck, shoulders). Negative for myalgias.   Skin: Negative for rash.   Neurological: Negative for dizziness, light-headedness, numbness and headaches.   Psychiatric/Behavioral: Negative for agitation and confusion.     Objective:     Vital Signs (Most Recent):  Temp: 98.1 °F (36.7 °C) (07/01/20 0701)  Pulse: 81 (07/01/20 0701)  Resp: 20 (07/01/20 0701)  BP: 112/70 (07/01/20 0701)  SpO2: 95 % (07/01/20 0701) Vital Signs (24h Range):  Temp:  [97.5 °F (36.4 °C)-100.4 °F (38 °C)] 98.1 °F (36.7 °C)  Pulse:  [72-98] 81  Resp:  [14-43] 20  SpO2:  [91 %-98 %] 95 %  BP: (102-177)/(56-92) 112/70  Arterial Line BP: (111-125)/(57-68) 111/57     Weight: 89 kg (196 lb 3.4 oz)  Body mass index is 27.37 kg/m².    Intake/Output Summary (Last 24 hours) at 7/1/2020 0749  Last data filed at 7/1/2020 0701  Gross per 24 hour   Intake 845.26 ml   Output 3180 ml   Net -2334.74 ml      Physical Exam  Constitutional:       General: He is not in acute distress.     Appearance: He is well-developed.   HENT:      Head: Normocephalic and atraumatic.      Mouth/Throat:      Pharynx: No oropharyngeal exudate.      Comments: Poor dentition  Eyes:      Conjunctiva/sclera: Conjunctivae normal.      Pupils: Pupils are equal, round, and reactive to  light.   Neck:      Musculoskeletal: Normal range of motion and neck supple.      Comments: R pulm cath in place, no erythema/tenderness  Cardiovascular:      Rate and Rhythm: Normal rate and regular rhythm.      Heart sounds: Normal heart sounds. No murmur.   Pulmonary:      Effort: Pulmonary effort is normal. No respiratory distress.      Breath sounds: Normal breath sounds. No wheezing or rales.   Abdominal:      General: Bowel sounds are normal. There is no distension.      Palpations: Abdomen is soft.      Tenderness: There is no abdominal tenderness. There is no guarding.   Musculoskeletal:         General: No tenderness.   Skin:     General: Skin is warm and dry.      Findings: No rash.      Comments: Stasis dermatitis of BLE (L > R)   Neurological:      Mental Status: He is alert and oriented to person, place, and time.      Cranial Nerves: No cranial nerve deficit.      Motor: No abnormal muscle tone.   Psychiatric:         Behavior: Behavior normal.         Significant Labs: All pertinent labs within the past 24 hours have been reviewed.    Significant Imaging: I have reviewed and interpreted all pertinent imaging results/findings within the past 24 hours.

## 2020-07-01 NOTE — ASSESSMENT & PLAN NOTE
[id number] of 3. Conferring a stroke risk of 3.2% per year  S/p impella removal. plts slowly dropping since admit. 172--> 78  Holding warfarin today, d/c'd heparin gtt  F/u LUCIO panel

## 2020-07-01 NOTE — ASSESSMENT & PLAN NOTE
69 y.o. male with history of CAD s/p CABG x3 (LIMA-LAD, SVG-OM, SVG-D in 1998), HFrEF, CKD III, afib on coumadin (last dose on Sunday), PAD s/p L fem-pop (occluded), BL SFA stenting, HTN, asthma, smoking, who presents as transfer for interventional cardiology evaluation of severe L main and three vessel disease / high risk PCI. NSTEMI and underwent LHC at OSH but was found to have dominant L circulation, 80% stenoses of the LM, pLAD, and pLCX. His RCA was totally occluded, as well as all his grafts. Transferred here for higher level of care. Repeat LHC on 6/26: High-degree stenosis of the distal LM, proximal LAD and LCx which received PCI. Impella placed. Post procedure HD consistent with cardiogenic shock and a high systemic vascular resistance. Impella was increased to P6 and he started afterload reduction with sodium nitroprusside. Heparin gtt discontinued. S/p Impella removal on 6/30.     Plan:  - Transition to hydralazine + isosorbide dintrate, wean nitroprusside gtt as tolerated  - US renal artery stenosis (high MAP with low CO)  - Distal pulse check q1hr  - Continue atorvastatin 80mg  - Hold BB until clinically stable  - Continue ASA, plavix  - Control BP to a goal of <130/80

## 2020-07-01 NOTE — PROGRESS NOTES
Notified Dr. Crawford that during PM cardiac profile. During wedge, balloon meet resistance, deflated balloon immediately. After deflation, SoV2 on monitor dropped to 29. STAT VBG performored. VBG resulted with a Svo2 of 54. Pt stable. Informed MD profile will be completed with DAP.

## 2020-07-01 NOTE — PLAN OF CARE
Faxed requested notes from Ras Irby MD to [facility] at [phone] for LifeVest referral process.

## 2020-07-01 NOTE — ASSESSMENT & PLAN NOTE
Right upper molars with poor dentition and likely dental caries. No evidence of abscess   Unasyn > CTX, will complete therapy today

## 2020-07-01 NOTE — ASSESSMENT & PLAN NOTE
Results for orders placed during the hospital encounter of 06/25/20   Echo Color Flow Doppler? Yes    Narrative · Eccentric left ventricular hypertrophy.  · Severely decreased left ventricular systolic function with global   hypokinesis, worst in the apical segments. The estimated ejection fraction   is 20%.  · Moderately reduced right ventricular systolic function.  · Left ventricular diastolic dysfunction.  · Mild aortic valve stenosis. Aortic valve area is 1.53 cm2; peak velocity   is 1.16 m/s; mean gradient is 4 mmHg.  · Mild mitral regurgitation.  · Elevated central venous pressure (15 mmHg).        Plan:  - Lasix 40mg daily   - Optimize GDMT once clinically stable  - Will Lifevest upon discharge

## 2020-07-02 DIAGNOSIS — Z98.61 POSTSURGICAL PERCUTANEOUS TRANSLUMINAL CORONARY ANGIOPLASTY STATUS: Primary | ICD-10-CM

## 2020-07-02 DIAGNOSIS — I25.10 CORONARY ARTERY DISEASE INVOLVING NATIVE CORONARY ARTERY OF NATIVE HEART WITHOUT ANGINA PECTORIS: ICD-10-CM

## 2020-07-02 PROBLEM — D53.9 MACROCYTIC ANEMIA: Status: ACTIVE | Noted: 2020-07-02

## 2020-07-02 LAB
ALBUMIN SERPL BCP-MCNC: 2.5 G/DL (ref 3.5–5.2)
ALLENS TEST: ABNORMAL
ALLENS TEST: ABNORMAL
ALP SERPL-CCNC: 76 U/L (ref 55–135)
ALT SERPL W/O P-5'-P-CCNC: 16 U/L (ref 10–44)
ANION GAP SERPL CALC-SCNC: 7 MMOL/L (ref 8–16)
AST SERPL-CCNC: 24 U/L (ref 10–40)
BASOPHILS # BLD AUTO: 0.03 K/UL (ref 0–0.2)
BASOPHILS NFR BLD: 0.5 % (ref 0–1.9)
BILIRUB SERPL-MCNC: 0.9 MG/DL (ref 0.1–1)
BILIRUB SERPL-MCNC: 0.9 MG/DL (ref 0.1–1)
BUN SERPL-MCNC: 20 MG/DL (ref 8–23)
CALCIUM SERPL-MCNC: 8 MG/DL (ref 8.7–10.5)
CHLORIDE SERPL-SCNC: 99 MMOL/L (ref 95–110)
CO2 SERPL-SCNC: 28 MMOL/L (ref 23–29)
CREAT SERPL-MCNC: 1.1 MG/DL (ref 0.5–1.4)
DELSYS: ABNORMAL
DELSYS: ABNORMAL
DIFFERENTIAL METHOD: ABNORMAL
EOSINOPHIL # BLD AUTO: 0.1 K/UL (ref 0–0.5)
EOSINOPHIL NFR BLD: 1.4 % (ref 0–8)
ERYTHROCYTE [DISTWIDTH] IN BLOOD BY AUTOMATED COUNT: 15.7 % (ref 11.5–14.5)
EST. GFR  (AFRICAN AMERICAN): >60 ML/MIN/1.73 M^2
EST. GFR  (NON AFRICAN AMERICAN): >60 ML/MIN/1.73 M^2
GLUCOSE SERPL-MCNC: 104 MG/DL (ref 70–110)
HCO3 UR-SCNC: 26 MMOL/L (ref 24–28)
HCO3 UR-SCNC: 28.6 MMOL/L (ref 24–28)
HCT VFR BLD AUTO: 23.7 % (ref 40–54)
HCYS SERPL-SCNC: 12.3 UMOL/L (ref 4–16.5)
HGB BLD-MCNC: 7.5 G/DL (ref 14–18)
IMM GRANULOCYTES # BLD AUTO: 0.04 K/UL (ref 0–0.04)
IMM GRANULOCYTES NFR BLD AUTO: 0.6 % (ref 0–0.5)
INR PPP: 1.1 (ref 0.8–1.2)
LDH SERPL L TO P-CCNC: 344 U/L (ref 110–260)
LYMPHOCYTES # BLD AUTO: 0.9 K/UL (ref 1–4.8)
LYMPHOCYTES NFR BLD: 13.3 % (ref 18–48)
MAGNESIUM SERPL-MCNC: 2.3 MG/DL (ref 1.6–2.6)
MCH RBC QN AUTO: 33.6 PG (ref 27–31)
MCHC RBC AUTO-ENTMCNC: 31.6 G/DL (ref 32–36)
MCV RBC AUTO: 106 FL (ref 82–98)
MONOCYTES # BLD AUTO: 0.7 K/UL (ref 0.3–1)
MONOCYTES NFR BLD: 10.5 % (ref 4–15)
NEUTROPHILS # BLD AUTO: 4.8 K/UL (ref 1.8–7.7)
NEUTROPHILS NFR BLD: 73.7 % (ref 38–73)
NRBC BLD-RTO: 0 /100 WBC
PCO2 BLDA: 40.9 MMHG (ref 35–45)
PCO2 BLDA: 45.7 MMHG (ref 35–45)
PH SMN: 7.4 [PH] (ref 7.35–7.45)
PH SMN: 7.41 [PH] (ref 7.35–7.45)
PHOSPHATE SERPL-MCNC: 3.8 MG/DL (ref 2.7–4.5)
PLATELET # BLD AUTO: 79 K/UL (ref 150–350)
PLATELET FUNCTION ASSAY - EPINEPHRINE: 119 SECS (ref 76–199)
PMV BLD AUTO: 12 FL (ref 9.2–12.9)
PO2 BLDA: 26 MMHG (ref 40–60)
PO2 BLDA: 31 MMHG (ref 40–60)
POC BE: 1 MMOL/L
POC BE: 4 MMOL/L
POC SATURATED O2: 48 % (ref 95–100)
POC SATURATED O2: 60 % (ref 95–100)
POC TCO2: 27 MMOL/L (ref 24–29)
POC TCO2: 30 MMOL/L (ref 24–29)
POTASSIUM SERPL-SCNC: 3.7 MMOL/L (ref 3.5–5.1)
POTASSIUM SERPL-SCNC: 4 MMOL/L (ref 3.5–5.1)
PROT SERPL-MCNC: 5.4 G/DL (ref 6–8.4)
PROTHROMBIN TIME: 11.5 SEC (ref 9–12.5)
RBC # BLD AUTO: 2.23 M/UL (ref 4.6–6.2)
SAMPLE: ABNORMAL
SAMPLE: ABNORMAL
SITE: ABNORMAL
SITE: ABNORMAL
SODIUM SERPL-SCNC: 134 MMOL/L (ref 136–145)
WBC # BLD AUTO: 6.48 K/UL (ref 3.9–12.7)

## 2020-07-02 PROCEDURE — 84132 ASSAY OF SERUM POTASSIUM: CPT

## 2020-07-02 PROCEDURE — 97162 PT EVAL MOD COMPLEX 30 MIN: CPT

## 2020-07-02 PROCEDURE — 84100 ASSAY OF PHOSPHORUS: CPT

## 2020-07-02 PROCEDURE — 99232 SBSQ HOSP IP/OBS MODERATE 35: CPT | Mod: GC,,, | Performed by: INTERNAL MEDICINE

## 2020-07-02 PROCEDURE — 25000003 PHARM REV CODE 250: Performed by: STUDENT IN AN ORGANIZED HEALTH CARE EDUCATION/TRAINING PROGRAM

## 2020-07-02 PROCEDURE — 94761 N-INVAS EAR/PLS OXIMETRY MLT: CPT

## 2020-07-02 PROCEDURE — 83735 ASSAY OF MAGNESIUM: CPT

## 2020-07-02 PROCEDURE — 97530 THERAPEUTIC ACTIVITIES: CPT

## 2020-07-02 PROCEDURE — 85610 PROTHROMBIN TIME: CPT

## 2020-07-02 PROCEDURE — 25000003 PHARM REV CODE 250: Performed by: INTERNAL MEDICINE

## 2020-07-02 PROCEDURE — 83921 ORGANIC ACID SINGLE QUANT: CPT

## 2020-07-02 PROCEDURE — 82600 ASSAY OF CYANIDE: CPT

## 2020-07-02 PROCEDURE — 97116 GAIT TRAINING THERAPY: CPT

## 2020-07-02 PROCEDURE — 97802 MEDICAL NUTRITION INDIV IN: CPT

## 2020-07-02 PROCEDURE — 99900035 HC TECH TIME PER 15 MIN (STAT)

## 2020-07-02 PROCEDURE — 83090 ASSAY OF HOMOCYSTEINE: CPT

## 2020-07-02 PROCEDURE — 83615 LACTATE (LD) (LDH) ENZYME: CPT

## 2020-07-02 PROCEDURE — 63600175 PHARM REV CODE 636 W HCPCS: Performed by: STUDENT IN AN ORGANIZED HEALTH CARE EDUCATION/TRAINING PROGRAM

## 2020-07-02 PROCEDURE — 80053 COMPREHEN METABOLIC PANEL: CPT

## 2020-07-02 PROCEDURE — 36415 COLL VENOUS BLD VENIPUNCTURE: CPT

## 2020-07-02 PROCEDURE — 85576 BLOOD PLATELET AGGREGATION: CPT

## 2020-07-02 PROCEDURE — 82803 BLOOD GASES ANY COMBINATION: CPT

## 2020-07-02 PROCEDURE — 99232 PR SUBSEQUENT HOSPITAL CARE,LEVL II: ICD-10-PCS | Mod: GC,,, | Performed by: INTERNAL MEDICINE

## 2020-07-02 PROCEDURE — 20000000 HC ICU ROOM

## 2020-07-02 PROCEDURE — 97165 OT EVAL LOW COMPLEX 30 MIN: CPT

## 2020-07-02 PROCEDURE — 85025 COMPLETE CBC W/AUTO DIFF WBC: CPT

## 2020-07-02 RX ORDER — HYDRALAZINE HYDROCHLORIDE 50 MG/1
100 TABLET, FILM COATED ORAL 3 TIMES DAILY
Status: DISCONTINUED | OUTPATIENT
Start: 2020-07-02 | End: 2020-07-05 | Stop reason: HOSPADM

## 2020-07-02 RX ORDER — HYDRALAZINE HYDROCHLORIDE 50 MG/1
50 TABLET, FILM COATED ORAL ONCE
Status: COMPLETED | OUTPATIENT
Start: 2020-07-02 | End: 2020-07-02

## 2020-07-02 RX ORDER — ISOSORBIDE DINITRATE 40 MG/1
40 TABLET ORAL 3 TIMES DAILY
Status: DISCONTINUED | OUTPATIENT
Start: 2020-07-02 | End: 2020-07-05 | Stop reason: HOSPADM

## 2020-07-02 RX ORDER — CARVEDILOL 6.25 MG/1
6.25 TABLET ORAL 2 TIMES DAILY
Status: DISCONTINUED | OUTPATIENT
Start: 2020-07-02 | End: 2020-07-03

## 2020-07-02 RX ORDER — POTASSIUM CHLORIDE 20 MEQ/1
40 TABLET, EXTENDED RELEASE ORAL ONCE
Status: COMPLETED | OUTPATIENT
Start: 2020-07-02 | End: 2020-07-02

## 2020-07-02 RX ADMIN — ISOSORBIDE DINITRATE 40 MG: 40 TABLET ORAL at 02:07

## 2020-07-02 RX ADMIN — FUROSEMIDE 40 MG: 40 TABLET ORAL at 08:07

## 2020-07-02 RX ADMIN — CLOPIDOGREL BISULFATE 75 MG: 75 TABLET ORAL at 08:07

## 2020-07-02 RX ADMIN — PANTOPRAZOLE SODIUM 40 MG: 40 TABLET, DELAYED RELEASE ORAL at 08:07

## 2020-07-02 RX ADMIN — CARVEDILOL 6.25 MG: 6.25 TABLET, FILM COATED ORAL at 11:07

## 2020-07-02 RX ADMIN — CARVEDILOL 6.25 MG: 6.25 TABLET, FILM COATED ORAL at 08:07

## 2020-07-02 RX ADMIN — ESCITALOPRAM OXALATE 10 MG: 5 TABLET, FILM COATED ORAL at 08:07

## 2020-07-02 RX ADMIN — CEFTRIAXONE 2 G: 2 INJECTION, SOLUTION INTRAVENOUS at 05:07

## 2020-07-02 RX ADMIN — OXYCODONE HYDROCHLORIDE 5 MG: 5 TABLET ORAL at 02:07

## 2020-07-02 RX ADMIN — FLUTICASONE FUROATE AND VILANTEROL TRIFENATATE 1 PUFF: 100; 25 POWDER RESPIRATORY (INHALATION) at 09:07

## 2020-07-02 RX ADMIN — SODIUM NITROPRUSSIDE 0.3 MCG/KG/MIN: 25 INJECTION, SOLUTION, CONCENTRATE INTRAVENOUS at 04:07

## 2020-07-02 RX ADMIN — HYDRALAZINE HYDROCHLORIDE 100 MG: 50 TABLET, FILM COATED ORAL at 08:07

## 2020-07-02 RX ADMIN — HYDRALAZINE HYDROCHLORIDE 50 MG: 50 TABLET, FILM COATED ORAL at 10:07

## 2020-07-02 RX ADMIN — ISOSORBIDE DINITRATE 40 MG: 10 TABLET ORAL at 06:07

## 2020-07-02 RX ADMIN — ATORVASTATIN CALCIUM 80 MG: 20 TABLET, FILM COATED ORAL at 08:07

## 2020-07-02 RX ADMIN — HYDRALAZINE HYDROCHLORIDE 100 MG: 50 TABLET, FILM COATED ORAL at 06:07

## 2020-07-02 RX ADMIN — ASPIRIN 81 MG CHEWABLE TABLET 81 MG: 81 TABLET CHEWABLE at 08:07

## 2020-07-02 RX ADMIN — OXYCODONE HYDROCHLORIDE 5 MG: 5 TABLET ORAL at 08:07

## 2020-07-02 RX ADMIN — ISOSORBIDE DINITRATE 40 MG: 40 TABLET ORAL at 08:07

## 2020-07-02 RX ADMIN — POTASSIUM CHLORIDE 40 MEQ: 1500 TABLET, EXTENDED RELEASE ORAL at 06:07

## 2020-07-02 RX ADMIN — HYDRALAZINE HYDROCHLORIDE 100 MG: 50 TABLET, FILM COATED ORAL at 02:07

## 2020-07-02 NOTE — PLAN OF CARE
CMICU DAILY GOALS       A: Awake    RASS: Goal -    Actual - RASS (Sánchez Agitation-Sedation Scale): 0-->alert and calm   Restraint necessity:    B: Breath   SBT: Not intubated   C: Coordinate A & B, analgesics/sedatives   Pain: managed    SAT: Pass  D: Delirium   CAM-ICU: Overall CAM-ICU: Negative  E: Early Mobility   MOVE Screen: Pass   Activity: Activity Management: activity clustered for rest period, activity encouraged, activity minimized  FAS: Feeding/Nutrition   Diet order: Diet/Nutrition Received: low saturated fat/low cholesterol,   Fluid restriction:    T: Thrombus   DVT prophylaxis: VTE Required Core Measure: Pharmacological prophylaxis initiated/maintained  H: HOB Elevation   Head of Bed (HOB): HOB at 30-45 degrees  U: Ulcer Prophylaxis   GI: yes  G: Glucose control   managed    S: Skin   Bundle compliance: yes   Bathing/Skin Care: linen changed Date: pt. Is PM bath   B: Bowel Function   no issues   I: Indwelling Catheters   Alberto necessity: [REMOVED]      Urethral Catheter 06/26/20 1805 16 Fr.-Reason for Continuing Urinary Catheterization: Critically ill in ICU requiring intensive monitoring   CVC necessity: Yes   IPAD offered: No  D: De-escalation Antibx   No  Plan for the day   Wean Nipride; remove swan tomorrow   Family/Goals of care/Code Status   Code Status: Full Code     No acute events throughout day, VS and assessment per flow sheet, patient progressing towards goals as tolerated, plan of care reviewed with Blaine Multani and family, all concerns addressed, will continue to monitor.

## 2020-07-02 NOTE — ASSESSMENT & PLAN NOTE
Patient is s/p multi-vessel PCI including unprotected LM (DK Crush), proximal and mid LAD, and distal and prox to mid LCx.  -continue EC ASA 81mg po qday  -continue Plavix 75mg po qday X 1year minimum  -rec cardiac rehab consult  -rec PT consult

## 2020-07-02 NOTE — CARE UPDATE
CCU Cross Cover:    Notified of nipride being discontinued. MAP of 86 /66 with HR 83. If increases further will adjust BP meds further to prevent restarting on nipride.     Estrellita Nolan MD  Cardiology Fellow  PGY 5  [facility] - [phone]

## 2020-07-02 NOTE — NURSING
Chicho VARELA notified of patient's low SVO2 reading from SWAN box since patient ambulated in ahn. VSS. Patient feels fine. RN unsure if SWAN box is giving accurate readings. No new orders at this time. RISHI.

## 2020-07-02 NOTE — PLAN OF CARE
Problem: Physical Therapy Goal  Goal: Physical Therapy Goal  Description: Goals to be met by: 2020    Patient will increase functional independence with mobility by performin. Supine to sit with Supervision - not met  2. Sit to stand transfer with Supervision - not met  3. Gait  x 150 feet with Supervision - not met  4. Ascend/descend 6 stair with bilateral Handrails Supervision  - not met   5. Stand for 3 minutes with Supervision to increase activity tolerance - not met    Outcome: Ongoing, Progressing     Eval completed and goals appropriate

## 2020-07-02 NOTE — PLAN OF CARE
Recommendations     1. Continue current Cardiac diet, add Boost Plus ONS if PO intake declines.   2. RD to monitor & follow-up.     Goals: Meet % EEN, EPN by RD f/u date  Nutrition Goal Status: new  Communication of RD Recs: reviewed with RN

## 2020-07-02 NOTE — ASSESSMENT & PLAN NOTE
[id number] of 3. Conferring a stroke risk of 3.2% per year  S/p impella removal. plts slowly dropping since admit. 172--> 78  Holding warfarin today, d/c'd heparin gtt  F/u LUCIO panel

## 2020-07-02 NOTE — ASSESSMENT & PLAN NOTE
Patient has severe PAD including the LE extremities and PAD of the upper extremities also  Impella was placed via left axillary artery due to bilateral ikaic artery stenoses as well as severe bilateral infrainguinal PAD  -continue statin

## 2020-07-02 NOTE — PT/OT/SLP EVAL
Physical Therapy Evaluation    Patient Name:  Blaine Multani   MRN:  [id number]  Admit Date: 6/25/2020  Admitting Diagnosis:  NSTEMI (non-ST elevated myocardial infarction)  Length of Stay: 7 days  Recent Surgery: Procedure(s) (LRB):  Impella, Removal (Right)  Thrombectomy, Upper Arterial  INSERTION, CATHETER, SWAN-DONTRELL, WITH IMAGING GUIDANCE  PTA, Subclavian 2 Days Post-Op    Recommendations:     Discharge Recommendations:  home health PT   Discharge Equipment Recommendations: none   Barriers to discharge: None    Assessment:     Blaine Multani is a 69 y.o. male admitted with a medical diagnosis of NSTEMI (non-ST elevated myocardial infarction). Pt with axillary impella removal on 6/30. Pt with pain in L shoulder with active movement. Pt's LE ROM and strength WFL. Recommend discharge home with HHPT once medically stable       Problem List: impaired endurance, decreased upper extremity function, pain, impaired cardiopulmonary response to activity  Rehab Prognosis: Good; patient would benefit from acute skilled PT services to address these deficits and reach maximum level of function.      Plan:     During this hospitalization, patient to be seen 4 x/week to address the identified rehab impairments via gait training, therapeutic activities, therapeutic exercises, neuromuscular re-education and progress towards the established goals.    · Plan of Care Expires:  08/01/20    Subjective   Communicated with RN prior to session.  Patient found HOB elevated upon PT entry to room, agreeable to evaluation. Blaine Multani's alone during session.    Chief Complaint: Tx for Cardiology Consult (Pt has hx of CABG, A-Fib. Pt accepted by MD Anthony for PCI. Pt denies CP. ) and COVID-19 Concerns (Pt tx from [facility] for Covid testing. )    Patient/Family Comments/goals: to get better and return home   Pain/Comfort:  · Pain Rating 1: 0/10  · Pain Rating Post-Intervention 1: (pain with active movement of L shoulder)    Living  "Environment:  Patient lives with wife in a H with 6 PRADIP and B HR.   Prior Level of Function:   Patient reports being indep with mobility & with ADLs. Hand Dominance: right. Patient uses DME as follows: none. DME owned (not currently used): none.    Patient reports they will have assistance from wife upon discharge.    Objective:   Patient found with: telemetry, blood pressure cuff, pulse ox (continuous), chaudhry catheter([place] Catheter)     General Precautions: Standard, Cardiac fall, [place] catheter (PA cordis)   Orthopedic Precautions:N/A   Braces:     Oxygen Device: Room Air  Vitals: BP (!) 121/56 (BP Location: Right arm, Patient Position: Lying)   Pulse 85   Temp 97.5 °F (36.4 °C) (Core ([place]))   Resp 16   Ht 5' 11" (1.803 m)   Wt 93.4 kg (205 lb 14.6 oz)   SpO2 96%   BMI 28.72 kg/m²     Exams:  · Cognition:   · Alert and Cooperative  · Ox4  · Command following: Follows multistep  commands  · Fluency: clear/fluent    · RLE ROM: WFL  · RLE Strength: WFL  · LLE ROM: WFL  · LLE Strength: WFL    Outcome Measures:  AM-PAC 6 CLICK MOBILITY  Turning over in bed (including adjusting bedclothes, sheets and blankets)?: 3  Sitting down on and standing up from a chair with arms (e.g., wheelchair, bedside commode, etc.): 3  Moving from lying on back to sitting on the side of the bed?: 3  Moving to and from a bed to a chair (including a wheelchair)?: 3  Need to walk in hospital room?: 3  Climbing 3-5 steps with a railing?: 3  Basic Mobility Total Score: 18     Functional Mobility:  Additional staff present: OT  Bed Mobility:  · Supine to Sit: stand by assistance; HOB elevated  · Scooting anteriorly to EOB to have both feet planted on floor: stand by assistance    Sitting Balance at Edge of Bed:   Assistance Level Required: Supervision   Time: 5 minutes    Transfers:   · Sit <> Stand Transfer: stand by assistance with no assistive device from EOB      Gait:   · Patient ambulated: 4ft    · Patient required: " contact guard  · Patient used: no assistive device  · Gait Pattern observed: reciprocal gait  · Gait Deviation(s): decreased step length and decreased tom  · Impairments due to: pain and post NSTEMI impairements    Therapeutic Activities, Exercises, & Education:   Educated pt on PT role/POC  Educated pt on importance of OOB activity and daily ambulation   Pt verbalized understanding     T/f to chair to increase tolerance to OOB activity and to create optimal positioning for lung expansion     Patient left up in chair with all lines intact, call button in reach and RN notified.    GOALS:   Multidisciplinary Problems     Physical Therapy Goals        Problem: Physical Therapy Goal    Goal Priority Disciplines Outcome Goal Variances Interventions   Physical Therapy Goal     PT, PT/OT Ongoing, Progressing     Description: Goals to be met by: 2020    Patient will increase functional independence with mobility by performin. Supine to sit with Supervision - not met  2. Sit to stand transfer with Supervision - not met  3. Gait  x 150 feet with Supervision - not met  4. Ascend/descend 6 stair with bilateral Handrails Supervision  - not met   5. Stand for 3 minutes with Supervision to increase activity tolerance - not met                     History:     Past Medical History:   Diagnosis Date    A-fib     Asthma     Bronchitis     Colon polyps     Hemorrhoids     HLD (hyperlipidemia)     Hypertension     PVD (peripheral vascular disease)        Past Surgical History:   Procedure Laterality Date    angiogram with stents Left     leg    ARTERIOGRAPHY OF SUBCLAVIAN ARTERY  2020    Procedure: Arteriogram, Subclavian;  Surgeon: Bruno Cruz MD;  Location: [facility] CATH LAB;  Service: Cardiology;;    CHOLECYSTECTOMY      COLONOSCOPY W/ POLYPECTOMY      CORONARY ARTERY BYPASS GRAFT  1998    x3    INSERTION OF INTRAVASCULAR MICROAXIAL BLOOD PUMP N/A 2020    Procedure: INSERTION, IMPELLA;   Surgeon: Bruno Cruz MD;  Location: [facility] CATH LAB;  Service: Cardiology;  Laterality: N/A;    LEFT HEART CATHETERIZATION Left 6/26/2020    Procedure: Left heart cath;  Surgeon: Bruno Cruz MD;  Location: [facility] CATH LAB;  Service: Cardiology;  Laterality: Left;    PLACEMENT OF SWAN DONTRELL CATHETER WITH IMAGING GUIDANCE  6/26/2020    Procedure: INSERTION, CATHETER, SWAN-DONTRELL, WITH IMAGING GUIDANCE;  Surgeon: Bruno Cruz MD;  Location: [facility] CATH LAB;  Service: Cardiology;;    PLACEMENT OF SWAN DONTRELL CATHETER WITH IMAGING GUIDANCE  6/30/2020    Procedure: INSERTION, CATHETER, SWAN-DONTRELL, WITH IMAGING GUIDANCE;  Surgeon: Bruno Cruz MD;  Location: [facility] CATH LAB;  Service: Cardiology;;    RIGHT HEART CATHETERIZATION Right 6/26/2020    Procedure: INSERTION, CATHETER, RIGHT HEART;  Surgeon: Bruno Cruz MD;  Location: [facility] CATH LAB;  Service: Cardiology;  Laterality: Right;    TOTAL HIP ARTHROPLASTY Right        Time Tracking:     PT Received On: 07/02/20  PT Start Time: 0910     PT Stop Time: 0930  PT Total Time (min): 20 min     Billable Minutes: Evaluation 10 and Gait Training 8    Tasneem Villasenor, PT, DPT  7/2/2020  [phone]

## 2020-07-02 NOTE — PLAN OF CARE
Goals and POC established this date.   Problem: Occupational Therapy Goal  Goal: Occupational Therapy Goal  Description: Goals to be met by: 7 days 7/9/2020     Patient will increase functional independence with ADLs by performing:  Pt to complete UE dressing with set-up  Pt to complete LE dressing with supervision.  Pt to complete standing g/h skills with supervision  Pt to complete toileting with supervision.  Pt to complete basic t/f skills including bed, chair and commode with supervision.   Outcome: Ongoing, Progressing

## 2020-07-02 NOTE — PROGRESS NOTES
[facility]  Cardiology  Progress Note    Patient Name: Blaine Multani  MRN: [id number]  Admission Date: 6/25/2020  Hospital Length of Stay: 7 days  Code Status: Full Code   Attending Physician: London Loera III, MD   Primary Care Physician: Nay Alcantar MD  Expected Discharge Date: 7/4/2020  Principal Problem:NSTEMI (non-ST elevated myocardial infarction)    Subjective:     Hospital Course:   NSTEMI and underwent LHC at OSH but was found to have dominant L circulation, 80% stenoses of the LM, pLAD, and pLCX. His RCA was totally occluded, as well as all his grafts. Transferred here for higher level of care. Repeat LHC on 6/26: High-degree stenosis of the distal LM, proximal LAD and LCx which received PCI. Impella placed. Post procedure HD consistent with cardiogenic shock and a high systemic vascular resistance. He was started afterload reduction with nitroprusside. Impella settings slowly weaned while tirating nitroprusside gtt. On 6/30, impella removal. Patient tolerated well. Heparin stopped. Will transition from nitroprusside to hydralazine. holding  ace/arb as patient with high MAP and low CO concerning for renal artery stenosis. US renal artery obtained, but study was inconclusive.    Interval History: NAEON. Afebrile, VSS and WNL. UOP 2.2L, net -1.2L.     Current gtts:  - nitroprusside 0.3      Review of Systems   Constitution: Negative for chills, diaphoresis, fever, weight gain and weight loss.   HENT: Negative for congestion and sore throat.    Eyes: Negative for visual disturbance.   Cardiovascular: Negative for chest pain, dyspnea on exertion, leg swelling, orthopnea, palpitations, paroxysmal nocturnal dyspnea and syncope.   Respiratory: Negative for cough, shortness of breath and wheezing.    Skin: Negative for rash.   Musculoskeletal: Positive for joint pain (shoulder, arm arthralgias). Negative for myalgias.   Gastrointestinal: Negative for abdominal pain, constipation,  diarrhea, melena, nausea and vomiting.   Genitourinary: Negative for dysuria, frequency and hematuria.   Neurological: Negative for dizziness, headaches, light-headedness and numbness.   Psychiatric/Behavioral: Negative for altered mental status.     Objective:     Vital Signs (Most Recent):  Temp: 97.8 °F (36.6 °C) (07/02/20 0300)  Pulse: 71 (07/02/20 0600)  Resp: 18 (07/02/20 0600)  BP: 108/61 (07/02/20 0606)  SpO2: 98 % (07/02/20 0600) Vital Signs (24h Range):  Temp:  [97.8 °F (36.6 °C)-99.2 °F (37.3 °C)] 97.8 °F (36.6 °C)  Pulse:  [71-89] 71  Resp:  [13-29] 18  SpO2:  [91 %-98 %] 98 %  BP: (102-149)/(55-79) 108/61     Weight: 93.4 kg (205 lb 14.6 oz)  Body mass index is 28.72 kg/m².     SpO2: 98 %  O2 Device (Oxygen Therapy): room air      Intake/Output Summary (Last 24 hours) at 7/2/2020 0708  Last data filed at 7/2/2020 0600  Gross per 24 hour   Intake 944.25 ml   Output 2185 ml   Net -1240.75 ml       Lines/Drains/Airways     Central Venous Catheter Line            Pulmonary Artery Catheter Assessment  06/30/20 1329 right internal jugular 1 day    Introducer 07/01/20 1900 right internal jugular less than 1 day          Drain                 Urethral Catheter 06/26/20 1805 16 Fr. 5 days                Physical Exam   Constitutional: He is oriented to person, place, and time. He appears well-developed and well-nourished. No distress.   HENT:   Head: Normocephalic and atraumatic.   Mouth/Throat: Oropharynx is clear and moist. No oropharyngeal exudate.   Eyes: Pupils are equal, round, and reactive to light. Conjunctivae are normal. No scleral icterus.   Neck: Neck supple. No JVD present. No tracheal deviation present.   R IJ Swanz in place, no erythema/tenderness   Cardiovascular: Normal rate, regular rhythm, normal heart sounds and intact distal pulses.   No murmur heard.  Pulses:  - L PT present by doppler  - L DP present by doppler  - R PT absent by doppler  - R DP present by doppler   Pulmonary/Chest: Effort  normal and breath sounds normal. No respiratory distress. He has no wheezes. He has no rales.   R Swanz in place, no erythema/tenderness   Abdominal: Soft. Bowel sounds are normal. He exhibits no distension. There is no abdominal tenderness. There is no rebound.   Musculoskeletal:         General: No edema.   Neurological: He is alert and oriented to person, place, and time. He exhibits normal muscle tone.   Skin: Skin is warm and dry. No rash noted. He is not diaphoretic. There is erythema (stasis dermatitis in BLE).   Ecchymosis of L shoulder, no hematoma   Psychiatric: He has a normal mood and affect. His behavior is normal.       Significant Labs: All pertinent lab results from the last 24 hours have been reviewed.    Significant Imaging: Reviewed.    Assessment and Plan:     * NSTEMI (non-ST elevated myocardial infarction)  Cardiomyopathy, ischemic  Status post cardiac catheterization  69 y.o. male with history of CAD s/p CABG x3 (LIMA-LAD, SVG-OM, SVG-D in 1998), HFrEF, CKD III, afib on coumadin (last dose on Sunday), PAD s/p L fem-pop (occluded), BL SFA stenting, HTN, asthma, smoking, who presents as transfer for interventional cardiology evaluation of severe L main and three vessel disease / high risk PCI. NSTEMI and underwent LHC at OSH but was found to have dominant L circulation, 80% stenoses of the LM, pLAD, and pLCX. His RCA was totally occluded, as well as all his grafts. Transferred here for higher level of care. Repeat LHC on 6/26: High-degree stenosis of the distal LM, proximal LAD and LCx which received PCI. Impella placed. Post procedure HD consistent with cardiogenic shock and a high systemic vascular resistance. Impella was increased to P6 and he started afterload reduction with sodium nitroprusside. Heparin gtt discontinued. S/p Impella removal on 6/30.     Plan:  - Transition to hydralazine + isosorbide dintrate, wean nitroprusside gtt as tolerated  - US renal artery stenosis (high MAP with  low CO)  - Distal pulse check q1hr  - Continue atorvastatin 80mg  - Hold BB until clinically stable  - Continue ASA, plavix  - Control BP to a goal of <130/80    Macrocytic anemia  Hb downtrending since admission (11 > 7.5), macrocytic anemia on admission  Methylmalonic acid, homocysteine, and FOBT ordered    Thrombocytopenia  Please see Paroxysmal atrial fibrillation    Acute blood loss anemia  Monitor with CBCs daily  Transfuse with goal Hb > 7    Dyslipidemia  Continue atorvastatin 80mg    Unstable angina pectoris  Acute systolic congestive heart failure  Results for orders placed during the hospital encounter of 06/25/20   Echo Color Flow Doppler? Yes    Narrative · Eccentric left ventricular hypertrophy.  · Severely decreased left ventricular systolic function with global   hypokinesis, worst in the apical segments. The estimated ejection fraction   is 20%.  · Moderately reduced right ventricular systolic function.  · Left ventricular diastolic dysfunction.  · Mild aortic valve stenosis. Aortic valve area is 1.53 cm2; peak velocity   is 1.16 m/s; mean gradient is 4 mmHg.  · Mild mitral regurgitation.  · Elevated central venous pressure (15 mmHg).        Plan:  - Lasix 40mg daily   - Optimize GDMT once clinically stable  - Will Lifevest upon discharge      Dental abscess  Right upper molars with poor dentition and likely dental caries. No evidence of abscess   Unasyn > CTX, will complete therapy today    Depression  Continue lexapro    Paroxysmal atrial fibrillation  [id number] of 3. Conferring a stroke risk of 3.2% per year  S/p impella removal. plts slowly dropping since admit. 172--> 78  Holding warfarin today, d/c'd heparin gtt  F/u LUCIO panel    HTN (hypertension)  Hold home meds    PAD (peripheral artery disease)  Continue ASA and atorvastatin        VTE Risk Mitigation (From admission, onward)         Ordered     IP VTE LOW RISK PATIENT  Once      06/26/20 1114     Place TATA hose  Until discontinued       06/26/20 1114     Place sequential compression device  Until discontinued      06/26/20 0022                Erik Valentino MD  Cardiology  [facility]

## 2020-07-02 NOTE — PROGRESS NOTES
"  [facility]  Adult Nutrition  Consult Note    SUMMARY     Recommendations    1. Continue current Cardiac diet, add Boost Plus ONS if PO intake declines.   2. RD to monitor & follow-up.    Goals: Meet % EEN, EPN by RD f/u date  Nutrition Goal Status: new  Communication of RD Recs: reviewed with RN    Reason for Assessment    Reason For Assessment: length of stay  Diagnosis: other (see comments)(NSTEMI)  Relevant Medical History: HF, CKD  Interdisciplinary Rounds: did not attend    General Information Comments: Unable to speak w/ pt this AM, working w/ PT. Per RN documentation, pt tolerating diet w/ % PO intake. Unsure of PO intake PTA, chart review reveals pt weighed 230# x 3 years ago. RD unable to assess for malnutrition - will monitor energy intake & weight changes.  Nutrition Discharge Planning: Adequate PO intake    Nutrition/Diet History    Spiritual, Cultural Beliefs, Sikh Practices, Values that Affect Care: no  Factors Affecting Nutritional Intake: None identified at this time    Anthropometrics    Temp: 97.1 °F (36.2 °C)  Height Method: Stated  Height: 5' 11" (180.3 cm)  Height (inches): 71 in  Weight Method: Bed Scale  Weight: 93.4 kg (205 lb 14.6 oz)  Weight (lb): 205.91 lb  Ideal Body Weight (IBW), Male: 172 lb  % Ideal Body Weight, Male (lb): 119.72 %  BMI (Calculated): 28.7  BMI Grade: 25 - 29.9 - overweight    Lab/Procedures/Meds    Pertinent Labs Reviewed: reviewed  Pertinent Labs Comments: Na 134  Pertinent Medications Reviewed: reviewed  Pertinent Medications Comments: Nipride    Estimated/Assessed Needs    Weight Used For Calorie Calculations: 93.4 kg (205 lb 14.6 oz)     Energy Calorie Requirements (kcal): 2151 kcal/d  Energy Need Method: Lewis-St Jeor(1.25 PAL)     Protein Requirements:  g/d (1-1.2 g/kg)  Weight Used For Protein Calculations: 93.4 kg (205 lb 14.6 oz)     Estimated Fluid Requirement Method: other (see comments)(Per MD or 1 " mL/kcal)    Nutrition Prescription Ordered    Current Diet Order: Cardiac    Evaluation of Received Nutrient/Fluid Intake    Comments: LBM: 6/29    Tolerance: tolerating    Nutrition Risk    Level of Risk/Frequency of Follow-up: (1x/week)     Assessment and Plan    No nutritional dx at this time.     Monitor and Evaluation    Food and Nutrient Intake: energy intake, food and beverage intake  Food and Nutrient Adminstration: diet order  Physical Activity and Function: nutrition-related ADLs and IADLs  Anthropometric Measurements: weight, weight change  Biochemical Data, Medical Tests and Procedures: inflammatory profile, lipid profile, glucose/endocrine profile, gastrointestinal profile, electrolyte and renal panel  Nutrition-Focused Physical Findings: overall appearance     Nutrition Follow-Up    RD Follow-up?: Yes

## 2020-07-02 NOTE — PT/OT/SLP EVAL
"Occupational Therapy  Co- Evaluation    Name: Blaine Multani  MRN: [id number]  Admitting Diagnosis:  NSTEMI (non-ST elevated myocardial infarction) 2 Days Post-Op  Pt had Impella L UE which is now removed.   Recommendations:     Discharge Recommendations:    Home with family     Assessment:     Blaine Multani is a 69 y.o. male with a medical diagnosis of NSTEMI (non-ST elevated myocardial infarction). Pt with impaired functional mobility/ADL skills, decreased functional strength/ROM related to pain. Pt tolerated eval well with good effort and OT anticipates pt will progress well and be ready for d/c home with fly support when medically stable.   Rehab Prognosis: Good; patient would benefit from acute skilled OT services to address these deficits and reach maximum level of function.       Plan:     Patient to be seen 3 x/week to address the above listed problems via self-care/home management, therapeutic activities, therapeutic exercises  · Plan of Care Expires:    · Plan of Care Reviewed with:      Subjective     "I'm tired" pt reports.     Occupational Profile:  Pt reports he lives with spouse in one story house with 6 PRADIP with B HR. Pt reports he was independent and has no DME/AE    Pain/Comfort:  · Pain Rating 1: 6/10  · Location - Side 1: Left  · Location 1: shoulder  · Pain Addressed 1: Reposition, Distraction, Nurse notified  No pain at rest. Pt left UE with AROM shoulder only.   Patients cultural, spiritual, Hoahaoism conflicts given the current situation: no    Objective:     Communicated with: nsg prior to session.  Pt found supine in bed. [place] in place.     General Precautions: Standard,   fall, [place]  Occupational Performance:    Bed Mobility:    Supine>sit with SBA    Functional Mobility/Transfers:  · Sit>stand with SBA  · Stand pivot with CGA    Activities of Daily Living:  Feeding: independent  G/H: simulated with set-up seated ; but pt declined activity.    Cognitive/Visual Perceptual:  Pt " awake, alert and following commands. Pt with flat affect but remained cooperative.     Physical Exam:  Pt is right UE dominant. Pt demo WFL R UE strength/ROM. Pt demo WFL  L UE elbow/hand strength. Pt demo 2-/5 left shoulder strength limited by pain especially with shoulder extension. However, pt with full ROM left shoulder with all PROM.  Bruising noted throughout left axilla and anterior shoulder.  Pt with intact light touch sensation and denies numbness/tingling B UE's.     AMPAC 6 Click ADL:  AMPAC Total Score: 13    Treatment & Education:  Pt demo Fair+ sitting balance. Pt without dizziness or LOB. Pt t/f to chair with CGA.  Education provided re: adequate positioning, ROM and support needed to L UE to assist with pain annmarie't and functional ROM. Pt verb and demo understanding.  Education provided re: OT POC and safety with functional mobility/ADL skills.   Patient left up in chair with all lines intact, call button in reach and nsg notified    GOALS:   Multidisciplinary Problems     Occupational Therapy Goals        Problem: Occupational Therapy Goal    Goal Priority Disciplines Outcome Interventions   Occupational Therapy Goal     OT, PT/OT Ongoing, Progressing    Description: Goals to be met by: 7 days 7/9/2020     Patient will increase functional independence with ADLs by performing:  Pt to complete UE dressing with set-up  Pt to complete LE dressing with supervision.  Pt to complete standing g/h skills with supervision  Pt to complete toileting with supervision.  Pt to complete basic t/f skills including bed, chair and commode with supervision.                    History:     Past Medical History:   Diagnosis Date    A-fib     Asthma     Bronchitis     Colon polyps     Hemorrhoids     HLD (hyperlipidemia)     Hypertension     PVD (peripheral vascular disease)        Past Surgical History:   Procedure Laterality Date    angiogram with stents Left     leg    ARTERIOGRAPHY OF SUBCLAVIAN ARTERY   6/26/2020    Procedure: Arteriogram, Subclavian;  Surgeon: Bruno Cruz MD;  Location: [facility] CATH LAB;  Service: Cardiology;;    CHOLECYSTECTOMY      COLONOSCOPY W/ POLYPECTOMY      CORONARY ARTERY BYPASS GRAFT  1998    x3    INSERTION OF INTRAVASCULAR MICROAXIAL BLOOD PUMP N/A 6/26/2020    Procedure: INSERTION, IMPELLA;  Surgeon: Bruno Cruz MD;  Location: [facility] CATH LAB;  Service: Cardiology;  Laterality: N/A;    LEFT HEART CATHETERIZATION Left 6/26/2020    Procedure: Left heart cath;  Surgeon: Bruno Cruz MD;  Location: [facility] CATH LAB;  Service: Cardiology;  Laterality: Left;    PLACEMENT OF SWAN DONTRELL CATHETER WITH IMAGING GUIDANCE  6/26/2020    Procedure: INSERTION, CATHETER, SWAN-DONTRELL, WITH IMAGING GUIDANCE;  Surgeon: Bruno Cruz MD;  Location: [facility] CATH LAB;  Service: Cardiology;;    PLACEMENT OF SWAN DONTRELL CATHETER WITH IMAGING GUIDANCE  6/30/2020    Procedure: INSERTION, CATHETER, SWAN-DONTRELL, WITH IMAGING GUIDANCE;  Surgeon: Bruno Cruz MD;  Location: [facility] CATH LAB;  Service: Cardiology;;    RIGHT HEART CATHETERIZATION Right 6/26/2020    Procedure: INSERTION, CATHETER, RIGHT HEART;  Surgeon: Bruno Cruz MD;  Location: [facility] CATH LAB;  Service: Cardiology;  Laterality: Right;    TOTAL HIP ARTHROPLASTY Right        Time Tracking:     OT Date of Treatment: 07/02/20  OT Start Time: 0911  OT Stop Time: 0930  OT Total Time (min): 19 min    Billable Minutes:Evaluation 9  Therapeutic Activity 10    MELVIN Gradner  7/2/2020

## 2020-07-02 NOTE — SUBJECTIVE & OBJECTIVE
Interval History: Patient denies angina or shortness of breath. Reports soreness at right IJV [place] site. Denies any left arm weakness or loss of sensation.    Objective:     Vital Signs (Most Recent):  Temp: 98 °F (36.7 °C) (07/01/20 1900)  Pulse: 83 (07/01/20 2200)  Resp: 19 (07/01/20 2200)  BP: 114/66 (07/01/20 2200)  SpO2: 96 % (07/01/20 2200) Vital Signs (24h Range):  Temp:  [98 °F (36.7 °C)-99.2 °F (37.3 °C)] 98 °F (36.7 °C)  Pulse:  [77-91] 83  Resp:  [13-29] 19  SpO2:  [91 %-98 %] 96 %  BP: (102-142)/(55-79) 114/66     Weight: 93.4 kg (205 lb 14.6 oz)  Body mass index is 28.72 kg/m².    SpO2: 96 %  O2 Device (Oxygen Therapy): room air      Intake/Output Summary (Last 24 hours) at 7/1/2020 2254  Last data filed at 7/1/2020 2200  Gross per 24 hour   Intake 1028.25 ml   Output 1850 ml   Net -821.75 ml       Lines/Drains/Airways     Central Venous Catheter Line            Pulmonary Artery Catheter Assessment  06/30/20 1329 right internal jugular 1 day          Drain                 Urethral Catheter 06/26/20 1805 16 Fr. 5 days                Physical Exam   Constitutional: He is oriented to person, place, and time. He appears well-developed and well-nourished.   HENT:   Head: Normocephalic.   Eyes: No scleral icterus.   Neck:   Right IJV [place] catheter   Cardiovascular: Normal rate. An irregularly irregular rhythm present.   Pulses:       Carotid pulses are 2+ on the right side and 2+ on the left side.       Radial pulses are 2+ on the right side and 2+ on the left side.        Femoral pulses are 1+ on the right side and 1+ on the left side.       Dorsalis pedis pulses are 0 on the right side and 0 on the left side.        Posterior tibial pulses are 0 on the right side and 0 on the left side.   Left radial pulse 1+  Left dp and pt present by doppler  Right dp present by doppler  Right pt absent by doppler   Pulmonary/Chest: Effort normal and breath sounds normal. He has no rales.   Abdominal: Soft.  Bowel sounds are normal. He exhibits no distension. There is no abdominal tenderness. There is no rebound and no guarding.   Genitourinary:    Genitourinary Comments: Alberto in penis     Musculoskeletal:         General: No edema.   Neurological: He is alert and oriented to person, place, and time.   Skin: Skin is warm and dry.   3cm area of faint ecchymosis over left axillary artery access site; no hematoma   Psychiatric: He has a normal mood and affect.

## 2020-07-02 NOTE — PLAN OF CARE
CMICU DAILY GOALS   No acute events throughout day, VS and assessment per flow sheet. Pt tolerated standing and pivot from chair to bed, medicated for insomnia, rate controlled afib, BP remained within normal limits, SBP of 140 x 2 through night, SVO2 of 48. patient progressing towards goals as tolerated, plan of care reviewed with Blaine Multani and family, all concerns addressed, will continue to monitor.     A: Awake    RASS: Goal -    Actual - RASS (Sánchez Agitation-Sedation Scale): 0-->alert and calm   Restraint necessity:    B: Breath   SBT: Not intubated   C: Coordinate A & B, analgesics/sedatives   Pain: managed    SAT: Not intubated  D: Delirium   CAM-ICU: Overall CAM-ICU: Negative  E: Early Mobility   MOVE Screen: Pass   Activity: Activity Management: standing - L3, up in chair - L3  FAS: Feeding/Nutrition   Diet order: Diet/Nutrition Received: low saturated fat/low cholesterol, 2 gram sodium, restrict fluids,   Fluid restriction:    T: Thrombus   DVT prophylaxis: VTE Required Core Measure: Pharmacological prophylaxis initiated/maintained  H: HOB Elevation   Head of Bed (HOB): HOB at 20-30 degrees  U: Ulcer Prophylaxis   GI: yes  G: Glucose control   managed    S: Skin   Bundle compliance: yes   Bathing/Skin Care: bath, chlorhexidine, back care, bath, complete, dressed/undressed, linen changed Date:   B: Bowel Function   no issues   I: Indwelling Catheters   Alberto necessity:      Urethral Catheter 06/26/20 1805 16 Fr.-Reason for Continuing Urinary Catheterization: Critically ill in ICU requiring intensive monitoring   CVC necessity: Yes   IPAD offered: No  D: De-escalation Antibx   Yes  Plan for the day   Remove [place] catheter, manage pain, discontinue nipride  Family/Goals of care/Code Status   Code Status: Full Code

## 2020-07-02 NOTE — ASSESSMENT & PLAN NOTE
Patient is euvolemic on exam  -continue to wean Nipride and titrate up hydralazine and isosorbide dnitrate  -once Nipride has been titrated off and  hydralazine and isosorbide dnitrate are at maximal doses, then consider starting low dose Coreg  -Rec LifeVest

## 2020-07-02 NOTE — ASSESSMENT & PLAN NOTE
Hb downtrending since admission (11 > 7.5), macrocytic anemia on admission  Methylmalonic acid, homocysteine, and FOBT ordered

## 2020-07-02 NOTE — PROGRESS NOTES
[facility]  Interventional Cardiology  Progress Note    Patient Name: Blaine Multani  MRN: [id number]  Admission Date: 6/25/2020  Hospital Length of Stay: 6 days  Code Status: Full Code   Attending Physician: London Loera III, MD   Primary Care Physician: Nay Alcantar MD  Principal Problem:NSTEMI (non-ST elevated myocardial infarction)    Subjective:     Interval History: Patient denies angina or shortness of breath. Reports soreness at right IJV [place] site. Denies any left arm weakness or loss of sensation.    Objective:     Vital Signs (Most Recent):  Temp: 98 °F (36.7 °C) (07/01/20 1900)  Pulse: 83 (07/01/20 2200)  Resp: 19 (07/01/20 2200)  BP: 114/66 (07/01/20 2200)  SpO2: 96 % (07/01/20 2200) Vital Signs (24h Range):  Temp:  [98 °F (36.7 °C)-99.2 °F (37.3 °C)] 98 °F (36.7 °C)  Pulse:  [77-91] 83  Resp:  [13-29] 19  SpO2:  [91 %-98 %] 96 %  BP: (102-142)/(55-79) 114/66     Weight: 93.4 kg (205 lb 14.6 oz)  Body mass index is 28.72 kg/m².    SpO2: 96 %  O2 Device (Oxygen Therapy): room air      Intake/Output Summary (Last 24 hours) at 7/1/2020 2254  Last data filed at 7/1/2020 2200  Gross per 24 hour   Intake 1028.25 ml   Output 1850 ml   Net -821.75 ml       Lines/Drains/Airways     Central Venous Catheter Line            Pulmonary Artery Catheter Assessment  06/30/20 1329 right internal jugular 1 day          Drain                 Urethral Catheter 06/26/20 1805 16 Fr. 5 days                Physical Exam   Constitutional: He is oriented to person, place, and time. He appears well-developed and well-nourished.   HENT:   Head: Normocephalic.   Eyes: No scleral icterus.   Neck:   Right IJV [place] catheter   Cardiovascular: Normal rate. An irregularly irregular rhythm present.   Pulses:       Carotid pulses are 2+ on the right side and 2+ on the left side.       Radial pulses are 2+ on the right side and 2+ on the left side.        Femoral pulses are 1+ on the right side and 1+  on the left side.       Dorsalis pedis pulses are 0 on the right side and 0 on the left side.        Posterior tibial pulses are 0 on the right side and 0 on the left side.   Left radial pulse 1+  Left dp and pt present by doppler  Right dp present by doppler  Right pt absent by doppler   Pulmonary/Chest: Effort normal and breath sounds normal. He has no rales.   Abdominal: Soft. Bowel sounds are normal. He exhibits no distension. There is no abdominal tenderness. There is no rebound and no guarding.   Genitourinary:    Genitourinary Comments: Alberto in penis     Musculoskeletal:         General: No edema.; Bilateral UE and hand strength 5/5   Neurological: He is alert and oriented to person, place, and time.   Skin: Skin is warm and dry.   3cm area of faint ecchymosis over left axillary artery access site; no hematoma   Psychiatric: He has a normal mood and affect.         Assessment and Plan:     Patient is a 69 y.o. male presenting with:    * NSTEMI (non-ST elevated myocardial infarction)  Patient is s/p multi-vessel PCI including unprotected LM (DK Crush), proximal and mid LAD, and distal and prox to mid LCx.  -continue EC ASA 81mg po qday  -continue Plavix 75mg po qday X 1year minimum  -rec cardiac rehab consult  -rec PT consult      Cardiomyopathy, ischemic  Patient is euvolemic on exam  -continue to wean Nipride and titrate up hydralazine and isosorbide dnitrate  -once Nipride has been titrated off and  hydralazine and isosorbide dnitrate are at maximal doses, then consider starting low dose Coreg  -Rec LifeVest    PAD (peripheral artery disease)  Patient has severe PAD including the LE extremities and PAD of the upper extremities also  Impella was placed via left axillary artery due to bilateral ikaic artery stenoses as well as severe bilateral infrainguinal PAD  -continue statin    Paroxysmal atrial fibrillation  Adequate rate control at present  -hold anti-coagulation for now given recent large bore  access    Dental abscess  ID consult appreciated    Dyslipidemia  -continue high intensity statin as patient presented with an NSTEMI    Acute blood loss anemia  Patient had macrocytic anemia on admission; however on 6/27/20 he bled from a peripheral IV site after the IV accidentally was pulled out; he sufficiently bled from his IV site to require a transfusion  -Follow Hct  -Rec checking folate and MMA      HTN (hypertension)  See ICM above  Renal artery duplex pending      All of the patient's and his brother's questions were answered.

## 2020-07-02 NOTE — SUBJECTIVE & OBJECTIVE
Interval History: NAEON. Afebrile, VSS and WNL. UOP 2.2L, net -1.2L.     Current gtts:  - nitroprusside 0.3      Review of Systems   Constitution: Negative for chills, diaphoresis, fever, weight gain and weight loss.   HENT: Negative for congestion and sore throat.    Eyes: Negative for visual disturbance.   Cardiovascular: Negative for chest pain, dyspnea on exertion, leg swelling, orthopnea, palpitations, paroxysmal nocturnal dyspnea and syncope.   Respiratory: Negative for cough, shortness of breath and wheezing.    Skin: Negative for rash.   Musculoskeletal: Positive for joint pain (shoulder, arm arthralgias). Negative for myalgias.   Gastrointestinal: Negative for abdominal pain, constipation, diarrhea, melena, nausea and vomiting.   Genitourinary: Negative for dysuria, frequency and hematuria.   Neurological: Negative for dizziness, headaches, light-headedness and numbness.   Psychiatric/Behavioral: Negative for altered mental status.     Objective:     Vital Signs (Most Recent):  Temp: 97.8 °F (36.6 °C) (07/02/20 0300)  Pulse: 71 (07/02/20 0600)  Resp: 18 (07/02/20 0600)  BP: 108/61 (07/02/20 0606)  SpO2: 98 % (07/02/20 0600) Vital Signs (24h Range):  Temp:  [97.8 °F (36.6 °C)-99.2 °F (37.3 °C)] 97.8 °F (36.6 °C)  Pulse:  [71-89] 71  Resp:  [13-29] 18  SpO2:  [91 %-98 %] 98 %  BP: (102-149)/(55-79) 108/61     Weight: 93.4 kg (205 lb 14.6 oz)  Body mass index is 28.72 kg/m².     SpO2: 98 %  O2 Device (Oxygen Therapy): room air      Intake/Output Summary (Last 24 hours) at 7/2/2020 0708  Last data filed at 7/2/2020 0600  Gross per 24 hour   Intake 944.25 ml   Output 2185 ml   Net -1240.75 ml       Lines/Drains/Airways     Central Venous Catheter Line            Pulmonary Artery Catheter Assessment  06/30/20 1329 right internal jugular 1 day    Introducer 07/01/20 1900 right internal jugular less than 1 day          Drain                 Urethral Catheter 06/26/20 1805 16 Fr. 5 days                Physical Exam    Constitutional: He is oriented to person, place, and time. He appears well-developed and well-nourished. No distress.   HENT:   Head: Normocephalic and atraumatic.   Mouth/Throat: Oropharynx is clear and moist. No oropharyngeal exudate.   Eyes: Pupils are equal, round, and reactive to light. Conjunctivae are normal. No scleral icterus.   Neck: Neck supple. No JVD present. No tracheal deviation present.   R IJ [place] in place, no erythema/tenderness   Cardiovascular: Normal rate, regular rhythm, normal heart sounds and intact distal pulses.   No murmur heard.  Pulses:  - L PT present by doppler  - L DP present by doppler  - R PT absent by doppler  - R DP present by doppler   Pulmonary/Chest: Effort normal and breath sounds normal. No respiratory distress. He has no wheezes. He has no rales.   R Swanz in place, no erythema/tenderness   Abdominal: Soft. Bowel sounds are normal. He exhibits no distension. There is no abdominal tenderness. There is no rebound.   Musculoskeletal:         General: No edema.   Neurological: He is alert and oriented to person, place, and time. He exhibits normal muscle tone.   Skin: Skin is warm and dry. No rash noted. He is not diaphoretic. There is erythema (stasis dermatitis in BLE).   Ecchymosis of L shoulder, no hematoma   Psychiatric: He has a normal mood and affect. His behavior is normal.       Significant Labs: All pertinent lab results from the last 24 hours have been reviewed.    Significant Imaging: Reviewed.

## 2020-07-02 NOTE — ASSESSMENT & PLAN NOTE
Patient had macrocytic anemia on admission; however on 6/27/20 he bled from a peripheral IV site after the IV accidentally was pulled out; he sufficiently bled from his IV site to require a transfusion  -Follow Hct  -Rec checking folate and MMA

## 2020-07-03 PROBLEM — I50.42 CHRONIC COMBINED SYSTOLIC AND DIASTOLIC CONGESTIVE HEART FAILURE: Status: ACTIVE | Noted: 2020-06-28

## 2020-07-03 PROBLEM — I50.42 CHRONIC COMBINED SYSTOLIC AND DIASTOLIC HEART FAILURE: Status: RESOLVED | Noted: 2020-06-30 | Resolved: 2020-07-03

## 2020-07-03 LAB
ALBUMIN SERPL BCP-MCNC: 2.7 G/DL (ref 3.5–5.2)
ALP SERPL-CCNC: 80 U/L (ref 55–135)
ALT SERPL W/O P-5'-P-CCNC: 21 U/L (ref 10–44)
ANION GAP SERPL CALC-SCNC: 10 MMOL/L (ref 8–16)
AST SERPL-CCNC: 31 U/L (ref 10–40)
BASOPHILS # BLD AUTO: 0.03 K/UL (ref 0–0.2)
BASOPHILS NFR BLD: 0.5 % (ref 0–1.9)
BILIRUB SERPL-MCNC: 0.9 MG/DL (ref 0.1–1)
BILIRUB SERPL-MCNC: 0.9 MG/DL (ref 0.1–1)
BUN SERPL-MCNC: 19 MG/DL (ref 8–23)
CALCIUM SERPL-MCNC: 8.3 MG/DL (ref 8.7–10.5)
CHLORIDE SERPL-SCNC: 103 MMOL/L (ref 95–110)
CO2 SERPL-SCNC: 23 MMOL/L (ref 23–29)
CREAT SERPL-MCNC: 1 MG/DL (ref 0.5–1.4)
CYANIDE BLD-MCNC: <10 UG/DL
CYANIDE BLD-MCNC: <10 UG/DL
DIFFERENTIAL METHOD: ABNORMAL
EOSINOPHIL # BLD AUTO: 0.3 K/UL (ref 0–0.5)
EOSINOPHIL NFR BLD: 4.7 % (ref 0–8)
ERYTHROCYTE [DISTWIDTH] IN BLOOD BY AUTOMATED COUNT: 15.3 % (ref 11.5–14.5)
EST. GFR  (AFRICAN AMERICAN): >60 ML/MIN/1.73 M^2
EST. GFR  (NON AFRICAN AMERICAN): >60 ML/MIN/1.73 M^2
GLUCOSE SERPL-MCNC: 99 MG/DL (ref 70–110)
HCT VFR BLD AUTO: 25.2 % (ref 40–54)
HGB BLD-MCNC: 8.1 G/DL (ref 14–18)
IMM GRANULOCYTES # BLD AUTO: 0.05 K/UL (ref 0–0.04)
IMM GRANULOCYTES NFR BLD AUTO: 0.8 % (ref 0–0.5)
INR PPP: 1.1 (ref 0.8–1.2)
LDH SERPL L TO P-CCNC: 362 U/L (ref 110–260)
LYMPHOCYTES # BLD AUTO: 0.8 K/UL (ref 1–4.8)
LYMPHOCYTES NFR BLD: 13.2 % (ref 18–48)
MAGNESIUM SERPL-MCNC: 2.1 MG/DL (ref 1.6–2.6)
MCH RBC QN AUTO: 33.5 PG (ref 27–31)
MCHC RBC AUTO-ENTMCNC: 32.1 G/DL (ref 32–36)
MCV RBC AUTO: 104 FL (ref 82–98)
MONOCYTES # BLD AUTO: 0.7 K/UL (ref 0.3–1)
MONOCYTES NFR BLD: 11.8 % (ref 4–15)
NEUTROPHILS # BLD AUTO: 4.3 K/UL (ref 1.8–7.7)
NEUTROPHILS NFR BLD: 69 % (ref 38–73)
NRBC BLD-RTO: 0 /100 WBC
PHOSPHATE SERPL-MCNC: 3.6 MG/DL (ref 2.7–4.5)
PLATELET # BLD AUTO: 124 K/UL (ref 150–350)
PLATELET FUNCTION ASSAY - EPINEPHRINE: 70 SECS (ref 76–199)
PMV BLD AUTO: 11.3 FL (ref 9.2–12.9)
POTASSIUM SERPL-SCNC: 3.6 MMOL/L (ref 3.5–5.1)
PROT SERPL-MCNC: 5.8 G/DL (ref 6–8.4)
PROTHROMBIN TIME: 11 SEC (ref 9–12.5)
RBC # BLD AUTO: 2.42 M/UL (ref 4.6–6.2)
SODIUM SERPL-SCNC: 136 MMOL/L (ref 136–145)
WBC # BLD AUTO: 6.21 K/UL (ref 3.9–12.7)

## 2020-07-03 PROCEDURE — 25000003 PHARM REV CODE 250: Performed by: INTERNAL MEDICINE

## 2020-07-03 PROCEDURE — 80053 COMPREHEN METABOLIC PANEL: CPT

## 2020-07-03 PROCEDURE — 85576 BLOOD PLATELET AGGREGATION: CPT

## 2020-07-03 PROCEDURE — 63700000 PHARM REV CODE 250 ALT 637 W/O HCPCS: Performed by: INTERNAL MEDICINE

## 2020-07-03 PROCEDURE — 25000003 PHARM REV CODE 250: Performed by: STUDENT IN AN ORGANIZED HEALTH CARE EDUCATION/TRAINING PROGRAM

## 2020-07-03 PROCEDURE — 20600001 HC STEP DOWN PRIVATE ROOM

## 2020-07-03 PROCEDURE — 99231 PR SUBSEQUENT HOSPITAL CARE,LEVL I: ICD-10-PCS | Mod: GC,,, | Performed by: INTERNAL MEDICINE

## 2020-07-03 PROCEDURE — 83735 ASSAY OF MAGNESIUM: CPT

## 2020-07-03 PROCEDURE — 85610 PROTHROMBIN TIME: CPT

## 2020-07-03 PROCEDURE — 84100 ASSAY OF PHOSPHORUS: CPT

## 2020-07-03 PROCEDURE — 85025 COMPLETE CBC W/AUTO DIFF WBC: CPT

## 2020-07-03 PROCEDURE — 82600 ASSAY OF CYANIDE: CPT

## 2020-07-03 PROCEDURE — 94640 AIRWAY INHALATION TREATMENT: CPT

## 2020-07-03 PROCEDURE — 94761 N-INVAS EAR/PLS OXIMETRY MLT: CPT

## 2020-07-03 PROCEDURE — 83615 LACTATE (LD) (LDH) ENZYME: CPT

## 2020-07-03 PROCEDURE — 99231 SBSQ HOSP IP/OBS SF/LOW 25: CPT | Mod: GC,,, | Performed by: INTERNAL MEDICINE

## 2020-07-03 RX ORDER — CARVEDILOL 6.25 MG/1
6.25 TABLET ORAL ONCE
Status: COMPLETED | OUTPATIENT
Start: 2020-07-03 | End: 2020-07-03

## 2020-07-03 RX ORDER — CARVEDILOL 12.5 MG/1
12.5 TABLET ORAL 2 TIMES DAILY
Status: DISCONTINUED | OUTPATIENT
Start: 2020-07-03 | End: 2020-07-05 | Stop reason: HOSPADM

## 2020-07-03 RX ORDER — SPIRONOLACTONE 25 MG/1
25 TABLET ORAL DAILY
Status: DISCONTINUED | OUTPATIENT
Start: 2020-07-03 | End: 2020-07-05 | Stop reason: HOSPADM

## 2020-07-03 RX ORDER — HYDRALAZINE HYDROCHLORIDE 50 MG/1
50 TABLET, FILM COATED ORAL ONCE
Status: COMPLETED | OUTPATIENT
Start: 2020-07-03 | End: 2020-07-03

## 2020-07-03 RX ADMIN — POTASSIUM CHLORIDE 40 MEQ: 20 SOLUTION ORAL at 06:07

## 2020-07-03 RX ADMIN — HYDRALAZINE HYDROCHLORIDE 50 MG: 50 TABLET, FILM COATED ORAL at 06:07

## 2020-07-03 RX ADMIN — ESCITALOPRAM OXALATE 10 MG: 5 TABLET, FILM COATED ORAL at 08:07

## 2020-07-03 RX ADMIN — CARVEDILOL 6.25 MG: 6.25 TABLET, FILM COATED ORAL at 08:07

## 2020-07-03 RX ADMIN — FUROSEMIDE 40 MG: 40 TABLET ORAL at 08:07

## 2020-07-03 RX ADMIN — HYDRALAZINE HYDROCHLORIDE 100 MG: 50 TABLET, FILM COATED ORAL at 02:07

## 2020-07-03 RX ADMIN — ATORVASTATIN CALCIUM 80 MG: 20 TABLET, FILM COATED ORAL at 08:07

## 2020-07-03 RX ADMIN — PANTOPRAZOLE SODIUM 40 MG: 40 TABLET, DELAYED RELEASE ORAL at 08:07

## 2020-07-03 RX ADMIN — CLOPIDOGREL BISULFATE 75 MG: 75 TABLET ORAL at 08:07

## 2020-07-03 RX ADMIN — CARVEDILOL 12.5 MG: 12.5 TABLET, FILM COATED ORAL at 08:07

## 2020-07-03 RX ADMIN — ISOSORBIDE DINITRATE 40 MG: 40 TABLET ORAL at 08:07

## 2020-07-03 RX ADMIN — ACETAMINOPHEN 650 MG: 325 TABLET ORAL at 08:07

## 2020-07-03 RX ADMIN — HYDRALAZINE HYDROCHLORIDE 100 MG: 50 TABLET, FILM COATED ORAL at 08:07

## 2020-07-03 RX ADMIN — SPIRONOLACTONE 25 MG: 25 TABLET ORAL at 11:07

## 2020-07-03 RX ADMIN — CARVEDILOL 6.25 MG: 6.25 TABLET, FILM COATED ORAL at 11:07

## 2020-07-03 RX ADMIN — FLUTICASONE FUROATE AND VILANTEROL TRIFENATATE 1 PUFF: 100; 25 POWDER RESPIRATORY (INHALATION) at 08:07

## 2020-07-03 RX ADMIN — OXYCODONE HYDROCHLORIDE 5 MG: 5 TABLET ORAL at 04:07

## 2020-07-03 RX ADMIN — Medication 6 MG: at 10:07

## 2020-07-03 RX ADMIN — ASPIRIN 81 MG CHEWABLE TABLET 81 MG: 81 TABLET CHEWABLE at 08:07

## 2020-07-03 RX ADMIN — ISOSORBIDE DINITRATE 40 MG: 40 TABLET ORAL at 02:07

## 2020-07-03 NOTE — ASSESSMENT & PLAN NOTE
Hb downtrending since admission (11 > 7.5), macrocytic anemia on admission  Methylmalonic acid, homocysteine, and FOBT ordered  Hb improving (7.5 > 8.1)

## 2020-07-03 NOTE — ASSESSMENT & PLAN NOTE
Right upper molars with poor dentition and likely dental caries. No evidence of abscess   S/p ABx x 7 days (unasyn > CTX)

## 2020-07-03 NOTE — PROGRESS NOTES
[facility]  Cardiology  Progress Note    Patient Name: Blaine Multani  MRN: [id number]  Admission Date: 6/25/2020  Hospital Length of Stay: 8 days  Code Status: Full Code   Attending Physician: London Loera III, MD   Primary Care Physician: Nay Alcantar MD  Expected Discharge Date: 7/7/2020  Principal Problem:NSTEMI (non-ST elevated myocardial infarction)    Subjective:     Hospital Course:   NSTEMI and underwent LHC at OSH but was found to have dominant L circulation, 80% stenoses of the LM, pLAD, and pLCX. His RCA was totally occluded, as well as all his grafts. Transferred here for higher level of care. Repeat LHC on 6/26: High-degree stenosis of the distal LM, proximal LAD and LCx which received PCI. Impella placed. Post procedure HD consistent with cardiogenic shock and a high systemic vascular resistance. He was started afterload reduction with nitroprusside. Impella settings slowly weaned while tirating nitroprusside gtt. On 6/30, impella removal. Patient tolerated well. Heparin stopped. Will transition from nitroprusside to hydralazine. holding  ace/arb as patient with high MAP and low CO concerning for renal artery stenosis. US renal artery obtained, but study was inconclusive.    Interval History: NAEON. Weaned off nitroprusside gtt yesterday. Swanz removed this morning, cordis in place. Afebrile, VSS and WNL. MAPs 80-90s since discontinue nitroprusside gtt.    Review of Systems   Constitution: Negative for chills, diaphoresis, fever, weight gain and weight loss.   HENT: Negative for congestion and sore throat.    Eyes: Negative for visual disturbance.   Cardiovascular: Negative for chest pain, dyspnea on exertion, leg swelling, orthopnea, palpitations, paroxysmal nocturnal dyspnea and syncope.   Respiratory: Negative for cough, shortness of breath and wheezing.    Skin: Negative for rash.   Musculoskeletal: Positive for joint pain (shoulder, arm arthralgias). Negative for  myalgias.   Gastrointestinal: Negative for abdominal pain, constipation, diarrhea, melena, nausea and vomiting.   Genitourinary: Negative for dysuria, frequency and hematuria.   Neurological: Negative for dizziness, headaches, light-headedness and numbness.   Psychiatric/Behavioral: Negative for altered mental status.     Objective:     Vital Signs (Most Recent):  Temp: 98.3 °F (36.8 °C) (07/03/20 0701)  Pulse: 87 (07/03/20 0701)  Resp: (!) 24 (07/03/20 0701)  BP: (!) 149/95 (07/03/20 0701)  SpO2: 98 % (07/03/20 0701) Vital Signs (24h Range):  Temp:  [97.1 °F (36.2 °C)-98.8 °F (37.1 °C)] 98.3 °F (36.8 °C)  Pulse:  [68-87] 87  Resp:  [15-38] 24  SpO2:  [94 %-100 %] 98 %  BP: ()/(53-95) 149/95     Weight: 93.4 kg (205 lb 14.6 oz)  Body mass index is 28.72 kg/m².     SpO2: 98 %  O2 Device (Oxygen Therapy): room air      Intake/Output Summary (Last 24 hours) at 7/3/2020 0809  Last data filed at 7/3/2020 0701  Gross per 24 hour   Intake 824.6 ml   Output 826 ml   Net -1.4 ml       Lines/Drains/Airways     Central Venous Catheter Line            Pulmonary Artery Catheter Assessment  06/30/20 1329 right internal jugular 2 days    Introducer 07/01/20 1900 right internal jugular 1 day                Physical Exam   Constitutional: He is oriented to person, place, and time. He appears well-developed and well-nourished. No distress.   HENT:   Head: Normocephalic and atraumatic.   Mouth/Throat: Oropharynx is clear and moist. No oropharyngeal exudate.   Eyes: Pupils are equal, round, and reactive to light. Conjunctivae are normal. No scleral icterus.   Neck: Neck supple. No JVD present. No tracheal deviation present.   R IJ Swanz in place, no erythema/tenderness   Cardiovascular: Normal rate, regular rhythm, normal heart sounds and intact distal pulses.   No murmur heard.  Pulses:  - L PT present by doppler  - L DP present by doppler  - R PT absent by doppler  - R DP present by doppler   Pulmonary/Chest: Effort normal and  breath sounds normal. No respiratory distress. He has no wheezes. He has no rales.   R IJ Cordis in place, no erythema/tenderness   Abdominal: Soft. Bowel sounds are normal. He exhibits no distension. There is no abdominal tenderness. There is no rebound.   Musculoskeletal:         General: No edema.   Neurological: He is alert and oriented to person, place, and time. He exhibits normal muscle tone.   Skin: Skin is warm and dry. No rash noted. He is not diaphoretic. There is erythema (stasis dermatitis in BLE).   Ecchymosis of L shoulder, no hematoma   Psychiatric: He has a normal mood and affect. His behavior is normal.       Significant Labs: All pertinent lab results from the last 24 hours have been reviewed.    Significant Imaging: Reviewed.    Assessment and Plan:     * NSTEMI (non-ST elevated myocardial infarction)  Cardiomyopathy, ischemic  Status post cardiac catheterization  69 y.o. male with history of CAD s/p CABG x3 (LIMA-LAD, SVG-OM, SVG-D in 1998), HFrEF, CKD III, afib on coumadin (last dose on Sunday), PAD s/p L fem-pop (occluded), BL SFA stenting, HTN, asthma, smoking, who presents as transfer for interventional cardiology evaluation of severe L main and three vessel disease / high risk PCI. NSTEMI and underwent LHC at OSH but was found to have dominant L circulation, 80% stenoses of the LM, pLAD, and pLCX. His RCA was totally occluded, as well as all his grafts. Transferred here for higher level of care. Repeat LHC on 6/26: High-degree stenosis of the distal LM, proximal LAD and LCx which received PCI. Impella placed. Post procedure HD consistent with cardiogenic shock and a high systemic vascular resistance. Impella was increased to P6 and he started afterload reduction with sodium nitroprusside. Heparin gtt discontinued. S/p Impella removal on 6/30. US renal artery inconclusive. Weaned off nitroprusside gtt on 7/2.      Plan:  - Continue hydralazine + isosorbide dintrate  - Remove Swanz/cordis,  step down to floor  - Distal pulse check q1hr  - Continue atorvastatin 80mg  - Continue coreg 6.25mg  - Continue ASA, plavix  - Control BP to a goal of <130/80    Macrocytic anemia  Hb downtrending since admission (11 > 7.5), macrocytic anemia on admission  Methylmalonic acid, homocysteine, and FOBT ordered  Hb improving (7.5 > 8.1)    Thrombocytopenia  Please see Paroxysmal atrial fibrillation    Acute blood loss anemia  Monitor with CBCs daily  Transfuse with goal Hb > 7    Dyslipidemia  Continue atorvastatin 80mg    Chronic combined systolic and diastolic congestive heart failure  Results for orders placed during the hospital encounter of 06/25/20   Echo Color Flow Doppler? Yes    Narrative · Eccentric left ventricular hypertrophy.  · Severely decreased left ventricular systolic function with global   hypokinesis, worst in the apical segments. The estimated ejection fraction   is 20%.  · Moderately reduced right ventricular systolic function.  · Left ventricular diastolic dysfunction.  · Mild aortic valve stenosis. Aortic valve area is 1.53 cm2; peak velocity   is 1.16 m/s; mean gradient is 4 mmHg.  · Mild mitral regurgitation.  · Elevated central venous pressure (15 mmHg).        Plan:  - Lasix 40mg daily   - Increase coreg to 12.5mg BID  - Initiate spironolactone 25mg  - Will initiate Entresto once clinically stable  - Will Lifevest upon discharge    Dental abscess  Right upper molars with poor dentition and likely dental caries. No evidence of abscess   S/p ABx x 7 days (unasyn > CTX)    Depression  Continue lexapro    Paroxysmal atrial fibrillation  [id number] of 3. Conferring a stroke risk of 3.2% per year  S/p impella removal. plts slowly dropping since admit. 172--> 78  Holding warfarin today, d/c'd heparin gtt  F/u LUCIO panel  Thrombocytopenia improving    HTN (hypertension)  Hold home meds  Optimize GDMT for HF    PAD (peripheral artery disease)  Continue ASA and atorvastatin      VTE Risk Mitigation  (From admission, onward)         Ordered     IP VTE LOW RISK PATIENT  Once      06/26/20 1114     Place TATA hose  Until discontinued      06/26/20 1114     Place sequential compression device  Until discontinued      06/26/20 0022                Erik Valentino MD  Cardiology  [facility]

## 2020-07-03 NOTE — NURSING TRANSFER
Nursing Transfer Note      7/3/2020     Transfer From: [facility]    Transfer via wheelchair    Transfer with cardiac monitoring, NS to cordis    Transported by RNs    Medicines sent: breo    Chart send with patient: Yes    Notified: family with patient    Patient reassessed at: arrival    Upon arrival to floor: cardiac monitor applied, patient oriented to room, call bell in reach and bed in lowest position

## 2020-07-03 NOTE — ASSESSMENT & PLAN NOTE
Results for orders placed during the hospital encounter of 06/25/20   Echo Color Flow Doppler? Yes    Narrative · Eccentric left ventricular hypertrophy.  · Severely decreased left ventricular systolic function with global   hypokinesis, worst in the apical segments. The estimated ejection fraction   is 20%.  · Moderately reduced right ventricular systolic function.  · Left ventricular diastolic dysfunction.  · Mild aortic valve stenosis. Aortic valve area is 1.53 cm2; peak velocity   is 1.16 m/s; mean gradient is 4 mmHg.  · Mild mitral regurgitation.  · Elevated central venous pressure (15 mmHg).        Plan:  - Lasix 40mg daily   - Increase coreg to 12.5mg BID  - Initiate spironolactone 25mg  - Will initiate Entresto once clinically stable  - Will Lifevest upon discharge

## 2020-07-03 NOTE — NURSING
At 1530 RNs Casie and Yaron removed IJ and reported applying pressure and bleeding to RN, RN assessed patient and found IJ bleeding, RN applied pressure for 10 minutes and dressed with new vasoline and gauze with tegaderm. Patient given pain medicine and RN frequently checking on site, no bleeding thus far. RN will continue to monitor

## 2020-07-03 NOTE — PLAN OF CARE
[facility] DAILY GOALS       A: Awake    RASS: Goal -    Actual - RASS (Sánchez Agitation-Sedation Scale): 0-->alert and calm   Restraint necessity:    B: Breath   SBT: Not intubated   C: Coordinate A & B, analgesics/sedatives   Pain: managed    SAT: Not intubated  D: Delirium   CAM-ICU: Overall CAM-ICU: Negative  E: Early Mobility   MOVE Screen: Pass   Activity: Activity Management: activity adjusted per tolerance, activity clustered for rest period  FAS: Feeding/Nutrition   Diet order: Diet/Nutrition Received: low saturated fat/low cholesterol, 2 gram sodium,   Fluid restriction:    T: Thrombus   DVT prophylaxis: VTE Required Core Measure: Pharmacological prophylaxis initiated/maintained  H: HOB Elevation   Head of Bed (HOB): HOB at 30-45 degrees  U: Ulcer Prophylaxis   GI: yes  G: Glucose control   No issues    S: Skin   Bundle compliance: yes   Bathing/Skin Care: bath, partial, bath, chlorhexidine, dressed/undressed, linen changed, back care Date: 07/03/20 at 3am  B: Bowel Function   no issues   I: Indwelling Catheters   Alberto necessity: [REMOVED]      Urethral Catheter 06/26/20 1805 16 Fr.-Reason for Continuing Urinary Catheterization: Critically ill in ICU requiring intensive monitoring   CVC necessity: Yes   IPAD offered: No  D: De-escalation Antibx   No  Plan for the day   For [place] removal by day team  Family/Goals of care/Code Status   Code Status: Full Code     No acute events throughout day, VS and assessment per flow sheet, patient progressing towards goals as tolerated, plan of care reviewed with Blaine Multani and family, all concerns addressed, will continue to monitor.

## 2020-07-03 NOTE — ASSESSMENT & PLAN NOTE
[id number] of 3. Conferring a stroke risk of 3.2% per year  S/p impella removal. plts slowly dropping since admit. 172--> 78  Holding warfarin today, d/c'd heparin gtt  F/u LUCIO panel  Thrombocytopenia improving

## 2020-07-03 NOTE — NURSING
PA pressure went high, giving abnormal numbers. Assessed the tubings for kinks and any closed stopcock and tried flushing it but unable to flush. Dr. Nolan was informed and made aware about it. MD said she will pull out the swan adrianna catheter later. Prior to pulling it SVO2 was taken as per order and the result is 60%. Blood was taken from the cordis. MD said ok to disconnect or turn off the swan machine.

## 2020-07-03 NOTE — SUBJECTIVE & OBJECTIVE
Interval History: NAEON. Weaned off nitroprusside gtt yesterday. Swanz removed this morning, cordis in place. Afebrile, VSS and WNL. MAPs 80-90s since discontinue nitroprusside gtt.    Review of Systems   Constitution: Negative for chills, diaphoresis, fever, weight gain and weight loss.   HENT: Negative for congestion and sore throat.    Eyes: Negative for visual disturbance.   Cardiovascular: Negative for chest pain, dyspnea on exertion, leg swelling, orthopnea, palpitations, paroxysmal nocturnal dyspnea and syncope.   Respiratory: Negative for cough, shortness of breath and wheezing.    Skin: Negative for rash.   Musculoskeletal: Positive for joint pain (shoulder, arm arthralgias). Negative for myalgias.   Gastrointestinal: Negative for abdominal pain, constipation, diarrhea, melena, nausea and vomiting.   Genitourinary: Negative for dysuria, frequency and hematuria.   Neurological: Negative for dizziness, headaches, light-headedness and numbness.   Psychiatric/Behavioral: Negative for altered mental status.     Objective:     Vital Signs (Most Recent):  Temp: 98.3 °F (36.8 °C) (07/03/20 0701)  Pulse: 87 (07/03/20 0701)  Resp: (!) 24 (07/03/20 0701)  BP: (!) 149/95 (07/03/20 0701)  SpO2: 98 % (07/03/20 0701) Vital Signs (24h Range):  Temp:  [97.1 °F (36.2 °C)-98.8 °F (37.1 °C)] 98.3 °F (36.8 °C)  Pulse:  [68-87] 87  Resp:  [15-38] 24  SpO2:  [94 %-100 %] 98 %  BP: ()/(53-95) 149/95     Weight: 93.4 kg (205 lb 14.6 oz)  Body mass index is 28.72 kg/m².     SpO2: 98 %  O2 Device (Oxygen Therapy): room air      Intake/Output Summary (Last 24 hours) at 7/3/2020 0809  Last data filed at 7/3/2020 0701  Gross per 24 hour   Intake 824.6 ml   Output 826 ml   Net -1.4 ml       Lines/Drains/Airways     Central Venous Catheter Line            Pulmonary Artery Catheter Assessment  06/30/20 1329 right internal jugular 2 days    Introducer 07/01/20 1900 right internal jugular 1 day                Physical Exam    Constitutional: He is oriented to person, place, and time. He appears well-developed and well-nourished. No distress.   HENT:   Head: Normocephalic and atraumatic.   Mouth/Throat: Oropharynx is clear and moist. No oropharyngeal exudate.   Eyes: Pupils are equal, round, and reactive to light. Conjunctivae are normal. No scleral icterus.   Neck: Neck supple. No JVD present. No tracheal deviation present.   R IJ Swanz in place, no erythema/tenderness   Cardiovascular: Normal rate, regular rhythm, normal heart sounds and intact distal pulses.   No murmur heard.  Pulses:  - L PT present by doppler  - L DP present by doppler  - R PT absent by doppler  - R DP present by doppler   Pulmonary/Chest: Effort normal and breath sounds normal. No respiratory distress. He has no wheezes. He has no rales.   R IJ Cordis in place, no erythema/tenderness   Abdominal: Soft. Bowel sounds are normal. He exhibits no distension. There is no abdominal tenderness. There is no rebound.   Musculoskeletal:         General: No edema.   Neurological: He is alert and oriented to person, place, and time. He exhibits normal muscle tone.   Skin: Skin is warm and dry. No rash noted. He is not diaphoretic. There is erythema (stasis dermatitis in BLE).   Ecchymosis of L shoulder, no hematoma   Psychiatric: He has a normal mood and affect. His behavior is normal.       Significant Labs: All pertinent lab results from the last 24 hours have been reviewed.    Significant Imaging: Reviewed.

## 2020-07-03 NOTE — ASSESSMENT & PLAN NOTE
69 y.o. male with history of CAD s/p CABG x3 (LIMA-LAD, SVG-OM, SVG-D in 1998), HFrEF, CKD III, afib on coumadin (last dose on Sunday), PAD s/p L fem-pop (occluded), BL SFA stenting, HTN, asthma, smoking, who presents as transfer for interventional cardiology evaluation of severe L main and three vessel disease / high risk PCI. NSTEMI and underwent LHC at OSH but was found to have dominant L circulation, 80% stenoses of the LM, pLAD, and pLCX. His RCA was totally occluded, as well as all his grafts. Transferred here for higher level of care. Repeat LHC on 6/26: High-degree stenosis of the distal LM, proximal LAD and LCx which received PCI. Impella placed. Post procedure HD consistent with cardiogenic shock and a high systemic vascular resistance. Impella was increased to P6 and he started afterload reduction with sodium nitroprusside. Heparin gtt discontinued. S/p Impella removal on 6/30. US renal artery inconclusive. Weaned off nitroprusside gtt on 7/2.      Plan:  - Continue hydralazine + isosorbide dintrate  - Remove Swanz/cordis, step down to floor  - Distal pulse check q1hr  - Continue atorvastatin 80mg  - Continue coreg 6.25mg  - Continue ASA, plavix  - Control BP to a goal of <130/80

## 2020-07-03 NOTE — NURSING TRANSFER
Nursing Transfer Note      7/3/2020     Transfer 350 from [id number]     Transfer via wheelchair    Transfer with cardiac monitoring, pt belongings, IV pole, inhaler, life vest equipment.    Transported by this RN and Brent RN     Medicines sent: inhaler    Chart send with patient: Yes    Notified: Family at bedside    Patient reassessed at: 07/3/2020 @ 1245     Upon arrival to floor: bed in lowest locked position, oriented to room, call light in reach, RN at bedside

## 2020-07-03 NOTE — PROGRESS NOTES
LOS: 8 days     24h events and S:  Blaine Multani is a 69 y.o. gentleman here with NSTEMI (non-ST elevated myocardial infarction) s/p high risk PCI to LM, LAD, LCx with impella support 6/26. This am he is resting in bed with no complaints. There were no acute events overnight. His swan did stop working.     O:  Vitals:  Temp: 98.5 °F (36.9 °C) (07/03/20 0300)  Pulse: 79 (07/03/20 0600)  Resp: (!) 21 (07/03/20 0600)  BP: 125/71 (07/03/20 0600)  SpO2: 96 % (07/03/20 0600)    Ins/Outs:    Intake/Output Summary (Last 24 hours) at 7/3/2020 0737  Last data filed at 7/3/2020 0701  Gross per 24 hour   Intake 632.8 ml   Output 897 ml   Net -264.2 ml       Physical Exam:  General: alert and oriented, conversant  Heart: RRR, no r/g appreciated  Lungs: CTAB  Abdomen: soft, NT, ND, +BS  Vascular:  no edema    Medications:   aspirin  81 mg Oral Daily    atorvastatin  80 mg Oral Daily    carvediloL  6.25 mg Oral BID    clopidogreL  75 mg Oral Daily    escitalopram oxalate  10 mg Oral Daily    fluticasone furoate-vilanteroL  1 puff Inhalation Daily    furosemide  40 mg Oral Daily    hydrALAZINE  100 mg Oral TID    isosorbide dinitrate  40 mg Oral TID    pantoprazole  40 mg Oral Daily       sodium chloride, acetaminophen, albuterol-ipratropium, calcium carbonate, dextrose 50 % in water (D50W), dextrose 50 % in water (D50W), glucagon (human recombinant), glucose, glucose, magnesium oxide, magnesium oxide, melatonin, ondansetron, oxyCODONE, potassium chloride 10%, potassium chloride 10%, potassium chloride 10%, potassium, sodium phosphates, potassium, sodium phosphates, potassium, sodium phosphates, senna-docusate 8.6-50 mg, sodium chloride 0.9%, sodium chloride 0.9%, sodium chloride 0.9%    Review of patient's allergies indicates:  No Known Allergies    Labs:  CBC with Diff:   Recent Labs   Lab 07/01/20  0634 07/02/20  0341 07/03/20  0330   WBC 6.96 6.48 6.21   HGB 7.9* 7.5* 8.1*   HCT 24.5* 23.7* 25.2*   PLT 76* 79* 124*    LYMPH 9.8*  0.7* 13.3*  0.9* 13.2*  0.8*   MONO 11.8  0.8 10.5  0.7 11.8  0.7   EOSINOPHIL 1.4 1.4 4.7       COAG:  Recent Labs   Lab 06/29/20 0229 06/29/20  0840 06/29/20 1956 07/01/20  0400 07/02/20  0341 07/03/20  0330   APTT 50.4* 46.9* 46.7*  --   --   --   --    INR 1.2  --   --    < > 1.2 1.1 1.1    < > = values in this interval not displayed.       CMP:   Recent Labs   Lab 07/01/20  0634 07/02/20  0341 07/02/20  0914 07/03/20  0330    104  --  99   CALCIUM 8.0* 8.0*  --  8.3*   ALBUMIN 2.5* 2.5*  --  2.7*   PROT 5.3* 5.4*  --  5.8*   * 134*  --  136   K 4.0 3.7 4.0 3.6   CO2 26 28  --  23    99  --  103   BUN 16 20  --  19   CREATININE 1.0 1.1  --  1.0   ALKPHOS 75 76  --  80   ALT 15 16  --  21   AST 26 24  --  31   BILITOT 1.0 0.9  0.9  --  0.9  0.9   MG 2.1 2.3  --  2.1   PHOS 3.9 3.8  --  3.6     Estimated Creatinine Clearance: 81.4 mL/min (based on SCr of 1 mg/dL).    .No results for input(s): CPK, TROPONINI, MB, BNP in the last 168 hours.      Diagnostic Results:  Ejection Fractions   No results found for: EF     Infectious disease labs:  Microbiology Results (last 7 days)     Procedure Component Value Units Date/Time    Blood culture [[id number]]     Order Status: Canceled Specimen: Blood     Blood culture [[id number]]     Order Status: Canceled Specimen: Blood           Assessment and Plan:  Blaine Multani is a 69 y.o. gentleman here with NSTEMI and s/p high risk PCI to LM, LAD, LCx with impella support on 6/26.     Ischemic Cardiomyopathy   Pt with CAD s/p CABG in 1998 all grafts occluded. Transferred here with NSTEMI and high risk PCI s/p LM, LAD, LCx PCI with impella support on 6/26 with good result. EF 20%. He is currently on GDMT aside from ARNI/ACEi/ARB because there was difficulty weaning from the impella. Will plan to start entresto as an outpatient. F/U in 2 weeks with Dr. Josh Cruz.     Dyslipidemia   Continue high intensity atorvastatin   Should obtain a  lipid panel     Hypertension  Blood pressure is better controlled today.  Continue with oral medications.     Peripheral arterial disease  On GDMT    Paroxysmal atrial fibrillation  Continue BB  Will need to be back on coumadin when anemia improves.   Maybe a watchman candidate.    Dental Abscess  Continue antibiotics    Depression  Continue meka Lincoln MD  Interventional Cardiology Fellow  Pager [phone]

## 2020-07-04 PROBLEM — D69.6 THROMBOCYTOPENIA: Status: RESOLVED | Noted: 2020-06-30 | Resolved: 2020-07-04

## 2020-07-04 LAB
ALBUMIN SERPL BCP-MCNC: 2.8 G/DL (ref 3.5–5.2)
ALP SERPL-CCNC: 87 U/L (ref 55–135)
ALT SERPL W/O P-5'-P-CCNC: 21 U/L (ref 10–44)
ANION GAP SERPL CALC-SCNC: 9 MMOL/L (ref 8–16)
AST SERPL-CCNC: 29 U/L (ref 10–40)
BASOPHILS # BLD AUTO: 0.03 K/UL (ref 0–0.2)
BASOPHILS NFR BLD: 0.5 % (ref 0–1.9)
BILIRUB SERPL-MCNC: 1.1 MG/DL (ref 0.1–1)
BILIRUB SERPL-MCNC: 1.1 MG/DL (ref 0.1–1)
BUN SERPL-MCNC: 18 MG/DL (ref 8–23)
CALCIUM SERPL-MCNC: 9.1 MG/DL (ref 8.7–10.5)
CHLORIDE SERPL-SCNC: 103 MMOL/L (ref 95–110)
CHOLEST SERPL-MCNC: 108 MG/DL (ref 120–199)
CHOLEST/HDLC SERPL: 3.6 {RATIO} (ref 2–5)
CO2 SERPL-SCNC: 26 MMOL/L (ref 23–29)
CREAT SERPL-MCNC: 1 MG/DL (ref 0.5–1.4)
CYANIDE BLD-MCNC: <10 UG/DL
CYANIDE BLD-MCNC: <10 UG/DL
DIFFERENTIAL METHOD: ABNORMAL
EOSINOPHIL # BLD AUTO: 0.2 K/UL (ref 0–0.5)
EOSINOPHIL NFR BLD: 4.3 % (ref 0–8)
ERYTHROCYTE [DISTWIDTH] IN BLOOD BY AUTOMATED COUNT: 15.3 % (ref 11.5–14.5)
EST. GFR  (AFRICAN AMERICAN): >60 ML/MIN/1.73 M^2
EST. GFR  (NON AFRICAN AMERICAN): >60 ML/MIN/1.73 M^2
GLUCOSE SERPL-MCNC: 104 MG/DL (ref 70–110)
HCT VFR BLD AUTO: 25.7 % (ref 40–54)
HDLC SERPL-MCNC: 30 MG/DL (ref 40–75)
HDLC SERPL: 27.8 % (ref 20–50)
HGB BLD-MCNC: 8.1 G/DL (ref 14–18)
IMM GRANULOCYTES # BLD AUTO: 0.03 K/UL (ref 0–0.04)
IMM GRANULOCYTES NFR BLD AUTO: 0.5 % (ref 0–0.5)
INR PPP: 1 (ref 0.8–1.2)
LDLC SERPL CALC-MCNC: 64.4 MG/DL (ref 63–159)
LYMPHOCYTES # BLD AUTO: 0.8 K/UL (ref 1–4.8)
LYMPHOCYTES NFR BLD: 14.2 % (ref 18–48)
MAGNESIUM SERPL-MCNC: 2.2 MG/DL (ref 1.6–2.6)
MCH RBC QN AUTO: 33.2 PG (ref 27–31)
MCHC RBC AUTO-ENTMCNC: 31.5 G/DL (ref 32–36)
MCV RBC AUTO: 105 FL (ref 82–98)
MONOCYTES # BLD AUTO: 0.5 K/UL (ref 0.3–1)
MONOCYTES NFR BLD: 9.3 % (ref 4–15)
NEUTROPHILS # BLD AUTO: 4 K/UL (ref 1.8–7.7)
NEUTROPHILS NFR BLD: 71.2 % (ref 38–73)
NONHDLC SERPL-MCNC: 78 MG/DL
NRBC BLD-RTO: 0 /100 WBC
OB PNL STL: POSITIVE
PHOSPHATE SERPL-MCNC: 4.2 MG/DL (ref 2.7–4.5)
PLATELET # BLD AUTO: 160 K/UL (ref 150–350)
PMV BLD AUTO: 11.1 FL (ref 9.2–12.9)
POTASSIUM SERPL-SCNC: 4.3 MMOL/L (ref 3.5–5.1)
PROT SERPL-MCNC: 6 G/DL (ref 6–8.4)
PROTHROMBIN TIME: 10.5 SEC (ref 9–12.5)
RBC # BLD AUTO: 2.44 M/UL (ref 4.6–6.2)
SODIUM SERPL-SCNC: 138 MMOL/L (ref 136–145)
TRIGL SERPL-MCNC: 68 MG/DL (ref 30–150)
WBC # BLD AUTO: 5.62 K/UL (ref 3.9–12.7)

## 2020-07-04 PROCEDURE — 94761 N-INVAS EAR/PLS OXIMETRY MLT: CPT

## 2020-07-04 PROCEDURE — 82272 OCCULT BLD FECES 1-3 TESTS: CPT

## 2020-07-04 PROCEDURE — 99231 PR SUBSEQUENT HOSPITAL CARE,LEVL I: ICD-10-PCS | Mod: GC,,, | Performed by: INTERNAL MEDICINE

## 2020-07-04 PROCEDURE — 36415 COLL VENOUS BLD VENIPUNCTURE: CPT

## 2020-07-04 PROCEDURE — 99231 SBSQ HOSP IP/OBS SF/LOW 25: CPT | Mod: GC,,, | Performed by: INTERNAL MEDICINE

## 2020-07-04 PROCEDURE — 25000003 PHARM REV CODE 250: Performed by: INTERNAL MEDICINE

## 2020-07-04 PROCEDURE — 85610 PROTHROMBIN TIME: CPT

## 2020-07-04 PROCEDURE — 25000003 PHARM REV CODE 250: Performed by: STUDENT IN AN ORGANIZED HEALTH CARE EDUCATION/TRAINING PROGRAM

## 2020-07-04 PROCEDURE — 97116 GAIT TRAINING THERAPY: CPT

## 2020-07-04 PROCEDURE — 97530 THERAPEUTIC ACTIVITIES: CPT

## 2020-07-04 PROCEDURE — 94640 AIRWAY INHALATION TREATMENT: CPT

## 2020-07-04 PROCEDURE — 80053 COMPREHEN METABOLIC PANEL: CPT

## 2020-07-04 PROCEDURE — 83735 ASSAY OF MAGNESIUM: CPT

## 2020-07-04 PROCEDURE — 20600001 HC STEP DOWN PRIVATE ROOM

## 2020-07-04 PROCEDURE — 84100 ASSAY OF PHOSPHORUS: CPT

## 2020-07-04 PROCEDURE — 85025 COMPLETE CBC W/AUTO DIFF WBC: CPT

## 2020-07-04 PROCEDURE — 80061 LIPID PANEL: CPT

## 2020-07-04 RX ORDER — FUROSEMIDE 20 MG/1
40 TABLET ORAL DAILY
Qty: 60 TABLET | Refills: 11 | Status: ON HOLD | OUTPATIENT
Start: 2020-07-04 | End: 2021-08-11 | Stop reason: HOSPADM

## 2020-07-04 RX ORDER — ISOSORBIDE DINITRATE 40 MG/1
40 TABLET ORAL 3 TIMES DAILY
Qty: 90 TABLET | Refills: 11 | Status: SHIPPED | OUTPATIENT
Start: 2020-07-04 | End: 2020-07-09 | Stop reason: SDUPTHER

## 2020-07-04 RX ORDER — ASPIRIN 81 MG/1
81 TABLET ORAL DAILY
Refills: 0 | COMMUNITY
Start: 2020-07-04 | End: 2021-08-13

## 2020-07-04 RX ORDER — CLOPIDOGREL BISULFATE 75 MG/1
75 TABLET ORAL DAILY
Qty: 30 TABLET | Refills: 11 | Status: SHIPPED | OUTPATIENT
Start: 2020-07-04 | End: 2020-07-27 | Stop reason: SDUPTHER

## 2020-07-04 RX ORDER — HYDRALAZINE HYDROCHLORIDE 100 MG/1
100 TABLET, FILM COATED ORAL 3 TIMES DAILY
Qty: 90 TABLET | Refills: 11 | Status: SHIPPED | OUTPATIENT
Start: 2020-07-04 | End: 2020-07-27 | Stop reason: SDUPTHER

## 2020-07-04 RX ORDER — CARVEDILOL 25 MG/1
12.5 TABLET ORAL 2 TIMES DAILY WITH MEALS
Qty: 365 TABLET | Refills: 0 | Status: SHIPPED | OUTPATIENT
Start: 2020-07-04

## 2020-07-04 RX ORDER — SPIRONOLACTONE 25 MG/1
25 TABLET ORAL DAILY
Qty: 30 TABLET | Refills: 11 | Status: SHIPPED | OUTPATIENT
Start: 2020-07-05 | End: 2020-07-27 | Stop reason: SDUPTHER

## 2020-07-04 RX ADMIN — HYDRALAZINE HYDROCHLORIDE 100 MG: 50 TABLET, FILM COATED ORAL at 02:07

## 2020-07-04 RX ADMIN — ISOSORBIDE DINITRATE 40 MG: 40 TABLET ORAL at 08:07

## 2020-07-04 RX ADMIN — ESCITALOPRAM OXALATE 10 MG: 5 TABLET, FILM COATED ORAL at 08:07

## 2020-07-04 RX ADMIN — APIXABAN 5 MG: 5 TABLET, FILM COATED ORAL at 10:07

## 2020-07-04 RX ADMIN — ISOSORBIDE DINITRATE 40 MG: 40 TABLET ORAL at 02:07

## 2020-07-04 RX ADMIN — ATORVASTATIN CALCIUM 80 MG: 20 TABLET, FILM COATED ORAL at 08:07

## 2020-07-04 RX ADMIN — HYDRALAZINE HYDROCHLORIDE 100 MG: 50 TABLET, FILM COATED ORAL at 08:07

## 2020-07-04 RX ADMIN — PANTOPRAZOLE SODIUM 40 MG: 40 TABLET, DELAYED RELEASE ORAL at 08:07

## 2020-07-04 RX ADMIN — FLUTICASONE FUROATE AND VILANTEROL TRIFENATATE 1 PUFF: 100; 25 POWDER RESPIRATORY (INHALATION) at 09:07

## 2020-07-04 RX ADMIN — ASPIRIN 81 MG CHEWABLE TABLET 81 MG: 81 TABLET CHEWABLE at 08:07

## 2020-07-04 RX ADMIN — CARVEDILOL 12.5 MG: 12.5 TABLET, FILM COATED ORAL at 08:07

## 2020-07-04 RX ADMIN — APIXABAN 5 MG: 5 TABLET, FILM COATED ORAL at 08:07

## 2020-07-04 RX ADMIN — FUROSEMIDE 40 MG: 40 TABLET ORAL at 08:07

## 2020-07-04 RX ADMIN — SPIRONOLACTONE 25 MG: 25 TABLET ORAL at 08:07

## 2020-07-04 RX ADMIN — CLOPIDOGREL BISULFATE 75 MG: 75 TABLET ORAL at 08:07

## 2020-07-04 NOTE — SUBJECTIVE & OBJECTIVE
Interval History: NAEON. Patient experienced bleeding from IJ site, resolved with pressure.     Review of Systems   Constitution: Negative for chills, diaphoresis, fever, weight gain and weight loss.   HENT: Negative for congestion and sore throat.    Eyes: Negative for visual disturbance.   Cardiovascular: Negative for chest pain, dyspnea on exertion, leg swelling, orthopnea, palpitations, paroxysmal nocturnal dyspnea and syncope.   Respiratory: Negative for cough, shortness of breath and wheezing.    Skin: Negative for rash.   Musculoskeletal: Positive for joint pain (shoulder, arm arthralgias). Negative for myalgias.   Gastrointestinal: Negative for abdominal pain, constipation, diarrhea, melena, nausea and vomiting.   Genitourinary: Negative for dysuria, frequency and hematuria.   Neurological: Negative for dizziness, headaches, light-headedness and numbness.   Psychiatric/Behavioral: Negative for altered mental status.     Objective:     Vital Signs (Most Recent):  Temp: 97.5 °F (36.4 °C) (07/04/20 0704)  Pulse: 69 (07/04/20 0733)  Resp: 16 (07/04/20 0704)  BP: (!) 98/53 (07/04/20 0704)  SpO2: 98 % (07/04/20 0704) Vital Signs (24h Range):  Temp:  [97.5 °F (36.4 °C)-98.1 °F (36.7 °C)] 97.5 °F (36.4 °C)  Pulse:  [60-98] 69  Resp:  [16-45] 16  SpO2:  [94 %-99 %] 98 %  BP: ()/(53-70) 98/53     Weight: 95.3 kg (210 lb 1.6 oz)  Body mass index is 29.3 kg/m².     SpO2: 98 %  O2 Device (Oxygen Therapy): room air      Intake/Output Summary (Last 24 hours) at 7/4/2020 0771  Last data filed at 7/3/2020 2334  Gross per 24 hour   Intake 790 ml   Output 450 ml   Net 340 ml       Lines/Drains/Airways     Peripheral Intravenous Line                 Peripheral IV - Single Lumen 07/03/20 1327 20 G;1 3/4 in Left Upper Arm less than 1 day                Physical Exam   Constitutional: He is oriented to person, place, and time. He appears well-developed and well-nourished. No distress.   HENT:   Head: Normocephalic and  atraumatic.   Mouth/Throat: Oropharynx is clear and moist. No oropharyngeal exudate.   Eyes: Pupils are equal, round, and reactive to light. Conjunctivae are normal. No scleral icterus.   Neck: Neck supple. No JVD present. No tracheal deviation present.   Cardiovascular: Normal rate, regular rhythm, normal heart sounds and intact distal pulses.   No murmur heard.  Pulses:  - L PT present by doppler  - L DP present by doppler  - R PT absent by doppler  - R DP present by doppler   Pulmonary/Chest: Effort normal and breath sounds normal. No respiratory distress. He has no wheezes. He has no rales.   Abdominal: Soft. Bowel sounds are normal. He exhibits no distension. There is no abdominal tenderness. There is no rebound.   Musculoskeletal:         General: No edema.   Neurological: He is alert and oriented to person, place, and time. He exhibits normal muscle tone.   Skin: Skin is warm and dry. No rash noted. He is not diaphoretic. There is erythema (stasis dermatitis in BLE).   Ecchymosis of L shoulder, no hematoma   Psychiatric: He has a normal mood and affect. His behavior is normal.       Significant Labs: All pertinent lab results from the last 24 hours have been reviewed.    Significant Imaging: Reviewed.

## 2020-07-04 NOTE — PROGRESS NOTES
" LOS: 9 days     24h events and S:  Blaine Multani is a 69 y.o. gentleman here with NSTEMI (non-ST elevated myocardial infarction) s/p high risk PCI to LM, LAD, LCx with impella support 6/26. This am he is resting in bed with no complaints. Overnight he was having issues with his lifevest. "It kept beeping then prompting him to press the button then saying malfunction"    O:  Vitals:  Temp: 97.5 °F (36.4 °C) (07/04/20 0704)  Pulse: 69 (07/04/20 0733)  Resp: 16 (07/04/20 0704)  BP: (!) 98/53 (07/04/20 0704)  SpO2: 98 % (07/04/20 0704)    Ins/Outs:    Intake/Output Summary (Last 24 hours) at 7/4/2020 0820  Last data filed at 7/3/2020 2334  Gross per 24 hour   Intake 530 ml   Output 450 ml   Net 80 ml       Physical Exam:  General: alert and oriented, conversant  Heart: RRR, no r/g appreciated  Lungs: CTAB  Abdomen: soft, NT, ND, +BS  Vascular:  no edema    Medications:   aspirin  81 mg Oral Daily    atorvastatin  80 mg Oral Daily    carvediloL  12.5 mg Oral BID    clopidogreL  75 mg Oral Daily    escitalopram oxalate  10 mg Oral Daily    fluticasone furoate-vilanteroL  1 puff Inhalation Daily    furosemide  40 mg Oral Daily    hydrALAZINE  100 mg Oral TID    isosorbide dinitrate  40 mg Oral TID    pantoprazole  40 mg Oral Daily    spironolactone  25 mg Oral Daily       sodium chloride, acetaminophen, albuterol-ipratropium, calcium carbonate, dextrose 50 % in water (D50W), dextrose 50 % in water (D50W), glucagon (human recombinant), glucose, glucose, melatonin, ondansetron, oxyCODONE, senna-docusate 8.6-50 mg, sodium chloride 0.9%    Review of patient's allergies indicates:  No Known Allergies    Labs:  CBC with Diff:   Recent Labs   Lab 07/01/20  0634 07/02/20  0341 07/03/20  0330   WBC 6.96 6.48 6.21   HGB 7.9* 7.5* 8.1*   HCT 24.5* 23.7* 25.2*   PLT 76* 79* 124*   LYMPH 9.8*  0.7* 13.3*  0.9* 13.2*  0.8*   MONO 11.8  0.8 10.5  0.7 11.8  0.7   EOSINOPHIL 1.4 1.4 4.7       COAG:  Recent Labs   Lab " 06/29/20 0229 06/29/20  0840 06/29/20 1956  07/02/20 0341 07/03/20  0330 07/04/20  0738   APTT 50.4* 46.9* 46.7*  --   --   --   --    INR 1.2  --   --    < > 1.1 1.1 1.0    < > = values in this interval not displayed.       CMP:   Recent Labs   Lab 07/02/20 0341 07/02/20  0914 07/03/20  0330 07/04/20  0738     --  99 104   CALCIUM 8.0*  --  8.3* 9.1   ALBUMIN 2.5*  --  2.7* 2.8*   PROT 5.4*  --  5.8* 6.0   *  --  136 138   K 3.7 4.0 3.6 4.3   CO2 28  --  23 26   CL 99  --  103 103   BUN 20  --  19 18   CREATININE 1.1  --  1.0 1.0   ALKPHOS 76  --  80 87   ALT 16  --  21 21   AST 24  --  31 29   BILITOT 0.9  0.9  --  0.9  0.9 1.1*  1.1*   MG 2.3  --  2.1 2.2   PHOS 3.8  --  3.6 4.2     Estimated Creatinine Clearance: 82.1 mL/min (based on SCr of 1 mg/dL).    .No results for input(s): CPK, TROPONINI, MB, BNP in the last 168 hours.      Diagnostic Results:  Ejection Fractions   No results found for: EF     Infectious disease labs:  Microbiology Results (last 7 days)     Procedure Component Value Units Date/Time    Blood culture [[id number]]     Order Status: Canceled Specimen: Blood     Blood culture [[id number]]     Order Status: Canceled Specimen: Blood           Assessment and Plan:  Blaine Multani is a 69 y.o. gentleman here with NSTEMI and s/p high risk PCI to LM, LAD, LCx with impella support on 6/26.     Ischemic Cardiomyopathy   Pt with CAD s/p CABG in 1998 all grafts occluded. Transferred here with NSTEMI and high risk PCI s/p LM, LAD, LCx PCI with impella support on 6/26 with good result. EF 20%. He is currently on GDMT aside from ARNI/ACEi/ARB because there was difficulty weaning from the impella. Will plan to start entresto as an outpatient. Also with lifevest which was malfunctioning overnight. Primary team will reach out to rep.    Dyslipidemia   Continue high intensity atorvastatin   Should obtain a lipid panel     Hypertension  Blood pressure is better controlled today.  Continue  with oral medications.     Peripheral arterial disease  On GDMT    Paroxysmal atrial fibrillation  Continue BB  Will need to be back on coumadin when anemia improves.   Maybe a watchman candidate.    Dental Abscess  Continue antibiotics    Depression  Continue lexapro    F/U in 2 weeks with Dr. Josh Cruz.     Rex Lincoln MD  Interventional Cardiology Fellow  Pager [phone]

## 2020-07-04 NOTE — PLAN OF CARE
Plan of care reviewed with pt, verbalized understanding  Answered questions  Free from falls and injury  Afebrile  SR/Afib on tele  Wound dressings removed  ANTB daily  PT  RT seen  Will continue to monitor

## 2020-07-04 NOTE — PLAN OF CARE
[facility]    HOME HEALTH ORDERS  FACE TO FACE ENCOUNTER    Patient Name: Blaine Multani  YOB: 1950    PCP: Nay Alcantar MD   PCP Address: [address] / [place] [address]  PCP Phone Number: [phone]  PCP Fax: [phone]    Encounter Date: 07/04/2020    Admit to Home Health    Diagnoses:  Active Hospital Problems    Diagnosis  POA    *NSTEMI (non-ST elevated myocardial infarction) [I21.4]  Yes     Priority: 2     Cardiomyopathy, ischemic [I25.5]  Yes     Priority: 1 - High    Status post cardiac catheterization [Z98.890]  Not Applicable     Priority: 1 - High    Macrocytic anemia [D53.9]  Yes    Chronic combined systolic and diastolic congestive heart failure [I50.42]  Yes    Dyslipidemia [E78.5]  Yes    Dental abscess [K04.7]  Yes    HTN (hypertension) [I10]  Yes    Paroxysmal atrial fibrillation [I48.0]  Yes    Depression [F32.9]  Yes    PAD (peripheral artery disease) [I73.9]  Yes      Resolved Hospital Problems    Diagnosis Date Resolved POA    Thrombocytopenia [D69.6] 07/04/2020 Yes       Future Appointments   Date Time Provider Department Center   7/9/2020  3:20 PM Bruno Cruz MD [facility]           I have seen and examined this patient face to face today. My clinical findings that support the need for the home health skilled services and home bound status are the following:  Weakness/numbness causing balance and gait disturbance due to Heart Failure, Weakness/Debility and Anemia making it taxing to leave home.    Allergies:Review of patient's allergies indicates:  No Known Allergies    Diet: cardiac diet    Activities: activity as tolerated    Nursing:   SN to complete comprehensive assessment including routine vital signs. Instruct on disease process and s/s of complications to report to MD. Review/verify medication list sent home with the patient at time of discharge  and instruct patient/caregiver as needed. Frequency may  be adjusted depending on start of care date.    Notify MD if SBP > 160 or < 90; DBP > 90 or < 50; HR > 120 or < 50; Temp > 101      CONSULTS:    Physical Therapy to evaluate and treat. Evaluate for home safety and equipment needs; Establish/upgrade home exercise program. Perform / instruct on therapeutic exercises, gait training, transfer training, and Range of Motion.  Occupational Therapy to evaluate and treat. Evaluate home environment for safety and equipment needs. Perform/Instruct on transfers, ADL training, ROM, and therapeutic exercises.  Aide to provide assistance with personal care, ADLs, and vital signs.    MISCELLANEOUS CARE:  N/A    WOUND CARE ORDERS  n/a      Medications: Review discharge medications with patient and family and provide education.      Current Discharge Medication List      START taking these medications    Details   apixaban (ELIQUIS) 5 mg Tab Take 1 tablet (5 mg total) by mouth 2 (two) times daily.  Qty: 60 tablet, Refills: 2      hydrALAZINE (APRESOLINE) 100 MG tablet Take 1 tablet (100 mg total) by mouth 3 (three) times daily.  Qty: 90 tablet, Refills: 11    Comments: .      isosorbide dinitrate (ISORDIL) 40 MG Tab Take 1 tablet (40 mg total) by mouth 3 (three) times daily.  Qty: 90 tablet, Refills: 11      spironolactone (ALDACTONE) 25 MG tablet Take 1 tablet (25 mg total) by mouth once daily.  Qty: 30 tablet, Refills: 11    Comments: .         CONTINUE these medications which have CHANGED    Details   aspirin (ECOTRIN) 81 MG EC tablet Take 1 tablet (81 mg total) by mouth once daily.  Refills: 0      carvediloL (COREG) 25 MG tablet Take 0.5 tablets (12.5 mg total) by mouth 2 (two) times daily with meals.  Qty: 365 tablet, Refills: 0    Comments: .      clopidogreL (PLAVIX) 75 mg tablet Take 1 tablet (75 mg total) by mouth once daily.  Qty: 30 tablet, Refills: 11      furosemide (LASIX) 20 MG tablet Take 2 tablets (40 mg total) by mouth once daily.  Qty: 60 tablet, Refills: 11          CONTINUE these medications which have NOT CHANGED    Details   famotidine (PEPCID) 20 MG tablet Take 20 mg by mouth once daily.      rosuvastatin (CRESTOR) 40 MG Tab Take 40 mg by mouth every evening.       sertraline (ZOLOFT) 50 MG tablet Take 50 mg by mouth every evening.       benzonatate (TESSALON) 200 MG capsule Take 200 mg by mouth 3 (three) times daily as needed.      omeprazole (PRILOSEC) 40 MG capsule Take 40 mg by mouth once daily.      SYMBICORT 160-4.5 mcg/actuation HFAA Inhale 2 puffs into the lungs every 12 (twelve) hours.          STOP taking these medications       cilostazol (PLETAL) 100 MG Tab Comments:   Reason for Stopping:         losartan (COZAAR) 100 MG tablet Comments:   Reason for Stopping:         atorvastatin (LIPITOR) 80 MG tablet Comments:   Reason for Stopping:         escitalopram oxalate (LEXAPRO) 10 MG tablet Comments:   Reason for Stopping:         warfarin (COUMADIN) 5 MG tablet Comments:   Reason for Stopping:               I certify that this patient is confined to his home and needs intermittent skilled nursing care, physical therapy and occupational therapy.    Erik Valentino MD  Medical Resident  [facility]  Pager: [phone]

## 2020-07-04 NOTE — ASSESSMENT & PLAN NOTE
Hb downtrending since admission (11 > 7.5), macrocytic anemia on admission  Methylmalonic acid, homocysteine, and FOBT ordered  Hb improving (7.5 > 8.1)  Monitor with CBCs daily  Transfuse with goal Hb > 7

## 2020-07-04 NOTE — ASSESSMENT & PLAN NOTE
[id number] of 3. Conferring a stroke risk of 3.2% per year  S/p impella removal. plts slowly dropping since admit. 172--> 78  Holding warfarin today, d/c'd heparin gtt  F/u LUCIO panel  Thrombocytopenia improving    Plan:  - Control with coreg  - Anticoagulation with Eliquis (will resume today and monitor Hb overnight)  - Telemetry  - Maintain K > 4, Mg > 2, Ca wnl

## 2020-07-04 NOTE — ASSESSMENT & PLAN NOTE
69 y.o. male with history of CAD s/p CABG x3 (LIMA-LAD, SVG-OM, SVG-D in 1998), HFrEF, CKD III, afib on coumadin (last dose on Sunday), PAD s/p L fem-pop (occluded), BL SFA stenting, HTN, asthma, smoking, who presents as transfer for interventional cardiology evaluation of severe L main and three vessel disease / high risk PCI. NSTEMI and underwent LHC at OSH but was found to have dominant L circulation, 80% stenoses of the LM, pLAD, and pLCX. His RCA was totally occluded, as well as all his grafts. Transferred here for higher level of care. Repeat LHC on 6/26: High-degree stenosis of the distal LM, proximal LAD and LCx which received PCI. Impella placed. Post procedure HD consistent with cardiogenic shock and a high systemic vascular resistance. Impella was increased to P6 and he started afterload reduction with sodium nitroprusside. Heparin gtt discontinued. S/p Impella removal on 6/30. US renal artery inconclusive. Weaned off nitroprusside gtt on 7/2.      Plan:  - Continue hydralazine + isosorbide dintrate  - Distal pulse check q1hr  - Continue atorvastatin 80mg  - Continue coreg 12.5mg (will increase to 25mg tomorrow if VSS)  - Continue ASA, plavix  - Control BP to a goal of <130/80

## 2020-07-04 NOTE — PLAN OF CARE
"Blaine Multani tolerated treatment well today. He was stepped down from ICU to floor status yesterday once [place] was removed. He feels well and agreeable to treatment. Expresses some soreness at L shoulder at rest but demonstrates functional ROM. He participated in 10 reps of L shoulder flex/abd within pain tolerance (to ~100 deg shoulder flex actively) as well as L shoulder internal rotation (tucking in shirt behind back) therex while sitting up. Ambulates 300 ft in hallways (wearing mask) with mostly stand-by assistance, no device; he is minimally unsteady which he attributes to being mostly bed-bound for past 7-10 days. During final 50 ft of ambulation he started with significant (7/10) bilateral calf pain, worse on R than L per patient so therapist provided contact-guard assist as needed for pt to safely ambulate back into room. Notified RN and MD Valentino (via [employer]) in regards to calf pain, history of DVT. Pain lasted 4-5 minutes before subsiding. He rated his fatigue with ambulation as "6/10" on 0-10 scale (0 being no fatigue, 10 being maximally fatigued). Discussed PT role, POC, goals and recommendations (Home with HH PT/OT, no DME needs) with patient; verbalized understanding. Blaine Multani will continue to benefit from acute PT services to promote mobility during this admission and improve return to PLOF.    Problem: Physical Therapy Goal  Goal: Physical Therapy Goal  Description: Goals to be met by: 2020    Patient will increase functional independence with mobility by performin. Supine to sit with Supervision - MET ()  2. Sit to stand transfer with Supervision - MET ()  3. Gait  x 150 feet with Supervision - not met, 300 ft with SBA-CGA  4. Ascend/descend 6 stair with bilateral Handrails Supervision  - not met   5. Stand for 3 minutes with Supervision to increase activity tolerance - not met  Outcome: Ongoing, Progressing    Toney Frias, PT  2020  "

## 2020-07-04 NOTE — ASSESSMENT & PLAN NOTE
Results for orders placed during the hospital encounter of 06/25/20   Echo Color Flow Doppler? Yes    Narrative · Eccentric left ventricular hypertrophy.  · Severely decreased left ventricular systolic function with global   hypokinesis, worst in the apical segments. The estimated ejection fraction   is 20%.  · Moderately reduced right ventricular systolic function.  · Left ventricular diastolic dysfunction.  · Mild aortic valve stenosis. Aortic valve area is 1.53 cm2; peak velocity   is 1.16 m/s; mean gradient is 4 mmHg.  · Mild mitral regurgitation.  · Elevated central venous pressure (15 mmHg).        Plan:  - Lasix 40mg daily   - Coreg 12.5mg BID (will consider increasing tomorrow)  - Spironolactone 25mg  - Isosorbide dinitrate + hydralazine  - Will initiate Entresto once clinically stable  - Will Lifevest upon discharge

## 2020-07-04 NOTE — CARE UPDATE
Patient's Lifevest was giving off alerts periodically throughout night. Called [employer] for further clarification. Per [employer] representative, patient's Lifevest is functional and patient is protected. The alerts are notifying patient and [employer] that equipment needs non-urgent servicing. Representative is to reach out to patient tomorrow to arrange servicing.     Erik Valentino MD  Medical Resident  [facility]  Pager: [phone]

## 2020-07-04 NOTE — PT/OT/SLP PROGRESS
"Physical Therapy  Treatment    Blaine Multani   [id number]    Time Tracking:     PT Received On: 07/04/20   PT Start Time: 1036   PT Stop Time: 1100   PT Total Time (min): 24 min    Billable Minutes: Gait Training 9, Therapeutic Activity 10 and Therapeutic Exercise 5      Recommendations:     Discharge recommendations: Home with HHPT     Equipment recommendations: None    Barriers to Discharge: Inaccessible home environment (6 PRADIP)    Patient Information:     Recent Surgery: Procedure(s) (LRB):  Impella, Removal (Right)  Thrombectomy, Upper Arterial  INSERTION, CATHETER, SWAN-IBETH, WITH IMAGING GUIDANCE  PTA, Subclavian 4 Days Post-Op    Diagnosis: NSTEMI (non-ST elevated myocardial infarction)    Length of Stay: 9 days    General Precautions: Standard, fall, cardiac  Orthopedic Precautions: None   Brace: None    Assessment:     Blaine Multani tolerated treatment well today. He was stepped down from ICU to floor status yesterday once [place] was removed. He feels well and agreeable to treatment. Expresses some soreness at L shoulder at rest but demonstrates functional ROM. He participated in 10 reps of L shoulder flex/abd within pain tolerance (to ~100 deg shoulder flex actively) as well as L shoulder internal rotation (tucking in shirt behind back) therex while sitting up. Ambulates 300 ft in hallways (wearing mask) with mostly stand-by assistance, no device; he is minimally unsteady which he attributes to being mostly bed-bound for past 7-10 days. During final 50 ft of ambulation he started with significant (7/10) bilateral calf pain, worse on R than L per patient so therapist provided contact-guard assist as needed for pt to safely ambulate back into room. Notified RN and MD Valentino (via [employer]) in regards to calf pain, history of DVT. Pain lasted 4-5 minutes before subsiding. He rated his fatigue with ambulation as "6/10" on 0-10 scale (0 being no fatigue, 10 being maximally fatigued). Discussed PT role, " POC, goals and recommendations (Home with HH PT/OT, no DME needs) with patient; verbalized understanding. Blaine Multani will continue to benefit from acute PT services to promote mobility during this admission and improve return to PLOF.    Problem List: weakness, decreased endurance, impaired self-care skills, impaired mobility, decreased sitting or standing balance, gait instability and pain    Rehab Prognosis: Good; patient would benefit from acute skilled PT services to address these deficits and reach maximum level of function.    Plan:     Patient to be seen 4 x/week to address the above listed problems via gait training, therapeutic activities, therapeutic exercises, neuromuscular re-education    Plan of Care Expires: 08/03/20  Plan of Care reviewed with: patient    Subjective:     Communicated with VINOD Nolan prior to treatment, appropriate to see for treatment.    Pt found supine in bed (HOB elevated) upon PT entry to room, agreeable to treatment.    Does this patient have any cultural, spiritual, Restoration conflicts given the current situation? Patient/family has no barriers to learning. Patient/family verbalizes understanding of his/her program and goals and demonstrates them correctly. No cultural, spiritual, or educational needs identified.    Objective:     Patient found with: telemetry, life vest    Pain:  Pain Rating 1: 0/10 at rest  Pain Rating Post-Intervention 1: 7/10 at bilateral calves (R > L) at end of ambulation trial; RN and MD notified (history of DVT)    Functional Mobility:    · Bed Mobility:  · Supine to Sitting: Independent   · Sitting to Supine: Independent    · Transfers:  · Sit to Stand: Supervision from EOB with no AD x 1 trial(s)    · Gait:  · 300 feet in hallways (wearing mask) with mostly stand-by assistance, no device; he is minimally unsteady which he attributes to being mostly bed-bound for past 7-10 days.  · During final 50 ft of ambulation he started with significant (7/10)  "bilateral calf pain, worse on R than L per patient so therapist provided contact-guard assist as needed for pt to safely ambulate back into room.  · Notified RN and MD Valentino (via [employer]) in regards to calf pain, history of DVT. Pain lasted 4-5 minutes before subsiding.  · He rated his fatigue with ambulation as "6/10" on 0-10 scale (0 being no fatigue, 10 being maximally fatigued)    · Assist level: SBA-CGA  · Device: no AD    · Balance:  · Static Sit: Independent at EOB    · Static Stand: Supervision with no AD    Additional Therapeutic Activity/Exercises:     1. Expresses some soreness at L shoulder at rest but demonstrates functional ROM.   A. Performs 10 reps of L shoulder flex/abd within pain tolerance (to ~100 deg shoulder flex actively)   B. Performs 10 reps L shoulder internal rotation (tucking in shirt behind back) therex while sitting up.    2. Discussed PT role, POC, goals and recommendations (Home with HH PT/OT, no DME needs) with patient; verbalized understanding.    3. Whiteboard was updated.    AM-PAC 6 CLICK MOBILITY  Turning over in bed (including adjusting bedclothes, sheets and blankets)?: 4  Sitting down on and standing up from a chair with arms (e.g., wheelchair, bedside commode, etc.): 4  Moving from lying on back to sitting on the side of the bed?: 4  Moving to and from a bed to a chair (including a wheelchair)?: 4  Need to walk in hospital room?: 3  Climbing 3-5 steps with a railing?: 3  Basic Mobility Total Score: 22    Patient was left supine in bed (HOB elevated) with all lines intact, call button in reach and RN present.    GOALS:   Multidisciplinary Problems     Physical Therapy Goals        Problem: Physical Therapy Goal    Goal Priority Disciplines Outcome Goal Variances Interventions   Physical Therapy Goal     PT, PT/OT Ongoing, Progressing     Description: Goals to be met by: 2020    Patient will increase functional independence with mobility by performin. Supine " to sit with Supervision - MET (7/4)  2. Sit to stand transfer with Supervision - MET (7/4)  3. Gait  x 150 feet with Supervision - not met, 300 ft with SBA-CGA  4. Ascend/descend 6 stair with bilateral Handrails Supervision  - not met   5. Stand for 3 minutes with Supervision to increase activity tolerance - not met                 Toney Frias, PT   7/4/2020

## 2020-07-04 NOTE — NURSING
Notified MD Valentino about patient right groin hematoma, also [employer] in contact with patient.  RN will continue to monitor groin sites

## 2020-07-04 NOTE — PROGRESS NOTES
[facility]  Cardiology  Progress Note    Patient Name: Blaine Multani  MRN: [id number]  Admission Date: 6/25/2020  Hospital Length of Stay: 9 days  Code Status: Full Code   Attending Physician: Mariposa Bean MD   Primary Care Physician: Nay Alcantar MD  Expected Discharge Date: 7/7/2020  Principal Problem:NSTEMI (non-ST elevated myocardial infarction)    Subjective:     Hospital Course:   NSTEMI and underwent LHC at OSH but was found to have dominant L circulation, 80% stenoses of the LM, pLAD, and pLCX. His RCA was totally occluded, as well as all his grafts. Transferred here for higher level of care. Repeat LHC on 6/26: High-degree stenosis of the distal LM, proximal LAD and LCx which received PCI. Impella placed. Post procedure HD consistent with cardiogenic shock and a high systemic vascular resistance. He was started afterload reduction with nitroprusside. Impella settings slowly weaned while tirating nitroprusside gtt. On 6/30, impella removal. Patient tolerated well. Heparin stopped. Weaned off nitroprusside, started on hydralazine-isosorbide dinitrate. US renal artery obtained, but study was inconclusive. Stepped down from ICU on 7/3. PT/OT consulted, recommended HH.    Interval History: NAEON. Patient experienced bleeding from IJ site, resolved with pressure.     Review of Systems   Constitution: Negative for chills, diaphoresis, fever, weight gain and weight loss.   HENT: Negative for congestion and sore throat.    Eyes: Negative for visual disturbance.   Cardiovascular: Negative for chest pain, dyspnea on exertion, leg swelling, orthopnea, palpitations, paroxysmal nocturnal dyspnea and syncope.   Respiratory: Negative for cough, shortness of breath and wheezing.    Skin: Negative for rash.   Musculoskeletal: Positive for joint pain (shoulder, arm arthralgias). Negative for myalgias.   Gastrointestinal: Negative for abdominal pain, constipation, diarrhea, melena, nausea and  vomiting.   Genitourinary: Negative for dysuria, frequency and hematuria.   Neurological: Negative for dizziness, headaches, light-headedness and numbness.   Psychiatric/Behavioral: Negative for altered mental status.     Objective:     Vital Signs (Most Recent):  Temp: 97.5 °F (36.4 °C) (07/04/20 0704)  Pulse: 69 (07/04/20 0733)  Resp: 16 (07/04/20 0704)  BP: (!) 98/53 (07/04/20 0704)  SpO2: 98 % (07/04/20 0704) Vital Signs (24h Range):  Temp:  [97.5 °F (36.4 °C)-98.1 °F (36.7 °C)] 97.5 °F (36.4 °C)  Pulse:  [60-98] 69  Resp:  [16-45] 16  SpO2:  [94 %-99 %] 98 %  BP: ()/(53-70) 98/53     Weight: 95.3 kg (210 lb 1.6 oz)  Body mass index is 29.3 kg/m².     SpO2: 98 %  O2 Device (Oxygen Therapy): room air      Intake/Output Summary (Last 24 hours) at 7/4/2020 0747  Last data filed at 7/3/2020 2334  Gross per 24 hour   Intake 790 ml   Output 450 ml   Net 340 ml       Lines/Drains/Airways     Peripheral Intravenous Line                 Peripheral IV - Single Lumen 07/03/20 1327 20 G;1 3/4 in Left Upper Arm less than 1 day                Physical Exam   Constitutional: He is oriented to person, place, and time. He appears well-developed and well-nourished. No distress.   HENT:   Head: Normocephalic and atraumatic.   Mouth/Throat: Oropharynx is clear and moist. No oropharyngeal exudate.   Eyes: Pupils are equal, round, and reactive to light. Conjunctivae are normal. No scleral icterus.   Neck: Neck supple. No JVD present. No tracheal deviation present.   Cardiovascular: Normal rate, regular rhythm, normal heart sounds and intact distal pulses.   No murmur heard.  Pulses:  - L PT present by doppler  - L DP present by doppler  - R PT absent by doppler  - R DP present by doppler   Pulmonary/Chest: Effort normal and breath sounds normal. No respiratory distress. He has no wheezes. He has no rales.   Abdominal: Soft. Bowel sounds are normal. He exhibits no distension. There is no abdominal tenderness. There is no  rebound.   Musculoskeletal:         General: No edema.   Neurological: He is alert and oriented to person, place, and time. He exhibits normal muscle tone.   Skin: Skin is warm and dry. No rash noted. He is not diaphoretic. There is erythema (stasis dermatitis in BLE).   Ecchymosis of L shoulder, no hematoma   Psychiatric: He has a normal mood and affect. His behavior is normal.       Significant Labs: All pertinent lab results from the last 24 hours have been reviewed.    Significant Imaging: Reviewed.    Assessment and Plan:     * NSTEMI (non-ST elevated myocardial infarction)  Cardiomyopathy, ischemic  Status post cardiac catheterization  69 y.o. male with history of CAD s/p CABG x3 (LIMA-LAD, SVG-OM, SVG-D in 1998), HFrEF, CKD III, afib on coumadin (last dose on Sunday), PAD s/p L fem-pop (occluded), BL SFA stenting, HTN, asthma, smoking, who presents as transfer for interventional cardiology evaluation of severe L main and three vessel disease / high risk PCI. NSTEMI and underwent LHC at OSH but was found to have dominant L circulation, 80% stenoses of the LM, pLAD, and pLCX. His RCA was totally occluded, as well as all his grafts. Transferred here for higher level of care. Repeat LHC on 6/26: High-degree stenosis of the distal LM, proximal LAD and LCx which received PCI. Impella placed. Post procedure HD consistent with cardiogenic shock and a high systemic vascular resistance. Impella was increased to P6 and he started afterload reduction with sodium nitroprusside. Heparin gtt discontinued. S/p Impella removal on 6/30. US renal artery inconclusive. Weaned off nitroprusside gtt on 7/2.      Plan:  - Continue hydralazine + isosorbide dintrate  - Distal pulse check q1hr  - Continue atorvastatin 80mg  - Continue coreg 12.5mg (will increase to 25mg tomorrow if VSS)  - Continue ASA, plavix  - Control BP to a goal of <130/80      Macrocytic anemia  Hb downtrending since admission (11 > 7.5), macrocytic anemia on  admission  Methylmalonic acid, homocysteine, and FOBT ordered  Hb improving (7.5 > 8.1)  Monitor with CBCs daily  Transfuse with goal Hb > 7    Dyslipidemia  Continue atorvastatin 80mg    Chronic combined systolic and diastolic congestive heart failure  Results for orders placed during the hospital encounter of 06/25/20   Echo Color Flow Doppler? Yes    Narrative · Eccentric left ventricular hypertrophy.  · Severely decreased left ventricular systolic function with global   hypokinesis, worst in the apical segments. The estimated ejection fraction   is 20%.  · Moderately reduced right ventricular systolic function.  · Left ventricular diastolic dysfunction.  · Mild aortic valve stenosis. Aortic valve area is 1.53 cm2; peak velocity   is 1.16 m/s; mean gradient is 4 mmHg.  · Mild mitral regurgitation.  · Elevated central venous pressure (15 mmHg).        Plan:  - Lasix 40mg daily   - Coreg 12.5mg BID (will consider increasing tomorrow)  - Spironolactone 25mg  - Isosorbide dinitrate + hydralazine  - Will initiate Entresto once clinically stable  - Will Lifevest upon discharge    Dental abscess  Right upper molars with poor dentition and likely dental caries. No evidence of abscess   S/p ABx x 7 days (unasyn > CTX)    Depression  Continue lexapro    Paroxysmal atrial fibrillation  [id number] of 3. Conferring a stroke risk of 3.2% per year  S/p impella removal. plts slowly dropping since admit. 172--> 78  Holding warfarin today, d/c'd heparin gtt  F/u LUCIO panel  Thrombocytopenia improving    Plan:  - Control with coreg  - Anticoagulation with Eliquis (will resume today and monitor Hb overnight)  - Telemetry  - Maintain K > 4, Mg > 2, Ca wnl    HTN (hypertension)  Hold home meds  Optimize GDMT for HF    PAD (peripheral artery disease)  Continue ASA and atorvastatin        VTE Risk Mitigation (From admission, onward)         Ordered     apixaban tablet 5 mg  2 times daily      07/04/20 1030     IP VTE LOW RISK PATIENT   Once      06/26/20 1114     Place sequential compression device  Until discontinued      06/26/20 0022                Erik Valentino MD  Cardiology  [facility]

## 2020-07-05 VITALS
RESPIRATION RATE: 18 BRPM | SYSTOLIC BLOOD PRESSURE: 103 MMHG | DIASTOLIC BLOOD PRESSURE: 58 MMHG | WEIGHT: 210.13 LBS | HEART RATE: 78 BPM | TEMPERATURE: 98 F | HEIGHT: 71 IN | BODY MASS INDEX: 29.42 KG/M2 | OXYGEN SATURATION: 98 %

## 2020-07-05 LAB
ABO + RH BLD: NORMAL
ALBUMIN SERPL BCP-MCNC: 2.8 G/DL (ref 3.5–5.2)
ALP SERPL-CCNC: 84 U/L (ref 55–135)
ALT SERPL W/O P-5'-P-CCNC: 25 U/L (ref 10–44)
ANION GAP SERPL CALC-SCNC: 10 MMOL/L (ref 8–16)
AST SERPL-CCNC: 31 U/L (ref 10–40)
BASOPHILS # BLD AUTO: 0.03 K/UL (ref 0–0.2)
BASOPHILS # BLD AUTO: 0.05 K/UL (ref 0–0.2)
BASOPHILS NFR BLD: 0.5 % (ref 0–1.9)
BASOPHILS NFR BLD: 0.7 % (ref 0–1.9)
BILIRUB SERPL-MCNC: 1 MG/DL (ref 0.1–1)
BLD GP AB SCN CELLS X3 SERPL QL: NORMAL
BLD PROD TYP BPU: NORMAL
BLOOD UNIT EXPIRATION DATE: NORMAL
BLOOD UNIT TYPE CODE: 6200
BLOOD UNIT TYPE: NORMAL
BUN SERPL-MCNC: 16 MG/DL (ref 8–23)
CALCIUM SERPL-MCNC: 8.7 MG/DL (ref 8.7–10.5)
CHLORIDE SERPL-SCNC: 107 MMOL/L (ref 95–110)
CO2 SERPL-SCNC: 26 MMOL/L (ref 23–29)
CODING SYSTEM: NORMAL
CREAT SERPL-MCNC: 1.1 MG/DL (ref 0.5–1.4)
CYANIDE BLD-MCNC: <10 UG/DL
DIFFERENTIAL METHOD: ABNORMAL
DIFFERENTIAL METHOD: ABNORMAL
DISPENSE STATUS: NORMAL
EOSINOPHIL # BLD AUTO: 0.3 K/UL (ref 0–0.5)
EOSINOPHIL # BLD AUTO: 0.3 K/UL (ref 0–0.5)
EOSINOPHIL NFR BLD: 3.5 % (ref 0–8)
EOSINOPHIL NFR BLD: 4.6 % (ref 0–8)
ERYTHROCYTE [DISTWIDTH] IN BLOOD BY AUTOMATED COUNT: 15.9 % (ref 11.5–14.5)
ERYTHROCYTE [DISTWIDTH] IN BLOOD BY AUTOMATED COUNT: 16.4 % (ref 11.5–14.5)
EST. GFR  (AFRICAN AMERICAN): >60 ML/MIN/1.73 M^2
EST. GFR  (NON AFRICAN AMERICAN): >60 ML/MIN/1.73 M^2
GLUCOSE SERPL-MCNC: 96 MG/DL (ref 70–110)
HCT VFR BLD AUTO: 24.7 % (ref 40–54)
HCT VFR BLD AUTO: 28.4 % (ref 40–54)
HGB BLD-MCNC: 7.7 G/DL (ref 14–18)
HGB BLD-MCNC: 8.9 G/DL (ref 14–18)
IMM GRANULOCYTES # BLD AUTO: 0.03 K/UL (ref 0–0.04)
IMM GRANULOCYTES # BLD AUTO: 0.05 K/UL (ref 0–0.04)
IMM GRANULOCYTES NFR BLD AUTO: 0.5 % (ref 0–0.5)
IMM GRANULOCYTES NFR BLD AUTO: 0.7 % (ref 0–0.5)
INR PPP: 1.1 (ref 0.8–1.2)
LYMPHOCYTES # BLD AUTO: 0.9 K/UL (ref 1–4.8)
LYMPHOCYTES # BLD AUTO: 1.1 K/UL (ref 1–4.8)
LYMPHOCYTES NFR BLD: 14.6 % (ref 18–48)
LYMPHOCYTES NFR BLD: 14.6 % (ref 18–48)
MAGNESIUM SERPL-MCNC: 2.2 MG/DL (ref 1.6–2.6)
MCH RBC QN AUTO: 33 PG (ref 27–31)
MCH RBC QN AUTO: 33.3 PG (ref 27–31)
MCHC RBC AUTO-ENTMCNC: 31.2 G/DL (ref 32–36)
MCHC RBC AUTO-ENTMCNC: 31.3 G/DL (ref 32–36)
MCV RBC AUTO: 106 FL (ref 82–98)
MCV RBC AUTO: 106 FL (ref 82–98)
MONOCYTES # BLD AUTO: 0.6 K/UL (ref 0.3–1)
MONOCYTES # BLD AUTO: 0.7 K/UL (ref 0.3–1)
MONOCYTES NFR BLD: 9.7 % (ref 4–15)
MONOCYTES NFR BLD: 9.9 % (ref 4–15)
NEUTROPHILS # BLD AUTO: 4.3 K/UL (ref 1.8–7.7)
NEUTROPHILS # BLD AUTO: 5.2 K/UL (ref 1.8–7.7)
NEUTROPHILS NFR BLD: 69.9 % (ref 38–73)
NEUTROPHILS NFR BLD: 70.8 % (ref 38–73)
NRBC BLD-RTO: 0 /100 WBC
NRBC BLD-RTO: 0 /100 WBC
PHOSPHATE SERPL-MCNC: 4.1 MG/DL (ref 2.7–4.5)
PLATELET # BLD AUTO: 207 K/UL (ref 150–350)
PLATELET # BLD AUTO: 226 K/UL (ref 150–350)
PMV BLD AUTO: 10.5 FL (ref 9.2–12.9)
PMV BLD AUTO: 11 FL (ref 9.2–12.9)
POTASSIUM SERPL-SCNC: 4.5 MMOL/L (ref 3.5–5.1)
PROT SERPL-MCNC: 5.7 G/DL (ref 6–8.4)
PROTHROMBIN TIME: 11 SEC (ref 9–12.5)
RBC # BLD AUTO: 2.33 M/UL (ref 4.6–6.2)
RBC # BLD AUTO: 2.67 M/UL (ref 4.6–6.2)
SODIUM SERPL-SCNC: 143 MMOL/L (ref 136–145)
TRANS ERYTHROCYTES VOL PATIENT: NORMAL ML
WBC # BLD AUTO: 6.08 K/UL (ref 3.9–12.7)
WBC # BLD AUTO: 7.4 K/UL (ref 3.9–12.7)

## 2020-07-05 PROCEDURE — 85610 PROTHROMBIN TIME: CPT

## 2020-07-05 PROCEDURE — P9021 RED BLOOD CELLS UNIT: HCPCS

## 2020-07-05 PROCEDURE — 94761 N-INVAS EAR/PLS OXIMETRY MLT: CPT

## 2020-07-05 PROCEDURE — 86920 COMPATIBILITY TEST SPIN: CPT

## 2020-07-05 PROCEDURE — 25000003 PHARM REV CODE 250: Performed by: STUDENT IN AN ORGANIZED HEALTH CARE EDUCATION/TRAINING PROGRAM

## 2020-07-05 PROCEDURE — 36415 COLL VENOUS BLD VENIPUNCTURE: CPT

## 2020-07-05 PROCEDURE — 86901 BLOOD TYPING SEROLOGIC RH(D): CPT

## 2020-07-05 PROCEDURE — 85025 COMPLETE CBC W/AUTO DIFF WBC: CPT

## 2020-07-05 PROCEDURE — 99239 PR HOSPITAL DISCHARGE DAY,>30 MIN: ICD-10-PCS | Mod: GC,,, | Performed by: INTERNAL MEDICINE

## 2020-07-05 PROCEDURE — 99239 HOSP IP/OBS DSCHRG MGMT >30: CPT | Mod: GC,,, | Performed by: INTERNAL MEDICINE

## 2020-07-05 PROCEDURE — 80053 COMPREHEN METABOLIC PANEL: CPT

## 2020-07-05 PROCEDURE — 83735 ASSAY OF MAGNESIUM: CPT

## 2020-07-05 PROCEDURE — 36430 TRANSFUSION BLD/BLD COMPNT: CPT

## 2020-07-05 PROCEDURE — 25000003 PHARM REV CODE 250: Performed by: INTERNAL MEDICINE

## 2020-07-05 PROCEDURE — 84100 ASSAY OF PHOSPHORUS: CPT

## 2020-07-05 PROCEDURE — 25000242 PHARM REV CODE 250 ALT 637 W/ HCPCS: Performed by: STUDENT IN AN ORGANIZED HEALTH CARE EDUCATION/TRAINING PROGRAM

## 2020-07-05 RX ORDER — HYDROCODONE BITARTRATE AND ACETAMINOPHEN 500; 5 MG/1; MG/1
TABLET ORAL
Status: DISCONTINUED | OUTPATIENT
Start: 2020-07-05 | End: 2020-07-05 | Stop reason: HOSPADM

## 2020-07-05 RX ADMIN — ASPIRIN 81 MG CHEWABLE TABLET 81 MG: 81 TABLET CHEWABLE at 08:07

## 2020-07-05 RX ADMIN — ISOSORBIDE DINITRATE 40 MG: 40 TABLET ORAL at 03:07

## 2020-07-05 RX ADMIN — APIXABAN 5 MG: 5 TABLET, FILM COATED ORAL at 08:07

## 2020-07-05 RX ADMIN — ESCITALOPRAM OXALATE 10 MG: 5 TABLET, FILM COATED ORAL at 08:07

## 2020-07-05 RX ADMIN — ISOSORBIDE DINITRATE 40 MG: 40 TABLET ORAL at 08:07

## 2020-07-05 RX ADMIN — HYDRALAZINE HYDROCHLORIDE 100 MG: 50 TABLET, FILM COATED ORAL at 08:07

## 2020-07-05 RX ADMIN — FLUTICASONE FUROATE AND VILANTEROL TRIFENATATE 1 PUFF: 100; 25 POWDER RESPIRATORY (INHALATION) at 08:07

## 2020-07-05 RX ADMIN — PANTOPRAZOLE SODIUM 40 MG: 40 TABLET, DELAYED RELEASE ORAL at 08:07

## 2020-07-05 RX ADMIN — CARVEDILOL 12.5 MG: 12.5 TABLET, FILM COATED ORAL at 08:07

## 2020-07-05 RX ADMIN — FUROSEMIDE 40 MG: 40 TABLET ORAL at 08:07

## 2020-07-05 RX ADMIN — Medication 6 MG: at 12:07

## 2020-07-05 RX ADMIN — CLOPIDOGREL BISULFATE 75 MG: 75 TABLET ORAL at 08:07

## 2020-07-05 RX ADMIN — ATORVASTATIN CALCIUM 80 MG: 20 TABLET, FILM COATED ORAL at 08:07

## 2020-07-05 RX ADMIN — SPIRONOLACTONE 25 MG: 25 TABLET ORAL at 08:07

## 2020-07-05 RX ADMIN — HYDRALAZINE HYDROCHLORIDE 100 MG: 50 TABLET, FILM COATED ORAL at 03:07

## 2020-07-05 NOTE — PROGRESS NOTES
[facility]  Cardiology  Progress Note    Patient Name: Blaine Multani  MRN: [id number]  Admission Date: 6/25/2020  Hospital Length of Stay: 10 days  Code Status: Full Code   Attending Physician: Mariposa Bean MD   Primary Care Physician: Nay Alcantar MD  Expected Discharge Date: 7/7/2020  Principal Problem:NSTEMI (non-ST elevated myocardial infarction)    Subjective:     Hospital Course:   NSTEMI and underwent LHC at OSH but was found to have dominant L circulation, 80% stenoses of the LM, pLAD, and pLCX. His RCA was totally occluded, as well as all his grafts. Transferred here for higher level of care. Repeat LHC on 6/26: High-degree stenosis of the distal LM, proximal LAD and LCx which received PCI. Impella placed. Post procedure HD consistent with cardiogenic shock and a high systemic vascular resistance. He was started afterload reduction with nitroprusside. Impella settings slowly weaned while tirating nitroprusside gtt. On 6/30, impella removal. Patient tolerated well. Heparin stopped. Weaned off nitroprusside, started on hydralazine-isosorbide dinitrate. US renal artery obtained, but study was inconclusive. Stepped down from ICU on 7/3. PT/OT consulted, recommended HH. Eliquis restarted in addition to ASA + plavix. Per [facility], wants triple therapy x 1 month, followed by Eliquis + plavix. Patient to f/u with Dr. Cruz in 2 weeks. Discussed results and plan with patient. Patient verbalized understanding and agreed to plan. Patient stable for discharge.    In summary, optimization of GDMT for HFrEF as below:  - ACEi/ARB held, will likely initiate Entresto as outpatient per [facility]  - B-blocker - on coreg   - Hydralazine 100mg TID + isosorbide dinitrate 40mg TID  - Spironolactone 25mg  - Lasix 40mg daily  - Lifevest on discharge    Optimization for NSTEMI s/p PCI:  - ASA, plavix, and Eliquis x 1 month, followed by Eliquis + plavix  - Rosuvastatin 40mg  - Holding ACEi/ARB as above  -  B-blocker - on coreg    Interval History: NAEON. No complaints today. He is anxious to be discharged home.Life vest needs service, rep to re-evaluate today.     Review of Systems   Constitution: Negative for chills, diaphoresis, fever, weight gain and weight loss.   HENT: Negative for congestion and sore throat.    Eyes: Negative for visual disturbance.   Cardiovascular: Negative for chest pain, dyspnea on exertion, leg swelling, orthopnea, palpitations, paroxysmal nocturnal dyspnea and syncope.   Respiratory: Negative for cough, shortness of breath and wheezing.    Skin: Negative for rash.   Musculoskeletal: Positive for joint pain (shoulder, arm arthralgias). Negative for myalgias.   Gastrointestinal: Negative for abdominal pain, constipation, diarrhea, melena, nausea and vomiting.   Genitourinary: Negative for dysuria, frequency and hematuria.   Neurological: Negative for dizziness, headaches, light-headedness and numbness.   Psychiatric/Behavioral: Negative for altered mental status.     Objective:     Vital Signs (Most Recent):  Temp: 98 °F (36.7 °C) (07/05/20 0756)  Pulse: 80 (07/05/20 0805)  Resp: 18 (07/05/20 0805)  BP: 125/70 (07/05/20 0756)  SpO2: 98 % (07/05/20 0756) Vital Signs (24h Range):  Temp:  [96.8 °F (36 °C)-98.6 °F (37 °C)] 98 °F (36.7 °C)  Pulse:  [65-80] 80  Resp:  [16-18] 18  SpO2:  [96 %-99 %] 98 %  BP: (111-136)/(64-76) 125/70     Weight: 95.3 kg (210 lb 1.6 oz)  Body mass index is 29.3 kg/m².     SpO2: 98 %  O2 Device (Oxygen Therapy): room air      Intake/Output Summary (Last 24 hours) at 7/5/2020 0845  Last data filed at 7/5/2020 0600  Gross per 24 hour   Intake 960 ml   Output 1100 ml   Net -140 ml       Lines/Drains/Airways     Peripheral Intravenous Line                 Peripheral IV - Single Lumen 07/03/20 1327 20 G;1 3/4 in Left Upper Arm 1 day                Physical Exam   Constitutional: He is oriented to person, place, and time. He appears well-developed and well-nourished. No  distress.   HENT:   Head: Normocephalic and atraumatic.   Mouth/Throat: Oropharynx is clear and moist. No oropharyngeal exudate.   Eyes: Pupils are equal, round, and reactive to light. Conjunctivae are normal. No scleral icterus.   Neck: Neck supple. No JVD present. No tracheal deviation present.   Cardiovascular: Normal rate, regular rhythm, normal heart sounds and intact distal pulses.   No murmur heard.  Pulses:  - L PT present by doppler  - L DP present by doppler  - R PT absent by doppler  - R DP present by doppler   Pulmonary/Chest: Effort normal and breath sounds normal. No respiratory distress. He has no wheezes. He has no rales.   Abdominal: Soft. Bowel sounds are normal. He exhibits no distension. There is no abdominal tenderness. There is no rebound.   Musculoskeletal:         General: No edema.   Neurological: He is alert and oriented to person, place, and time. He exhibits normal muscle tone.   Skin: Skin is warm and dry. No rash noted. He is not diaphoretic. There is erythema (stasis dermatitis in BLE).   Ecchymosis of L shoulder, no hematoma   Psychiatric: He has a normal mood and affect. His behavior is normal.       Significant Labs: All pertinent lab results from the last 24 hours have been reviewed.    Significant Imaging: Reviewed.    Assessment and Plan:     * NSTEMI (non-ST elevated myocardial infarction)  69 y.o. male with history of CAD s/p CABG x3 (LIMA-LAD, SVG-OM, SVG-D in 1998), HFrEF, CKD III, afib on coumadin (last dose on Sunday), PAD s/p L fem-pop (occluded), BL SFA stenting, HTN, asthma, smoking, who presents as transfer for interventional cardiology evaluation of severe L main and three vessel disease / high risk PCI. NSTEMI and underwent LHC at OSH but was found to have dominant L circulation, 80% stenoses of the LM, pLAD, and pLCX. His RCA was totally occluded, as well as all his grafts. Transferred here for higher level of care. Repeat LHC on 6/26: High-degree stenosis of the  distal LM, proximal LAD and LCx which received PCI. Impella placed. Post procedure HD consistent with cardiogenic shock and a high systemic vascular resistance. Impella was increased to P6 and he started afterload reduction with sodium nitroprusside. Heparin gtt discontinued. S/p Impella removal on 6/30. US renal artery inconclusive. Weaned off nitroprusside gtt on 7/2.      Plan:  - Continue hydralazine + isosorbide dintrate  - Distal pulse check q1hr  - Continue atorvastatin 80mg  - Continue coreg 12.5mg  - Continue ASA, plavix  - Control BP to a goal of <130/80    Macrocytic anemia  Hb downtrending since admission (11 > 7.5), macrocytic anemia on admission  Methylmalonic acid, homocysteine, and FOBT ordered  Hb stable  Monitor with CBCs daily  Transfuse with goal Hb > 7    Cardiomyopathy, ischemic       Dyslipidemia  Continue atorvastatin 80mg    Chronic combined systolic and diastolic congestive heart failure    Plan:  - Lasix 40mg daily   - Coreg 12.5mg BID  - Spironolactone 25mg  - Isosorbide dinitrate + hydralazine  - Will initiate Entresto once clinically stable  - Will Lifevest upon discharge    Status post cardiac catheterization       Dental abscess  Right upper molars with poor dentition and likely dental caries. No evidence of abscess   S/p ABx x 7 days (unasyn > CTX)    Depression  Continue lexapro    Paroxysmal atrial fibrillation  [id number] of 3. Conferring a stroke risk of 3.2% per year  S/p impella removal. plts slowly dropping since admit. 172--> 78  Holding warfarin today, d/c'd heparin gtt  F/u LUCIO panel  Thrombocytopenia improving    Plan:  - Control with coreg  - Anticoagulation with Eliquis  - Telemetry  - Maintain K > 4, Mg > 2, Ca wnl    HTN (hypertension)  Hold home meds  Optimize GDMT for HF    PAD (peripheral artery disease)  Continue ASA and atorvastatin      VTE Risk Mitigation (From admission, onward)         Ordered     apixaban tablet 5 mg  2 times daily      07/04/20 1030     IP  VTE LOW RISK PATIENT  Once      06/26/20 1114     Place sequential compression device  Until discontinued      06/26/20 0022                Fran Metcalf MD  Cardiology  [facility]

## 2020-07-05 NOTE — NURSING
Patient notified of pending blood transfusion.  Consent signed.  Type & Screen collected, pending results, tubing primed.

## 2020-07-05 NOTE — ASSESSMENT & PLAN NOTE
69 y.o. male with history of CAD s/p CABG x3 (LIMA-LAD, SVG-OM, SVG-D in 1998), HFrEF, CKD III, afib on coumadin (last dose on Sunday), PAD s/p L fem-pop (occluded), BL SFA stenting, HTN, asthma, smoking, who presents as transfer for interventional cardiology evaluation of severe L main and three vessel disease / high risk PCI. NSTEMI and underwent LHC at OSH but was found to have dominant L circulation, 80% stenoses of the LM, pLAD, and pLCX. His RCA was totally occluded, as well as all his grafts. Transferred here for higher level of care. Repeat LHC on 6/26: High-degree stenosis of the distal LM, proximal LAD and LCx which received PCI. Impella placed. Post procedure HD consistent with cardiogenic shock and a high systemic vascular resistance. Impella was increased to P6 and he started afterload reduction with sodium nitroprusside. Heparin gtt discontinued. S/p Impella removal on 6/30. US renal artery inconclusive. Weaned off nitroprusside gtt on 7/2.      Plan:  - Continue hydralazine + isosorbide dintrate  - Distal pulse check q1hr  - Continue atorvastatin 80mg  - Continue coreg 12.5mg  - Continue ASA, plavix  - Control BP to a goal of <130/80

## 2020-07-05 NOTE — SUBJECTIVE & OBJECTIVE
Interval History: NAEON. No complaints today. He is anxious to be discharged home.Life vest needs service, rep to re-evaluate today.     Review of Systems   Constitution: Negative for chills, diaphoresis, fever, weight gain and weight loss.   HENT: Negative for congestion and sore throat.    Eyes: Negative for visual disturbance.   Cardiovascular: Negative for chest pain, dyspnea on exertion, leg swelling, orthopnea, palpitations, paroxysmal nocturnal dyspnea and syncope.   Respiratory: Negative for cough, shortness of breath and wheezing.    Skin: Negative for rash.   Musculoskeletal: Positive for joint pain (shoulder, arm arthralgias). Negative for myalgias.   Gastrointestinal: Negative for abdominal pain, constipation, diarrhea, melena, nausea and vomiting.   Genitourinary: Negative for dysuria, frequency and hematuria.   Neurological: Negative for dizziness, headaches, light-headedness and numbness.   Psychiatric/Behavioral: Negative for altered mental status.     Objective:     Vital Signs (Most Recent):  Temp: 98 °F (36.7 °C) (07/05/20 0756)  Pulse: 80 (07/05/20 0805)  Resp: 18 (07/05/20 0805)  BP: 125/70 (07/05/20 0756)  SpO2: 98 % (07/05/20 0756) Vital Signs (24h Range):  Temp:  [96.8 °F (36 °C)-98.6 °F (37 °C)] 98 °F (36.7 °C)  Pulse:  [65-80] 80  Resp:  [16-18] 18  SpO2:  [96 %-99 %] 98 %  BP: (111-136)/(64-76) 125/70     Weight: 95.3 kg (210 lb 1.6 oz)  Body mass index is 29.3 kg/m².     SpO2: 98 %  O2 Device (Oxygen Therapy): room air      Intake/Output Summary (Last 24 hours) at 7/5/2020 0845  Last data filed at 7/5/2020 0600  Gross per 24 hour   Intake 960 ml   Output 1100 ml   Net -140 ml       Lines/Drains/Airways     Peripheral Intravenous Line                 Peripheral IV - Single Lumen 07/03/20 1327 20 G;1 3/4 in Left Upper Arm 1 day                Physical Exam   Constitutional: He is oriented to person, place, and time. He appears well-developed and well-nourished. No distress.   HENT:   Head:  Normocephalic and atraumatic.   Mouth/Throat: Oropharynx is clear and moist. No oropharyngeal exudate.   Eyes: Pupils are equal, round, and reactive to light. Conjunctivae are normal. No scleral icterus.   Neck: Neck supple. No JVD present. No tracheal deviation present.   Cardiovascular: Normal rate, regular rhythm, normal heart sounds and intact distal pulses.   No murmur heard.  Pulses:  - L PT present by doppler  - L DP present by doppler  - R PT absent by doppler  - R DP present by doppler   Pulmonary/Chest: Effort normal and breath sounds normal. No respiratory distress. He has no wheezes. He has no rales.   Abdominal: Soft. Bowel sounds are normal. He exhibits no distension. There is no abdominal tenderness. There is no rebound.   Musculoskeletal:         General: No edema.   Neurological: He is alert and oriented to person, place, and time. He exhibits normal muscle tone.   Skin: Skin is warm and dry. No rash noted. He is not diaphoretic. There is erythema (stasis dermatitis in BLE).   Ecchymosis of L shoulder, no hematoma   Psychiatric: He has a normal mood and affect. His behavior is normal.       Significant Labs: All pertinent lab results from the last 24 hours have been reviewed.    Significant Imaging: Reviewed.

## 2020-07-05 NOTE — ASSESSMENT & PLAN NOTE
Plan:  - Lasix 40mg daily   - Coreg 12.5mg BID  - Spironolactone 25mg  - Isosorbide dinitrate + hydralazine  - Will initiate Entresto once clinically stable  - Will Lifevest upon discharge

## 2020-07-05 NOTE — NURSING
Patient reported lump under left lateral flank near upper arm.  C. Gillies, M.D. at bedside assessing prior to discharge.

## 2020-07-05 NOTE — ASSESSMENT & PLAN NOTE
Hb downtrending since admission (11 > 7.5), macrocytic anemia on admission  Methylmalonic acid, homocysteine, and FOBT ordered  Hb stable  Monitor with CBCs daily  Transfuse with goal Hb > 7

## 2020-07-05 NOTE — DISCHARGE SUMMARY
[facility]  Cardiology  Discharge Summary      Patient Name: Blaine Multani  MRN: [id number]  Admission Date: 6/25/2020  Hospital Length of Stay: 10 days  Discharge Date and Time:  07/05/2020 1:40 PM  Attending Physician: Mariposa Bean MD    Discharging Provider: Fran Metcalf MD  Primary Care Physician: Nay Alcantar MD    HPI:   69 y.o. male with history of CAD s/p CABG x3 (LIMA-LAD, SVG-OM, SVG-D in 1998), HFrEF (35-40%), CKD III, afib on coumadin (last dose on Sunday), PAD s/p L fem-pop (occluded), BL SFA stenting, HTN, asthma, smoking, who presents as transfer for interventional cardiology evaluation of severe L main and three vessel disease / high risk PCI.      Patient initially presented to [facility] 6/22 complaining of intermittent angina and WYLIE for four days. His angina resolved with ASA and NTG. He was found to have a NSTEMI and underwent LHC but was found to have dominant L circulation, 80% stenoses of the LM, pLAD, and pLCX. His RCA was totally occluded, as well as all his grafts. His descending aortography showed a 60-70% R iliac stenosis with mild disease in the remaining of the artery. He was found to have L iliac venous stent.     Venous mapping showed absent L greater saphenous vein, patent R greater saphenous vein from the groin to the distal thigh, BL calcified lesser saphenous veins, bypass grafts in the right thigh which both appear occluded with one possibly acutely occluded while the other appears to be chronically occluded.     He was kept on medical management, and has not had angina since admitted to OSH on Monday.Patient was subsequently transferred to [facility] for high risk L main / circumflex stenting with Impella support. He is COVID negative. He currently denies chest pain or shortness of breath and is lying comfortably in bed. Creatinine is 1.3    Procedure(s) (LRB):  Impella, Removal (Right)  Thrombectomy, Upper Arterial  INSERTION, CATHETER, SWAN-DONTRELL,  WITH IMAGING GUIDANCE  PTA, Subclavian     Indwelling Lines/Drains at time of discharge:  Lines/Drains/Airways     None                 Hospital Course:  NSTEMI and underwent LHC at OSH but was found to have dominant L circulation, 80% stenoses of the LM, pLAD, and pLCX. His RCA was totally occluded, as well as all his grafts. Transferred here for higher level of care. Repeat LHC on 6/26: High-degree stenosis of the distal LM, proximal LAD and LCx underwent complex PCI. Impella placed. Post procedure HD consistent with cardiogenic shock and a high systemic vascular resistance. He was started afterload reduction with nitroprusside. Impella settings slowly weaned while tirating nitroprusside gtt. On 6/30, impella removal. Patient tolerated well. Heparin stopped. Weaned off nitroprusside, started on hydralazine-isosorbide dinitrate, Coreg, Spironolactone. US renal artery obtained, but study was inconclusive. Stepped down from ICU on 7/3. PT/OT consulted, recommended HH. Eliquis restarted in addition to ASA + plavix. Plan for triple therapy x 1 month, followed by Eliquis + plavix for 1 year. Defer Entresto to be started as an outpatient. Transfused pRBCs for anemia. Patient to f/u with Dr. Cruz in 2 weeks. Discussed results and plan with patient. Patient verbalized understanding and agreed to plan. Patient stable for discharge. Discharged with Lifevest.     Consults:   Consults (From admission, onward)        Status Ordering Provider     Inpatient consult to Cardiac Rehab  Once     Provider:  (Not yet assigned)    ARYA Calderon     Inpatient consult to Infectious Diseases  Once     Provider:  (Not yet assigned)    Completed RONNIE MI     Inpatient consult to Interventional Cardiology  Once     Provider:  (Not yet assigned)    Completed JERZY SOL     Inpatient consult to Midline team  Once     Provider:  (Not yet assigned)    Completed CHINTAN ASENCIO III           Significant Diagnostic Studies: Labs:   CMP   Recent Labs   Lab 07/04/20  0738 07/05/20  0651    143   K 4.3 4.5    107   CO2 26 26    96   BUN 18 16   CREATININE 1.0 1.1   CALCIUM 9.1 8.7   PROT 6.0 5.7*   ALBUMIN 2.8* 2.8*   BILITOT 1.1*  1.1* 1.0   ALKPHOS 87 84   AST 29 31   ALT 21 25   ANIONGAP 9 10   ESTGFRAFRICA >60.0 >60.0   EGFRNONAA >60.0 >60.0   , CBC   Recent Labs   Lab 07/04/20  0739 07/05/20  0650   WBC 5.62 6.08   HGB 8.1* 7.7*   HCT 25.7* 24.7*    207   , INR   Lab Results   Component Value Date    INR 1.1 07/05/2020    INR 1.0 07/04/2020    INR 1.1 07/03/2020   , Lipid Panel   Lab Results   Component Value Date    CHOL 108 (L) 07/04/2020    HDL 30 (L) 07/04/2020    LDLCALC 64.4 07/04/2020    TRIG 68 07/04/2020    CHOLHDL 27.8 07/04/2020   , Troponin No results for input(s): TROPONINI in the last 168 hours., A1C:   Recent Labs   Lab 06/26/20  0322   HGBA1C 5.4    and All labs within the past 24 hours have been reviewed  Radiology: X-Ray: CXR: X-Ray Chest 1 View (CXR):   Results for orders placed or performed during the hospital encounter of 06/25/20   X-Ray Chest 1 View    Narrative    EXAMINATION:  XR CHEST 1 VIEW    CLINICAL HISTORY:  impella;    TECHNIQUE:  Single frontal view of the chest was performed.    COMPARISON:  June 29, 2020    FINDINGS:  Single chest view is submitted, postoperative change, Impella device, and pulmonary arterial catheter again noted, stable appearance.  The cardiomediastinal silhouette appears stable.  The intrathoracic appearance is stable with mild improved aeration.      Impression    Mild improved aeration, otherwise stable intrathoracic appearance.      Electronically signed by: Wilfredo Flores  Date:    06/30/2020  Time:    05:23    and X-Ray Chest PA and Lateral (CXR): No results found for this visit on 06/25/20. and KUB: X-Ray Abdomen AP 1 View (KUB): No results found for this visit on 06/25/20.  CT scan: CT ABDOMEN PELVIS WITH  CONTRAST: No results found for this visit on 06/25/20. and CT ABDOMEN PELVIS WITHOUT CONTRAST: No results found for this visit on 06/25/20.  Cardiac Graphics: Echocardiogram:   2D echo with color flow doppler: No results found for this or any previous visit. and Transthoracic echo (TTE) complete (Cupid Only):   Results for orders placed or performed during the hospital encounter of 06/25/20   Echo Color Flow Doppler? Yes   Result Value Ref Range    Ascending aorta 3.36 cm    STJ 3.31 cm    AV mean gradient 4 mmHg    Ao peak tawnya 1.16 m/s    Ao VTI 20.31 cm    IVS 0.89 0.6 - 1.1 cm    LA size 5.30 cm    Left Atrium Major Axis 5.73 cm    Left Atrium Minor Axis 5.81 cm    LVIDD 5.59 3.5 - 6.0 cm    LVIDS 4.59 (A) 2.1 - 4.0 cm    LVOT diameter 2.00 cm    LVOT peak VTI 9.89 cm    PW 0.77 0.6 - 1.1 cm    RA Major Axis 5.44 cm    RA Width 4.23 cm    RVDD 3.36 cm    Sinus 3.07 cm    TAPSE 1.33 cm    TDI LATERAL 0.08 m/s    TDI SEPTAL 0.03 m/s    LA WIDTH 3.93 cm    LV Diastolic Volume 152.92 mL    LV Systolic Volume 96.86 mL    LVOT peak tawnya 0.54 m/s    FS 18 %    LA volume 102.15 cm3    LV mass 172.33 g    Left Ventricle Relative Wall Thickness 0.28 cm    AV valve area 1.53 cm2    AV Velocity Ratio 0.47     AV index (prosthetic) 0.49     Mean e' 0.06 m/s    LVOT area 3.1 cm2    LVOT stroke volume 31.05 cm3    AV peak gradient 5 mmHg    Right Atrial Pressure (from IVC) 15 mmHg    Narrative    · Eccentric left ventricular hypertrophy.  · Severely decreased left ventricular systolic function with global   hypokinesis, worst in the apical segments. The estimated ejection fraction   is 20%.  · Moderately reduced right ventricular systolic function.  · Left ventricular diastolic dysfunction.  · Mild aortic valve stenosis. Aortic valve area is 1.53 cm2; peak velocity   is 1.16 m/s; mean gradient is 4 mmHg.  · Mild mitral regurgitation.  · Elevated central venous pressure (15 mmHg).          Pending Diagnostic Studies:      Procedure Component Value Units Date/Time    CBC auto differential [[id number]]     Order Status: Sent Lab Status: No result     Specimen: Blood     Cyanide level [[id number]] Collected: 07/03/20 0330    Order Status: Sent Lab Status: In process Updated: 07/03/20 0420    Specimen: Blood     Cyanide level [[id number]] Collected: 06/28/20 0105    Order Status: Sent Lab Status: In process Updated: 06/28/20 0107    Specimen: Blood     Homocysteine, serum [[id number]] Collected: 07/02/20 0914    Order Status: Sent Lab Status: In process Updated: 07/02/20 0940    Specimen: Blood     Methylmalonic acid, serum [[id number]] Collected: 07/02/20 0914    Order Status: Sent Lab Status: In process Updated: 07/02/20 0943    Specimen: Blood     Platelet Function Assay-EPI [[id number]] Collected: 06/28/20 0105    Order Status: Sent Lab Status: In process Updated: 06/28/20 0435    Specimen: Blood     Sodium, urine, random [[id number]] Collected: 06/30/20 1825    Order Status: Sent Lab Status: In process Updated: 06/30/20 1825    Specimen: Urine, Clean Catch           Final Active Diagnoses:    Diagnosis Date Noted POA    PRINCIPAL PROBLEM:  NSTEMI (non-ST elevated myocardial infarction) [I21.4] 06/25/2020 Yes    Macrocytic anemia [D53.9] 07/02/2020 Yes    Cardiomyopathy, ischemic [I25.5] 07/01/2020 Yes    Chronic combined systolic and diastolic congestive heart failure [I50.42] 06/28/2020 Yes    Dyslipidemia [E78.5] 06/28/2020 Yes    Dental abscess [K04.7] 06/27/2020 Yes    Status post cardiac catheterization [Z98.890]  Not Applicable    HTN (hypertension) [I10] 06/26/2020 Yes    Paroxysmal atrial fibrillation [I48.0] 06/26/2020 Yes    Depression [F32.9] 06/26/2020 Yes    PAD (peripheral artery disease) [I73.9] 11/09/2017 Yes      Problems Resolved During this Admission:    Diagnosis Date Noted Date Resolved POA    Thrombocytopenia [D69.6] 06/30/2020 07/04/2020 Yes     No new Assessment & Plan notes have been filed under this  hospital service since the last note was generated.  Service: Cardiology      Discharged Condition: fair    Disposition: Home or Self Care    Follow Up:    Patient Instructions:   No discharge procedures on file.  Medications:  Reconciled Home Medications:      Medication List      START taking these medications    apixaban 5 mg Tab  Commonly known as: ELIQUIS  Take 1 tablet (5 mg total) by mouth 2 (two) times daily.     hydrALAZINE 100 MG tablet  Commonly known as: APRESOLINE  Take 1 tablet (100 mg total) by mouth 3 (three) times daily.     isosorbide dinitrate 40 MG Tab  Commonly known as: ISORDIL  Take 1 tablet (40 mg total) by mouth 3 (three) times daily.     spironolactone 25 MG tablet  Commonly known as: ALDACTONE  Take 1 tablet (25 mg total) by mouth once daily.        CHANGE how you take these medications    carvediloL 25 MG tablet  Commonly known as: COREG  Take 0.5 tablets (12.5 mg total) by mouth 2 (two) times daily with meals.  What changed: how much to take     furosemide 20 MG tablet  Commonly known as: LASIX  Take 2 tablets (40 mg total) by mouth once daily.  What changed: how much to take        CONTINUE taking these medications    aspirin 81 MG EC tablet  Commonly known as: ECOTRIN  Take 1 tablet (81 mg total) by mouth once daily.     benzonatate 200 MG capsule  Commonly known as: TESSALON  Take 200 mg by mouth 3 (three) times daily as needed.     clopidogreL 75 mg tablet  Commonly known as: PLAVIX  Take 1 tablet (75 mg total) by mouth once daily.     famotidine 20 MG tablet  Commonly known as: PEPCID  Take 20 mg by mouth once daily.     omeprazole 40 MG capsule  Commonly known as: PRILOSEC  Take 40 mg by mouth once daily.     rosuvastatin 40 MG Tab  Commonly known as: CRESTOR  Take 40 mg by mouth every evening.     sertraline 50 MG tablet  Commonly known as: ZOLOFT  Take 50 mg by mouth every evening.     SYMBICORT 160-4.5 mcg/actuation Hfaa  Generic drug: budesonide-formoterol 160-4.5 mcg  Inhale 2  puffs into the lungs every 12 (twelve) hours.        STOP taking these medications    cilostazoL 100 MG Tab  Commonly known as: PLETAL     losartan 100 MG tablet  Commonly known as: COZAAR     warfarin 5 MG tablet  Commonly known as: COUMADIN            Time spent on the discharge of patient: 35 minutes    Fran Metcalf MD  Cardiology  [facility]

## 2020-07-05 NOTE — PLAN OF CARE
"AAOX4, Indpendendent, urinal at in reach. Lifevest 4000 attached to patient; module reading "NEEDS SERVICE". Module will be replaced this AM between 9-10am prior to discharge. Fall protocol maintained, no falls during shift. Wounds noted to extremities. No complaints of pain. Will continue to monitor.    "

## 2020-07-05 NOTE — PROGRESS NOTES
LOS: 10 days     24h events and S:  Blaine Multani is a 69 y.o. gentleman here with NSTEMI (non-ST elevated myocardial infarction) s/p high risk PCI to LM, LAD, LCx with impella support 6/26. This am he is resting in bed with no complaints. No acute events overnight.     O:  Vitals:  Temp: 98 °F (36.7 °C) (07/05/20 0756)  Pulse: 80 (07/05/20 0805)  Resp: 18 (07/05/20 0805)  BP: 125/70 (07/05/20 0756)  SpO2: 98 % (07/05/20 0756)    Ins/Outs:    Intake/Output Summary (Last 24 hours) at 7/5/2020 0838  Last data filed at 7/5/2020 0600  Gross per 24 hour   Intake 960 ml   Output 1100 ml   Net -140 ml       Physical Exam:  General: alert and oriented, conversant  Heart: RRR, no r/g appreciated  Lungs: CTAB  Abdomen: soft, NT, ND, +BS  Vascular:  no edema    Medications:   apixaban  5 mg Oral BID    aspirin  81 mg Oral Daily    atorvastatin  80 mg Oral Daily    carvediloL  12.5 mg Oral BID    clopidogreL  75 mg Oral Daily    escitalopram oxalate  10 mg Oral Daily    fluticasone furoate-vilanteroL  1 puff Inhalation Daily    furosemide  40 mg Oral Daily    hydrALAZINE  100 mg Oral TID    isosorbide dinitrate  40 mg Oral TID    pantoprazole  40 mg Oral Daily    spironolactone  25 mg Oral Daily       sodium chloride, acetaminophen, albuterol-ipratropium, calcium carbonate, dextrose 50 % in water (D50W), dextrose 50 % in water (D50W), glucagon (human recombinant), glucose, glucose, melatonin, ondansetron, oxyCODONE, senna-docusate 8.6-50 mg, sodium chloride 0.9%    Review of patient's allergies indicates:  No Known Allergies    Labs:  CBC with Diff:   Recent Labs   Lab 07/03/20  0330 07/04/20  0739 07/05/20  0650   WBC 6.21 5.62 6.08   HGB 8.1* 8.1* 7.7*   HCT 25.2* 25.7* 24.7*   * 160 207   LYMPH 13.2*  0.8* 14.2*  0.8* 14.6*  0.9*   MONO 11.8  0.7 9.3  0.5 9.9  0.6   EOSINOPHIL 4.7 4.3 4.6       COAG:  Recent Labs   Lab 06/29/20  0229 06/29/20  0840 06/29/20  1956  07/03/20  0330 07/04/20  0738  07/05/20  0651   APTT 50.4* 46.9* 46.7*  --   --   --   --    INR 1.2  --   --    < > 1.1 1.0 1.1    < > = values in this interval not displayed.       CMP:   Recent Labs   Lab 07/02/20  0341 07/02/20  0914 07/03/20  0330 07/04/20  0738     --  99 104   CALCIUM 8.0*  --  8.3* 9.1   ALBUMIN 2.5*  --  2.7* 2.8*   PROT 5.4*  --  5.8* 6.0   *  --  136 138   K 3.7 4.0 3.6 4.3   CO2 28  --  23 26   CL 99  --  103 103   BUN 20  --  19 18   CREATININE 1.1  --  1.0 1.0   ALKPHOS 76  --  80 87   ALT 16  --  21 21   AST 24  --  31 29   BILITOT 0.9  0.9  --  0.9  0.9 1.1*  1.1*   MG 2.3  --  2.1 2.2   PHOS 3.8  --  3.6 4.2     Estimated Creatinine Clearance: 82.1 mL/min (based on SCr of 1 mg/dL).    .No results for input(s): CPK, TROPONINI, MB, BNP in the last 168 hours.      Diagnostic Results:  Ejection Fractions   No results found for: EF     Infectious disease labs:  Microbiology Results (last 7 days)     ** No results found for the last 168 hours. **          Assessment and Plan:  Blaine Multani is a 69 y.o. gentleman here with NSTEMI and s/p high risk PCI to LM, LAD, LCx with impella support on 6/26.     Ischemic Cardiomyopathy   Pt with CAD s/p CABG in 1998 all grafts occluded. Transferred here with NSTEMI and high risk PCI s/p LM, LAD, LCx PCI with impella support on 6/26 with good result. EF 20%. He is currently on GDMT aside from ARNI/ACEi/ARB because there was difficulty weaning from the impella. Will plan to start entresto as an outpatient. Also with lifevest which was malfunctioning. Life vest rep to come replace device today.     Dyslipidemia   Continue high intensity atorvastatin   Should obtain a lipid panel     Hypertension  Blood pressure is better controlled today.  Continue with oral medications.     Peripheral arterial disease  On GDMT    Paroxysmal atrial fibrillation  Continue BB  Will need to be back on coumadin when anemia improves.   Maybe a watchman candidate.    Dental  Abscess  Continue antibiotics    Depression  Continue lexapro    F/U in 2 weeks with Dr. Josh Cruz.     Rex Lincoln MD  Interventional Cardiology Fellow  Pager [phone]

## 2020-07-05 NOTE — ASSESSMENT & PLAN NOTE
[id number] of 3. Conferring a stroke risk of 3.2% per year  S/p impella removal. plts slowly dropping since admit. 172--> 78  Holding warfarin today, d/c'd heparin gtt  F/u LUCIO panel  Thrombocytopenia improving    Plan:  - Control with coreg  - Anticoagulation with Eliquis  - Telemetry  - Maintain K > 4, Mg > 2, Ca wnl

## 2020-07-05 NOTE — NURSING
Received and acknowledged discharge instructions ordered at 1340 hours.  Ordered to hold discharged for completion of blood transfuion (I unit PRBCs) and follow-up CBC.  Provided a copy of AVS to patient.  Patient received bedside delivery for Clopidogrel, Eloquis, hydralazine, and spironolactone.  Discharge instructions explained to patient.   Discontinued PIV in left upper arm, tip intact.  Removed telemetry .  Informed patient of future follow-up appointments.  Administered all ordered medications.  Explained medication regime to include new, continued, and discontinued medications.  Patient informed when next dose is due for all medications.  MIS not completed.  Discussed when to call the doctor. Discussed heart failure tips, diagnosing heart failure, treatment plan, diet, procedures, tracking weight, signs of a flare-up to include:  swelling, shortness of breath dizziness, chest pain and cough.  Heart Failure, COVID-19, and Eloquis education completed. Patient acknowledged understanding of all instructions.  Will continue to monitor patient until off unit.

## 2020-07-05 NOTE — NURSING
Patient referred to PCP for follow-up of lump.  Bedside secured wheelchair and escorted patient to front of building where family will pick him up by his report.

## 2020-07-06 ENCOUNTER — PATIENT OUTREACH (OUTPATIENT)
Dept: ADMINISTRATIVE | Facility: CLINIC | Age: 70
End: 2020-07-06

## 2020-07-06 NOTE — PLAN OF CARE
Pt transported home by wife (Belinda Multani). Pt discharged on 7/5/2020 in private vehicle.       07/06/20 1141   Final Note   Assessment Type Final Discharge Note   Anticipated Discharge Disposition Home  (Home with spouse (Belinda Multani))   What phone number can be called within the next 1-3 days to see how you are doing after discharge? [phone]   Hospital Follow Up  Appt(s) scheduled? Yes   Discharge plans and expectations educations in teach back method with documentation complete? Yes   Post-Acute Status   Post-Acute Authorization Other   Other Status See Comments  (See AVS for f/u appts)   Discharge Delays None known at this time       Selvin Lockhart LMSW  [facility] -  Dept  [phone]

## 2020-07-06 NOTE — PATIENT INSTRUCTIONS
Discharge Instructions for Heart Attack  You have had a heart attack. Also known as acute myocardial infarction, or AMI, a heart attack occurs when a vessel supplying the heart with blood suddenly becomes blocked. Follow these guidelines for home care and lifestyle changes.  Home Care  Take your medications exactly as directed. Dont skip doses.  Remember that recovery after a heart attack takes time. Plan to rest for at least 4-8 weeks while you recover. Then return to normal activity when your doctor says its okay.  Ask your doctor about joining a heart rehabilitation program.  Tell your doctor if you are feeling depressed. Feelings of sadness are common after a heart attack, but it is important that you speak to someone if you are feeling overwhelmed by these feelings.  If you are having chest pain, call 911 for an ambulance. Do NOT drive yourself to the hospital.  Ask your family members to learn CPR.  Learn to take your own blood pressure and pulse. Keep a record of your results. Ask your doctor when you should seek emergency medical attention. He or she will tell you which blood pressure reading is dangerous.  Lifestyle Changes  Maintain a healthy weight. Get help to lose any extra pounds.  Cut back on salt.  Limit canned, dried, packaged, and fast foods.  Dont add salt to your food.  Season foods with herbs instead of salt when you cook.  Break the smoking habit. Enroll in a stop-smoking program to improve your chances of success.  Limit fatty foods.  Check your lipid levels regularly. (Your doctor can show you how to do this.)  Build up your activity according to your doctors recommendation.  Ask your doctor when its okay to resume sexual activity.  Tell your doctor about any erectile dysfunction (ED) medication you are taking. Some ED medications are not safe if you take certain heart medications.  Try to manage stress.  Follow-Up  Make a follow-up appointment as directed by our staff.    When to Seek  Medical Attention  Call 911 right away if you have:  Chest pain that is not relieved by medication.  Shortness of breath.  Otherwise, call your doctor immediately if you have:  Lightheadedness, dizziness, or fainting.  Feeling of irregular heartbeat or fast pulse.   © 2903-7104 [employer], [address] [place] [address]. All rights reserved. This information is not intended as a substitute for professional medical care. Always follow your healthcare professional's instructions.

## 2020-07-07 ENCOUNTER — NURSE TRIAGE (OUTPATIENT)
Dept: ADMINISTRATIVE | Facility: CLINIC | Age: 70
End: 2020-07-07

## 2020-07-07 NOTE — TELEPHONE ENCOUNTER
Patient populated to Covid 19 symptom tracker call back   Patient stated he texted yes to text message because he has a cough and SOB at night  Denies fever  Patient had NSTEMI on June 25 and is at home recovering  Patient tested 3 times for Covid in last week and negative  Patient stated he has a follow up appointment with family MD tomorrow and will let him know about the cough with clear sputum  Denies any other complaints  Patient has access to OOC and 911 for emergencies    Reason for Disposition   Dry (non-productive) cough (i.e., no sputum or minimal clear sputum)   Cough with cold symptoms (e.g., runny nose, postnasal drip, throat clearing)    Additional Information   Negative: Severe difficulty breathing (e.g., struggling for each breath, speaks in single words)   Negative: Bluish (or gray) lips or face now   Negative: [1] Difficulty breathing AND [2] exposure to flames, smoke, or fumes   Negative: [1] Stridor AND [2] difficulty breathing   Negative: Sounds like a life-threatening emergency to the triager   Negative: [1] Previous asthma attacks AND [2] this feels like asthma attack    Protocols used: COUGH - ACUTE [id number]-H-QO, COUGH - ACUTE NON-PRODUCTIVE-A-AH

## 2020-07-08 ENCOUNTER — TELEPHONE (OUTPATIENT)
Dept: CARDIAC REHAB | Facility: CLINIC | Age: 70
End: 2020-07-08

## 2020-07-08 LAB — METHYLMALONATE SERPL-SCNC: 0.65 UMOL/L

## 2020-07-09 ENCOUNTER — OFFICE VISIT (OUTPATIENT)
Dept: CARDIOLOGY | Facility: CLINIC | Age: 70
End: 2020-07-09
Payer: MEDICARE

## 2020-07-09 VITALS
HEART RATE: 54 BPM | WEIGHT: 213.19 LBS | OXYGEN SATURATION: 98 % | SYSTOLIC BLOOD PRESSURE: 139 MMHG | HEIGHT: 71 IN | DIASTOLIC BLOOD PRESSURE: 83 MMHG | BODY MASS INDEX: 29.85 KG/M2

## 2020-07-09 DIAGNOSIS — I10 ESSENTIAL HYPERTENSION: ICD-10-CM

## 2020-07-09 DIAGNOSIS — I25.5 CARDIOMYOPATHY, ISCHEMIC: ICD-10-CM

## 2020-07-09 DIAGNOSIS — I73.9 PAD (PERIPHERAL ARTERY DISEASE): Primary | ICD-10-CM

## 2020-07-09 DIAGNOSIS — D53.9 MACROCYTIC ANEMIA: ICD-10-CM

## 2020-07-09 DIAGNOSIS — I50.42 CHRONIC COMBINED SYSTOLIC AND DIASTOLIC CONGESTIVE HEART FAILURE: ICD-10-CM

## 2020-07-09 DIAGNOSIS — E78.5 DYSLIPIDEMIA: ICD-10-CM

## 2020-07-09 DIAGNOSIS — I48.0 PAROXYSMAL ATRIAL FIBRILLATION: ICD-10-CM

## 2020-07-09 PROCEDURE — 1126F AMNT PAIN NOTED NONE PRSNT: CPT | Mod: S$GLB,,, | Performed by: INTERNAL MEDICINE

## 2020-07-09 PROCEDURE — 99214 PR OFFICE/OUTPT VISIT, EST, LEVL IV, 30-39 MIN: ICD-10-PCS | Mod: S$GLB,,, | Performed by: INTERNAL MEDICINE

## 2020-07-09 PROCEDURE — 1159F PR MEDICATION LIST DOCUMENTED IN MEDICAL RECORD: ICD-10-PCS | Mod: S$GLB,,, | Performed by: INTERNAL MEDICINE

## 2020-07-09 PROCEDURE — 3079F DIAST BP 80-89 MM HG: CPT | Mod: CPTII,S$GLB,, | Performed by: INTERNAL MEDICINE

## 2020-07-09 PROCEDURE — 3075F SYST BP GE 130 - 139MM HG: CPT | Mod: CPTII,S$GLB,, | Performed by: INTERNAL MEDICINE

## 2020-07-09 PROCEDURE — 3079F PR MOST RECENT DIASTOLIC BLOOD PRESSURE 80-89 MM HG: ICD-10-PCS | Mod: CPTII,S$GLB,, | Performed by: INTERNAL MEDICINE

## 2020-07-09 PROCEDURE — 1159F MED LIST DOCD IN RCRD: CPT | Mod: S$GLB,,, | Performed by: INTERNAL MEDICINE

## 2020-07-09 PROCEDURE — 99214 OFFICE O/P EST MOD 30 MIN: CPT | Mod: S$GLB,,, | Performed by: INTERNAL MEDICINE

## 2020-07-09 PROCEDURE — 3008F BODY MASS INDEX DOCD: CPT | Mod: CPTII,S$GLB,, | Performed by: INTERNAL MEDICINE

## 2020-07-09 PROCEDURE — 1126F PR PAIN SEVERITY QUANTIFIED, NO PAIN PRESENT: ICD-10-PCS | Mod: S$GLB,,, | Performed by: INTERNAL MEDICINE

## 2020-07-09 PROCEDURE — 99999 PR PBB SHADOW E&M-EST. PATIENT-LVL IV: CPT | Mod: PBBFAC,,, | Performed by: INTERNAL MEDICINE

## 2020-07-09 PROCEDURE — 3008F PR BODY MASS INDEX (BMI) DOCUMENTED: ICD-10-PCS | Mod: CPTII,S$GLB,, | Performed by: INTERNAL MEDICINE

## 2020-07-09 PROCEDURE — 3075F PR MOST RECENT SYSTOLIC BLOOD PRESS GE 130-139MM HG: ICD-10-PCS | Mod: CPTII,S$GLB,, | Performed by: INTERNAL MEDICINE

## 2020-07-09 PROCEDURE — 99999 PR PBB SHADOW E&M-EST. PATIENT-LVL IV: ICD-10-PCS | Mod: PBBFAC,,, | Performed by: INTERNAL MEDICINE

## 2020-07-09 RX ORDER — ISOSORBIDE DINITRATE 40 MG/1
40 TABLET ORAL 3 TIMES DAILY
Qty: 90 TABLET | Refills: 11 | Status: SHIPPED | OUTPATIENT
Start: 2020-07-09 | End: 2021-08-13

## 2020-07-10 ENCOUNTER — TELEPHONE (OUTPATIENT)
Dept: CARDIAC REHAB | Facility: CLINIC | Age: 70
End: 2020-07-10

## 2020-07-10 DIAGNOSIS — Z98.61 POSTSURGICAL PERCUTANEOUS TRANSLUMINAL CORONARY ANGIOPLASTY STATUS: Primary | ICD-10-CM

## 2020-07-10 DIAGNOSIS — I25.10 ATHEROSCLEROSIS OF NATIVE CORONARY ARTERY OF NATIVE HEART WITHOUT ANGINA PECTORIS: ICD-10-CM

## 2020-07-10 PROBLEM — I73.9 CLAUDICATION: Status: RESOLVED | Noted: 2017-11-09 | Resolved: 2020-07-10

## 2020-07-10 NOTE — PROGRESS NOTES
Subjective:    Patient ID:  Blaine Multani is a 69 y.o. male who presents for follow-up of Coronary Artery Disease and Peripheral Arterial Disease      HPI  Mr. Grayson is a 70 y/o gentleman who [place] [facility] [place] [facility] after presenting there with an NSTEMI.  He underwent coronary angiography there that demonstrated mutli-vessel CAD and occlusion of all of his coronary artery bypass grafts including his LIMA to LAD.  His LVEF was in the 15-20% range. Additionally he has severe PAD and is s/p bilateral iliac stents as well as having bilateral fem-pop bypasses which are occluded and PTAS of the left SFA. He also has severe infra-popliteal PAD.  The patient was turned down for redo-CABG.  On 6/26/20 he underwent multi-vessel PCI with Impella CP support.  The Impella was placed via percutaneous access of the left axillary artery due to the patient's severe LE PAD.  It was weaned over several days after the PCI.  Today the patient denies angina.  However he reports that since his hospital discharge he has felt weak all over his body.  He reports shortness of breath with walking feet 50 feet. He has gained 4 lbs since discharge and reports new 2 pillow orthopnea as well as daily PND. He also reports new onset dependent bilateral LE edema. He was not experiencing these symptoms in the hospital. He denies palpitations, syncope, or near syncope.  He reports good medication compliance except that since hospital discharhe he has been unable to get his Isordil filled.    Past Medical History:   Diagnosis Date    A-fib     Asthma     Bronchitis     Colon polyps     Hemorrhoids     HLD (hyperlipidemia)     Hypertension     PVD (peripheral vascular disease)      Past Surgical History:   Procedure Laterality Date    angiogram with stents Left     leg    ARTERIOGRAPHY OF SUBCLAVIAN ARTERY  6/26/2020    Procedure: Arteriogram, Subclavian;  Surgeon: Bruno Cruz MD;  Location: [facility] CATH LAB;  Service:  Cardiology;;    CHOLECYSTECTOMY      COLONOSCOPY W/ POLYPECTOMY      CORONARY ARTERY BYPASS GRAFT  1998    x3    INSERTION OF INTRAVASCULAR MICROAXIAL BLOOD PUMP N/A 6/26/2020    Procedure: INSERTION, IMPELLA;  Surgeon: Bruno Cruz MD;  Location: [facility] CATH LAB;  Service: Cardiology;  Laterality: N/A;    LEFT HEART CATHETERIZATION Left 6/26/2020    Procedure: Left heart cath;  Surgeon: Bruno Cruz MD;  Location: [facility] CATH LAB;  Service: Cardiology;  Laterality: Left;    PLACEMENT OF SWAN DONTRELL CATHETER WITH IMAGING GUIDANCE  6/26/2020    Procedure: INSERTION, CATHETER, SWAN-DONTRELL, WITH IMAGING GUIDANCE;  Surgeon: Bruno Cruz MD;  Location: [facility] CATH LAB;  Service: Cardiology;;    PLACEMENT OF SWAN DONTRELL CATHETER WITH IMAGING GUIDANCE  6/30/2020    Procedure: INSERTION, CATHETER, SWAN-DONTRELL, WITH IMAGING GUIDANCE;  Surgeon: Bruno Cruz MD;  Location: [facility] CATH LAB;  Service: Cardiology;;    RIGHT HEART CATHETERIZATION Right 6/26/2020    Procedure: INSERTION, CATHETER, RIGHT HEART;  Surgeon: Bruno Cruz MD;  Location: [facility] CATH LAB;  Service: Cardiology;  Laterality: Right;    TOTAL HIP ARTHROPLASTY Right      Current Outpatient Medications on File Prior to Visit   Medication Sig Dispense Refill    apixaban (ELIQUIS) 5 mg Tab Take 1 tablet (5 mg total) by mouth 2 (two) times daily. 60 tablet 2    aspirin (ECOTRIN) 81 MG EC tablet Take 1 tablet (81 mg total) by mouth once daily.  0    carvediloL (COREG) 25 MG tablet Take 0.5 tablets (12.5 mg total) by mouth 2 (two) times daily with meals. 365 tablet 0    clopidogreL (PLAVIX) 75 mg tablet Take 1 tablet (75 mg total) by mouth once daily. 30 tablet 11    furosemide (LASIX) 20 MG tablet Take 2 tablets (40 mg total) by mouth once daily. 60 tablet 11    hydrALAZINE (APRESOLINE) 100 MG tablet Take 1 tablet (100 mg total) by mouth 3 (three) times daily. 90 tablet 11    rosuvastatin (CRESTOR) 40 MG Tab Take 40 mg by mouth  every evening.       sertraline (ZOLOFT) 50 MG tablet Take 50 mg by mouth every evening.       spironolactone (ALDACTONE) 25 MG tablet Take 1 tablet (25 mg total) by mouth once daily. 30 tablet 11    SYMBICORT 160-4.5 mcg/actuation HFAA Inhale 2 puffs into the lungs every 12 (twelve) hours.       benzonatate (TESSALON) 200 MG capsule Take 200 mg by mouth 3 (three) times daily as needed.      famotidine (PEPCID) 20 MG tablet Take 20 mg by mouth once daily.      omeprazole (PRILOSEC) 40 MG capsule Take 40 mg by mouth once daily.       No current facility-administered medications on file prior to visit.      Review of patient's allergies indicates:  No Known Allergies  Social History     Tobacco Use    Smoking status: Former Smoker     Quit date:      Years since quittin.5    Smokeless tobacco: Never Used   Substance Use Topics    Alcohol use: Not Currently     Alcohol/week: 12.0 standard drinks     Types: 12 Cans of beer per week    Drug use: No     Family History   Problem Relation Age of Onset    Cancer Mother     Cancer Sister        Review of Systems   Constitution: Negative for decreased appetite, diaphoresis, fever, malaise/fatigue, weight gain and weight loss.   HENT: Negative for congestion, nosebleeds and sore throat.    Eyes: Negative for blurred vision, vision loss in left eye, vision loss in right eye and visual disturbance.   Cardiovascular: Positive for dyspnea on exertion, leg swelling, orthopnea and paroxysmal nocturnal dyspnea. Negative for chest pain, claudication, near-syncope, palpitations and syncope.   Respiratory: Negative for cough, hemoptysis, shortness of breath and wheezing.    Endocrine: Negative for polyuria.   Hematologic/Lymphatic: Does not bruise/bleed easily.   Skin: Negative for nail changes and rash.   Musculoskeletal: Negative for back pain, muscle cramps and myalgias.   Gastrointestinal: Negative for abdominal pain, change in bowel habit, diarrhea, heartburn,  "hematemesis, hematochezia, melena, nausea and vomiting.   Genitourinary: Negative for bladder incontinence, dysuria, frequency and hematuria.   Psychiatric/Behavioral: Negative for depression.   Allergic/Immunologic: Negative for hives.        Objective:  Vitals:    07/09/20 1310 07/09/20 1314   BP: 136/68 139/83   BP Location: Left arm Right arm   Patient Position: Sitting Sitting   BP Method: Large (Automatic) Large (Automatic)   Pulse: 69 (!) 54   SpO2: 98%    Weight: 96.7 kg (213 lb 3 oz)    Height: 5' 11" (1.803 m)          Physical Exam   Constitutional: He is oriented to person, place, and time. He appears well-developed and well-nourished.   HENT:   Head: Normocephalic and atraumatic.   Eyes: Pupils are equal, round, and reactive to light. EOM are normal.   Neck: Neck supple. JVD present. No thyromegaly present.   Cardiovascular: Normal rate, regular rhythm and normal heart sounds. PMI is displaced. Exam reveals no gallop and no friction rub.   No murmur heard.  Pulses:       Carotid pulses are 2+ on the right side and 2+ on the left side.       Radial pulses are 2+ on the right side and 2+ on the left side.        Femoral pulses are 2+ on the right side and 2+ on the left side.       Dorsalis pedis pulses are 2+ on the right side and 2+ on the left side.        Posterior tibial pulses are 2+ on the right side and 2+ on the left side.   Pulmonary/Chest: Effort normal and breath sounds normal. He has no wheezes. He has no rhonchi. He has no rales.   Abdominal: Soft. Normal appearance and bowel sounds are normal. He exhibits no distension. There is no hepatosplenomegaly. There is no abdominal tenderness.   Musculoskeletal:         General: Edema present.   Neurological: He is alert and oriented to person, place, and time. Gait normal.   Skin: Skin is warm and dry. No rash noted. No erythema.   Psychiatric: He has a normal mood and affect.         Assessment:       1. Cardiomyopathy, ischemic    2. Chronic " combined systolic and diastolic congestive heart failure    3. PAD (peripheral artery disease)    4. Dyslipidemia    5. Essential hypertension    6. Paroxysmal atrial fibrillation    7. Macrocytic anemia         Plan:       1) Ischemic cardiomyopathy.  The patient reports NYHA Class III symptoms; he has gained 4 lbs since hospital discharge  -discussed the importance of a low sodium heart healthy diet  -patient agreed to continue weighing himself daily  -he will increase his lasix to 80mg po  daily until his weight gets back to 206lbs and then resume 40mg po qday  -continue Lifevest  -reassess LVEF in 90 days after PCI  -continue hydralaxine 100mg po TID  -continue Isordil 40mg po TID; will send prescription to [facility] for patient to get today  -continue spironolactone 25mg po qday  -continue COreg 25mg po BID  -patient may beneift from Entresto in future    2) CAD.  The patient underwent successful multi-vessel PCI with Impella support; he is free of angina  -continue EC ASA 81mg poq day  -continue Plavix 75mg poq day X 1 year minimum  -start phase 2 cardiac rehab    3) HTN. Blood pressure adequately controlled in clinic today; continue above medications    4) Dyslipidemia. 7/4/20 lipid panel reviewed; Continue Crestor 40mg po qday    5) PAD.  The patient denies claudication or CLI symptoms at this time  -will refer patient to podiatry for high risk feet    6) PAF. Patient is on Eliquis; rec stopping aspirin 1 month after PCI or referral for [facility] to eventually be able to stop Eliquis    7) H/o GERD. Continue PPI while on DAPT    All of the patient's questions were answered.

## 2020-07-10 NOTE — TELEPHONE ENCOUNTER
Spoke to pt regarding scheduling of cardiac rehab pre-testing including COVID-19 screening. Pt was instructed to go to urgent care in [place] on Monday 7/13 for his COVID screening. Instructed pt that it is a first come first serve basis and it is necessary before stress test. Pt verbalized understanding of all pre-testing scheduling.

## 2020-07-13 ENCOUNTER — NURSE TRIAGE (OUTPATIENT)
Dept: ADMINISTRATIVE | Facility: CLINIC | Age: 70
End: 2020-07-13

## 2020-07-13 ENCOUNTER — LAB VISIT (OUTPATIENT)
Dept: URGENT CARE | Facility: CLINIC | Age: 70
End: 2020-07-13
Payer: MEDICARE

## 2020-07-13 VITALS — OXYGEN SATURATION: 99 % | TEMPERATURE: 98 F | HEART RATE: 69 BPM

## 2020-07-13 DIAGNOSIS — Z98.61 POSTSURGICAL PERCUTANEOUS TRANSLUMINAL CORONARY ANGIOPLASTY STATUS: ICD-10-CM

## 2020-07-13 DIAGNOSIS — I25.10 ATHEROSCLEROSIS OF NATIVE CORONARY ARTERY OF NATIVE HEART WITHOUT ANGINA PECTORIS: ICD-10-CM

## 2020-07-13 PROCEDURE — U0003 INFECTIOUS AGENT DETECTION BY NUCLEIC ACID (DNA OR RNA); SEVERE ACUTE RESPIRATORY SYNDROME CORONAVIRUS 2 (SARS-COV-2) (CORONAVIRUS DISEASE [COVID-19]), AMPLIFIED PROBE TECHNIQUE, MAKING USE OF HIGH THROUGHPUT TECHNOLOGIES AS DESCRIBED BY CMS-2020-01-R: HCPCS

## 2020-07-13 NOTE — TELEPHONE ENCOUNTER
RN contacted pt through the Post Procedural Symptom Tracker for Day 13.  Pt stated that he is asymptomatic and doing well.  No additional action needed at this time per protocol.      Additional Information   Negative: [1] Caller is not with the adult (patient) AND [2] reporting urgent symptoms   Negative: Lab result questions   Negative: Medication questions   Negative: Caller can't be reached by phone   Negative: Caller has already spoken to PCP or another triager   Negative: RN needs further essential information from caller in order to complete triage   Negative: Requesting regular office appointment   Negative: [1] Caller requesting NON-URGENT health information AND [2] PCP's office is the best resource   Negative: Health Information question, no triage required and triager able to answer question   Negative: General information question, no triage required and triager able to answer question   Negative: Question about upcoming scheduled test, no triage required and triager able to answer question   Negative: [1] Caller is not with the adult (patient) AND [2] probable NON-URGENT symptoms   Negative: [1] Follow-up call to recent contact AND [2] information only call, no triage required    Protocols used: INFORMATION ONLY CALL-A-

## 2020-07-14 LAB — SARS-COV-2 RNA RESP QL NAA+PROBE: NOT DETECTED

## 2020-07-16 ENCOUNTER — HOSPITAL ENCOUNTER (OUTPATIENT)
Dept: CARDIOLOGY | Facility: HOSPITAL | Age: 70
Discharge: HOME | End: 2020-07-16
Attending: INTERNAL MEDICINE
Payer: MEDICARE

## 2020-07-16 VITALS
BODY MASS INDEX: 28.86 KG/M2 | WEIGHT: 206.13 LBS | HEART RATE: 61 BPM | SYSTOLIC BLOOD PRESSURE: 131 MMHG | DIASTOLIC BLOOD PRESSURE: 73 MMHG | HEIGHT: 71 IN

## 2020-07-16 DIAGNOSIS — Z98.61 POSTSURGICAL PERCUTANEOUS TRANSLUMINAL CORONARY ANGIOPLASTY STATUS: ICD-10-CM

## 2020-07-16 DIAGNOSIS — I25.10 ATHEROSCLEROSIS OF NATIVE CORONARY ARTERY OF NATIVE HEART WITHOUT ANGINA PECTORIS: ICD-10-CM

## 2020-07-16 LAB
CV STRESS BASE HR: 61 BPM
DIASTOLIC BLOOD PRESSURE: 73 MMHG
OHS CV CPX 1 MINUTE RECOVERY HEART RATE: 72 BPM
OHS CV CPX 85 PERCENT MAX PREDICTED HEART RATE MALE: 128
OHS CV CPX ABDOMINAL GIRTH: 41.6 CM
OHS CV CPX DATA GRADE - PEAK: 2
OHS CV CPX DATA O2 SAT - PEAK: 94
OHS CV CPX DATA O2 SAT - REST: 99
OHS CV CPX DATA SPEED - PEAK: 2.1
OHS CV CPX DATA TIME - PEAK: 3.27
OHS CV CPX DATA VE/VCO2 - PEAK: 59
OHS CV CPX DATA VE/VO2 - PEAK: 52
OHS CV CPX DATA VO2 - PEAK: 7.4
OHS CV CPX DATA VO2 - REST: 3.6
OHS CV CPX ESTIMATED METS: 3
OHS CV CPX FEV1/FVC: 0.69
OHS CV CPX FORCED EXPIRATORY VOLUME: 1.6
OHS CV CPX FORCED VITAL CAPACITY (FVC): 2.31
OHS CV CPX HIGHEST VO: 24.9
OHS CV CPX MAX PREDICTED HEART RATE: 151
OHS CV CPX MAXIMAL VOLUNTARY VENTILATION (MVV) PREDICTED: 64
OHS CV CPX MAXIMAL VOLUNTARY VENTILATION (MVV): 47
OHS CV CPX MAXIUMUM EXERCISE VENTILATION (VE MAX): 23.2
OHS CV CPX PATIENT AGE: 69
OHS CV CPX PATIENT HEIGHT IN: 71
OHS CV CPX PATIENT IS FEMALE AGE 11-19: 0
OHS CV CPX PATIENT IS FEMALE AGE GREATER THAN 19: 0
OHS CV CPX PATIENT IS FEMALE AGE LESS THAN 11: 0
OHS CV CPX PATIENT IS FEMALE: 0
OHS CV CPX PATIENT IS MALE AGE 11-25: 0
OHS CV CPX PATIENT IS MALE AGE GREATER THAN 25: 1
OHS CV CPX PATIENT IS MALE AGE LESS THAN 11: 0
OHS CV CPX PATIENT IS MALE GREATER THAN 18: 1
OHS CV CPX PATIENT IS MALE LESS THAN OR EQUAL TO 18: 0
OHS CV CPX PATIENT IS MALE: 1
OHS CV CPX PATIENT WEIGHT RETURNED IN OZ: 3298.08
OHS CV CPX PEAK DIASTOLIC BLOOD PRESSURE: 66 MMHG
OHS CV CPX PEAK HEAR RATE: 82 BPM
OHS CV CPX PEAK RATE PRESSURE PRODUCT: 8364
OHS CV CPX PEAK SYSTOLIC BLOOD PRESSURE: 102 MMHG
OHS CV CPX PERCENT BODY FAT: 16.5
OHS CV CPX PERCENT MAX PREDICTED HEART RATE ACHIEVED: 54
OHS CV CPX PREDICTED VO2: 24.9 ML/KG/MIN
OHS CV CPX RATE PRESSURE PRODUCT PRESENTING: 7991
OHS CV CPX REST PET CO2: 26
OHS CV CPX VE/VCO2 SLOPE: 46.8
STRESS ECHO POST EXERCISE DUR MIN: 3 MINUTES
STRESS ECHO POST EXERCISE DUR SEC: 16 SECONDS
SYSTOLIC BLOOD PRESSURE: 131 MMHG

## 2020-07-16 PROCEDURE — 94621 CARDIOPULM EXERCISE TESTING: CPT | Mod: 26,,, | Performed by: INTERNAL MEDICINE

## 2020-07-16 PROCEDURE — 94621 CARDIOPULM EXERCISE TESTING: CPT

## 2020-07-16 PROCEDURE — 94621 CARDIOPULMONARY EXERCISE TESTING (CUPID ONLY): ICD-10-PCS | Mod: 26,,, | Performed by: INTERNAL MEDICINE

## 2020-07-16 NOTE — PROGRESS NOTES
HISTORY: S/P PTCA/STENT (6-), CAD, CHF, HTN, DLP, PAD, PARO A. FIB, HX OF NSTEMI, EF=20%(6-)    ANTHROPOMETRICS:     PRE   Abdominal girth (in) 41.6   Height (in) 71   Weight (lbs) 206   BMI 28.7   % Body Fat 16.5%       EXERCISE RESULTS:     PRE   Peak VO2 (CPX only) 7.4   Actual METS (CPX only) 2.11   Estimated METS 3.0       LAB RESULTS:    Lab Results   Component Value Date    MPV 10.1 07/16/2020       Lab Results   Component Value Date    CHOL 111 (L) 07/16/2020     Lab Results   Component Value Date    HDL 36 (L) 07/16/2020     Lab Results   Component Value Date    LDLCALC 61.4 (L) 07/16/2020     Lab Results   Component Value Date    TRIG 68 07/16/2020     Lab Results   Component Value Date    CHOLHDL 32.4 07/16/2020       Lab Results   Component Value Date    GLUF 104 07/16/2020     Lab Results   Component Value Date    HGBA1C 5.4 06/26/2020        Lab Results   Component Value Date    HSCRP 7.34 (H) 07/16/2020         ANA LILIA SCORES:     PRE   Anxiety 4   Depression 4   Somatic 4   Hostility 2     SF-36 SCORES:     PRE   Physical Function 16   Social Function 5   Mental Health 18   Pain 4   Change in Health 1   Physical Role Limitation 0   Mental Role Limitation 1   Energy/Fatigue 11   Health Perceptions 13   Total Score 69     PHQ-9:     PRE   PHQ-9 9       EDUCATION SCORES:     PRE   Education Score 50

## 2020-07-20 ENCOUNTER — TELEPHONE (OUTPATIENT)
Dept: CARDIAC REHAB | Facility: CLINIC | Age: 70
End: 2020-07-20

## 2020-07-21 ENCOUNTER — CLINICAL SUPPORT (OUTPATIENT)
Dept: CARDIAC REHAB | Facility: CLINIC | Age: 70
End: 2020-07-21
Payer: MEDICARE

## 2020-07-21 VITALS
RESPIRATION RATE: 18 BRPM | SYSTOLIC BLOOD PRESSURE: 104 MMHG | DIASTOLIC BLOOD PRESSURE: 64 MMHG | HEART RATE: 63 BPM | OXYGEN SATURATION: 98 %

## 2020-07-21 DIAGNOSIS — Z98.61 POSTSURGICAL PERCUTANEOUS TRANSLUMINAL CORONARY ANGIOPLASTY STATUS: ICD-10-CM

## 2020-07-21 DIAGNOSIS — I25.10 CORONARY ARTERY DISEASE INVOLVING NATIVE CORONARY ARTERY OF NATIVE HEART WITHOUT ANGINA PECTORIS: ICD-10-CM

## 2020-07-21 PROCEDURE — 99999 PR PBB SHADOW E&M-EST. PATIENT-LVL III: ICD-10-PCS | Mod: PBBFAC,,,

## 2020-07-21 PROCEDURE — 99999 PR PBB SHADOW E&M-EST. PATIENT-LVL III: CPT | Mod: PBBFAC,,,

## 2020-07-21 NOTE — PROGRESS NOTES
Patient here for Phase II Cardiac Rehab orientation.     /64 (BP Location: Left arm, Patient Position: Standing, BP Method: Medium (Manual))   Pulse 63   Resp 18   SpO2 98%     ASSESSMENT:  Heart Sounds: regularly irregular rhythm  Prosthetic Valve: No  Lung Sounds: clear to auscultation bilaterally  Capillary Refill: normal  Left Radial Pulse: Normal (+2)  Right Radial Pulse: Normal (+2)  Left Pedal Pulse: Weak (1+)  Right Pedal Pulse: Weak (1+)  Right Edema: Trace  Left Edema Trace  Strength: good  Range of Motion: full range of motion  Existing Limitations:    Site   [x] Arthritis, bursitis hip   [] Amputation, atrophy    [] Other:    []     Callus -  Bilateral feet.  Discoloration noted to bilateral lower extremities along with very dry skin.  Patient reports to have discomfort when walking.     QOL:  Discussed Neisha, SF36, and PHQ-9 Questionnaire scores with patient.  Patient denies any overwhelming stress or anxiety.  He states that he is currently not working & has feelings of not contributing.  Patient has been instructed to notify staff in the event that circumstances worsen.  Patient verbalizes understanding.    RISK FACTORS:  The following risk factors are present:  hyperlipidemia, hypertension, positive family history, sedentary lifestyle, stress    Tobacco Use:  [x] Non-smoker   [] Tobacco use in last 6 months - cigarettes, cigars, cigarillos, chew tobacco, and e-cigarettes   [] Education and counseling   [] Referral to smoking cessation   [] MD notified   [] [employer]      GOALS:  Patient has set the following goals:  Decrease cholesterol level  Increase exercise tolerance  Increase knowledge of CAD  Weight loss  Demonstrate accurate pulse taking  ID & manage personal areas of stress  Learn more about healthy eating    Discussed Cardiac Rehab program in depth with patient.  Medication list updated per patient & marked as reviewed.  Patient has been instructed to notify staff of any  problems while attending rehab (ie: chest pain, shortness of breath, lightheadedness, dizziness).  Patient has been instructed to monitor blood pressure readings outside of rehab & to keep a daily log of the readings.  Patient verbalizes understanding.    Jael Castro RN  Cardiac Rehab Nurse

## 2020-07-21 NOTE — PROGRESS NOTES
Exercise:  I met with Mr. Valladares for orientation to Phase II cardiac rehab.  All consent forms were signed, entry cardiorespiratory exercise stress test (CPX) results were reviewed, proper attire and shoes were discussed, and range of motion with strength assessment was performed.         An estimated MET Level of 3.0 , a Peak VO2 of 7.4ml/kg/min, and actual METs of 2.11 were measured during entry CPX.  These results place the patient in the high risk category. The entry CPX test results also included weight (206 lb), abdominal girth (41.6 in), BMI (28.7), and body composition (16.5%).   At exit, an estimated MET Level of 3.9 will be desired to achieve the goal of a 30% improvement.     Based on the entry CPX test, the target heart range during aerobic exercise for Mr. Valladares is rest + 20 bpm.      Mr. Valladares will attend cardiac rehab class 3 times per week which will include aerobic exercise, resistance training, and stretching that will be modified to fit limitations.  Aerobic exercise will be 30-60 minutes per day with a goal intensity of 12-15 on the Rate of Perceived Exertion (RPE) scale.  Resistance training will incorporate 1 to 2 sets of 10 to 15 repetitions with free weights.  A beginning weight of 5 pounds will be used based on strength and range of motion assessment.    There were some limitations noted by the patient due to leg claudication.  Adjustments to treadmill walking will be made according to tolerance.      He stated he is currently walking on his treadmill 2 to 3 days per week for about 10 minutes.  Continuing to participate in an aerobic exercise program was encouraged at least two additional days per week for at least 30 minutes per day outside of attending cardiac rehab class 3 days per week.  We discussed  exercise into session of no less than 10 minutes to equal 30 minutes.  Mr. Valladares stated understanding.    Mr. Valladares will begin Cardiac Rehab on Monday, July 27 at 9:00am.    The  exercise prescription will be adjusted based on tolerance of exercise intensity by patient.    Soledad Decker, MEd., CEP

## 2020-07-21 NOTE — PROGRESS NOTES
Blaine Multani, a 69 y.o. male, is here for nutrition counseling and education pertinent to his diagnosis of   1. Postsurgical percutaneous transluminal coronary angioplasty status    2. Coronary artery disease involving native coronary artery of native heart without angina pectoris        Patient will be starting Phase II Cardiac Rehabilitation.     NUTRITION ASSESSMENT:    Anthropometrics:  · Height: 70 in  · Weight: 206 lbs  · BMI:  28.75  · Abdominal girth: 41.6 inches  · Body fat: 16.5%      Drug Allergies and Intolerances:  Review of patient's allergies indicates:  No Known Allergies    Food Allergies and Intolerances:  None per pt    Past Medical History:  Past Medical History:   Diagnosis Date    A-fib     Asthma     Bronchitis     Colon polyps     Hemorrhoids     HLD (hyperlipidemia)     Hypertension     PVD (peripheral vascular disease)        Past Surgical History:  Past Surgical History:   Procedure Laterality Date    angiogram with stents Left     leg    ARTERIOGRAPHY OF SUBCLAVIAN ARTERY  6/26/2020    Procedure: Arteriogram, Subclavian;  Surgeon: Bruno Cruz MD;  Location: [facility] CATH LAB;  Service: Cardiology;;    CHOLECYSTECTOMY      COLONOSCOPY W/ POLYPECTOMY      CORONARY ARTERY BYPASS GRAFT  1998    x3    INSERTION OF INTRAVASCULAR MICROAXIAL BLOOD PUMP N/A 6/26/2020    Procedure: INSERTION, IMPELLA;  Surgeon: Bruno Cruz MD;  Location: [facility] CATH LAB;  Service: Cardiology;  Laterality: N/A;    LEFT HEART CATHETERIZATION Left 6/26/2020    Procedure: Left heart cath;  Surgeon: Bruno Cruz MD;  Location: [facility] CATH LAB;  Service: Cardiology;  Laterality: Left;    PLACEMENT OF SWAN DONTRELL CATHETER WITH IMAGING GUIDANCE  6/26/2020    Procedure: INSERTION, CATHETER, SWAN-DONTRELL, WITH IMAGING GUIDANCE;  Surgeon: Bruno Cruz MD;  Location: [facility] CATH LAB;  Service: Cardiology;;    PLACEMENT OF SWAN DONTRELL CATHETER WITH IMAGING GUIDANCE  6/30/2020    Procedure:  INSERTION, CATHETER, SWAN-DONTRELL, WITH IMAGING GUIDANCE;  Surgeon: Bruno Cruz MD;  Location: [facility] CATH LAB;  Service: Cardiology;;    RIGHT HEART CATHETERIZATION Right 6/26/2020    Procedure: INSERTION, CATHETER, RIGHT HEART;  Surgeon: Bruno Cruz MD;  Location: [facility] CATH LAB;  Service: Cardiology;  Laterality: Right;    TOTAL HIP ARTHROPLASTY Right        Medications:  Current Outpatient Medications   Medication Sig    apixaban (ELIQUIS) 5 mg Tab Take 1 tablet (5 mg total) by mouth 2 (two) times daily.    aspirin (ECOTRIN) 81 MG EC tablet Take 1 tablet (81 mg total) by mouth once daily.    benzonatate (TESSALON) 200 MG capsule Take 200 mg by mouth 3 (three) times daily as needed.    carvediloL (COREG) 25 MG tablet Take 0.5 tablets (12.5 mg total) by mouth 2 (two) times daily with meals.    clopidogreL (PLAVIX) 75 mg tablet Take 1 tablet (75 mg total) by mouth once daily.    famotidine (PEPCID) 20 MG tablet Take 20 mg by mouth once daily.    furosemide (LASIX) 20 MG tablet Take 2 tablets (40 mg total) by mouth once daily.    hydrALAZINE (APRESOLINE) 100 MG tablet Take 1 tablet (100 mg total) by mouth 3 (three) times daily.    isosorbide dinitrate (ISORDIL) 40 MG Tab Take 1 tablet (40 mg total) by mouth 3 (three) times daily.    omeprazole (PRILOSEC) 40 MG capsule Take 40 mg by mouth once daily.    rosuvastatin (CRESTOR) 40 MG Tab Take 40 mg by mouth every evening.     sertraline (ZOLOFT) 50 MG tablet Take 50 mg by mouth every evening.     spironolactone (ALDACTONE) 25 MG tablet Take 1 tablet (25 mg total) by mouth once daily.    SYMBICORT 160-4.5 mcg/actuation HFAA Inhale 2 puffs into the lungs every 12 (twelve) hours.      No current facility-administered medications for this visit.        Vitamins and Supplements:  None    Labs:  Labs reviewed with patient. Patient confirms he is taking Crestor for cholesterol control.    Lab Results   Component Value Date    CHOL 111 (L)  07/16/2020     Lab Results   Component Value Date    HDL 36 (L) 07/16/2020     Lab Results   Component Value Date    LDLCALC 61.4 (L) 07/16/2020     Lab Results   Component Value Date    TRIG 68 07/16/2020     Lab Results   Component Value Date    CHOLHDL 32.4 07/16/2020         Lab Results   Component Value Date    GLUF 104 07/16/2020     Lab Results   Component Value Date    HGBA1C 5.4 06/26/2020       Nutrition/Diet History:  Patient eats 3 meals daily.  Seasons food with Mrs Salas.  Denies use of a salt shaker at the table on prepared foods. Dines out 0 per week. States he has not eating out since discharged from hospital  Chooses fried foods 0 times per week; does have an air fryer that he uses to 'mckeon' occasionally.  Chooses fish 2 times per week.   Beverages:    Alcohol: nonsmoker     Patient has adopted a mostly vegetarian diet at this time. He occasionally still eats chicken and fish as his protein. He also has been eating plant based meat.        Typical Week:  · Breakfast: Oatmeal/berries  · Lunch: Salad, plant based burger  · Dinner: chicken or fish in air fryer; veggie noodles and sauce  · Snacks: nuts, fruit    Difficulty Chewing or Swallowing: no  Current Exercise: See Exercise Physiologist Note  Food Safety/Food Preparation: self  Living Arrangements/Family Support: Lives independently  Cultural/Spiritual/Personal Preferences: None  Barriers to Education: none identified  Stage of Change Related to Diet Habits: Patient is action - ready to set action plan and implement; patient ready to further his plant based diet habits.    NUTRITION DIAGNOSIS:  1. Food and nutrition related knowledge deficit related to the lack of prior nutrition education as evidenced by diet history         NUTRITION INTERVENTION:    Nutrition Prescription:  · Total Energy Estimated Needs: 1950 Kcal/d  · Method for Estimating Needs: [place]  · Total Protein Estimated Needs: 80 g/d  · Method for Estimating Needs: 0.8-1.2  g/Kg BW  · Total Fluid Estimated Needs: 1 mL/Kcal    Meals and Snacks: 2 gram sodium, Mediterranean diet  Goal(s)    1. Patient willing to increase fish intake (non-fried varieties) to a goal of 2-3 servings per week.   2. Patient willing to increase fruit and vegetable intake    Nutrition Education-Content: Purpose of the nutrition education, priority modifications, nutrition relationship to health/disease, and recommended modifications  Goal(s)  1. Understand and adhere to Mediterranean diet      Comments: Discussed ways to incorporate healthy snacks, eating on a schedule, and monitoring sodium intake for heart health.    Nutrition Education:   Person taught: patient   Comprehension: good    Preferred Learning Method: verbal and written   Education Needed/Provided: Nutrition counseling and education related to cardiac rehabilitation   Outcome/Evaluation: Verbalizes understanding and was able to demonstrate comprehension on verification.    Nutrition Education Method: Weekly nutrition lectures on the Mediterranean diet, cooking, shopping, and dining out    Written Materials Provided:  3 Day Food Record, Introduction to Mediterranean Diet    Strategies Implemented: Motivational interviewing, Goal setting, Self-Monitoring, and Problem Solving    Provided RD Contact Information: Yes      NUTRITION MONITORING:  Patient to participate in Cardiac Rehab sessions three times a week  12, 24, and Discharge Session Follow Up  Weekly Dietitian Weight Check  Follow Up Plan for Ongoing Self-Management Support      Lakesha EDGE, RDN

## 2020-07-27 ENCOUNTER — CLINICAL SUPPORT (OUTPATIENT)
Dept: CARDIAC REHAB | Facility: CLINIC | Age: 70
End: 2020-07-27
Payer: MEDICARE

## 2020-07-27 DIAGNOSIS — I50.42 CHRONIC COMBINED SYSTOLIC AND DIASTOLIC CONGESTIVE HEART FAILURE: Primary | ICD-10-CM

## 2020-07-27 DIAGNOSIS — Z98.61 POSTSURGICAL PERCUTANEOUS TRANSLUMINAL CORONARY ANGIOPLASTY STATUS: ICD-10-CM

## 2020-07-27 DIAGNOSIS — I25.10 ATHEROSCLEROSIS OF NATIVE CORONARY ARTERY OF NATIVE HEART WITHOUT ANGINA PECTORIS: ICD-10-CM

## 2020-07-27 PROCEDURE — 93798 PR CARDIAC REHAB/MONITOR: ICD-10-PCS | Mod: S$GLB,,, | Performed by: INTERNAL MEDICINE

## 2020-07-27 PROCEDURE — 93798 PHYS/QHP OP CAR RHAB W/ECG: CPT | Mod: S$GLB,,, | Performed by: INTERNAL MEDICINE

## 2020-07-27 RX ORDER — HYDRALAZINE HYDROCHLORIDE 100 MG/1
100 TABLET, FILM COATED ORAL 3 TIMES DAILY
Qty: 90 TABLET | Refills: 11 | Status: SHIPPED | OUTPATIENT
Start: 2020-07-27 | End: 2021-08-13

## 2020-07-27 RX ORDER — CLOPIDOGREL BISULFATE 75 MG/1
75 TABLET ORAL DAILY
Qty: 30 TABLET | Refills: 11 | Status: SHIPPED | OUTPATIENT
Start: 2020-07-27 | End: 2021-08-13

## 2020-07-27 RX ORDER — SPIRONOLACTONE 25 MG/1
25 TABLET ORAL DAILY
Qty: 30 TABLET | Refills: 11 | Status: SHIPPED | OUTPATIENT
Start: 2020-07-27 | End: 2021-08-13

## 2020-07-27 NOTE — PROGRESS NOTES
Pt c/o bilateral quadriceps pain 3-4/10 on NuStepp. Pt paused for couple minutes and resumed. The patient tolerated exercise session well with no complaints

## 2020-07-29 ENCOUNTER — CLINICAL SUPPORT (OUTPATIENT)
Dept: CARDIAC REHAB | Facility: CLINIC | Age: 70
End: 2020-07-29
Payer: MEDICARE

## 2020-07-29 DIAGNOSIS — Z98.61 POSTSURGICAL PERCUTANEOUS TRANSLUMINAL CORONARY ANGIOPLASTY STATUS: ICD-10-CM

## 2020-07-29 DIAGNOSIS — I25.10 ATHEROSCLEROSIS OF NATIVE CORONARY ARTERY OF NATIVE HEART WITHOUT ANGINA PECTORIS: ICD-10-CM

## 2020-07-29 PROCEDURE — 93798 PR CARDIAC REHAB/MONITOR: ICD-10-PCS | Mod: S$GLB,,, | Performed by: INTERNAL MEDICINE

## 2020-07-29 PROCEDURE — 93798 PHYS/QHP OP CAR RHAB W/ECG: CPT | Mod: S$GLB,,, | Performed by: INTERNAL MEDICINE

## 2020-07-30 ENCOUNTER — OFFICE VISIT (OUTPATIENT)
Dept: PODIATRY | Facility: CLINIC | Age: 70
End: 2020-07-30
Payer: MEDICARE

## 2020-07-30 VITALS — HEIGHT: 71 IN | WEIGHT: 206 LBS | BODY MASS INDEX: 28.84 KG/M2 | RESPIRATION RATE: 18 BRPM

## 2020-07-30 DIAGNOSIS — L60.9 DISEASE OF NAIL: Primary | ICD-10-CM

## 2020-07-30 DIAGNOSIS — I73.9 PAD (PERIPHERAL ARTERY DISEASE): ICD-10-CM

## 2020-07-30 DIAGNOSIS — L30.9 DERMATITIS: ICD-10-CM

## 2020-07-30 PROCEDURE — 3008F PR BODY MASS INDEX (BMI) DOCUMENTED: ICD-10-PCS | Mod: CPTII,S$GLB,, | Performed by: PODIATRIST

## 2020-07-30 PROCEDURE — 3008F BODY MASS INDEX DOCD: CPT | Mod: CPTII,S$GLB,, | Performed by: PODIATRIST

## 2020-07-30 PROCEDURE — 99999 PR PBB SHADOW E&M-EST. PATIENT-LVL IV: CPT | Mod: PBBFAC,,, | Performed by: PODIATRIST

## 2020-07-30 PROCEDURE — 1126F AMNT PAIN NOTED NONE PRSNT: CPT | Mod: S$GLB,,, | Performed by: PODIATRIST

## 2020-07-30 PROCEDURE — 1126F PR PAIN SEVERITY QUANTIFIED, NO PAIN PRESENT: ICD-10-PCS | Mod: S$GLB,,, | Performed by: PODIATRIST

## 2020-07-30 PROCEDURE — 99203 PR OFFICE/OUTPT VISIT, NEW, LEVL III, 30-44 MIN: ICD-10-PCS | Mod: 25,S$GLB,, | Performed by: PODIATRIST

## 2020-07-30 PROCEDURE — 11721 DEBRIDE NAIL 6 OR MORE: CPT | Mod: Q9,S$GLB,, | Performed by: PODIATRIST

## 2020-07-30 PROCEDURE — 1159F MED LIST DOCD IN RCRD: CPT | Mod: S$GLB,,, | Performed by: PODIATRIST

## 2020-07-30 PROCEDURE — 99203 OFFICE O/P NEW LOW 30 MIN: CPT | Mod: 25,S$GLB,, | Performed by: PODIATRIST

## 2020-07-30 PROCEDURE — 11721 PR DEBRIDEMENT OF NAILS, 6 OR MORE: ICD-10-PCS | Mod: Q9,S$GLB,, | Performed by: PODIATRIST

## 2020-07-30 PROCEDURE — 1159F PR MEDICATION LIST DOCUMENTED IN MEDICAL RECORD: ICD-10-PCS | Mod: S$GLB,,, | Performed by: PODIATRIST

## 2020-07-30 PROCEDURE — 99999 PR PBB SHADOW E&M-EST. PATIENT-LVL IV: ICD-10-PCS | Mod: PBBFAC,,, | Performed by: PODIATRIST

## 2020-07-30 RX ORDER — LEVALBUTEROL INHALATION SOLUTION 0.63 MG/3ML
SOLUTION RESPIRATORY (INHALATION)
COMMUNITY
Start: 2017-10-04

## 2020-07-30 RX ORDER — TRIAMCINOLONE ACETONIDE 0.25 MG/G
CREAM TOPICAL 2 TIMES DAILY
Qty: 80 G | Refills: 3 | Status: SHIPPED | OUTPATIENT
Start: 2020-07-30

## 2020-07-30 RX ORDER — AMLODIPINE BESYLATE 5 MG/1
TABLET ORAL
COMMUNITY
Start: 2018-03-03

## 2020-07-30 RX ORDER — ALBUTEROL SULFATE 90 UG/1
AEROSOL, METERED RESPIRATORY (INHALATION)
COMMUNITY

## 2020-07-30 RX ORDER — LOSARTAN POTASSIUM 100 MG/1
100 TABLET ORAL
COMMUNITY
Start: 2020-06-22

## 2020-07-30 RX ORDER — IPRATROPIUM BROMIDE AND ALBUTEROL SULFATE 2.5; .5 MG/3ML; MG/3ML
SOLUTION RESPIRATORY (INHALATION)
COMMUNITY

## 2020-07-30 RX ORDER — HYDROXYZINE HYDROCHLORIDE 25 MG/1
25 TABLET, FILM COATED ORAL
COMMUNITY
Start: 2018-03-03

## 2020-07-30 NOTE — PROGRESS NOTES
Subjective:      Patient ID: Blaine Multani is a 69 y.o. male.    Chief Complaint: PCP (Nay AVILA next week ), Foot Problem (numbness ), and Foot Pain    Blaine is a 69 y.o. male who presents to the clinic for evaluation and treatment of high risk feet. Blaine has a past medical history of A-fib, Asthma, Bronchitis, Colon polyps, Hemorrhoids, HLD (hyperlipidemia), Hypertension, and PVD (peripheral vascular disease). The patient's chief complaint is long, thick toenails. This patient has documented high risk feet requiring routine maintenance secondary to peripheral vascular disease.    PCP: Nay Alcantar MD    Date Last Seen by PCP:   Chief Complaint   Patient presents with    PCP     Nay AVILA next week     Foot Problem     numbness     Foot Pain       Last encounter in this department: Visit date not found    Hemoglobin A1C   Date Value Ref Range Status   06/26/2020 5.4 4.0 - 5.6 % Final     Comment:     ADA Screening Guidelines:  5.7-6.4%  Consistent with prediabetes  >or=6.5%  Consistent with diabetes  High levels of fetal hemoglobin interfere with the HbA1C  assay. Heterozygous hemoglobin variants (HbS, HgC, etc)do  not significantly interfere with this assay.   However, presence of multiple variants may affect accuracy.         Review of Systems   Constitution: Negative for chills, decreased appetite and fever.   Cardiovascular: Positive for leg swelling.   Skin: Positive for dry skin, nail changes and poor wound healing.   Musculoskeletal: Positive for muscle cramps (feet and legs, mainly at night). Negative for arthritis, joint pain, joint swelling and myalgias.   Gastrointestinal: Negative for nausea and vomiting.   Neurological: Negative for loss of balance, numbness and paresthesias.         Patient Active Problem List   Diagnosis    Nonhealing ulcer of left lower extremity    PAD (peripheral artery disease)    NSTEMI (non-ST elevated myocardial infarction)    HTN  (hypertension)    Paroxysmal atrial fibrillation    Depression    Dental abscess    Status post cardiac catheterization    Chronic combined systolic and diastolic congestive heart failure    Dyslipidemia    Cardiomyopathy, ischemic    Macrocytic anemia       Current Outpatient Medications on File Prior to Visit   Medication Sig Dispense Refill    albuterol (VENTOLIN HFA) 90 mcg/actuation inhaler Ventolin HFA 90 mcg/actuation aerosol inhaler      albuterol-ipratropium (DUO-NEB) 2.5 mg-0.5 mg/3 mL nebulizer solution ipratropium 0.5 mg-albuterol 3 mg (2.5 mg base)/3 mL nebulization soln      apixaban (ELIQUIS) 5 mg Tab Take 1 tablet (5 mg total) by mouth 2 (two) times daily. 60 tablet 11    aspirin (ECOTRIN) 81 MG EC tablet Take 1 tablet (81 mg total) by mouth once daily.  0    benzonatate (TESSALON) 200 MG capsule Take 200 mg by mouth 3 (three) times daily as needed.      carvediloL (COREG) 25 MG tablet Take 0.5 tablets (12.5 mg total) by mouth 2 (two) times daily with meals. 365 tablet 0    clopidogreL (PLAVIX) 75 mg tablet Take 1 tablet (75 mg total) by mouth once daily. 30 tablet 11    famotidine (PEPCID) 20 MG tablet Take 20 mg by mouth once daily.      furosemide (LASIX) 20 MG tablet Take 2 tablets (40 mg total) by mouth once daily. 60 tablet 11    hydrALAZINE (APRESOLINE) 100 MG tablet Take 1 tablet (100 mg total) by mouth 3 (three) times daily. 90 tablet 11    hydrOXYzine HCL (ATARAX) 25 MG tablet Take by mouth.      isosorbide dinitrate (ISORDIL) 40 MG Tab Take 1 tablet (40 mg total) by mouth 3 (three) times daily. 90 tablet 11    levalbuterol (XOPENEX) 0.63 mg/3 mL nebulizer solution       losartan (COZAAR) 100 MG tablet Take 100 mg by mouth.      omeprazole (PRILOSEC) 40 MG capsule Take 40 mg by mouth once daily.      rosuvastatin (CRESTOR) 40 MG Tab Take 40 mg by mouth every evening.       sertraline (ZOLOFT) 50 MG tablet Take 50 mg by mouth every evening.       spironolactone  (ALDACTONE) 25 MG tablet Take 1 tablet (25 mg total) by mouth once daily. 30 tablet 11    SYMBICORT 160-4.5 mcg/actuation HFAA Inhale 2 puffs into the lungs every 12 (twelve) hours.       amLODIPine (NORVASC) 5 MG tablet Take by mouth.       No current facility-administered medications on file prior to visit.        Review of patient's allergies indicates:  No Known Allergies    Past Surgical History:   Procedure Laterality Date    angiogram with stents Left     leg    ARTERIOGRAPHY OF SUBCLAVIAN ARTERY  6/26/2020    Procedure: Arteriogram, Subclavian;  Surgeon: Bruno Cruz MD;  Location: [facility] CATH LAB;  Service: Cardiology;;    CHOLECYSTECTOMY      COLONOSCOPY W/ POLYPECTOMY      CORONARY ARTERY BYPASS GRAFT  1998    x3    INSERTION OF INTRAVASCULAR MICROAXIAL BLOOD PUMP N/A 6/26/2020    Procedure: INSERTION, IMPELLA;  Surgeon: Bruno Cruz MD;  Location: [facility] CATH LAB;  Service: Cardiology;  Laterality: N/A;    LEFT HEART CATHETERIZATION Left 6/26/2020    Procedure: Left heart cath;  Surgeon: Bruno Cruz MD;  Location: [facility] CATH LAB;  Service: Cardiology;  Laterality: Left;    PLACEMENT OF SWAN DONTRELL CATHETER WITH IMAGING GUIDANCE  6/26/2020    Procedure: INSERTION, CATHETER, SWAN-DONTRELL, WITH IMAGING GUIDANCE;  Surgeon: Bruno Cruz MD;  Location: [facility] CATH LAB;  Service: Cardiology;;    PLACEMENT OF SWAN DONTRELL CATHETER WITH IMAGING GUIDANCE  6/30/2020    Procedure: INSERTION, CATHETER, SWAN-DONTRELL, WITH IMAGING GUIDANCE;  Surgeon: Bruno Cruz MD;  Location: [facility] CATH LAB;  Service: Cardiology;;    RIGHT HEART CATHETERIZATION Right 6/26/2020    Procedure: INSERTION, CATHETER, RIGHT HEART;  Surgeon: Bruno Cruz MD;  Location: [facility] CATH LAB;  Service: Cardiology;  Laterality: Right;    TOTAL HIP ARTHROPLASTY Right        Family History   Problem Relation Age of Onset    Cancer Mother     Cancer Sister        Social History     Socioeconomic History     "Marital status:      Spouse name: Not on file    Number of children: Not on file    Years of education: Not on file    Highest education level: Not on file   Occupational History    Not on file   Social Needs    Financial resource strain: Not on file    Food insecurity     Worry: Not on file     Inability: Not on file    Transportation needs     Medical: Not on file     Non-medical: Not on file   Tobacco Use    Smoking status: Former Smoker     Quit date:      Years since quittin.5    Smokeless tobacco: Never Used   Substance and Sexual Activity    Alcohol use: Not Currently     Alcohol/week: 12.0 standard drinks     Types: 12 Cans of beer per week    Drug use: No    Sexual activity: Not on file   Lifestyle    Physical activity     Days per week: Not on file     Minutes per session: Not on file    Stress: Not on file   Relationships    Social connections     Talks on phone: Not on file     Gets together: Not on file     Attends Holiness service: Not on file     Active member of club or organization: Not on file     Attends meetings of clubs or organizations: Not on file     Relationship status: Not on file   Other Topics Concern    Not on file   Social History Narrative    Not on file               Objective:       Vitals:    20 0845   Resp: 18   Weight: 93.4 kg (206 lb)   Height: 5' 11" (1.803 m)   PainSc: 0-No pain        Physical Exam  Constitutional:       Appearance: He is well-developed.   Cardiovascular:      Comments: Dorsalis pedis and posterior tibial pulses are diminished Bilaterally. Toes are cool to touch. Feet are warm proximally.There is decreased digital hair. Skin is atrophic, slightly hyperpigmented, and mildly edematous      Musculoskeletal: Normal range of motion.         General: No tenderness.      Comments: Adequate joint range of motion without pain, limitation, nor crepitation Bilateral feet and ankle joints. Muscle strength is 5/5 in all groups " bilaterally.         Skin:     General: Skin is warm and dry.      Findings: No ecchymosis, erythema or lesion.      Comments: Nails x10 are elongated by  2-4mm's, thickened by 2-3 mm's, dystrophic, and are darkened in  coloration . Xerosis Bilaterally. No open lesions noted.    Xerosis w/ associated erythematous raised  Papules LLE   Neurological:      Mental Status: He is alert and oriented to person, place, and time.      Comments: Lowell-Jameson 5.07 monofilamant testing is diminished Neftaly feet. Sharp/dull sensation diminished Bilaterally. Light touch absent Bilaterally.       Psychiatric:         Behavior: Behavior normal.               Assessment:       Encounter Diagnoses   Name Primary?    PAD (peripheral artery disease)     Disease of nail Yes         Plan:       Blaine was seen today for pcp, foot problem and foot pain.    Diagnoses and all orders for this visit:    Disease of nail    PAD (peripheral artery disease)  -     Ambulatory referral/consult to Podiatry    Other orders  -     triamcinolone acetonide 0.025% (KENALOG) 0.025 % cream; Apply topically 2 (two) times daily.      I counseled the patient on his conditions, their implications and medical management.        - Shoe inspection. Patient instructed on proper foot hygeine. We discussed wearing proper shoe gear, daily foot inspections, never walking without protective shoe gear, never putting sharp instruments to feet, routine podiatric nail visits every 2-3 months.      - With patient's permission, nails were aggressively reduced and debrided x 10 to their soft tissue attachment mechanically and with electric , removing all offending nail and debris. Patient relates relief following the procedure. He will continue to monitor the areas daily, inspect his feet, wear protective shoe gear when ambulatory, moisturizer to maintain skin integrity and follow in this office in approximately 2-3 months, sooner p.r.n.

## 2020-07-30 NOTE — LETTER
July 30, 2020      Bruno Cruz MD  [address] [facility] [address]           [facility]iatry  [address]  [facility] [address]  Phone: [phone]          Patient: Blaine Multani   MR Number: [id number]   YOB: 1950   Date of Visit: 7/30/2020       Dear Dr. Bruno Cruz:    Thank you for referring Blaine Multani to me for evaluation. Attached you will find relevant portions of my assessment and plan of care.    If you have questions, please do not hesitate to call me. I look forward to following Blaine Multani along with you.    Sincerely,    Angelita Steel, RONNIE    Enclosure  CC:  No Recipients    If you would like to receive this communication electronically, please contact [email or web address] or [phone] to request more information on [email or web address] Link access.    For providers and/or their staff who would like to refer a patient to [facility], please contact us through our one-stop-shop provider referral line, [facility], at [phone].    If you feel you have received this communication in error or would no longer like to receive these types of communications, please e-mail [email or web address]

## 2020-07-31 ENCOUNTER — CLINICAL SUPPORT (OUTPATIENT)
Dept: CARDIAC REHAB | Facility: CLINIC | Age: 70
End: 2020-07-31
Payer: MEDICARE

## 2020-07-31 DIAGNOSIS — Z98.61 POSTSURGICAL PERCUTANEOUS TRANSLUMINAL CORONARY ANGIOPLASTY STATUS: ICD-10-CM

## 2020-07-31 DIAGNOSIS — I25.10 CORONARY ATHEROSCLEROSIS OF NATIVE CORONARY ARTERY: ICD-10-CM

## 2020-07-31 PROCEDURE — 93798 PHYS/QHP OP CAR RHAB W/ECG: CPT | Mod: S$GLB,,, | Performed by: INTERNAL MEDICINE

## 2020-07-31 PROCEDURE — 93798 PR CARDIAC REHAB/MONITOR: ICD-10-PCS | Mod: S$GLB,,, | Performed by: INTERNAL MEDICINE

## 2020-08-03 ENCOUNTER — CLINICAL SUPPORT (OUTPATIENT)
Dept: CARDIAC REHAB | Facility: CLINIC | Age: 70
End: 2020-08-03
Payer: MEDICARE

## 2020-08-03 DIAGNOSIS — Z98.61 POSTSURGICAL PERCUTANEOUS TRANSLUMINAL CORONARY ANGIOPLASTY STATUS: ICD-10-CM

## 2020-08-03 DIAGNOSIS — I25.10 ATHEROSCLEROSIS OF NATIVE CORONARY ARTERY OF NATIVE HEART WITHOUT ANGINA PECTORIS: ICD-10-CM

## 2020-08-03 PROCEDURE — 93798 PR CARDIAC REHAB/MONITOR: ICD-10-PCS | Mod: S$GLB,,, | Performed by: INTERNAL MEDICINE

## 2020-08-03 PROCEDURE — 93798 PHYS/QHP OP CAR RHAB W/ECG: CPT | Mod: S$GLB,,, | Performed by: INTERNAL MEDICINE

## 2020-08-05 ENCOUNTER — CLINICAL SUPPORT (OUTPATIENT)
Dept: CARDIAC REHAB | Facility: CLINIC | Age: 70
End: 2020-08-05
Payer: MEDICARE

## 2020-08-05 DIAGNOSIS — Z98.61 POSTSURGICAL PERCUTANEOUS TRANSLUMINAL CORONARY ANGIOPLASTY STATUS: ICD-10-CM

## 2020-08-05 DIAGNOSIS — I25.10 ATHEROSCLEROSIS OF NATIVE CORONARY ARTERY OF NATIVE HEART WITHOUT ANGINA PECTORIS: ICD-10-CM

## 2020-08-05 PROCEDURE — 93798 PHYS/QHP OP CAR RHAB W/ECG: CPT | Mod: S$GLB,,, | Performed by: INTERNAL MEDICINE

## 2020-08-05 PROCEDURE — 93798 PR CARDIAC REHAB/MONITOR: ICD-10-PCS | Mod: S$GLB,,, | Performed by: INTERNAL MEDICINE

## 2020-08-07 ENCOUNTER — CLINICAL SUPPORT (OUTPATIENT)
Dept: CARDIAC REHAB | Facility: CLINIC | Age: 70
End: 2020-08-07
Payer: MEDICARE

## 2020-08-07 DIAGNOSIS — Z98.61 POSTSURGICAL PERCUTANEOUS TRANSLUMINAL CORONARY ANGIOPLASTY STATUS: ICD-10-CM

## 2020-08-07 DIAGNOSIS — I25.10 ATHEROSCLEROSIS OF NATIVE CORONARY ARTERY OF NATIVE HEART WITHOUT ANGINA PECTORIS: ICD-10-CM

## 2020-08-07 PROCEDURE — 93798 PHYS/QHP OP CAR RHAB W/ECG: CPT | Mod: S$GLB,,, | Performed by: INTERNAL MEDICINE

## 2020-08-07 PROCEDURE — 93798 PR CARDIAC REHAB/MONITOR: ICD-10-PCS | Mod: S$GLB,,, | Performed by: INTERNAL MEDICINE

## 2020-08-10 ENCOUNTER — CLINICAL SUPPORT (OUTPATIENT)
Dept: CARDIAC REHAB | Facility: CLINIC | Age: 70
End: 2020-08-10
Payer: MEDICARE

## 2020-08-10 ENCOUNTER — DOCUMENTATION ONLY (OUTPATIENT)
Dept: CARDIOLOGY | Facility: CLINIC | Age: 70
End: 2020-08-10

## 2020-08-10 DIAGNOSIS — Z98.61 POSTSURGICAL PERCUTANEOUS TRANSLUMINAL CORONARY ANGIOPLASTY STATUS: ICD-10-CM

## 2020-08-10 DIAGNOSIS — I25.10 CORONARY ARTERY DISEASE INVOLVING NATIVE CORONARY ARTERY OF NATIVE HEART WITHOUT ANGINA PECTORIS: ICD-10-CM

## 2020-08-10 PROCEDURE — 93798 PHYS/QHP OP CAR RHAB W/ECG: CPT | Mod: S$GLB,,, | Performed by: INTERNAL MEDICINE

## 2020-08-10 PROCEDURE — 93798 PR CARDIAC REHAB/MONITOR: ICD-10-PCS | Mod: S$GLB,,, | Performed by: INTERNAL MEDICINE

## 2020-08-12 ENCOUNTER — DOCUMENTATION ONLY (OUTPATIENT)
Dept: CARDIAC REHAB | Facility: CLINIC | Age: 70
End: 2020-08-12

## 2020-08-12 NOTE — PROGRESS NOTES
Hi, This is Dilip in cardiac rehab we have Mr. Blaine Multani here for class he states he is not wearing his life vest and was told by you yesterday that he could take it off while he is here around other people. Please let me know if this is indeed the case. We cannot let him exercise until we hear from you.  He can exercise without it so long as he is being monitored with a RN. Thank you per Dr. JARRED Cruz.  Ok. Dr Cristobal just messaged me. He has to wear his life vest/ per Dr. JARRED Cruz

## 2020-08-14 ENCOUNTER — CLINICAL SUPPORT (OUTPATIENT)
Dept: CARDIAC REHAB | Facility: CLINIC | Age: 70
End: 2020-08-14
Payer: MEDICARE

## 2020-08-14 DIAGNOSIS — Z98.61 POSTSURGICAL PERCUTANEOUS TRANSLUMINAL CORONARY ANGIOPLASTY STATUS: ICD-10-CM

## 2020-08-14 DIAGNOSIS — I25.10 ATHEROSCLEROSIS OF NATIVE CORONARY ARTERY OF NATIVE HEART WITHOUT ANGINA PECTORIS: ICD-10-CM

## 2020-08-14 PROCEDURE — 93798 PHYS/QHP OP CAR RHAB W/ECG: CPT | Mod: S$GLB,,, | Performed by: INTERNAL MEDICINE

## 2020-08-14 PROCEDURE — 93798 PR CARDIAC REHAB/MONITOR: ICD-10-PCS | Mod: S$GLB,,, | Performed by: INTERNAL MEDICINE

## 2020-08-17 ENCOUNTER — CLINICAL SUPPORT (OUTPATIENT)
Dept: CARDIAC REHAB | Facility: CLINIC | Age: 70
End: 2020-08-17
Payer: MEDICARE

## 2020-08-17 DIAGNOSIS — Z98.61 POSTSURGICAL PERCUTANEOUS TRANSLUMINAL CORONARY ANGIOPLASTY STATUS: ICD-10-CM

## 2020-08-17 DIAGNOSIS — I25.10 ATHEROSCLEROSIS OF NATIVE CORONARY ARTERY OF NATIVE HEART WITHOUT ANGINA PECTORIS: ICD-10-CM

## 2020-08-17 PROCEDURE — 93798 PR CARDIAC REHAB/MONITOR: ICD-10-PCS | Mod: S$GLB,,, | Performed by: INTERNAL MEDICINE

## 2020-08-17 PROCEDURE — 93798 PHYS/QHP OP CAR RHAB W/ECG: CPT | Mod: S$GLB,,, | Performed by: INTERNAL MEDICINE

## 2020-08-19 ENCOUNTER — CLINICAL SUPPORT (OUTPATIENT)
Dept: CARDIAC REHAB | Facility: CLINIC | Age: 70
End: 2020-08-19
Payer: MEDICARE

## 2020-08-19 DIAGNOSIS — I25.10 ATHEROSCLEROSIS OF NATIVE CORONARY ARTERY OF NATIVE HEART WITHOUT ANGINA PECTORIS: ICD-10-CM

## 2020-08-19 DIAGNOSIS — Z98.61 POSTSURGICAL PERCUTANEOUS TRANSLUMINAL CORONARY ANGIOPLASTY STATUS: ICD-10-CM

## 2020-08-19 PROCEDURE — 93798 PHYS/QHP OP CAR RHAB W/ECG: CPT | Mod: S$GLB,,, | Performed by: INTERNAL MEDICINE

## 2020-08-19 PROCEDURE — 93798 PR CARDIAC REHAB/MONITOR: ICD-10-PCS | Mod: S$GLB,,, | Performed by: INTERNAL MEDICINE

## 2020-08-19 NOTE — PROGRESS NOTES
Patient here this morning for cardiac rehab session. Pt walked on the treadmill for the first modality of exercise but needed to stop intermediately due to calf pain. Patient then moved to the [facility] and needed to stop again. Staff observed that patient's coloring looked pale. When questioned, pt stated that he was feeling SOB 15 on dyspnea scale. O2 sats then checked. O2 sats 94% when pt resting but as pt started to move his O2 sats dropped to 85%. It was also noted that pt has an approx 4 lb weight gain since Monday. Pt stated that has been feeling SOB with exertion since Monday after class. He has been sleeping on at least 2 pillows the past couple of days as well.  Bilateral breath sounds clear. Dr. Montelongo then notified and Dr. Montelongo stated for patient to increase his lasix to 80 mg once a day until the 4 lbs is gone and for pt to stop exercise today. Patient was given these instructions and he verbalized understanding.

## 2020-08-21 ENCOUNTER — CLINICAL SUPPORT (OUTPATIENT)
Dept: CARDIAC REHAB | Facility: CLINIC | Age: 70
End: 2020-08-21
Payer: MEDICARE

## 2020-08-21 DIAGNOSIS — I25.10 CORONARY ARTERY DISEASE INVOLVING NATIVE CORONARY ARTERY OF NATIVE HEART WITHOUT ANGINA PECTORIS: ICD-10-CM

## 2020-08-21 DIAGNOSIS — Z98.61 POSTSURGICAL PERCUTANEOUS TRANSLUMINAL CORONARY ANGIOPLASTY STATUS: ICD-10-CM

## 2020-08-21 PROCEDURE — 93798 PR CARDIAC REHAB/MONITOR: ICD-10-PCS | Mod: S$GLB,,, | Performed by: INTERNAL MEDICINE

## 2020-08-21 PROCEDURE — 93798 PHYS/QHP OP CAR RHAB W/ECG: CPT | Mod: S$GLB,,, | Performed by: INTERNAL MEDICINE

## 2020-08-24 ENCOUNTER — CLINICAL SUPPORT (OUTPATIENT)
Dept: CARDIAC REHAB | Facility: CLINIC | Age: 70
End: 2020-08-24
Payer: MEDICARE

## 2020-08-24 DIAGNOSIS — I25.10 ATHEROSCLEROSIS OF NATIVE CORONARY ARTERY OF NATIVE HEART WITHOUT ANGINA PECTORIS: ICD-10-CM

## 2020-08-24 DIAGNOSIS — Z98.61 POSTSURGICAL PERCUTANEOUS TRANSLUMINAL CORONARY ANGIOPLASTY STATUS: ICD-10-CM

## 2020-08-24 PROCEDURE — 93798 PHYS/QHP OP CAR RHAB W/ECG: CPT | Mod: S$GLB,,, | Performed by: INTERNAL MEDICINE

## 2020-08-24 PROCEDURE — 93798 PR CARDIAC REHAB/MONITOR: ICD-10-PCS | Mod: S$GLB,,, | Performed by: INTERNAL MEDICINE

## 2020-08-26 ENCOUNTER — CLINICAL SUPPORT (OUTPATIENT)
Dept: CARDIAC REHAB | Facility: CLINIC | Age: 70
End: 2020-08-26
Payer: MEDICARE

## 2020-08-26 DIAGNOSIS — Z98.61 POSTSURGICAL PERCUTANEOUS TRANSLUMINAL CORONARY ANGIOPLASTY STATUS: ICD-10-CM

## 2020-08-26 DIAGNOSIS — I25.10 CORONARY ARTERY DISEASE INVOLVING NATIVE CORONARY ARTERY OF NATIVE HEART WITHOUT ANGINA PECTORIS: ICD-10-CM

## 2020-08-26 PROCEDURE — 93798 PHYS/QHP OP CAR RHAB W/ECG: CPT | Mod: S$GLB,,, | Performed by: INTERNAL MEDICINE

## 2020-08-26 PROCEDURE — 93798 PR CARDIAC REHAB/MONITOR: ICD-10-PCS | Mod: S$GLB,,, | Performed by: INTERNAL MEDICINE

## 2020-08-28 ENCOUNTER — CLINICAL SUPPORT (OUTPATIENT)
Dept: CARDIAC REHAB | Facility: CLINIC | Age: 70
End: 2020-08-28
Payer: MEDICARE

## 2020-08-28 DIAGNOSIS — Z98.61 POSTSURGICAL PERCUTANEOUS TRANSLUMINAL CORONARY ANGIOPLASTY STATUS: ICD-10-CM

## 2020-08-28 DIAGNOSIS — I25.10 CORONARY ATHEROSCLEROSIS OF NATIVE CORONARY ARTERY: ICD-10-CM

## 2020-08-28 PROCEDURE — 93798 PR CARDIAC REHAB/MONITOR: ICD-10-PCS | Mod: S$GLB,,, | Performed by: INTERNAL MEDICINE

## 2020-08-28 PROCEDURE — 93798 PHYS/QHP OP CAR RHAB W/ECG: CPT | Mod: S$GLB,,, | Performed by: INTERNAL MEDICINE

## 2020-08-31 ENCOUNTER — CLINICAL SUPPORT (OUTPATIENT)
Dept: CARDIAC REHAB | Facility: CLINIC | Age: 70
End: 2020-08-31
Payer: MEDICARE

## 2020-08-31 ENCOUNTER — TELEPHONE (OUTPATIENT)
Dept: CARDIOLOGY | Facility: CLINIC | Age: 70
End: 2020-08-31

## 2020-08-31 DIAGNOSIS — I25.10 CORONARY ARTERY DISEASE INVOLVING NATIVE CORONARY ARTERY OF NATIVE HEART WITHOUT ANGINA PECTORIS: ICD-10-CM

## 2020-08-31 DIAGNOSIS — Z98.61 POSTSURGICAL PERCUTANEOUS TRANSLUMINAL CORONARY ANGIOPLASTY STATUS: ICD-10-CM

## 2020-08-31 PROCEDURE — 93798 PR CARDIAC REHAB/MONITOR: ICD-10-PCS | Mod: S$GLB,,, | Performed by: INTERNAL MEDICINE

## 2020-08-31 PROCEDURE — 93798 PHYS/QHP OP CAR RHAB W/ECG: CPT | Mod: S$GLB,,, | Performed by: INTERNAL MEDICINE

## 2020-09-02 ENCOUNTER — CLINICAL SUPPORT (OUTPATIENT)
Dept: CARDIAC REHAB | Facility: CLINIC | Age: 70
End: 2020-09-02
Payer: MEDICARE

## 2020-09-02 DIAGNOSIS — I25.10 CORONARY ARTERY DISEASE INVOLVING NATIVE CORONARY ARTERY OF NATIVE HEART WITHOUT ANGINA PECTORIS: ICD-10-CM

## 2020-09-02 DIAGNOSIS — Z98.61 POSTSURGICAL PERCUTANEOUS TRANSLUMINAL CORONARY ANGIOPLASTY STATUS: ICD-10-CM

## 2020-09-02 PROCEDURE — 93798 PHYS/QHP OP CAR RHAB W/ECG: CPT | Mod: S$GLB,,, | Performed by: INTERNAL MEDICINE

## 2020-09-02 PROCEDURE — 93798 PR CARDIAC REHAB/MONITOR: ICD-10-PCS | Mod: S$GLB,,, | Performed by: INTERNAL MEDICINE

## 2020-09-04 ENCOUNTER — CLINICAL SUPPORT (OUTPATIENT)
Dept: CARDIAC REHAB | Facility: CLINIC | Age: 70
End: 2020-09-04
Payer: MEDICARE

## 2020-09-04 DIAGNOSIS — Z98.61 POSTSURGICAL PERCUTANEOUS TRANSLUMINAL CORONARY ANGIOPLASTY STATUS: ICD-10-CM

## 2020-09-04 DIAGNOSIS — I25.10 CORONARY ATHEROSCLEROSIS OF NATIVE CORONARY ARTERY: ICD-10-CM

## 2020-09-04 PROCEDURE — 93798 PHYS/QHP OP CAR RHAB W/ECG: CPT | Mod: S$GLB,,, | Performed by: INTERNAL MEDICINE

## 2020-09-04 PROCEDURE — 93798 PR CARDIAC REHAB/MONITOR: ICD-10-PCS | Mod: S$GLB,,, | Performed by: INTERNAL MEDICINE

## 2020-09-09 ENCOUNTER — CLINICAL SUPPORT (OUTPATIENT)
Dept: CARDIAC REHAB | Facility: CLINIC | Age: 70
End: 2020-09-09
Payer: MEDICARE

## 2020-09-09 DIAGNOSIS — I25.10 CORONARY ARTERY DISEASE INVOLVING NATIVE CORONARY ARTERY OF NATIVE HEART WITHOUT ANGINA PECTORIS: ICD-10-CM

## 2020-09-09 DIAGNOSIS — Z98.61 POSTSURGICAL PERCUTANEOUS TRANSLUMINAL CORONARY ANGIOPLASTY STATUS: ICD-10-CM

## 2020-09-09 PROCEDURE — 93798 PHYS/QHP OP CAR RHAB W/ECG: CPT | Mod: S$GLB,,, | Performed by: INTERNAL MEDICINE

## 2020-09-09 PROCEDURE — 93798 PR CARDIAC REHAB/MONITOR: ICD-10-PCS | Mod: S$GLB,,, | Performed by: INTERNAL MEDICINE

## 2020-09-11 ENCOUNTER — CLINICAL SUPPORT (OUTPATIENT)
Dept: CARDIAC REHAB | Facility: CLINIC | Age: 70
End: 2020-09-11
Payer: MEDICARE

## 2020-09-11 DIAGNOSIS — I25.10 CORONARY ATHEROSCLEROSIS OF NATIVE CORONARY ARTERY: ICD-10-CM

## 2020-09-11 DIAGNOSIS — Z98.61 POSTSURGICAL PERCUTANEOUS TRANSLUMINAL CORONARY ANGIOPLASTY STATUS: ICD-10-CM

## 2020-09-11 PROCEDURE — 93798 PR CARDIAC REHAB/MONITOR: ICD-10-PCS | Mod: S$GLB,,, | Performed by: INTERNAL MEDICINE

## 2020-09-11 PROCEDURE — 93798 PHYS/QHP OP CAR RHAB W/ECG: CPT | Mod: S$GLB,,, | Performed by: INTERNAL MEDICINE

## 2020-09-14 ENCOUNTER — CLINICAL SUPPORT (OUTPATIENT)
Dept: CARDIAC REHAB | Facility: CLINIC | Age: 70
End: 2020-09-14
Payer: MEDICARE

## 2020-09-14 DIAGNOSIS — I25.10 CORONARY ARTERY DISEASE INVOLVING NATIVE CORONARY ARTERY OF NATIVE HEART WITHOUT ANGINA PECTORIS: ICD-10-CM

## 2020-09-14 DIAGNOSIS — Z98.61 POSTSURGICAL PERCUTANEOUS TRANSLUMINAL CORONARY ANGIOPLASTY STATUS: ICD-10-CM

## 2020-09-14 PROCEDURE — 93798 PHYS/QHP OP CAR RHAB W/ECG: CPT | Mod: S$GLB,,, | Performed by: INTERNAL MEDICINE

## 2020-09-14 PROCEDURE — 93798 PR CARDIAC REHAB/MONITOR: ICD-10-PCS | Mod: S$GLB,,, | Performed by: INTERNAL MEDICINE

## 2020-09-14 NOTE — PROGRESS NOTES
The patient tolerated exercise session fair.  Patient had to stop on treadmill due to leg pain.  Resumed exercise on Nustep.

## 2020-09-16 ENCOUNTER — CLINICAL SUPPORT (OUTPATIENT)
Dept: CARDIAC REHAB | Facility: CLINIC | Age: 70
End: 2020-09-16
Payer: MEDICARE

## 2020-09-16 DIAGNOSIS — I25.10 CORONARY ARTERY DISEASE INVOLVING NATIVE CORONARY ARTERY OF NATIVE HEART WITHOUT ANGINA PECTORIS: ICD-10-CM

## 2020-09-16 DIAGNOSIS — Z98.61 POSTSURGICAL PERCUTANEOUS TRANSLUMINAL CORONARY ANGIOPLASTY STATUS: ICD-10-CM

## 2020-09-16 PROCEDURE — 93798 PR CARDIAC REHAB/MONITOR: ICD-10-PCS | Mod: S$GLB,,, | Performed by: INTERNAL MEDICINE

## 2020-09-16 PROCEDURE — 93798 PHYS/QHP OP CAR RHAB W/ECG: CPT | Mod: S$GLB,,, | Performed by: INTERNAL MEDICINE

## 2020-09-18 ENCOUNTER — CLINICAL SUPPORT (OUTPATIENT)
Dept: CARDIAC REHAB | Facility: CLINIC | Age: 70
End: 2020-09-18
Payer: MEDICARE

## 2020-09-18 DIAGNOSIS — I25.10 CORONARY ATHEROSCLEROSIS OF NATIVE CORONARY ARTERY: ICD-10-CM

## 2020-09-18 DIAGNOSIS — Z98.61 POSTSURGICAL PERCUTANEOUS TRANSLUMINAL CORONARY ANGIOPLASTY STATUS: ICD-10-CM

## 2020-09-18 PROCEDURE — 93798 PHYS/QHP OP CAR RHAB W/ECG: CPT | Mod: S$GLB,,, | Performed by: INTERNAL MEDICINE

## 2020-09-18 PROCEDURE — 93798 PR CARDIAC REHAB/MONITOR: ICD-10-PCS | Mod: S$GLB,,, | Performed by: INTERNAL MEDICINE

## 2020-09-21 ENCOUNTER — CLINICAL SUPPORT (OUTPATIENT)
Dept: CARDIAC REHAB | Facility: CLINIC | Age: 70
End: 2020-09-21
Payer: MEDICARE

## 2020-09-21 DIAGNOSIS — Z98.61 POSTSURGICAL PERCUTANEOUS TRANSLUMINAL CORONARY ANGIOPLASTY STATUS: ICD-10-CM

## 2020-09-21 DIAGNOSIS — I25.10 CORONARY ARTERY DISEASE INVOLVING NATIVE CORONARY ARTERY OF NATIVE HEART WITHOUT ANGINA PECTORIS: ICD-10-CM

## 2020-09-21 PROCEDURE — 93798 PR CARDIAC REHAB/MONITOR: ICD-10-PCS | Mod: S$GLB,,, | Performed by: INTERNAL MEDICINE

## 2020-09-21 PROCEDURE — 93798 PHYS/QHP OP CAR RHAB W/ECG: CPT | Mod: S$GLB,,, | Performed by: INTERNAL MEDICINE

## 2020-09-23 ENCOUNTER — CLINICAL SUPPORT (OUTPATIENT)
Dept: CARDIAC REHAB | Facility: CLINIC | Age: 70
End: 2020-09-23
Payer: MEDICARE

## 2020-09-23 DIAGNOSIS — Z98.61 POSTSURGICAL PERCUTANEOUS TRANSLUMINAL CORONARY ANGIOPLASTY STATUS: Primary | ICD-10-CM

## 2020-09-23 DIAGNOSIS — I25.10 CORONARY ARTERY DISEASE INVOLVING NATIVE CORONARY ARTERY OF NATIVE HEART WITHOUT ANGINA PECTORIS: ICD-10-CM

## 2020-09-23 DIAGNOSIS — Z98.61 POSTSURGICAL PERCUTANEOUS TRANSLUMINAL CORONARY ANGIOPLASTY STATUS: ICD-10-CM

## 2020-09-23 DIAGNOSIS — Z03.818 ENCOUNTER FOR OBSERVATION FOR SUSPECTED EXPOSURE TO OTHER BIOLOGICAL AGENTS RULED OUT: ICD-10-CM

## 2020-09-23 PROCEDURE — 93798 PR CARDIAC REHAB/MONITOR: ICD-10-PCS | Mod: S$GLB,,, | Performed by: INTERNAL MEDICINE

## 2020-09-23 PROCEDURE — 93798 PHYS/QHP OP CAR RHAB W/ECG: CPT | Mod: S$GLB,,, | Performed by: INTERNAL MEDICINE

## 2020-09-25 ENCOUNTER — CLINICAL SUPPORT (OUTPATIENT)
Dept: CARDIAC REHAB | Facility: CLINIC | Age: 70
End: 2020-09-25
Payer: MEDICARE

## 2020-09-25 DIAGNOSIS — Z98.61 POSTSURGICAL PERCUTANEOUS TRANSLUMINAL CORONARY ANGIOPLASTY STATUS: ICD-10-CM

## 2020-09-25 DIAGNOSIS — I25.10 ATHEROSCLEROSIS OF NATIVE CORONARY ARTERY OF NATIVE HEART WITHOUT ANGINA PECTORIS: ICD-10-CM

## 2020-09-25 PROCEDURE — 93798 PHYS/QHP OP CAR RHAB W/ECG: CPT | Mod: S$GLB,,, | Performed by: INTERNAL MEDICINE

## 2020-09-25 PROCEDURE — 93798 PR CARDIAC REHAB/MONITOR: ICD-10-PCS | Mod: S$GLB,,, | Performed by: INTERNAL MEDICINE

## 2020-09-28 ENCOUNTER — CLINICAL SUPPORT (OUTPATIENT)
Dept: CARDIAC REHAB | Facility: CLINIC | Age: 70
End: 2020-09-28
Payer: MEDICARE

## 2020-09-28 DIAGNOSIS — I25.10 ATHEROSCLEROSIS OF NATIVE CORONARY ARTERY OF NATIVE HEART WITHOUT ANGINA PECTORIS: ICD-10-CM

## 2020-09-28 DIAGNOSIS — Z98.61 POSTSURGICAL PERCUTANEOUS TRANSLUMINAL CORONARY ANGIOPLASTY STATUS: ICD-10-CM

## 2020-09-28 PROCEDURE — 93798 PR CARDIAC REHAB/MONITOR: ICD-10-PCS | Mod: S$GLB,,, | Performed by: INTERNAL MEDICINE

## 2020-09-28 PROCEDURE — 93798 PHYS/QHP OP CAR RHAB W/ECG: CPT | Mod: S$GLB,,, | Performed by: INTERNAL MEDICINE

## 2020-09-30 ENCOUNTER — CLINICAL SUPPORT (OUTPATIENT)
Dept: CARDIAC REHAB | Facility: CLINIC | Age: 70
End: 2020-09-30
Payer: MEDICARE

## 2020-09-30 DIAGNOSIS — I25.10 ATHEROSCLEROSIS OF NATIVE CORONARY ARTERY OF NATIVE HEART WITHOUT ANGINA PECTORIS: ICD-10-CM

## 2020-09-30 DIAGNOSIS — Z98.61 POSTSURGICAL PERCUTANEOUS TRANSLUMINAL CORONARY ANGIOPLASTY STATUS: ICD-10-CM

## 2020-09-30 PROCEDURE — 93798 PR CARDIAC REHAB/MONITOR: ICD-10-PCS | Mod: S$GLB,,, | Performed by: INTERNAL MEDICINE

## 2020-09-30 PROCEDURE — 93798 PHYS/QHP OP CAR RHAB W/ECG: CPT | Mod: S$GLB,,, | Performed by: INTERNAL MEDICINE

## 2020-09-30 NOTE — PROGRESS NOTES
Pt was exercising without complaint then he developed a nose bleed while on the NuStep. Ice gretchen applied and pt was instructed to stop exercising. Pt only completed about 20 minutes of aerobic exercise and did attend lecture. Nose bleed stopped after ice applied and pt stopped exercising.

## 2020-10-02 ENCOUNTER — CLINICAL SUPPORT (OUTPATIENT)
Dept: CARDIAC REHAB | Facility: CLINIC | Age: 70
End: 2020-10-02
Payer: MEDICARE

## 2020-10-02 DIAGNOSIS — Z98.61 POSTSURGICAL PERCUTANEOUS TRANSLUMINAL CORONARY ANGIOPLASTY STATUS: ICD-10-CM

## 2020-10-02 DIAGNOSIS — I25.10 CORONARY ATHEROSCLEROSIS OF NATIVE CORONARY ARTERY: ICD-10-CM

## 2020-10-02 PROCEDURE — 93798 PHYS/QHP OP CAR RHAB W/ECG: CPT | Mod: S$GLB,,, | Performed by: INTERNAL MEDICINE

## 2020-10-02 PROCEDURE — 93798 PR CARDIAC REHAB/MONITOR: ICD-10-PCS | Mod: S$GLB,,, | Performed by: INTERNAL MEDICINE

## 2020-10-02 PROCEDURE — 99999 PR PBB SHADOW E&M-EST. PATIENT-LVL I: CPT | Mod: PBBFAC,,,

## 2020-10-02 PROCEDURE — 99999 PR PBB SHADOW E&M-EST. PATIENT-LVL I: ICD-10-PCS | Mod: PBBFAC,,,

## 2020-10-05 ENCOUNTER — CLINICAL SUPPORT (OUTPATIENT)
Dept: CARDIAC REHAB | Facility: CLINIC | Age: 70
End: 2020-10-05
Payer: MEDICARE

## 2020-10-05 DIAGNOSIS — Z98.61 POSTSURGICAL PERCUTANEOUS TRANSLUMINAL CORONARY ANGIOPLASTY STATUS: ICD-10-CM

## 2020-10-05 DIAGNOSIS — I25.10 CORONARY ARTERY DISEASE INVOLVING NATIVE CORONARY ARTERY OF NATIVE HEART WITHOUT ANGINA PECTORIS: ICD-10-CM

## 2020-10-05 PROCEDURE — 93798 PR CARDIAC REHAB/MONITOR: ICD-10-PCS | Mod: S$GLB,,, | Performed by: INTERNAL MEDICINE

## 2020-10-05 PROCEDURE — 93798 PHYS/QHP OP CAR RHAB W/ECG: CPT | Mod: S$GLB,,, | Performed by: INTERNAL MEDICINE

## 2020-10-07 ENCOUNTER — CLINICAL SUPPORT (OUTPATIENT)
Dept: CARDIAC REHAB | Facility: CLINIC | Age: 70
End: 2020-10-07
Payer: MEDICARE

## 2020-10-07 DIAGNOSIS — I25.10 CORONARY ATHEROSCLEROSIS OF NATIVE CORONARY ARTERY: ICD-10-CM

## 2020-10-07 DIAGNOSIS — Z98.61 POSTSURGICAL PERCUTANEOUS TRANSLUMINAL CORONARY ANGIOPLASTY STATUS: ICD-10-CM

## 2020-10-07 PROCEDURE — 93798 PR CARDIAC REHAB/MONITOR: ICD-10-PCS | Mod: S$GLB,,, | Performed by: INTERNAL MEDICINE

## 2020-10-07 PROCEDURE — 93798 PHYS/QHP OP CAR RHAB W/ECG: CPT | Mod: S$GLB,,, | Performed by: INTERNAL MEDICINE

## 2020-10-09 ENCOUNTER — CLINICAL SUPPORT (OUTPATIENT)
Dept: CARDIAC REHAB | Facility: CLINIC | Age: 70
End: 2020-10-09
Payer: MEDICARE

## 2020-10-09 DIAGNOSIS — Z98.61 POSTSURGICAL PERCUTANEOUS TRANSLUMINAL CORONARY ANGIOPLASTY STATUS: ICD-10-CM

## 2020-10-09 DIAGNOSIS — I25.10 CORONARY ATHEROSCLEROSIS OF NATIVE CORONARY ARTERY: ICD-10-CM

## 2020-10-09 PROCEDURE — 93798 PR CARDIAC REHAB/MONITOR: ICD-10-PCS | Mod: S$GLB,,, | Performed by: INTERNAL MEDICINE

## 2020-10-09 PROCEDURE — 93798 PHYS/QHP OP CAR RHAB W/ECG: CPT | Mod: S$GLB,,, | Performed by: INTERNAL MEDICINE

## 2020-10-12 ENCOUNTER — CLINICAL SUPPORT (OUTPATIENT)
Dept: CARDIAC REHAB | Facility: CLINIC | Age: 70
End: 2020-10-12
Payer: MEDICARE

## 2020-10-12 DIAGNOSIS — Z98.61 POSTSURGICAL PERCUTANEOUS TRANSLUMINAL CORONARY ANGIOPLASTY STATUS: ICD-10-CM

## 2020-10-12 DIAGNOSIS — I25.10 ATHEROSCLEROSIS OF NATIVE CORONARY ARTERY OF NATIVE HEART WITHOUT ANGINA PECTORIS: ICD-10-CM

## 2020-10-12 PROCEDURE — 93798 PR CARDIAC REHAB/MONITOR: ICD-10-PCS | Mod: S$GLB,,, | Performed by: INTERNAL MEDICINE

## 2020-10-12 PROCEDURE — 93798 PHYS/QHP OP CAR RHAB W/ECG: CPT | Mod: S$GLB,,, | Performed by: INTERNAL MEDICINE

## 2020-10-14 ENCOUNTER — CLINICAL SUPPORT (OUTPATIENT)
Dept: CARDIAC REHAB | Facility: CLINIC | Age: 70
End: 2020-10-14
Payer: MEDICARE

## 2020-10-14 DIAGNOSIS — I25.10 CORONARY ARTERY DISEASE INVOLVING NATIVE CORONARY ARTERY OF NATIVE HEART WITHOUT ANGINA PECTORIS: ICD-10-CM

## 2020-10-14 DIAGNOSIS — Z98.61 POSTSURGICAL PERCUTANEOUS TRANSLUMINAL CORONARY ANGIOPLASTY STATUS: ICD-10-CM

## 2020-10-14 PROCEDURE — 93798 PR CARDIAC REHAB/MONITOR: ICD-10-PCS | Mod: S$GLB,,, | Performed by: INTERNAL MEDICINE

## 2020-10-14 PROCEDURE — 93798 PHYS/QHP OP CAR RHAB W/ECG: CPT | Mod: S$GLB,,, | Performed by: INTERNAL MEDICINE

## 2020-10-15 ENCOUNTER — OFFICE VISIT (OUTPATIENT)
Dept: CARDIOLOGY | Facility: CLINIC | Age: 70
End: 2020-10-15
Payer: MEDICARE

## 2020-10-15 ENCOUNTER — HOSPITAL ENCOUNTER (OUTPATIENT)
Dept: CARDIOLOGY | Facility: HOSPITAL | Age: 70
Discharge: HOME | End: 2020-10-15
Attending: INTERNAL MEDICINE
Payer: MEDICARE

## 2020-10-15 VITALS
WEIGHT: 199.31 LBS | DIASTOLIC BLOOD PRESSURE: 77 MMHG | SYSTOLIC BLOOD PRESSURE: 129 MMHG | OXYGEN SATURATION: 100 % | BODY MASS INDEX: 27 KG/M2 | HEIGHT: 72 IN | HEART RATE: 62 BPM

## 2020-10-15 VITALS
WEIGHT: 199 LBS | HEART RATE: 62 BPM | HEIGHT: 72 IN | SYSTOLIC BLOOD PRESSURE: 129 MMHG | BODY MASS INDEX: 26.95 KG/M2 | DIASTOLIC BLOOD PRESSURE: 77 MMHG

## 2020-10-15 DIAGNOSIS — I25.5 CARDIOMYOPATHY, ISCHEMIC: Primary | ICD-10-CM

## 2020-10-15 DIAGNOSIS — I10 ESSENTIAL HYPERTENSION: ICD-10-CM

## 2020-10-15 DIAGNOSIS — I25.5 CARDIOMYOPATHY, ISCHEMIC: ICD-10-CM

## 2020-10-15 DIAGNOSIS — I73.9 PAD (PERIPHERAL ARTERY DISEASE): ICD-10-CM

## 2020-10-15 DIAGNOSIS — E78.5 DYSLIPIDEMIA: ICD-10-CM

## 2020-10-15 DIAGNOSIS — I50.42 CHRONIC COMBINED SYSTOLIC AND DIASTOLIC CONGESTIVE HEART FAILURE: ICD-10-CM

## 2020-10-15 DIAGNOSIS — Z98.890 STATUS POST CARDIAC CATHETERIZATION: ICD-10-CM

## 2020-10-15 PROBLEM — L97.929 NONHEALING ULCER OF LEFT LOWER EXTREMITY: Status: RESOLVED | Noted: 2017-11-09 | Resolved: 2020-10-15

## 2020-10-15 LAB
ASCENDING AORTA: 3.45 CM
AV INDEX (PROSTH): 0.49
AV MEAN GRADIENT: 6 MMHG
AV PEAK GRADIENT: 12 MMHG
AV VALVE AREA: 2.4 CM2
AV VELOCITY RATIO: 0.53
BSA FOR ECHO PROCEDURE: 2.14 M2
CV ECHO LV RWT: 0.35 CM
DOP CALC AO PEAK VEL: 1.74 M/S
DOP CALC AO VTI: 40.95 CM
DOP CALC LVOT AREA: 4.9 CM2
DOP CALC LVOT DIAMETER: 2.5 CM
DOP CALC LVOT PEAK VEL: 0.93 M/S
DOP CALC LVOT STROKE VOLUME: 98.27 CM3
DOP CALCLVOT PEAK VEL VTI: 20.03 CM
E/E' RATIO: 14.88 M/S
ECHO LV POSTERIOR WALL: 1.01 CM (ref 0.6–1.1)
FRACTIONAL SHORTENING: 13 % (ref 28–44)
INTERVENTRICULAR SEPTUM: 0.84 CM (ref 0.6–1.1)
IVRT: 64.7 MSEC
LA MAJOR: 6.48 CM
LA MINOR: 6.49 CM
LA WIDTH: 6.06 CM
LEFT ATRIUM SIZE: 4.55 CM
LEFT ATRIUM VOLUME INDEX: 71.5 ML/M2
LEFT ATRIUM VOLUME: 151.99 CM3
LEFT INTERNAL DIMENSION IN SYSTOLE: 5.05 CM (ref 2.1–4)
LEFT VENTRICLE DIASTOLIC VOLUME INDEX: 79.23 ML/M2
LEFT VENTRICLE DIASTOLIC VOLUME: 168.44 ML
LEFT VENTRICLE MASS INDEX: 100 G/M2
LEFT VENTRICLE SYSTOLIC VOLUME INDEX: 56.9 ML/M2
LEFT VENTRICLE SYSTOLIC VOLUME: 121.05 ML
LEFT VENTRICULAR INTERNAL DIMENSION IN DIASTOLE: 5.83 CM (ref 3.5–6)
LEFT VENTRICULAR MASS: 212.62 G
LV LATERAL E/E' RATIO: 13.22 M/S
LV SEPTAL E/E' RATIO: 17 M/S
MV PEAK E VEL: 1.19 M/S
PISA TR MAX VEL: 3.09 M/S
PULM VEIN S/D RATIO: 0.33
PV PEAK D VEL: 0.78 M/S
PV PEAK S VEL: 0.26 M/S
RA MAJOR: 5.84 CM
RA PRESSURE: 15 MMHG
RA WIDTH: 5.41 CM
RETIRED EF AND QEF - SEE NOTES: 46 %
RIGHT VENTRICULAR END-DIASTOLIC DIMENSION: 4.82 CM
RV TISSUE DOPPLER FREE WALL SYSTOLIC VELOCITY 1 (APICAL 4 CHAMBER VIEW): 8.76 CM/S
SINUS: 3.92 CM
STJ: 3.28 CM
TDI LATERAL: 0.09 M/S
TDI SEPTAL: 0.07 M/S
TDI: 0.08 M/S
TR MAX PG: 38 MMHG
TRICUSPID ANNULAR PLANE SYSTOLIC EXCURSION: 1.64 CM
TV REST PULMONARY ARTERY PRESSURE: 53 MMHG

## 2020-10-15 PROCEDURE — 93306 ECHO (CUPID ONLY): ICD-10-PCS | Mod: 26,,, | Performed by: INTERNAL MEDICINE

## 2020-10-15 PROCEDURE — 3008F BODY MASS INDEX DOCD: CPT | Mod: CPTII,S$GLB,, | Performed by: INTERNAL MEDICINE

## 2020-10-15 PROCEDURE — 99999 PR PBB SHADOW E&M-EST. PATIENT-LVL V: CPT | Mod: PBBFAC,,, | Performed by: INTERNAL MEDICINE

## 2020-10-15 PROCEDURE — 3078F DIAST BP <80 MM HG: CPT | Mod: CPTII,S$GLB,, | Performed by: INTERNAL MEDICINE

## 2020-10-15 PROCEDURE — 1125F AMNT PAIN NOTED PAIN PRSNT: CPT | Mod: S$GLB,,, | Performed by: INTERNAL MEDICINE

## 2020-10-15 PROCEDURE — 3074F SYST BP LT 130 MM HG: CPT | Mod: CPTII,S$GLB,, | Performed by: INTERNAL MEDICINE

## 2020-10-15 PROCEDURE — 99214 OFFICE O/P EST MOD 30 MIN: CPT | Mod: S$GLB,,, | Performed by: INTERNAL MEDICINE

## 2020-10-15 PROCEDURE — 3008F PR BODY MASS INDEX (BMI) DOCUMENTED: ICD-10-PCS | Mod: CPTII,S$GLB,, | Performed by: INTERNAL MEDICINE

## 2020-10-15 PROCEDURE — 93306 TTE W/DOPPLER COMPLETE: CPT

## 2020-10-15 PROCEDURE — 3074F PR MOST RECENT SYSTOLIC BLOOD PRESSURE < 130 MM HG: ICD-10-PCS | Mod: CPTII,S$GLB,, | Performed by: INTERNAL MEDICINE

## 2020-10-15 PROCEDURE — 1159F PR MEDICATION LIST DOCUMENTED IN MEDICAL RECORD: ICD-10-PCS | Mod: S$GLB,,, | Performed by: INTERNAL MEDICINE

## 2020-10-15 PROCEDURE — 1125F PR PAIN SEVERITY QUANTIFIED, PAIN PRESENT: ICD-10-PCS | Mod: S$GLB,,, | Performed by: INTERNAL MEDICINE

## 2020-10-15 PROCEDURE — 1159F MED LIST DOCD IN RCRD: CPT | Mod: S$GLB,,, | Performed by: INTERNAL MEDICINE

## 2020-10-15 PROCEDURE — 99999 PR PBB SHADOW E&M-EST. PATIENT-LVL V: ICD-10-PCS | Mod: PBBFAC,,, | Performed by: INTERNAL MEDICINE

## 2020-10-15 PROCEDURE — 99214 PR OFFICE/OUTPT VISIT, EST, LEVL IV, 30-39 MIN: ICD-10-PCS | Mod: S$GLB,,, | Performed by: INTERNAL MEDICINE

## 2020-10-15 PROCEDURE — 3078F PR MOST RECENT DIASTOLIC BLOOD PRESSURE < 80 MM HG: ICD-10-PCS | Mod: CPTII,S$GLB,, | Performed by: INTERNAL MEDICINE

## 2020-10-15 PROCEDURE — 93306 TTE W/DOPPLER COMPLETE: CPT | Mod: 26,,, | Performed by: INTERNAL MEDICINE

## 2020-10-15 NOTE — PROGRESS NOTES
Subjective:    Patient ID:  Blaine Multani is a 69 y.o. male who presents for follow-up of Coronary Artery Disease      HPI  Mr. Multani is a 70 y/o gentleman who was transferred to [facility] from [facility] after presenting there with an NSTEMI.  He underwent coronary angiography there that demonstrated mutli-vessel CAD and occlusion of all of his coronary artery bypass grafts including his LIMA to LAD.  His LVEF was in the 15-20% range. Additionally he has severe PAD and is s/p bilateral iliac stents as well as having bilateral fem-pop bypasses which are occluded and PTAS of the left SFA. He also has severe infra-popliteal PAD.  The patient was turned down for redo-CABG.  On 6/26/20 he underwent multi-vessel PCI with Impella CP support.  The Impella was placed via percutaneous access of the left axillary artery due to the patient's severe LE PAD.  It was weaned over several days after the PCI.  The patient was last seen in this clinic on 7/9/20.  In the interim since his last clinic visit he has been participating in phase 2 cardiac rehab.  Today the patient denies angina.  He reports that marcy feels strong. He denies shortness of breath. He also reports trace LE edema at ankles. He denies palpitations, syncope, or near syncope. He reports chronic bilateral calf claudication upon walking 1/4 mile.  He denies any skin breakdown on his feet.  He reports good medication compliance.    Past Medical History:   Diagnosis Date    A-fib     Asthma     Bronchitis     Colon polyps     Hemorrhoids     HLD (hyperlipidemia)     Hypertension     PVD (peripheral vascular disease)      Past Surgical History:   Procedure Laterality Date    angiogram with stents Left     leg    ARTERIOGRAPHY OF SUBCLAVIAN ARTERY  6/26/2020    Procedure: Arteriogram, Subclavian;  Surgeon: Bruno Cruz MD;  Location: [facility] CATH LAB;  Service: Cardiology;;    CHOLECYSTECTOMY      COLONOSCOPY W/ POLYPECTOMY      CORONARY ARTERY BYPASS GRAFT   1998    x3    INSERTION OF INTRAVASCULAR MICROAXIAL BLOOD PUMP N/A 6/26/2020    Procedure: INSERTION, IMPELLA;  Surgeon: Bruno Cruz MD;  Location: [facility] CATH LAB;  Service: Cardiology;  Laterality: N/A;    LEFT HEART CATHETERIZATION Left 6/26/2020    Procedure: Left heart cath;  Surgeon: Bruno Cruz MD;  Location: [facility] CATH LAB;  Service: Cardiology;  Laterality: Left;    PLACEMENT OF SWAN DONTRELL CATHETER WITH IMAGING GUIDANCE  6/26/2020    Procedure: INSERTION, CATHETER, SWAN-DONTRELL, WITH IMAGING GUIDANCE;  Surgeon: Bruno Cruz MD;  Location: [facility] CATH LAB;  Service: Cardiology;;    PLACEMENT OF SWAN DONTRELL CATHETER WITH IMAGING GUIDANCE  6/30/2020    Procedure: INSERTION, CATHETER, SWAN-DONTRELL, WITH IMAGING GUIDANCE;  Surgeon: Bruno Cruz MD;  Location: [facility] CATH LAB;  Service: Cardiology;;    RIGHT HEART CATHETERIZATION Right 6/26/2020    Procedure: INSERTION, CATHETER, RIGHT HEART;  Surgeon: Bruno Cruz MD;  Location: [facility] CATH LAB;  Service: Cardiology;  Laterality: Right;    TOTAL HIP ARTHROPLASTY Right      Current Outpatient Medications on File Prior to Visit   Medication Sig Dispense Refill    amLODIPine (NORVASC) 5 MG tablet Take by mouth.      apixaban (ELIQUIS) 5 mg Tab Take 1 tablet (5 mg total) by mouth 2 (two) times daily. 60 tablet 11    aspirin (ECOTRIN) 81 MG EC tablet Take 1 tablet (81 mg total) by mouth once daily.  0    carvediloL (COREG) 25 MG tablet Take 0.5 tablets (12.5 mg total) by mouth 2 (two) times daily with meals. 365 tablet 0    clopidogreL (PLAVIX) 75 mg tablet Take 1 tablet (75 mg total) by mouth once daily. 30 tablet 11    famotidine (PEPCID) 20 MG tablet Take 20 mg by mouth once daily.      furosemide (LASIX) 20 MG tablet Take 2 tablets (40 mg total) by mouth once daily. 60 tablet 11    hydrALAZINE (APRESOLINE) 100 MG tablet Take 1 tablet (100 mg total) by mouth 3 (three) times daily. 90 tablet 11    isosorbide dinitrate  (ISORDIL) 40 MG Tab Take 1 tablet (40 mg total) by mouth 3 (three) times daily. 90 tablet 11    losartan (COZAAR) 100 MG tablet Take 100 mg by mouth.      omeprazole (PRILOSEC) 40 MG capsule Take 40 mg by mouth once daily.      rosuvastatin (CRESTOR) 40 MG Tab Take 40 mg by mouth every evening.       sertraline (ZOLOFT) 50 MG tablet Take 50 mg by mouth every evening.       spironolactone (ALDACTONE) 25 MG tablet Take 1 tablet (25 mg total) by mouth once daily. 30 tablet 11    albuterol (VENTOLIN HFA) 90 mcg/actuation inhaler Ventolin HFA 90 mcg/actuation aerosol inhaler      albuterol-ipratropium (DUO-NEB) 2.5 mg-0.5 mg/3 mL nebulizer solution ipratropium 0.5 mg-albuterol 3 mg (2.5 mg base)/3 mL nebulization soln      benzonatate (TESSALON) 200 MG capsule Take 200 mg by mouth 3 (three) times daily as needed.      hydrOXYzine HCL (ATARAX) 25 MG tablet Take 25 mg by mouth.       levalbuterol (XOPENEX) 0.63 mg/3 mL nebulizer solution       SYMBICORT 160-4.5 mcg/actuation HFAA Inhale 2 puffs into the lungs every 12 (twelve) hours.       triamcinolone acetonide 0.025% (KENALOG) 0.025 % cream Apply topically 2 (two) times daily. (Patient not taking: Reported on 10/15/2020) 80 g 3     No current facility-administered medications on file prior to visit.      Review of patient's allergies indicates:  No Known Allergies  Social History     Tobacco Use    Smoking status: Former Smoker     Quit date:      Years since quittin.7    Smokeless tobacco: Never Used   Substance Use Topics    Alcohol use: Not Currently     Alcohol/week: 12.0 standard drinks     Types: 12 Cans of beer per week    Drug use: No     Family History   Problem Relation Age of Onset    Cancer Mother     Cancer Sister           Review of Systems   Constitution: Negative for decreased appetite, diaphoresis, fever, malaise/fatigue, weight gain and weight loss.   HENT: Negative for congestion, nosebleeds and sore throat.    Eyes: Negative  for blurred vision, vision loss in left eye, vision loss in right eye and visual disturbance.   Cardiovascular: Positive for claudication. Negative for chest pain, dyspnea on exertion, leg swelling, near-syncope, orthopnea, palpitations, paroxysmal nocturnal dyspnea and syncope.   Respiratory: Negative for cough, hemoptysis, shortness of breath and wheezing.    Endocrine: Negative for polyuria.   Hematologic/Lymphatic: Does not bruise/bleed easily.   Skin: Negative for nail changes and rash.   Musculoskeletal: Negative for back pain, muscle cramps and myalgias.   Gastrointestinal: Negative for abdominal pain, change in bowel habit, diarrhea, heartburn, hematemesis, hematochezia, melena, nausea and vomiting.   Genitourinary: Negative for bladder incontinence, dysuria, frequency and hematuria.   Psychiatric/Behavioral: Negative for depression.   Allergic/Immunologic: Negative for hives.        Objective:  Vitals:    10/15/20 0830 10/15/20 0832   BP: 132/72 129/77   BP Location: Left arm Right arm   Patient Position: Sitting Sitting   BP Method: Large (Automatic) Large (Automatic)   Pulse: 62 62   SpO2: 100%    Weight: 90.4 kg (199 lb 4.7 oz)    Height: 6' (1.829 m)          Physical Exam   Constitutional: He is oriented to person, place, and time. He appears well-developed and well-nourished.   HENT:   Head: Normocephalic and atraumatic.   Eyes: Pupils are equal, round, and reactive to light. EOM are normal.   Neck: Neck supple. No JVD present. No thyromegaly present.   Cardiovascular: Normal rate, regular rhythm and normal heart sounds. PMI is displaced. Exam reveals no gallop and no friction rub.   No murmur heard.  Pulses:       Carotid pulses are 2+ on the right side and 2+ on the left side.       Radial pulses are 2+ on the right side and 2+ on the left side.        Femoral pulses are 2+ on the right side and 2+ on the left side.       Dorsalis pedis pulses are 0 on the right side and 0 on the left side.         Posterior tibial pulses are 0 on the right side and 0 on the left side.   Left dp and pt present by doppler only  Right PT absent by doppler  Right DP present by doppler only   Pulmonary/Chest: Effort normal and breath sounds normal. He has no wheezes. He has no rhonchi. He has no rales.   Abdominal: Soft. Normal appearance and bowel sounds are normal. He exhibits no distension. There is no hepatosplenomegaly. There is no abdominal tenderness.   Musculoskeletal:         General: No edema.   Neurological: He is alert and oriented to person, place, and time. Gait normal.   Skin: Skin is warm and dry.   Venous stasis dermatitis on bilateral shins   Psychiatric: He has a normal mood and affect.         Assessment:       1. Cardiomyopathy, ischemic    2. Chronic combined systolic and diastolic congestive heart failure    3. Dyslipidemia    4. Essential hypertension    5. PAD (peripheral artery disease)    6. Status post cardiac catheterization         Plan:       1) Ischemic cardiomyopathy.  The patient reports NYHA Class 2 symptoms;  -continue low sodium heart healthy diet  -continue to check daily weight  -TTE to evaluate LVEF and determine if LifeVest can be discontinued  -continue hydralaxine 100mg po TID  -continue Isordil 40mg po TID;   -continue spironolactone 25mg po qday  -continue COreg 25mg po BID  -patient may beneift from Entresto in future     2) CAD.  The patient underwent successful multi-vessel PCI with Impella support; he is free of angina  -continue EC ASA 81mg poq day  -continue Plavix 75mg poq day X 1 year minimum  -continue phase 2 cardiac rehab  -discussed starting phase 3 after completing phase 2; patient stated he will continue exercising     3) HTN. Blood pressure adequately controlled in clinic today; continue above medications     4) Dyslipidemia. 7/4/20 lipid panel reviewed; Continue Crestor 40mg po qday     5) PAD.  The patient denies claudication or CLI symptoms at this time  -venkatanet was  referred to podiatry for high risk feet during his last clinic visit     6) PAF. Patient is on Eliquis;will discuss referral for Watchman to eventually be able to stop Eliquis during his nest clinic visit versus stopping aspirin and continuing Plavix plus Eliquis     7) H/o GERD. Continue PPI while on DAPT     All of the patient's questions were answered.

## 2020-10-16 ENCOUNTER — CLINICAL SUPPORT (OUTPATIENT)
Dept: CARDIAC REHAB | Facility: CLINIC | Age: 70
End: 2020-10-16
Payer: MEDICARE

## 2020-10-16 DIAGNOSIS — Z98.61 POSTSURGICAL PERCUTANEOUS TRANSLUMINAL CORONARY ANGIOPLASTY STATUS: ICD-10-CM

## 2020-10-16 DIAGNOSIS — I25.10 CORONARY ARTERY DISEASE INVOLVING NATIVE CORONARY ARTERY OF NATIVE HEART WITHOUT ANGINA PECTORIS: ICD-10-CM

## 2020-10-16 PROCEDURE — 93798 PHYS/QHP OP CAR RHAB W/ECG: CPT | Mod: S$GLB,,, | Performed by: INTERNAL MEDICINE

## 2020-10-16 PROCEDURE — 93798 PR CARDIAC REHAB/MONITOR: ICD-10-PCS | Mod: S$GLB,,, | Performed by: INTERNAL MEDICINE

## 2020-10-17 ENCOUNTER — CLINICAL SUPPORT (OUTPATIENT)
Dept: URGENT CARE | Facility: CLINIC | Age: 70
End: 2020-10-17
Payer: MEDICARE

## 2020-10-17 VITALS — TEMPERATURE: 98 F

## 2020-10-17 DIAGNOSIS — Z03.818 ENCOUNTER FOR OBSERVATION FOR SUSPECTED EXPOSURE TO OTHER BIOLOGICAL AGENTS RULED OUT: ICD-10-CM

## 2020-10-17 PROCEDURE — U0003 INFECTIOUS AGENT DETECTION BY NUCLEIC ACID (DNA OR RNA); SEVERE ACUTE RESPIRATORY SYNDROME CORONAVIRUS 2 (SARS-COV-2) (CORONAVIRUS DISEASE [COVID-19]), AMPLIFIED PROBE TECHNIQUE, MAKING USE OF HIGH THROUGHPUT TECHNOLOGIES AS DESCRIBED BY CMS-2020-01-R: HCPCS

## 2020-10-18 LAB — SARS-COV-2 RNA RESP QL NAA+PROBE: NOT DETECTED

## 2020-10-19 NOTE — PROGRESS NOTES
HISTORY: S/P PTCA/STENT (6-), CAD, CHF, HTN, HX OF NSTEMI, PAD, A. FIB, DLP, EF=40% (10-)    ANTHROPOMETRICS:     PRE POST   Abdominal girth (in) 41.6 40.0   Height (in) 71 71   Weight (lbs) 206 193   BMI 28.7 26.9   % Body Fat 16.5% 11.8%     EXERCISE RESULTS:     PRE POST   Peak VO2 (CPX only) 7.4 9.8   Actual METS (CPX only) 2.11 2.8   Estimated METS 3.0 4.0       HOME PRESCRIPTION: Blaine Leal.  You completed Cardiac Rehab.  Aerobic exercise frequency is recommended 5 to 6 times per week with a duration of 30 to 60 minutes.  Intensity should keep you in your target heart range of rest + 20 beats per minute.  Include a 3 to 5 minute warm up and cool down stretches.  Resistance training is recommended 2 to 3 days per week on non-consecutive days.  Good luck to you.  Keep up the hard work, and it has been a pleasure working with you.      LAB RESULTS:    Lab Results   Component Value Date    MPV 10.6 10/20/2020    MPV 10.1 07/16/2020    MPV 10.5 07/05/2020       Lab Results   Component Value Date    CHOL 153 10/20/2020    CHOL 111 (L) 07/16/2020    CHOL 108 (L) 07/04/2020     Lab Results   Component Value Date    HDL 60 10/20/2020    HDL 36 (L) 07/16/2020    HDL 30 (L) 07/04/2020     Lab Results   Component Value Date    LDLCALC 81.2 10/20/2020    LDLCALC 61.4 (L) 07/16/2020    LDLCALC 64.4 07/04/2020     Lab Results   Component Value Date    TRIG 59 10/20/2020    TRIG 68 07/16/2020    TRIG 68 07/04/2020     Lab Results   Component Value Date    CHOLHDL 39.2 10/20/2020    CHOLHDL 32.4 07/16/2020    CHOLHDL 27.8 07/04/2020       Lab Results   Component Value Date    GLUF 103 10/20/2020    GLUF 104 07/16/2020     Lab Results   Component Value Date    HGBA1C 5.4 06/26/2020        Lab Results   Component Value Date    HSCRP 10.30 (H) 10/20/2020    HSCRP 7.34 (H) 07/16/2020         ANA LILIA SCORES:     PRE POST   Anxiety 4 1   Depression 4 0   Somatic 4 3   Hostility 2 0     SF-36 SCORES:     PRE  POST   Physical Function 16 22   Social Function 5 11   Mental Health 18 28   Pain 4 6   Change in Health 1 5   Physical Role Limitation 0 3   Mental Role Limitation 1 3   Energy/Fatigue 11 20   Health Perceptions 13 22   Total Score 69 120     PHQ-9:     PRE POST   PHQ-9 9 0       EDUCATION SCORES:     PRE POST   Education Score 50 100

## 2020-10-20 ENCOUNTER — HOSPITAL ENCOUNTER (OUTPATIENT)
Dept: CARDIOLOGY | Facility: HOSPITAL | Age: 70
Discharge: HOME | End: 2020-10-20
Attending: INTERNAL MEDICINE
Payer: MEDICARE

## 2020-10-20 VITALS
WEIGHT: 193 LBS | SYSTOLIC BLOOD PRESSURE: 134 MMHG | DIASTOLIC BLOOD PRESSURE: 73 MMHG | HEART RATE: 63 BPM | BODY MASS INDEX: 26.14 KG/M2 | HEIGHT: 72 IN

## 2020-10-20 DIAGNOSIS — Z98.61 POSTSURGICAL PERCUTANEOUS TRANSLUMINAL CORONARY ANGIOPLASTY STATUS: ICD-10-CM

## 2020-10-20 DIAGNOSIS — I25.10 CORONARY ARTERY DISEASE INVOLVING NATIVE CORONARY ARTERY OF NATIVE HEART WITHOUT ANGINA PECTORIS: ICD-10-CM

## 2020-10-20 LAB
CV STRESS BASE HR: 63 BPM
DIASTOLIC BLOOD PRESSURE: 73 MMHG
OHS CV CPX 1 MINUTE RECOVERY HEART RATE: 74 BPM
OHS CV CPX 85 PERCENT MAX PREDICTED HEART RATE MALE: 128
OHS CV CPX ABDOMINAL GIRTH: 40 CM
OHS CV CPX DATA GRADE - PEAK: 2.5
OHS CV CPX DATA O2 SAT - PEAK: 97
OHS CV CPX DATA O2 SAT - REST: 98
OHS CV CPX DATA SPEED - PEAK: 2.3
OHS CV CPX DATA TIME - PEAK: 4.13
OHS CV CPX DATA VE/VCO2 - PEAK: 47
OHS CV CPX DATA VE/VO2 - PEAK: 40
OHS CV CPX DATA VO2 - PEAK: 9.8
OHS CV CPX DATA VO2 - REST: 4.9
OHS CV CPX ESTIMATED METS: 4
OHS CV CPX FEV1/FVC: 0.71
OHS CV CPX FORCED EXPIRATORY VOLUME: 1.67
OHS CV CPX FORCED VITAL CAPACITY (FVC): 2.34
OHS CV CPX HIGHEST VO: 26.2
OHS CV CPX MAX PREDICTED HEART RATE: 151
OHS CV CPX MAXIMAL VOLUNTARY VENTILATION (MVV) PREDICTED: 66.8
OHS CV CPX MAXIMAL VOLUNTARY VENTILATION (MVV): 49
OHS CV CPX MAXIUMUM EXERCISE VENTILATION (VE MAX): 35
OHS CV CPX PATIENT AGE: 69
OHS CV CPX PATIENT HEIGHT IN: 72
OHS CV CPX PATIENT IS FEMALE AGE 11-19: 0
OHS CV CPX PATIENT IS FEMALE AGE GREATER THAN 19: 0
OHS CV CPX PATIENT IS FEMALE AGE LESS THAN 11: 0
OHS CV CPX PATIENT IS FEMALE: 0
OHS CV CPX PATIENT IS MALE AGE 11-25: 0
OHS CV CPX PATIENT IS MALE AGE GREATER THAN 25: 1
OHS CV CPX PATIENT IS MALE AGE LESS THAN 11: 0
OHS CV CPX PATIENT IS MALE GREATER THAN 18: 1
OHS CV CPX PATIENT IS MALE LESS THAN OR EQUAL TO 18: 0
OHS CV CPX PATIENT IS MALE: 1
OHS CV CPX PATIENT WEIGHT RETURNED IN OZ: 3088
OHS CV CPX PEAK DIASTOLIC BLOOD PRESSURE: 70 MMHG
OHS CV CPX PEAK HEAR RATE: 81 BPM
OHS CV CPX PEAK RATE PRESSURE PRODUCT: NORMAL
OHS CV CPX PEAK SYSTOLIC BLOOD PRESSURE: 127 MMHG
OHS CV CPX PERCENT BODY FAT: 11.8
OHS CV CPX PERCENT MAX PREDICTED HEART RATE ACHIEVED: 54
OHS CV CPX PREDICTED VO2: 26.2 ML/KG/MIN
OHS CV CPX RATE PRESSURE PRODUCT PRESENTING: 8442
OHS CV CPX REST PET CO2: 27
OHS CV CPX VE/VCO2 SLOPE: 41.6
STRESS ECHO POST EXERCISE DUR MIN: 4 MINUTES
STRESS ECHO POST EXERCISE DUR SEC: 8 SECONDS
SYSTOLIC BLOOD PRESSURE: 134 MMHG

## 2020-10-20 PROCEDURE — 94621 CARDIOPULM EXERCISE TESTING: CPT

## 2020-10-20 PROCEDURE — 94621 CARDIOPULMONARY EXERCISE TESTING (CUPID ONLY): ICD-10-PCS | Mod: 26,,, | Performed by: INTERNAL MEDICINE

## 2020-10-20 PROCEDURE — 94621 CARDIOPULM EXERCISE TESTING: CPT | Mod: 26,,, | Performed by: INTERNAL MEDICINE

## 2020-10-23 ENCOUNTER — TELEPHONE (OUTPATIENT)
Dept: CARDIOLOGY | Facility: CLINIC | Age: 70
End: 2020-10-23

## 2020-10-23 ENCOUNTER — CLINICAL SUPPORT (OUTPATIENT)
Dept: CARDIAC REHAB | Facility: CLINIC | Age: 70
End: 2020-10-23
Payer: MEDICARE

## 2020-10-23 DIAGNOSIS — Z98.61 POSTSURGICAL PERCUTANEOUS TRANSLUMINAL CORONARY ANGIOPLASTY STATUS: ICD-10-CM

## 2020-10-23 DIAGNOSIS — I50.42 CHRONIC COMBINED SYSTOLIC AND DIASTOLIC CONGESTIVE HEART FAILURE: ICD-10-CM

## 2020-10-23 DIAGNOSIS — I25.10 ATHEROSCLEROSIS OF NATIVE CORONARY ARTERY OF NATIVE HEART WITHOUT ANGINA PECTORIS: ICD-10-CM

## 2020-10-23 PROCEDURE — 99999 PR PBB SHADOW E&M-EST. PATIENT-LVL III: ICD-10-PCS | Mod: PBBFAC,,,

## 2020-10-23 PROCEDURE — 93798 PHYS/QHP OP CAR RHAB W/ECG: CPT | Mod: S$GLB,,, | Performed by: INTERNAL MEDICINE

## 2020-10-23 PROCEDURE — 93798 PR CARDIAC REHAB/MONITOR: ICD-10-PCS | Mod: S$GLB,,, | Performed by: INTERNAL MEDICINE

## 2020-10-23 PROCEDURE — 99999 PR PBB SHADOW E&M-EST. PATIENT-LVL III: CPT | Mod: PBBFAC,,,

## 2020-10-23 NOTE — PROGRESS NOTES
Patient here for exit interview for Phase II Cardiac rehab.    Discussed with patient pre/post labs, stress tests, QOL, risk factors, goals & home exercise prescription.                                      Patient has been instructed to seek attention via PCP/Psychiatry in the event that circumstances change.  Patient verbalizes understanding.       RISK FACTORS:  Hyperlipidemia-under good control with increase in HDL to 80; Hypertension-under good control; Family history-unchanged; Stress- decreased greatly as shown on QOL; Sedentary- pt is exercising 30-60 minutes 5 times per week.    GOALS:  The following goals have been met:  Decrease cholesterol level  Increase exercise tolerance  Increase knowledge of CAD  Decrease blood pressure  Weight loss  Accurate pulse taking  ID & manage personal areas of stress  Learn more about healthy eating    Home exercise prescription discussed with patient.  Patient has been instructed to follow up with Cardiologist.   Information on Medical Fitness Program at [facility] given to patient.      Dilip Wick RN  Cardiac Rehab Nurse

## 2020-10-23 NOTE — PROGRESS NOTES
Met with Blaine Multani for exit to Phase II cardiac rehab.      Weight: 193 lbs  BMI:  26.9   Abdominal girth: 40 inches  Body fat: 11.8%    Patient feels confident that he can maintain sustainable healthy lifestyle choices at home moving forward.     Labs reviewed with patient. Patient confirms he is taking Crestor for cholesterol control.    Lab Results   Component Value Date    CHOL 153 10/20/2020     Lab Results   Component Value Date    HDL 60 10/20/2020     Lab Results   Component Value Date    LDLCALC 81.2 10/20/2020     Lab Results   Component Value Date    TRIG 59 10/20/2020     Lab Results   Component Value Date    CHOLHDL 39.2 10/20/2020         Lab Results   Component Value Date    GLUF 103 10/20/2020     Lab Results   Component Value Date    HGBA1C 5.4 06/26/2020       Vitamins/supplements:     Patient eats 3 meals daily.  Patient and wife prepares meals at home.  [employer] food with salt free seasoning and olive oil.  Denies use of a salt shaker at the table on prepared foods. Dines out 0 meals per week at this time.  Chooses fried foods 0 times per week.  Chooses fish 3 times per week. Patient willing to increase fish intake (non-fried varieties) to a goal of 2-3 servings per week.  Beverages include water, tea.  Alcohol none.    Blaine Multani encouraged to contact cardiac rehab staff with any additional questions or concerns regarding diet.      Lakesha EDGE, RDN

## 2020-10-23 NOTE — PROGRESS NOTES
Exercise:  I met with Mr. Valladares for an exit interview from the Phase II cardiac rehab program.  The entry and exit cardiopulmonary exercise stress test (CPX) results were discussed as well as current and future exercise programs.      Entry CPX test measurements of Peak VO2max was 7.4ml/kg/min, actual METs was 2.11, and estimated METs was 3.0.  At exit, the CPX test measurements of Peak VO2max was 9.8ml/kg/min, actual METs was 2.8, and estimated METs was 4.0.  The goal of a 30% improvement to 3.9 estimated METs was achieved.    Based on the exit CPX test, the target heart range during aerobic exercise for Mr. Valladares is rest + 20 bpm.      Other exit stress test results included weight (193 lb), abdominal girth (40.0 in), BMI (26.9), and body composition (11.8%).    Mr. Valladares stated he will continue to go to the gym so that he can use the Nustep.  He is going 5 days per week and exercises aerobically for 30 to 40 minutes.  He is also doing resistance exercises and stretches.      Aerobic exercise recommendations of 5 to 6 days per week for 30 to 60 minutes per day, resistance training 2 to 3 non-consecutive days per week, and stretching after each session of exercise was reviewed.   Mr. Valladares was asked to call if he has any questions in the future.  He stated understanding.    The patient was compliant with attendance to the rehab classes.    Char Cavanaugh., CEP

## 2020-10-26 DIAGNOSIS — I73.9 PAD (PERIPHERAL ARTERY DISEASE): Primary | ICD-10-CM

## 2020-10-27 ENCOUNTER — HOSPITAL ENCOUNTER (OUTPATIENT)
Dept: CARDIOLOGY | Facility: HOSPITAL | Age: 70
Discharge: HOME | End: 2020-10-27
Attending: INTERNAL MEDICINE
Payer: MEDICARE

## 2020-10-27 DIAGNOSIS — I73.9 PAD (PERIPHERAL ARTERY DISEASE): ICD-10-CM

## 2020-10-27 LAB
LEFT ANT TIBIAL SYS PSV: 33 CM/S
LEFT CFA PSV: 0 CM/S
LEFT EXTERNAL ILIAC PSV: 0 CM/S
LEFT PERONEAL SYS PSV: 36 CM/S
LEFT POPLITEAL PSV: 0 CM/S
LEFT POST TIBIAL SYS PSV: 17 CM/S
LEFT PROFUNDA SYS PSV: 53 CM/S
LEFT SUPER FEMORAL DIST SYS PSV: 0 CM/S
LEFT SUPER FEMORAL MID SYS PSV: 0 CM/S
LEFT SUPER FEMORAL OSTIAL SYS PSV: 0 CM/S
LEFT SUPER FEMORAL PROX SYS PSV: 0 CM/S
LEFT TIB/PER TRUNK SYS PSV: 25 CM/S
OHS CV LEFT LOWER EXTREMITY ABI (NO CALC): 0.47
OHS CV RIGHT ABI LOWER EXTREMITY (NO CALC): 0.28
RIGHT ANT TIBIAL SYS PSV: 8 CM/S
RIGHT CFA PSV: 239 CM/S
RIGHT EXTERNAL ILLIAC PSV: 275 CM/S
RIGHT PERONEAL SYS PSV: 0 CM/S
RIGHT POPLITEAL PSV: 22 CM/S
RIGHT POST TIBIAL SYS PSV: 39 CM/S
RIGHT PROFUNDA SYS PSV: 201 CM/S
RIGHT SUPER FEMORAL DIST SYS PSV: 0 CM/S
RIGHT SUPER FEMORAL MID SYS PSV: 0 CM/S
RIGHT SUPER FEMORAL OSTIAL SYS PSV: 221 CM/S
RIGHT SUPER FEMORAL PROX SYS PSV: 0 CM/S
RIGHT TIB/PER TRUNK SYS PSV: 18 CM/S

## 2020-10-27 PROCEDURE — 93925 LOWER EXTREMITY STUDY: CPT

## 2020-10-27 PROCEDURE — 93925 CV US DOPPLER ARTERIAL LEGS BILATERAL (CUPID ONLY): ICD-10-PCS | Mod: 26,,, | Performed by: INTERNAL MEDICINE

## 2020-10-27 PROCEDURE — 93925 LOWER EXTREMITY STUDY: CPT | Mod: 26,,, | Performed by: INTERNAL MEDICINE

## 2020-10-29 ENCOUNTER — TELEPHONE (OUTPATIENT)
Dept: CARDIOLOGY | Facility: CLINIC | Age: 70
End: 2020-10-29

## 2020-11-03 DIAGNOSIS — Z01.818 PRE-OP TESTING: Primary | ICD-10-CM

## 2020-11-05 ENCOUNTER — DOCUMENTATION ONLY (OUTPATIENT)
Dept: CARDIOLOGY | Facility: CLINIC | Age: 70
End: 2020-11-05

## 2020-11-05 ENCOUNTER — OFFICE VISIT (OUTPATIENT)
Dept: CARDIOLOGY | Facility: CLINIC | Age: 70
End: 2020-11-05
Payer: MEDICARE

## 2020-11-05 VITALS
DIASTOLIC BLOOD PRESSURE: 75 MMHG | BODY MASS INDEX: 26.57 KG/M2 | OXYGEN SATURATION: 100 % | SYSTOLIC BLOOD PRESSURE: 143 MMHG | HEIGHT: 72 IN | WEIGHT: 196.19 LBS | HEART RATE: 65 BPM

## 2020-11-05 DIAGNOSIS — I50.42 CHRONIC COMBINED SYSTOLIC AND DIASTOLIC CONGESTIVE HEART FAILURE: ICD-10-CM

## 2020-11-05 DIAGNOSIS — E78.5 DYSLIPIDEMIA: ICD-10-CM

## 2020-11-05 DIAGNOSIS — I25.5 CARDIOMYOPATHY, ISCHEMIC: ICD-10-CM

## 2020-11-05 DIAGNOSIS — I10 ESSENTIAL HYPERTENSION: ICD-10-CM

## 2020-11-05 DIAGNOSIS — R21 RASH: ICD-10-CM

## 2020-11-05 DIAGNOSIS — I70.229 CRITICAL LOWER LIMB ISCHEMIA: Primary | ICD-10-CM

## 2020-11-05 PROCEDURE — 3078F DIAST BP <80 MM HG: CPT | Mod: CPTII,S$GLB,, | Performed by: INTERNAL MEDICINE

## 2020-11-05 PROCEDURE — 99999 PR PBB SHADOW E&M-EST. PATIENT-LVL V: ICD-10-PCS | Mod: PBBFAC,,, | Performed by: INTERNAL MEDICINE

## 2020-11-05 PROCEDURE — 1125F PR PAIN SEVERITY QUANTIFIED, PAIN PRESENT: ICD-10-PCS | Mod: S$GLB,,, | Performed by: INTERNAL MEDICINE

## 2020-11-05 PROCEDURE — 3008F PR BODY MASS INDEX (BMI) DOCUMENTED: ICD-10-PCS | Mod: CPTII,S$GLB,, | Performed by: INTERNAL MEDICINE

## 2020-11-05 PROCEDURE — 3008F BODY MASS INDEX DOCD: CPT | Mod: CPTII,S$GLB,, | Performed by: INTERNAL MEDICINE

## 2020-11-05 PROCEDURE — 3077F PR MOST RECENT SYSTOLIC BLOOD PRESSURE >= 140 MM HG: ICD-10-PCS | Mod: CPTII,S$GLB,, | Performed by: INTERNAL MEDICINE

## 2020-11-05 PROCEDURE — 99214 OFFICE O/P EST MOD 30 MIN: CPT | Mod: S$GLB,,, | Performed by: INTERNAL MEDICINE

## 2020-11-05 PROCEDURE — 99999 PR PBB SHADOW E&M-EST. PATIENT-LVL V: CPT | Mod: PBBFAC,,, | Performed by: INTERNAL MEDICINE

## 2020-11-05 PROCEDURE — 3077F SYST BP >= 140 MM HG: CPT | Mod: CPTII,S$GLB,, | Performed by: INTERNAL MEDICINE

## 2020-11-05 PROCEDURE — 1125F AMNT PAIN NOTED PAIN PRSNT: CPT | Mod: S$GLB,,, | Performed by: INTERNAL MEDICINE

## 2020-11-05 PROCEDURE — 99214 PR OFFICE/OUTPT VISIT, EST, LEVL IV, 30-39 MIN: ICD-10-PCS | Mod: S$GLB,,, | Performed by: INTERNAL MEDICINE

## 2020-11-05 PROCEDURE — 1159F MED LIST DOCD IN RCRD: CPT | Mod: S$GLB,,, | Performed by: INTERNAL MEDICINE

## 2020-11-05 PROCEDURE — 1159F PR MEDICATION LIST DOCUMENTED IN MEDICAL RECORD: ICD-10-PCS | Mod: S$GLB,,, | Performed by: INTERNAL MEDICINE

## 2020-11-05 PROCEDURE — 3078F PR MOST RECENT DIASTOLIC BLOOD PRESSURE < 80 MM HG: ICD-10-PCS | Mod: CPTII,S$GLB,, | Performed by: INTERNAL MEDICINE

## 2020-11-05 RX ORDER — SODIUM CHLORIDE 9 MG/ML
3 INJECTION, SOLUTION INTRAVENOUS CONTINUOUS
Status: CANCELLED | OUTPATIENT
Start: 2020-11-05 | End: 2020-11-05

## 2020-11-05 RX ORDER — DIPHENHYDRAMINE HCL 25 MG
50 CAPSULE ORAL ONCE
Status: CANCELLED | OUTPATIENT
Start: 2020-11-05 | End: 2020-11-05

## 2020-11-05 RX ORDER — CILOSTAZOL 50 MG/1
50 TABLET ORAL 2 TIMES DAILY
Qty: 60 TABLET | Refills: 11 | Status: ON HOLD | OUTPATIENT
Start: 2020-11-05 | End: 2021-08-11 | Stop reason: HOSPADM

## 2020-11-05 NOTE — PROGRESS NOTES
"OUTPATIENT CATHETERIZATION INSTRUCTIONS    You have been scheduled for a procedure in the catheterization lab on Monday, November 23, 2020.     Please report to the Cardiology Waiting Area on the Third floor of the hospital and check in at 6 AM.   You will then be taken to the SSCU (Short Stay Cardiac Unit) and prepared for your procedure. Please be aware that this is not the time of your procedure but the time you are to arrive. The procedures are scheduled on an hourly basis; however, emergency cases take precedence over all other cases.       You may not have anything to eat or drink after midnight the night before your test. You may take your regular morning medications with water. If there are any medications that you should not take you will be instructed to hold them that morning. If you are diabetic and on Metformin (Glucophage) do not take it the day before, the day of, and for 2 days after your procedure.      The procedure will take 1-2 hours to perform. After the procedure, you will return to SSCU on the third floor of the hospital. You will need to lie still (or keep your arm still) for the next 4 to 6 hours to minimize bleeding from the puncture site. Your family may remain in the room with you during this time.       You may be able to be discharged home that same afternoon if there is someone to drive you home and there were no complications. If you have one of the balloon, stent, or device procedures you may spend the night in the hospital. Your doctor will determine, based on your progress, the date and time of your discharge. The results of your procedure will be discussed with you before you are discharged. Any further testing or procedures will be scheduled for you either before you leave or you will be called with these appointments.       If you should have any questions, concerns, or need to change the date of your procedure, please call "VINOD Nance @ [phone]    Special " Instructions:    Hold your apixiban (Eliquis) for 3 days before your procedure. Your last dose will be on: Thursday, November 19, 2020.               THE ABOVE INSTRUCTIONS WERE GIVEN TO THE PATIENT VERBALLY AND THEY VERBALIZED UNDERSTANDING.  THEY DO NOT REQUIRE ANY SPECIAL NEEDS AND DO NOT HAVE ANY LEARNING BARRIERS.          Directions for Reporting to Cardiology Waiting Area in the Hospital  If you park in the Parking Garage:  Take elevators to the1st floor of the parking garage.  Continue past the gift shop, coffee shop, and piano.  Take a right and go to the gold elevators. (Elevator B)  Take the elevator to the 3rd floor.  Follow the arrow on the sign on the wall that says Cath Lab Registration/EP Lab Registration.  Follow the long hallway all the way around until you come to a big open area.  This is the registration area.  Check in at Reception Desk.    OR    If family is dropping you off:  Have them drop you off at the front of the Hospital under the green overhang.  Enter through the doors and take a right.  Take the E elevators to the 3rd floor Cardiology Waiting Area.  Check in at the Reception Desk in the waiting room.

## 2020-11-06 NOTE — PROGRESS NOTES
Subjective:    Patient ID:  Blaine Multani is a 69 y.o. male who presents for follow-up of Coronary Artery Disease and Peripheral Arterial Disease      HPI  Mr. Multani is a 68 y/o gentleman who was transferred to [facility] from [facility] after presenting there with an NSTEMI.  He underwent coronary angiography there that demonstrated mutli-vessel CAD and occlusion of all of his coronary artery bypass grafts including his LIMA to LAD.  His LVEF was in the 15-20% range. Additionally he has severe PAD and is s/p bilateral iliac stents as well as having bilateral fem-pop bypasses which are occluded and PTAS of the left SFA. He also has severe infra-popliteal PAD.  The patient was turned down for redo-CABG.  On 6/26/20 he underwent multi-vessel PCI with Impella CP support.  The Impella was placed via percutaneous access of the left axillary artery due to the patient's severe LE PAD.  It was weaned over several days after the PCI.  The patient was last seen in this clinic on 10/15/20.  In the interim since his last clinic visit he has been graduated from phase 2 cardiac rehab. He reports that he has been exercising at [facility] spending 20 minutes on a step machine followed by a break and then doing 15-20 mnutes more as well as weights. He denies angina symptoms during exercise.   Today the patient denies angina.  He reports that he now feels strong. He denies shortness of breath. He also reports trace LE edema at ankles. He denies palpitations, syncope, or near syncope. He reports chronic bilateral calf claudication upon walking 2-3 blocks.  He denied any skin breakdown on his feet duringhis previous clinic visits, but today stated that for the last 6 months or so he has been experiencing reucrrent skin breakdown that heals and comes back on the dorsal aspect of his left foot.    Addditionally the patient reports a pruritic, erythematous rash on both his formearms.  He reports good medication  compliance.     Past Medical History:   Diagnosis Date    A-fib     Asthma     Bronchitis     Colon polyps     Hemorrhoids     HLD (hyperlipidemia)     Hypertension     PVD (peripheral vascular disease)      Past Surgical History:   Procedure Laterality Date    angiogram with stents Left     leg    ARTERIOGRAPHY OF SUBCLAVIAN ARTERY  6/26/2020    Procedure: Arteriogram, Subclavian;  Surgeon: Bruno Cruz MD;  Location: [facility] CATH LAB;  Service: Cardiology;;    CHOLECYSTECTOMY      COLONOSCOPY W/ POLYPECTOMY      CORONARY ARTERY BYPASS GRAFT  1998    x3    INSERTION OF INTRAVASCULAR MICROAXIAL BLOOD PUMP N/A 6/26/2020    Procedure: INSERTION, IMPELLA;  Surgeon: Bruno Cruz MD;  Location: [facility] CATH LAB;  Service: Cardiology;  Laterality: N/A;    LEFT HEART CATHETERIZATION Left 6/26/2020    Procedure: Left heart cath;  Surgeon: Bruno Cruz MD;  Location: [facility] CATH LAB;  Service: Cardiology;  Laterality: Left;    PLACEMENT OF SWAN DONTRELL CATHETER WITH IMAGING GUIDANCE  6/26/2020    Procedure: INSERTION, CATHETER, SWAN-DONTRELL, WITH IMAGING GUIDANCE;  Surgeon: Bruno Cruz MD;  Location: [facility] CATH LAB;  Service: Cardiology;;    PLACEMENT OF SWAN DONTRELL CATHETER WITH IMAGING GUIDANCE  6/30/2020    Procedure: INSERTION, CATHETER, SWAN-DONTRELL, WITH IMAGING GUIDANCE;  Surgeon: Bruno Cruz MD;  Location: [facility] CATH LAB;  Service: Cardiology;;    RIGHT HEART CATHETERIZATION Right 6/26/2020    Procedure: INSERTION, CATHETER, RIGHT HEART;  Surgeon: Bruno Cruz MD;  Location: [facility] CATH LAB;  Service: Cardiology;  Laterality: Right;    TOTAL HIP ARTHROPLASTY Right      Current Outpatient Medications on File Prior to Visit   Medication Sig Dispense Refill    apixaban (ELIQUIS) 5 mg Tab Take 1 tablet (5 mg total) by mouth 2 (two) times daily. 60 tablet 11    aspirin (ECOTRIN) 81 MG EC tablet Take 1 tablet (81 mg total) by mouth once daily.  0    carvediloL (COREG) 25 MG  tablet Take 0.5 tablets (12.5 mg total) by mouth 2 (two) times daily with meals. 365 tablet 0    clopidogreL (PLAVIX) 75 mg tablet Take 1 tablet (75 mg total) by mouth once daily. 30 tablet 11    furosemide (LASIX) 20 MG tablet Take 2 tablets (40 mg total) by mouth once daily. 60 tablet 11    hydrALAZINE (APRESOLINE) 100 MG tablet Take 1 tablet (100 mg total) by mouth 3 (three) times daily. 90 tablet 11    rosuvastatin (CRESTOR) 40 MG Tab Take 40 mg by mouth every evening.       sertraline (ZOLOFT) 50 MG tablet Take 50 mg by mouth every evening.       spironolactone (ALDACTONE) 25 MG tablet Take 1 tablet (25 mg total) by mouth once daily. 30 tablet 11    albuterol (VENTOLIN HFA) 90 mcg/actuation inhaler Ventolin HFA 90 mcg/actuation aerosol inhaler      albuterol-ipratropium (DUO-NEB) 2.5 mg-0.5 mg/3 mL nebulizer solution ipratropium 0.5 mg-albuterol 3 mg (2.5 mg base)/3 mL nebulization soln      amLODIPine (NORVASC) 5 MG tablet Take by mouth.      benzonatate (TESSALON) 200 MG capsule Take 200 mg by mouth 3 (three) times daily as needed.      famotidine (PEPCID) 20 MG tablet Take 20 mg by mouth once daily.      hydrOXYzine HCL (ATARAX) 25 MG tablet Take 25 mg by mouth.       isosorbide dinitrate (ISORDIL) 40 MG Tab Take 1 tablet (40 mg total) by mouth 3 (three) times daily. 90 tablet 11    levalbuterol (XOPENEX) 0.63 mg/3 mL nebulizer solution       losartan (COZAAR) 100 MG tablet Take 100 mg by mouth.      omeprazole (PRILOSEC) 40 MG capsule Take 40 mg by mouth once daily.      SYMBICORT 160-4.5 mcg/actuation HFAA Inhale 2 puffs into the lungs every 12 (twelve) hours.       triamcinolone acetonide 0.025% (KENALOG) 0.025 % cream Apply topically 2 (two) times daily. 80 g 3     No current facility-administered medications on file prior to visit.      Review of patient's allergies indicates:  No Known Allergies  Social History     Tobacco Use    Smoking status: Former Smoker     Quit date: 2014      Years since quittin.8    Smokeless tobacco: Never Used   Substance Use Topics    Alcohol use: Not Currently     Alcohol/week: 12.0 standard drinks     Types: 12 Cans of beer per week    Drug use: No     Family History   Problem Relation Age of Onset    Cancer Mother     Cancer Sister        Review of Systems   Constitution: Negative for decreased appetite, diaphoresis, fever, malaise/fatigue, weight gain and weight loss.   HENT: Negative for congestion, nosebleeds and sore throat.    Eyes: Negative for blurred vision, vision loss in left eye, vision loss in right eye and visual disturbance.   Cardiovascular: Positive for claudication. Negative for chest pain, dyspnea on exertion, leg swelling, near-syncope, orthopnea, palpitations, paroxysmal nocturnal dyspnea and syncope.   Respiratory: Negative for cough, hemoptysis, shortness of breath and wheezing.    Endocrine: Negative for polyuria.   Hematologic/Lymphatic: Does not bruise/bleed easily.   Skin: Positive for poor wound healing. Negative for nail changes and rash.   Musculoskeletal: Negative for back pain, muscle cramps and myalgias.   Gastrointestinal: Negative for abdominal pain, change in bowel habit, diarrhea, heartburn, hematemesis, hematochezia, melena, nausea and vomiting.   Genitourinary: Negative for bladder incontinence, dysuria, frequency and hematuria.   Psychiatric/Behavioral: Negative for depression.   Allergic/Immunologic: Negative for hives.        Objective:  Vitals:    20 0859 20 0902   BP: (!) 156/79 (!) 143/75   BP Location: Left arm Right arm   Patient Position: Sitting Sitting   BP Method: Large (Automatic) Large (Automatic)   Pulse: 60 65   SpO2: 100%    Weight: 89 kg (196 lb 3.4 oz)    Height: 6' (1.829 m)          Physical Exam   Constitutional: He is oriented to person, place, and time. He appears well-developed and well-nourished.   HENT:   Head: Normocephalic and atraumatic.   Eyes: Pupils are equal, round, and  reactive to light. EOM are normal.   Neck: Neck supple. No JVD present. No thyromegaly present.   Cardiovascular: Normal rate and regular rhythm. PMI is displaced. Exam reveals no gallop and no friction rub.   No murmur heard.  Pulses:       Carotid pulses are 2+ on the right side and 2+ on the left side.       Radial pulses are 2+ on the right side and 2+ on the left side.        Femoral pulses are 2+ on the right side and 2+ on the left side.  Bilateral dp biphasic on doppler  Bilateral pt soft, but biphasic on doppler   Pulmonary/Chest: Effort normal. He has no wheezes. He has no rhonchi. He has no rales.   Abdominal: Soft. Normal appearance. He exhibits no distension. There is no hepatosplenomegaly. There is no abdominal tenderness.   Neurological: He is alert and oriented to person, place, and time. Gait normal.   Skin: Skin is warm and dry.   Bilateral venous stasis dermatitison shins  Bilateral forearm erythematous rash   Psychiatric: He has a normal mood and affect.         Assessment:       1. Critical lower limb ischemia    2. Cardiomyopathy, ischemic    3. Chronic combined systolic and diastolic congestive heart failure    4. Dyslipidemia    5. Essential hypertension    6. Rash         Plan:       1) Ischemic cardiomyopathy.  The patient reports NYHA Class 2 symptoms;  -continue low sodium heart healthy diet  -continue to check daily weight  -TTE on 6/26/20 demonstrated LVEF=20%, TTE on 10/15/20 demonstraed LVEF=46%  -continue hydralaxine 100mg po TID  -continue Isordil 40mg po TID;   -continue spironolactone 25mg po qday  -continue COreg 25mg po BID  -patient may beneift from [facility] in future     2) CAD.  The patient underwent successful multi-vessel PCI with Impella support; he is free of angina  -continue EC ASA 81mg poq day  -continue Plavix 75mg poq day X 1 year minimum  -continue phase 2 cardiac rehab  -continue daily aerobic exercise     3) HTN. Blood pressure inadequately controlled in clinic  todayy, but was adequately controlled during previous clinic visit  -reassess during next clinic visit  -continue above medications     4) Dyslipidemia. 7/4/20 lipid panel reviewed; Continue Crestor 40mg po qday     5) PAD.  The patient reports Mandy 2a claudication and now reports a h/o a recurrent foot wound  -bilateral LE duplex reveiwed  -patient was referred to podiatry for high risk feet 2clinic visits ago  -I discussed cilostazol with the patient including potential side effects such as CHF exacerbation; the patient agreed to a trial  -start cilostazol 50mg po BID  -I discussed abd aortography and run-off with the patient to plan possible PTA/S as his left foot wound is reported to be recurring; will stage PTA/S   -6Fr R Radial access  The risks, benefits, and alternatives of peripheral vascular angiography and possible intervention were discussed with the patient.  All questions were answered and informed consent was obtained.      6) PAF. Patient is on Eliquis;will discuss referral for [facility] to eventually be able to stop Eliquis during his nest clinic visit versus stopping aspirin and continuing Plavix plus Eliquis     7) H/o GERD. Continue PPI while on DAPT    8) Bilateral forearm rash. Will refer the patinet to dermatology for further evaluation     All of the patient's questions were answered.

## 2020-11-06 NOTE — H&P (VIEW-ONLY)
Subjective:    Patient ID:  Blaine Multani is a 69 y.o. male who presents for follow-up of Coronary Artery Disease and Peripheral Arterial Disease      HPI  Mr. Multani is a 68 y/o gentleman who was transferred to [facility] from [facility] after presenting there with an NSTEMI.  He underwent coronary angiography there that demonstrated mutli-vessel CAD and occlusion of all of his coronary artery bypass grafts including his LIMA to LAD.  His LVEF was in the 15-20% range. Additionally he has severe PAD and is s/p bilateral iliac stents as well as having bilateral fem-pop bypasses which are occluded and PTAS of the left SFA. He also has severe infra-popliteal PAD.  The patient was turned down for redo-CABG.  On 6/26/20 he underwent multi-vessel PCI with Impella CP support.  The Impella was placed via percutaneous access of the left axillary artery due to the patient's severe LE PAD.  It was weaned over several days after the PCI.  The patient was last seen in this clinic on 10/15/20.  In the interim since his last clinic visit he has been graduated from phase 2 cardiac rehab. He reports that he has been exercising at [facility] spending 20 minutes on a step machine followed by a break and then doing 15-20 mnutes more as well as weights. He denies angina symptoms during exercise.   Today the patient denies angina.  He reports that he now feels strong. He denies shortness of breath. He also reports trace LE edema at ankles. He denies palpitations, syncope, or near syncope. He reports chronic bilateral calf claudication upon walking 2-3 blocks.  He denied any skin breakdown on his feet duringhis previous clinic visits, but today stated that for the last 6 months or so he has been experiencing reucrrent skin breakdown that heals and comes back on the dorsal aspect of his left foot.    Addditionally the patient reports a pruritic, erythematous rash on both his formearms.  He reports good medication  compliance.     Past Medical History:   Diagnosis Date    A-fib     Asthma     Bronchitis     Colon polyps     Hemorrhoids     HLD (hyperlipidemia)     Hypertension     PVD (peripheral vascular disease)      Past Surgical History:   Procedure Laterality Date    angiogram with stents Left     leg    ARTERIOGRAPHY OF SUBCLAVIAN ARTERY  6/26/2020    Procedure: Arteriogram, Subclavian;  Surgeon: Bruno Cruz MD;  Location: [facility] CATH LAB;  Service: Cardiology;;    CHOLECYSTECTOMY      COLONOSCOPY W/ POLYPECTOMY      CORONARY ARTERY BYPASS GRAFT  1998    x3    INSERTION OF INTRAVASCULAR MICROAXIAL BLOOD PUMP N/A 6/26/2020    Procedure: INSERTION, IMPELLA;  Surgeon: Bruno Cruz MD;  Location: [facility] CATH LAB;  Service: Cardiology;  Laterality: N/A;    LEFT HEART CATHETERIZATION Left 6/26/2020    Procedure: Left heart cath;  Surgeon: Bruno Cruz MD;  Location: [facility] CATH LAB;  Service: Cardiology;  Laterality: Left;    PLACEMENT OF SWAN DONTRELL CATHETER WITH IMAGING GUIDANCE  6/26/2020    Procedure: INSERTION, CATHETER, SWAN-DONTRELL, WITH IMAGING GUIDANCE;  Surgeon: Bruno Cruz MD;  Location: [facility] CATH LAB;  Service: Cardiology;;    PLACEMENT OF SWAN DONTRELL CATHETER WITH IMAGING GUIDANCE  6/30/2020    Procedure: INSERTION, CATHETER, SWAN-DONTRELL, WITH IMAGING GUIDANCE;  Surgeon: Bruno Cruz MD;  Location: [facility] CATH LAB;  Service: Cardiology;;    RIGHT HEART CATHETERIZATION Right 6/26/2020    Procedure: INSERTION, CATHETER, RIGHT HEART;  Surgeon: Bruno Cruz MD;  Location: [facility] CATH LAB;  Service: Cardiology;  Laterality: Right;    TOTAL HIP ARTHROPLASTY Right      Current Outpatient Medications on File Prior to Visit   Medication Sig Dispense Refill    apixaban (ELIQUIS) 5 mg Tab Take 1 tablet (5 mg total) by mouth 2 (two) times daily. 60 tablet 11    aspirin (ECOTRIN) 81 MG EC tablet Take 1 tablet (81 mg total) by mouth once daily.  0    carvediloL (COREG) 25 MG  tablet Take 0.5 tablets (12.5 mg total) by mouth 2 (two) times daily with meals. 365 tablet 0    clopidogreL (PLAVIX) 75 mg tablet Take 1 tablet (75 mg total) by mouth once daily. 30 tablet 11    furosemide (LASIX) 20 MG tablet Take 2 tablets (40 mg total) by mouth once daily. 60 tablet 11    hydrALAZINE (APRESOLINE) 100 MG tablet Take 1 tablet (100 mg total) by mouth 3 (three) times daily. 90 tablet 11    rosuvastatin (CRESTOR) 40 MG Tab Take 40 mg by mouth every evening.       sertraline (ZOLOFT) 50 MG tablet Take 50 mg by mouth every evening.       spironolactone (ALDACTONE) 25 MG tablet Take 1 tablet (25 mg total) by mouth once daily. 30 tablet 11    albuterol (VENTOLIN HFA) 90 mcg/actuation inhaler Ventolin HFA 90 mcg/actuation aerosol inhaler      albuterol-ipratropium (DUO-NEB) 2.5 mg-0.5 mg/3 mL nebulizer solution ipratropium 0.5 mg-albuterol 3 mg (2.5 mg base)/3 mL nebulization soln      amLODIPine (NORVASC) 5 MG tablet Take by mouth.      benzonatate (TESSALON) 200 MG capsule Take 200 mg by mouth 3 (three) times daily as needed.      famotidine (PEPCID) 20 MG tablet Take 20 mg by mouth once daily.      hydrOXYzine HCL (ATARAX) 25 MG tablet Take 25 mg by mouth.       isosorbide dinitrate (ISORDIL) 40 MG Tab Take 1 tablet (40 mg total) by mouth 3 (three) times daily. 90 tablet 11    levalbuterol (XOPENEX) 0.63 mg/3 mL nebulizer solution       losartan (COZAAR) 100 MG tablet Take 100 mg by mouth.      omeprazole (PRILOSEC) 40 MG capsule Take 40 mg by mouth once daily.      SYMBICORT 160-4.5 mcg/actuation HFAA Inhale 2 puffs into the lungs every 12 (twelve) hours.       triamcinolone acetonide 0.025% (KENALOG) 0.025 % cream Apply topically 2 (two) times daily. 80 g 3     No current facility-administered medications on file prior to visit.      Review of patient's allergies indicates:  No Known Allergies  Social History     Tobacco Use    Smoking status: Former Smoker     Quit date: 2014      Years since quittin.8    Smokeless tobacco: Never Used   Substance Use Topics    Alcohol use: Not Currently     Alcohol/week: 12.0 standard drinks     Types: 12 Cans of beer per week    Drug use: No     Family History   Problem Relation Age of Onset    Cancer Mother     Cancer Sister        Review of Systems   Constitution: Negative for decreased appetite, diaphoresis, fever, malaise/fatigue, weight gain and weight loss.   HENT: Negative for congestion, nosebleeds and sore throat.    Eyes: Negative for blurred vision, vision loss in left eye, vision loss in right eye and visual disturbance.   Cardiovascular: Positive for claudication. Negative for chest pain, dyspnea on exertion, leg swelling, near-syncope, orthopnea, palpitations, paroxysmal nocturnal dyspnea and syncope.   Respiratory: Negative for cough, hemoptysis, shortness of breath and wheezing.    Endocrine: Negative for polyuria.   Hematologic/Lymphatic: Does not bruise/bleed easily.   Skin: Positive for poor wound healing. Negative for nail changes and rash.   Musculoskeletal: Negative for back pain, muscle cramps and myalgias.   Gastrointestinal: Negative for abdominal pain, change in bowel habit, diarrhea, heartburn, hematemesis, hematochezia, melena, nausea and vomiting.   Genitourinary: Negative for bladder incontinence, dysuria, frequency and hematuria.   Psychiatric/Behavioral: Negative for depression.   Allergic/Immunologic: Negative for hives.        Objective:  Vitals:    20 0859 20 0902   BP: (!) 156/79 (!) 143/75   BP Location: Left arm Right arm   Patient Position: Sitting Sitting   BP Method: Large (Automatic) Large (Automatic)   Pulse: 60 65   SpO2: 100%    Weight: 89 kg (196 lb 3.4 oz)    Height: 6' (1.829 m)          Physical Exam   Constitutional: He is oriented to person, place, and time. He appears well-developed and well-nourished.   HENT:   Head: Normocephalic and atraumatic.   Eyes: Pupils are equal, round, and  reactive to light. EOM are normal.   Neck: Neck supple. No JVD present. No thyromegaly present.   Cardiovascular: Normal rate and regular rhythm. PMI is displaced. Exam reveals no gallop and no friction rub.   No murmur heard.  Pulses:       Carotid pulses are 2+ on the right side and 2+ on the left side.       Radial pulses are 2+ on the right side and 2+ on the left side.        Femoral pulses are 2+ on the right side and 2+ on the left side.  Bilateral dp biphasic on doppler  Bilateral pt soft, but biphasic on doppler   Pulmonary/Chest: Effort normal. He has no wheezes. He has no rhonchi. He has no rales.   Abdominal: Soft. Normal appearance. He exhibits no distension. There is no hepatosplenomegaly. There is no abdominal tenderness.   Neurological: He is alert and oriented to person, place, and time. Gait normal.   Skin: Skin is warm and dry.   Bilateral venous stasis dermatitison shins  Bilateral forearm erythematous rash   Psychiatric: He has a normal mood and affect.         Assessment:       1. Critical lower limb ischemia    2. Cardiomyopathy, ischemic    3. Chronic combined systolic and diastolic congestive heart failure    4. Dyslipidemia    5. Essential hypertension    6. Rash         Plan:       1) Ischemic cardiomyopathy.  The patient reports NYHA Class 2 symptoms;  -continue low sodium heart healthy diet  -continue to check daily weight  -TTE on 6/26/20 demonstrated LVEF=20%, TTE on 10/15/20 demonstraed LVEF=46%  -continue hydralaxine 100mg po TID  -continue Isordil 40mg po TID;   -continue spironolactone 25mg po qday  -continue COreg 25mg po BID  -patient may beneift from [facility] in future     2) CAD.  The patient underwent successful multi-vessel PCI with Impella support; he is free of angina  -continue EC ASA 81mg poq day  -continue Plavix 75mg poq day X 1 year minimum  -continue phase 2 cardiac rehab  -continue daily aerobic exercise     3) HTN. Blood pressure inadequately controlled in clinic  todayy, but was adequately controlled during previous clinic visit  -reassess during next clinic visit  -continue above medications     4) Dyslipidemia. 7/4/20 lipid panel reviewed; Continue Crestor 40mg po qday     5) PAD.  The patient reports Mandy 2a claudication and now reports a h/o a recurrent foot wound  -bilateral LE duplex reveiwed  -patient was referred to podiatry for high risk feet 2clinic visits ago  -I discussed cilostazol with the patient including potential side effects such as CHF exacerbation; the patient agreed to a trial  -start cilostazol 50mg po BID  -I discussed abd aortography and run-off with the patient to plan possible PTA/S as his left foot wound is reported to be recurring; will stage PTA/S   -6Fr R Radial access  The risks, benefits, and alternatives of peripheral vascular angiography and possible intervention were discussed with the patient.  All questions were answered and informed consent was obtained.      6) PAF. Patient is on Eliquis;will discuss referral for [facility] to eventually be able to stop Eliquis during his nest clinic visit versus stopping aspirin and continuing Plavix plus Eliquis     7) H/o GERD. Continue PPI while on DAPT    8) Bilateral forearm rash. Will refer the patinet to dermatology for further evaluation     All of the patient's questions were answered.

## 2020-11-20 ENCOUNTER — LAB VISIT (OUTPATIENT)
Dept: URGENT CARE | Facility: CLINIC | Age: 70
End: 2020-11-20
Payer: MEDICARE

## 2020-11-20 VITALS — TEMPERATURE: 98 F

## 2020-11-20 DIAGNOSIS — Z01.818 PRE-OP TESTING: ICD-10-CM

## 2020-11-20 PROCEDURE — U0003 INFECTIOUS AGENT DETECTION BY NUCLEIC ACID (DNA OR RNA); SEVERE ACUTE RESPIRATORY SYNDROME CORONAVIRUS 2 (SARS-COV-2) (CORONAVIRUS DISEASE [COVID-19]), AMPLIFIED PROBE TECHNIQUE, MAKING USE OF HIGH THROUGHPUT TECHNOLOGIES AS DESCRIBED BY CMS-2020-01-R: HCPCS

## 2020-11-22 LAB — SARS-COV-2 RNA RESP QL NAA+PROBE: NOT DETECTED

## 2020-11-23 ENCOUNTER — HOSPITAL ENCOUNTER (OUTPATIENT)
Facility: HOSPITAL | Age: 70
Discharge: HOME | End: 2020-11-23
Attending: INTERNAL MEDICINE | Admitting: INTERNAL MEDICINE
Payer: MEDICARE

## 2020-11-23 VITALS
TEMPERATURE: 97 F | HEIGHT: 72 IN | HEART RATE: 48 BPM | WEIGHT: 195 LBS | BODY MASS INDEX: 26.41 KG/M2 | DIASTOLIC BLOOD PRESSURE: 74 MMHG | SYSTOLIC BLOOD PRESSURE: 155 MMHG | OXYGEN SATURATION: 97 % | RESPIRATION RATE: 16 BRPM

## 2020-11-23 DIAGNOSIS — R21 RASH: ICD-10-CM

## 2020-11-23 DIAGNOSIS — I70.229 CRITICAL LOWER LIMB ISCHEMIA: ICD-10-CM

## 2020-11-23 LAB
ABO + RH BLD: NORMAL
ANION GAP SERPL CALC-SCNC: 9 MMOL/L (ref 8–16)
BASOPHILS # BLD AUTO: 0.03 K/UL (ref 0–0.2)
BASOPHILS NFR BLD: 0.8 % (ref 0–1.9)
BLD GP AB SCN CELLS X3 SERPL QL: NORMAL
BUN SERPL-MCNC: 28 MG/DL (ref 8–23)
CALCIUM SERPL-MCNC: 8.9 MG/DL (ref 8.7–10.5)
CHLORIDE SERPL-SCNC: 106 MMOL/L (ref 95–110)
CO2 SERPL-SCNC: 23 MMOL/L (ref 23–29)
CREAT SERPL-MCNC: 1.1 MG/DL (ref 0.5–1.4)
DIFFERENTIAL METHOD: ABNORMAL
EOSINOPHIL # BLD AUTO: 0.2 K/UL (ref 0–0.5)
EOSINOPHIL NFR BLD: 5 % (ref 0–8)
ERYTHROCYTE [DISTWIDTH] IN BLOOD BY AUTOMATED COUNT: 16.8 % (ref 11.5–14.5)
EST. GFR  (AFRICAN AMERICAN): >60 ML/MIN/1.73 M^2
EST. GFR  (NON AFRICAN AMERICAN): >60 ML/MIN/1.73 M^2
GLUCOSE SERPL-MCNC: 103 MG/DL (ref 70–110)
HCT VFR BLD AUTO: 30.5 % (ref 40–54)
HGB BLD-MCNC: 10.2 G/DL (ref 14–18)
IMM GRANULOCYTES # BLD AUTO: 0.01 K/UL (ref 0–0.04)
IMM GRANULOCYTES NFR BLD AUTO: 0.3 % (ref 0–0.5)
LYMPHOCYTES # BLD AUTO: 0.7 K/UL (ref 1–4.8)
LYMPHOCYTES NFR BLD: 19.1 % (ref 18–48)
MCH RBC QN AUTO: 32 PG (ref 27–31)
MCHC RBC AUTO-ENTMCNC: 33.4 G/DL (ref 32–36)
MCV RBC AUTO: 96 FL (ref 82–98)
MONOCYTES # BLD AUTO: 0.6 K/UL (ref 0.3–1)
MONOCYTES NFR BLD: 15.2 % (ref 4–15)
NEUTROPHILS # BLD AUTO: 2.3 K/UL (ref 1.8–7.7)
NEUTROPHILS NFR BLD: 59.6 % (ref 38–73)
NRBC BLD-RTO: 0 /100 WBC
PLATELET # BLD AUTO: 191 K/UL (ref 150–350)
PMV BLD AUTO: 10.9 FL (ref 9.2–12.9)
POTASSIUM SERPL-SCNC: 3.9 MMOL/L (ref 3.5–5.1)
RBC # BLD AUTO: 3.19 M/UL (ref 4.6–6.2)
SODIUM SERPL-SCNC: 138 MMOL/L (ref 136–145)
WBC # BLD AUTO: 3.82 K/UL (ref 3.9–12.7)

## 2020-11-23 PROCEDURE — 36200 PR PLACE CATH AORTA: ICD-10-PCS | Mod: ,,, | Performed by: INTERNAL MEDICINE

## 2020-11-23 PROCEDURE — 99152 MOD SED SAME PHYS/QHP 5/>YRS: CPT | Mod: ,,, | Performed by: INTERNAL MEDICINE

## 2020-11-23 PROCEDURE — 80048 BASIC METABOLIC PNL TOTAL CA: CPT

## 2020-11-23 PROCEDURE — 25000003 PHARM REV CODE 250: Performed by: INTERNAL MEDICINE

## 2020-11-23 PROCEDURE — 75630 X-RAY AORTA LEG ARTERIES: CPT | Performed by: INTERNAL MEDICINE

## 2020-11-23 PROCEDURE — 99152 MOD SED SAME PHYS/QHP 5/>YRS: CPT | Performed by: INTERNAL MEDICINE

## 2020-11-23 PROCEDURE — 75630 PR  ANGIO AORTOBIFEMORAL W CATH: ICD-10-PCS | Mod: 26,,, | Performed by: INTERNAL MEDICINE

## 2020-11-23 PROCEDURE — 63600175 PHARM REV CODE 636 W HCPCS: Performed by: INTERNAL MEDICINE

## 2020-11-23 PROCEDURE — 86901 BLOOD TYPING SEROLOGIC RH(D): CPT

## 2020-11-23 PROCEDURE — C1769 GUIDE WIRE: HCPCS | Performed by: INTERNAL MEDICINE

## 2020-11-23 PROCEDURE — 36200 PLACE CATHETER IN AORTA: CPT | Mod: RT | Performed by: INTERNAL MEDICINE

## 2020-11-23 PROCEDURE — 99153 MOD SED SAME PHYS/QHP EA: CPT | Performed by: INTERNAL MEDICINE

## 2020-11-23 PROCEDURE — 99152 PR MOD CONSCIOUS SEDATION, SAME PHYS, 5+ YRS, FIRST 15 MIN: ICD-10-PCS | Mod: ,,, | Performed by: INTERNAL MEDICINE

## 2020-11-23 PROCEDURE — 25500020 PHARM REV CODE 255: Performed by: INTERNAL MEDICINE

## 2020-11-23 PROCEDURE — 75630 X-RAY AORTA LEG ARTERIES: CPT | Mod: 26,,, | Performed by: INTERNAL MEDICINE

## 2020-11-23 PROCEDURE — 85025 COMPLETE CBC W/AUTO DIFF WBC: CPT

## 2020-11-23 PROCEDURE — 36200 PLACE CATHETER IN AORTA: CPT | Mod: ,,, | Performed by: INTERNAL MEDICINE

## 2020-11-23 PROCEDURE — C1894 INTRO/SHEATH, NON-LASER: HCPCS | Performed by: INTERNAL MEDICINE

## 2020-11-23 RX ORDER — ACETAMINOPHEN 325 MG/1
650 TABLET ORAL EVERY 4 HOURS PRN
Status: DISCONTINUED | OUTPATIENT
Start: 2020-11-23 | End: 2020-11-23 | Stop reason: HOSPADM

## 2020-11-23 RX ORDER — SODIUM CHLORIDE 9 MG/ML
INJECTION, SOLUTION INTRAVENOUS CONTINUOUS
Status: DISCONTINUED | OUTPATIENT
Start: 2020-11-23 | End: 2020-11-23 | Stop reason: HOSPADM

## 2020-11-23 RX ORDER — ONDANSETRON 8 MG/1
8 TABLET, ORALLY DISINTEGRATING ORAL EVERY 8 HOURS PRN
Status: DISCONTINUED | OUTPATIENT
Start: 2020-11-23 | End: 2020-11-23 | Stop reason: HOSPADM

## 2020-11-23 RX ORDER — IODIXANOL 320 MG/ML
INJECTION, SOLUTION INTRAVASCULAR
Status: DISCONTINUED | OUTPATIENT
Start: 2020-11-23 | End: 2020-11-23 | Stop reason: HOSPADM

## 2020-11-23 RX ORDER — HEPARIN SODIUM 1000 [USP'U]/ML
INJECTION, SOLUTION INTRAVENOUS; SUBCUTANEOUS
Status: DISCONTINUED | OUTPATIENT
Start: 2020-11-23 | End: 2020-11-23 | Stop reason: HOSPADM

## 2020-11-23 RX ORDER — SODIUM CHLORIDE 9 MG/ML
3 INJECTION, SOLUTION INTRAVENOUS CONTINUOUS
Status: ACTIVE | OUTPATIENT
Start: 2020-11-23 | End: 2020-11-23

## 2020-11-23 RX ORDER — DIPHENHYDRAMINE HCL 50 MG
50 CAPSULE ORAL ONCE
Status: COMPLETED | OUTPATIENT
Start: 2020-11-23 | End: 2020-11-23

## 2020-11-23 RX ORDER — NITROGLYCERIN 5 MG/ML
INJECTION, SOLUTION INTRAVENOUS
Status: DISCONTINUED | OUTPATIENT
Start: 2020-11-23 | End: 2020-11-23 | Stop reason: HOSPADM

## 2020-11-23 RX ORDER — FENTANYL CITRATE 50 UG/ML
INJECTION, SOLUTION INTRAMUSCULAR; INTRAVENOUS
Status: DISCONTINUED | OUTPATIENT
Start: 2020-11-23 | End: 2020-11-23 | Stop reason: HOSPADM

## 2020-11-23 RX ORDER — MIDAZOLAM HYDROCHLORIDE 1 MG/ML
INJECTION, SOLUTION INTRAMUSCULAR; INTRAVENOUS
Status: DISCONTINUED | OUTPATIENT
Start: 2020-11-23 | End: 2020-11-23 | Stop reason: HOSPADM

## 2020-11-23 RX ORDER — HEPARIN SODIUM 200 [USP'U]/100ML
INJECTION, SOLUTION INTRAVENOUS
Status: DISCONTINUED | OUTPATIENT
Start: 2020-11-23 | End: 2020-11-23 | Stop reason: HOSPADM

## 2020-11-23 RX ORDER — LIDOCAINE HYDROCHLORIDE 20 MG/ML
INJECTION, SOLUTION EPIDURAL; INFILTRATION; INTRACAUDAL; PERINEURAL
Status: DISCONTINUED | OUTPATIENT
Start: 2020-11-23 | End: 2020-11-23 | Stop reason: HOSPADM

## 2020-11-23 RX ADMIN — SODIUM CHLORIDE 3 ML/KG/HR: 0.9 INJECTION, SOLUTION INTRAVENOUS at 06:11

## 2020-11-23 RX ADMIN — DIPHENHYDRAMINE HYDROCHLORIDE 50 MG: 50 CAPSULE ORAL at 06:11

## 2020-11-23 NOTE — PLAN OF CARE
Received via stretcher from procedure.  Report from VINOD Conde.  Awake and alert.  No c/o pain or SOB. vasc band in place to right wrist.  No bleeding or hematoma noted to right wrist.  Pt oriented to room and call bell provided. Will continue to monitor.

## 2020-11-23 NOTE — PLAN OF CARE
ambulated on unit this morning by self.  Pt states that wife will be here later to take him home.  Oriented to room and call bell provided.  Verbalized understanding of procedure.  Oriented to room and call bell provided.  Will continue to monitor.

## 2020-11-23 NOTE — Clinical Note
Angiography of the bilateral lower extremity performed with the catheter   power injection 80 mL contrast at 10 mL/s.

## 2020-11-23 NOTE — NURSING
Vasc band removed from right wrist.  No bleeding or hematoma noted.  Gauze and tegaderm applied to right wrist puncture site.  Will continue to monitor.

## 2020-11-23 NOTE — DISCHARGE INSTRUCTIONS
Medication discharge instructions:  1. You may safely resume Eliquis tomorrow morning on Tuesday, 11/24/20.  2. All other medications should be taken as previously prescribed.     Cath discharge instructions:  1. Do not strain or lift anything greater than 5 lbs to wrist that was accessed.  2. Do not drive or operate any dangerous machinery for 24 hours.  3. Keep the dressing on, clean, and dry for 24 hours.  4. After 24 hours, the dressing may be removed and a shower is allowed.  5. Clean the area with mild soap and water and then cover it with a bandage.  6. Once the skin has healed, bathing in a tub or swimming is allowed.  7. Inspect the access site daily and report to the physician any swelling at the site that  cannot be controlled with manual pressure for 10 minutes, unusual pain at the  access site or affected extremity, unusual swelling at the access site, or signs or  symptoms of infection such as redness, pain, or fever.    Call 911 if you have:  Bleeding from the puncture site that you cannot stop by doing the following:  Keep your wrist straight and apply firm pressure to the site using your fingers and a gauze pad. Keep the pressure on for 20 minutes. Continue this until the bleeding stops. This may take awhile. When bleeding stops, cover the site with a sterile bandage and keep your wrist still as much as possible.

## 2020-11-23 NOTE — DISCHARGE SUMMARY
Discharge Summary  Interventional Cardiology      Admit Date: 11/23/2020    Discharge Date:  11/23/2020    Attending Physician: Bruno Cruz MD    Discharge Physician: Juliane Latif MD    Principal Diagnoses: PAD (peripheral artery disease)  Indication for Admission: Angiogram, Abdominal Aorta W/ Extremity Runoff (Right)    Discharged Condition: Good    Hospital Course: Patient presented for outpatient diagnostic peripheral angiogram which went without complication. Peripheral angiogram revealed severe bilateral peripheral artery disease. See full cath report in Epic for details. Hemostasis of patient's R Radial access site was achieved with VascBand. Patient was monitored per post-cath protocol, and his R radial access site was c/d/i with no hematoma. Patient was able to ambulate without difficulty. He was feeling well and anticipating discharge home today.     Outpatient Plan:  - There were no medication changes   - Continue with DAPT + statin therapy  - Risk factor reduction (ie BP control, glycemic control, etc)  - Refer to [facility] for evaluation of staged peripheral intervention     Diet: Cardiac diet    Activity: Ad damián, wound care instructions provided    Disposition: Home or Self Care    Discharge Medications:      Medication List      CONTINUE taking these medications    albuterol-ipratropium 2.5 mg-0.5 mg/3 mL nebulizer solution  Commonly known as: DUO-NEB     amLODIPine 5 MG tablet  Commonly known as: NORVASC     apixaban 5 mg Tab  Commonly known as: ELIQUIS  Take 1 tablet (5 mg total) by mouth 2 (two) times daily.     aspirin 81 MG EC tablet  Commonly known as: ECOTRIN  Take 1 tablet (81 mg total) by mouth once daily.     benzonatate 200 MG capsule  Commonly known as: TESSALON     carvediloL 25 MG tablet  Commonly known as: COREG  Take 0.5 tablets (12.5 mg total) by mouth 2 (two) times daily with meals.     cilostazoL 50 MG Tab  Commonly known as: PLETAL  Take 1 tablet (50 mg total) by  mouth 2 (two) times daily.     clopidogreL 75 mg tablet  Commonly known as: PLAVIX  Take 1 tablet (75 mg total) by mouth once daily.     famotidine 20 MG tablet  Commonly known as: PEPCID     furosemide 20 MG tablet  Commonly known as: LASIX  Take 2 tablets (40 mg total) by mouth once daily.     hydrALAZINE 100 MG tablet  Commonly known as: APRESOLINE  Take 1 tablet (100 mg total) by mouth 3 (three) times daily.     hydrOXYzine HCL 25 MG tablet  Commonly known as: ATARAX     isosorbide dinitrate 40 MG Tab  Commonly known as: ISORDIL  Take 1 tablet (40 mg total) by mouth 3 (three) times daily.     levalbuterol 0.63 mg/3 mL nebulizer solution  Commonly known as: XOPENEX     losartan 100 MG tablet  Commonly known as: COZAAR     omeprazole 40 MG capsule  Commonly known as: PRILOSEC     rosuvastatin 40 MG Tab  Commonly known as: CRESTOR     sertraline 50 MG tablet  Commonly known as: ZOLOFT     spironolactone 25 MG tablet  Commonly known as: ALDACTONE  Take 1 tablet (25 mg total) by mouth once daily.     SYMBICORT 160-4.5 mcg/actuation Hfaa  Generic drug: budesonide-formoterol 160-4.5 mcg     triamcinolone acetonide 0.025% 0.025 % cream  Commonly known as: KENALOG  Apply topically 2 (two) times daily.     VENTOLIN HFA 90 mcg/actuation inhaler  Generic drug: albuterol

## 2020-11-23 NOTE — INTERVAL H&P NOTE
The patient has been examined and the H&P has been reviewed:    I concur with the findings and no changes have occurred since H&P was written.    Anesthesia/Surgery risks, benefits and alternative options discussed and understood by patient/family.    Proceed with diagnostic peripheral angiography    Access: R radial (6Fr)    Antiplatelets: ASA+Plavix      Anticoagulation: Eliquis last dose on Thursday 11/19/20    Labs: Cr 1.2 on 11/5/20, a.m. labs pending      Active Hospital Problems    Diagnosis  POA    *PAD (peripheral artery disease) [I73.9]  Yes    Rash [R21]  Yes    Cardiomyopathy, ischemic [I25.5]  Yes    Chronic combined systolic and diastolic congestive heart failure [I50.42]  Yes    Dyslipidemia [E78.5]  Yes    HTN (hypertension) [I10]  Yes    Paroxysmal atrial fibrillation [I48.0]  Yes      Resolved Hospital Problems   No resolved problems to display.

## 2020-11-23 NOTE — Clinical Note
Problem: Bowel/Gastric:  Goal: Normal bowel function is maintained or improved  Outcome: PROGRESSING AS EXPECTED  Patient continent of bowel. Taking scheduled bowel medications. [facility] 2/23/17    Problem: GLYCEMIA IMBALANCE  Goal: Clinical indication of glycemia balance is achieved  Outcome: PROGRESSING SLOWER THAN EXPECTED  Patient's HS FSBS 285. 3 units sliding scale Humalog administered per MAR. HS snack given and consumed. No s/s of hypo/hyperglycemia noted.          The wire is inserted in to the abdominal aorta.

## 2020-11-23 NOTE — NURSING
IV's d/c'd x 2 with cath tips intact.  Right wrist dressing gauze/tegaderm remains clean dry and intact.  Pt verbalized understanding of d/c instructions.  Dr. Cruz spoke with patient at bedside and pt wife was able to listen via speaker on pt cell phone.  Pt verbalized understanding of resuming eliquis tomorrow morning

## 2020-11-23 NOTE — BRIEF OP NOTE
Brief Operative Note:    : Bruno Cruz MD     Referring Physician: Bruno Cruz     All Operators: Surgeon(s):  MD Juliane Ochoa MD     Preoperative Diagnosis: Critical lower limb ischemia [I99.8]     Postop Diagnosis: Critical lower limb ischemia [I99.8]    Treatments/Procedures: Procedure(s) (LRB):  Angiogram, Abdominal Aorta W/ Extremity Runoff (Right)    Access: R radial (6Fr)    Findings:Severe peripheral disease is present.     See catheterization report for full details.      Closure device: VascBand    Plan:  - post cath protocol   - ivf @ 150/hr x 4 hours  - continue aspirin 81 mg daily and Plavix 75 mg daily  - continue with cilostazol   - continue high intensity statin therapy (LDL goal < 70)  - risk factor reduction (BP <130/80 mmHg, glycemic control, etc)  - [facility] for eval of staged peripheral intervention     Estimated Blood loss: 20 cc    Specimens removed: No    Juliane Latif MD

## 2020-12-02 ENCOUNTER — OFFICE VISIT (OUTPATIENT)
Dept: CARDIOLOGY | Facility: CLINIC | Age: 70
End: 2020-12-02
Payer: MEDICARE

## 2020-12-02 VITALS
DIASTOLIC BLOOD PRESSURE: 76 MMHG | WEIGHT: 200.63 LBS | HEART RATE: 53 BPM | OXYGEN SATURATION: 100 % | BODY MASS INDEX: 27.21 KG/M2 | SYSTOLIC BLOOD PRESSURE: 116 MMHG

## 2020-12-02 DIAGNOSIS — I87.2 VENOUS INSUFFICIENCY OF BOTH LOWER EXTREMITIES: ICD-10-CM

## 2020-12-02 DIAGNOSIS — I21.4 NSTEMI (NON-ST ELEVATED MYOCARDIAL INFARCTION): ICD-10-CM

## 2020-12-02 DIAGNOSIS — I83.024: ICD-10-CM

## 2020-12-02 DIAGNOSIS — I73.9 PAD (PERIPHERAL ARTERY DISEASE): ICD-10-CM

## 2020-12-02 DIAGNOSIS — I87.2 VENOUS INSUFFICIENCY OF LOWER EXTREMITY, UNSPECIFIED LATERALITY: ICD-10-CM

## 2020-12-02 DIAGNOSIS — E78.5 DYSLIPIDEMIA: ICD-10-CM

## 2020-12-02 DIAGNOSIS — L97.421: ICD-10-CM

## 2020-12-02 DIAGNOSIS — I50.42 CHRONIC COMBINED SYSTOLIC AND DIASTOLIC CONGESTIVE HEART FAILURE: ICD-10-CM

## 2020-12-02 DIAGNOSIS — Z98.890 STATUS POST CARDIAC CATHETERIZATION: ICD-10-CM

## 2020-12-02 DIAGNOSIS — I48.0 PAROXYSMAL ATRIAL FIBRILLATION: ICD-10-CM

## 2020-12-02 DIAGNOSIS — L97.921 NONHEALING ULCER OF LEFT LOWER EXTREMITY LIMITED TO BREAKDOWN OF SKIN: Primary | ICD-10-CM

## 2020-12-02 PROCEDURE — 3078F DIAST BP <80 MM HG: CPT | Mod: CPTII,S$GLB,, | Performed by: INTERNAL MEDICINE

## 2020-12-02 PROCEDURE — 3008F PR BODY MASS INDEX (BMI) DOCUMENTED: ICD-10-PCS | Mod: CPTII,S$GLB,, | Performed by: INTERNAL MEDICINE

## 2020-12-02 PROCEDURE — 99999 PR PBB SHADOW E&M-EST. PATIENT-LVL V: CPT | Mod: PBBFAC,,, | Performed by: INTERNAL MEDICINE

## 2020-12-02 PROCEDURE — 1126F PR PAIN SEVERITY QUANTIFIED, NO PAIN PRESENT: ICD-10-PCS | Mod: S$GLB,,, | Performed by: INTERNAL MEDICINE

## 2020-12-02 PROCEDURE — 99215 OFFICE O/P EST HI 40 MIN: CPT | Mod: S$GLB,,, | Performed by: INTERNAL MEDICINE

## 2020-12-02 PROCEDURE — 3008F BODY MASS INDEX DOCD: CPT | Mod: CPTII,S$GLB,, | Performed by: INTERNAL MEDICINE

## 2020-12-02 PROCEDURE — 1126F AMNT PAIN NOTED NONE PRSNT: CPT | Mod: S$GLB,,, | Performed by: INTERNAL MEDICINE

## 2020-12-02 PROCEDURE — 3078F PR MOST RECENT DIASTOLIC BLOOD PRESSURE < 80 MM HG: ICD-10-PCS | Mod: CPTII,S$GLB,, | Performed by: INTERNAL MEDICINE

## 2020-12-02 PROCEDURE — 1159F MED LIST DOCD IN RCRD: CPT | Mod: S$GLB,,, | Performed by: INTERNAL MEDICINE

## 2020-12-02 PROCEDURE — 99215 PR OFFICE/OUTPT VISIT, EST, LEVL V, 40-54 MIN: ICD-10-PCS | Mod: S$GLB,,, | Performed by: INTERNAL MEDICINE

## 2020-12-02 PROCEDURE — 1159F PR MEDICATION LIST DOCUMENTED IN MEDICAL RECORD: ICD-10-PCS | Mod: S$GLB,,, | Performed by: INTERNAL MEDICINE

## 2020-12-02 PROCEDURE — 3074F SYST BP LT 130 MM HG: CPT | Mod: CPTII,S$GLB,, | Performed by: INTERNAL MEDICINE

## 2020-12-02 PROCEDURE — 3074F PR MOST RECENT SYSTOLIC BLOOD PRESSURE < 130 MM HG: ICD-10-PCS | Mod: CPTII,S$GLB,, | Performed by: INTERNAL MEDICINE

## 2020-12-02 PROCEDURE — 99999 PR PBB SHADOW E&M-EST. PATIENT-LVL V: ICD-10-PCS | Mod: PBBFAC,,, | Performed by: INTERNAL MEDICINE

## 2020-12-02 NOTE — LETTER
December 12, 2020      Bruno Cruz MD  [address]  [facility] [address]           [place] [facility]  [address] [facility] [address]  [facility] [address]  Phone: [phone]          Patient: Blaine Multani   MR Number: [id number]   YOB: 1950   Date of Visit: 12/2/2020       Dear Dr. Bruno Cruz:    Thank you for referring Blaine Multani to me for evaluation. Attached you will find relevant portions of my assessment and plan of care.    If you have questions, please do not hesitate to call me. I look forward to following Blaine Multani along with you.    Sincerely,    Anuj Field MD    Enclosure  CC:  No Recipients    If you would like to receive this communication electronically, please contact [email or web address] or [phone] to request more information on [place] Link access.    For providers and/or their staff who would like to refer a patient to [facility], please contact us through our one-stop-shop provider referral line, [facility] [place], at [phone].    If you feel you have received this communication in error or would no longer like to receive these types of communications, please e-mail [email or web address]

## 2020-12-02 NOTE — PROGRESS NOTES
Subjective:   Patient ID:  Blaine Multani is a 70 y.o. male who presents for evaluation and management of Peripheral Arterial Disease, Chronic Venous Insufficiency w/ Ulceration, and Claudication      HPI:       He is here by recommendation of Dr. Cruz for management of recurrent left dorsum recurrent wound limited to the skin. He has extensively engorged varicose veins in both legs ankles + feet with hyperpigmentation and trace edema. He has a history of CAD s/p CABG ( L GSV harvest) then multiple vessel PCI, high risk PCI with Impella (axillary access due to severe PAD), ICM, HTN, HLP, PAD s/p endovascular + surgical interventions (bilateral iliac stents, L CFA-DFA Supera stent, bilateral fem-pop R patent + L occluded )        Aortogram 11/23/2020     Patent aorta, R [place] + bilateral EIA   Moderate prox L [place] disease   R IIA    Patent R CFA (heavily calcified)   Patent L CFA-DFA (Supera stent)   Patent R fem-pop with distal POP    R POP reconstitutes in P3 segment    Patent R AT with PT  and diffusely diseased PER   L SFA/POP  with reconstitution of small island of P3   High grade prox L AT but AT is the main vessel to the L foot   Diffusely diseased L PT + PER      Reviewed his echo, angiogram, ultrasound, labs, ecg, and history during the visit                 Patient Active Problem List    Diagnosis Date Noted    Venous insufficiency of lower extremity 12/12/2020    Varicose veins of left lower extremity with ulcer of midfoot limited to breakdown of skin 12/12/2020    Rash 11/23/2020    Macrocytic anemia 07/02/2020    Cardiomyopathy, ischemic 07/01/2020    Chronic combined systolic and diastolic congestive heart failure 06/28/2020    Dyslipidemia 06/28/2020    Dental abscess 06/27/2020    Status post cardiac catheterization     HTN (hypertension) 06/26/2020    Paroxysmal atrial fibrillation 06/26/2020    Depression 06/26/2020    NSTEMI (non-ST elevated myocardial infarction) 06/25/2020     Nonhealing ulcer of left lower extremity 2017    Atherosclerosis of native arteries of extremity with rest pain 2017           Right Arm BP - Sittin/72  Left Arm BP - Sittin/72        LABS    LAST HbA1c  Lab Results   Component Value Date    HGBA1C 5.4 2020       Lipid panel  Lab Results   Component Value Date    CHOL 153 10/20/2020    CHOL 111 (L) 2020    CHOL 108 (L) 2020     Lab Results   Component Value Date    HDL 60 10/20/2020    HDL 36 (L) 2020    HDL 30 (L) 2020     Lab Results   Component Value Date    LDLCALC 81.2 10/20/2020    LDLCALC 61.4 (L) 2020    LDLCALC 64.4 2020     Lab Results   Component Value Date    TRIG 59 10/20/2020    TRIG 68 2020    TRIG 68 2020     Lab Results   Component Value Date    CHOLHDL 39.2 10/20/2020    CHOLHDL 32.4 2020    CHOLHDL 27.8 2020            Review of Systems   Constitution: Negative for diaphoresis, night sweats, weight gain and weight loss.   HENT: Negative for congestion.    Eyes: Negative for blurred vision, discharge and double vision.   Cardiovascular: Positive for claudication (L > R ). Negative for chest pain, cyanosis, dyspnea on exertion, irregular heartbeat, leg swelling, near-syncope, orthopnea, palpitations, paroxysmal nocturnal dyspnea and syncope.   Respiratory: Negative for cough, shortness of breath and wheezing.    Endocrine: Negative for cold intolerance, heat intolerance and polyphagia.   Hematologic/Lymphatic: Negative for adenopathy and bleeding problem. Does not bruise/bleed easily.   Skin: Positive for poor wound healing (small L foot dorsum wound ). Negative for dry skin and nail changes.   Musculoskeletal: Negative for arthritis, back pain, falls, joint pain, myalgias and neck pain.   Gastrointestinal: Negative for bloating, abdominal pain, change in bowel habit and constipation.   Genitourinary: Negative for bladder incontinence, dysuria, flank pain,  genital sores and missed menses.   Neurological: Negative for aphonia, brief paralysis, difficulty with concentration, dizziness and weakness.   Psychiatric/Behavioral: Negative for altered mental status and memory loss. The patient does not have insomnia.    Allergic/Immunologic: Negative for environmental allergies.       Objective:   Physical Exam   Constitutional: He is oriented to person, place, and time. He appears well-developed and well-nourished. He is not intubated.   HENT:   Head: Normocephalic and atraumatic.   Right Ear: External ear normal.   Left Ear: External ear normal.   Mouth/Throat: Oropharynx is clear and moist.   Eyes: Pupils are equal, round, and reactive to light. Conjunctivae and EOM are normal. Right eye exhibits no discharge. Left eye exhibits no discharge. No scleral icterus.   Neck: Normal range of motion. Neck supple. Normal carotid pulses, no hepatojugular reflux and no JVD present. Carotid bruit is not present. No tracheal deviation present. No thyromegaly present.   Cardiovascular: Normal rate, regular rhythm, S1 normal and S2 normal.  No extrasystoles are present. PMI is not displaced. Exam reveals no gallop, no S3, no distant heart sounds, no friction rub and no midsystolic click.   No murmur heard.  Pulses:       Carotid pulses are 2+ on the right side and 2+ on the left side.       Radial pulses are 2+ on the right side and 2+ on the left side.        Femoral pulses are 2+ on the right side and 2+ on the left side.       Popliteal pulses are 1+ on the right side and 0 on the left side.        Dorsalis pedis pulses are 0 on the right side and 0 on the left side.        Posterior tibial pulses are 0 on the right side and 0 on the left side.       Faint R PT and monophasic R DP doppler signals       Faint L PT and monophasic L DP doppler signals         Pulmonary/Chest: Effort normal and breath sounds normal. No accessory muscle usage or stridor. No apnea, no tachypnea and no  bradypnea. He is not intubated. No respiratory distress. He has no decreased breath sounds. He has no wheezes. He has no rales. He exhibits no tenderness and no bony tenderness.   Abdominal: He exhibits no distension, no pulsatile liver, no abdominal bruit, no ascites, no pulsatile midline mass and no mass. There is no abdominal tenderness. There is no rebound and no guarding.   Musculoskeletal: Normal range of motion.         General: No tenderness or edema.   Lymphadenopathy:     He has no cervical adenopathy.   Neurological: He is alert and oriented to person, place, and time. He has normal reflexes. No cranial nerve deficit. Coordination normal.   Skin: Skin is warm. No rash noted. No erythema. No pallor.     Diffusely engorged infra-popliteal varicose veins from knees to feet      L foot is worse than R      Small superficial wound on the distal-lateral dorsum of L foot       See images          Psychiatric: He has a normal mood and affect. His behavior is normal. Judgment and thought content normal.                             Assessment:     1. Nonhealing ulcer of left lower extremity limited to breakdown of skin    2. Venous insufficiency of lower extremity, unspecified laterality    3. Status post cardiac catheterization    4. Paroxysmal atrial fibrillation    5. PAD (peripheral artery disease)    6. NSTEMI (non-ST elevated myocardial infarction)    7. Dyslipidemia    8. Chronic combined systolic and diastolic congestive heart failure    9. Venous insufficiency of both lower extremities    10. Varicose veins of left lower extremity with ulcer of midfoot limited to breakdown of skin          He has extensive PAS with multiple interventions (surgical + endovascular) with venous insufficiency. His left foot recurrent wound has the appearance of venous ulcers due to reflux. He has multiple engorged varicose veins in his legs and particularly in the area of recurrent ulcerations      Plan:         Obtain TcPO2  to assess his healing threshold. Obtain venous reflux ultrasound to assess the integrity of his superficial veins. Compression stockings 20-30 mmHg.        Return in 2 to 4 weeks after above mentioned tests to discuss a strategy to assist him with this recurrent wound.        Continue general cardiovascular care with Dr. Anthony RAMIREZ for PAD             Continue with current medical plan and lifestyle changes.  Return sooner for concerns or questions. If symptoms persist go to the ED  I have reviewed all pertinent data on this patient       I have reviewed the patient's medical history in detail and updated the computerized patient record.    Orders Placed This Encounter   Procedures    COMPRESSION STOCKINGS     Knee high   Zipper option if available     Order Specific Question:   Pressure amount:     Answer:   20-30 mmHg    US Lower Extremity Veins Bilateral Insuf     Standing Status:   Future     Number of Occurrences:   1     Standing Expiration Date:   12/2/2021     Order Specific Question:   May the Radiologist modify the order per protocol to meet the clinical needs of the patient?     Answer:   Yes    Ambulatory referral/consult to Wound Clinic     Standing Status:   Future     Number of Occurrences:   1     Standing Expiration Date:   1/2/2022     Referral Priority:   Routine     Referral Type:   Consultation     Referral Reason:   Specialty Services Required     Requested Specialty:   Wound Care     Number of Visits Requested:   1       Follow up as scheduled. Return sooner for concerns or questions            He expressed verbal understanding and agreed with the plan        Patient's Medications   New Prescriptions    No medications on file   Previous Medications    ALBUTEROL (VENTOLIN HFA) 90 MCG/ACTUATION INHALER    Ventolin HFA 90 mcg/actuation aerosol inhaler    ALBUTEROL-IPRATROPIUM (DUO-NEB) 2.5 MG-0.5 MG/3 ML NEBULIZER SOLUTION    ipratropium 0.5 mg-albuterol 3 mg (2.5 mg base)/3 mL nebulization  soln    AMLODIPINE (NORVASC) 5 MG TABLET    Take by mouth.    APIXABAN (ELIQUIS) 5 MG TAB    Take 1 tablet (5 mg total) by mouth 2 (two) times daily.    ASPIRIN (ECOTRIN) 81 MG EC TABLET    Take 1 tablet (81 mg total) by mouth once daily.    BENZONATATE (TESSALON) 200 MG CAPSULE    Take 200 mg by mouth 3 (three) times daily as needed.    CARVEDILOL (COREG) 25 MG TABLET    Take 0.5 tablets (12.5 mg total) by mouth 2 (two) times daily with meals.    CILOSTAZOL (PLETAL) 50 MG TAB    Take 1 tablet (50 mg total) by mouth 2 (two) times daily.    CLOPIDOGREL (PLAVIX) 75 MG TABLET    Take 1 tablet (75 mg total) by mouth once daily.    FAMOTIDINE (PEPCID) 20 MG TABLET    Take 20 mg by mouth once daily.    FUROSEMIDE (LASIX) 20 MG TABLET    Take 2 tablets (40 mg total) by mouth once daily.    HYDRALAZINE (APRESOLINE) 100 MG TABLET    Take 1 tablet (100 mg total) by mouth 3 (three) times daily.    HYDROXYZINE HCL (ATARAX) 25 MG TABLET    Take 25 mg by mouth.     ISOSORBIDE DINITRATE (ISORDIL) 40 MG TAB    Take 1 tablet (40 mg total) by mouth 3 (three) times daily.    LEVALBUTEROL (XOPENEX) 0.63 MG/3 ML NEBULIZER SOLUTION        LOSARTAN (COZAAR) 100 MG TABLET    Take 100 mg by mouth.    OMEPRAZOLE (PRILOSEC) 40 MG CAPSULE    Take 40 mg by mouth once daily.    ROSUVASTATIN (CRESTOR) 40 MG TAB    Take 40 mg by mouth every evening.     SERTRALINE (ZOLOFT) 50 MG TABLET    Take 50 mg by mouth every evening.     SPIRONOLACTONE (ALDACTONE) 25 MG TABLET    Take 1 tablet (25 mg total) by mouth once daily.    SYMBICORT 160-4.5 MCG/ACTUATION HFAA    Inhale 2 puffs into the lungs every 12 (twelve) hours.     TRIAMCINOLONE ACETONIDE 0.025% (KENALOG) 0.025 % CREAM    Apply topically 2 (two) times daily.   Modified Medications    No medications on file   Discontinued Medications    No medications on file

## 2020-12-10 ENCOUNTER — HOSPITAL ENCOUNTER (OUTPATIENT)
Dept: WOUND CARE | Facility: HOSPITAL | Age: 70
Discharge: HOME | End: 2020-12-10
Attending: INTERNAL MEDICINE
Payer: MEDICARE

## 2020-12-10 ENCOUNTER — HOSPITAL ENCOUNTER (OUTPATIENT)
Dept: RADIOLOGY | Facility: HOSPITAL | Age: 70
Discharge: HOME | End: 2020-12-10
Attending: INTERNAL MEDICINE
Payer: MEDICARE

## 2020-12-10 VITALS
SYSTOLIC BLOOD PRESSURE: 145 MMHG | TEMPERATURE: 99 F | BODY MASS INDEX: 27.09 KG/M2 | HEIGHT: 72 IN | WEIGHT: 200 LBS | DIASTOLIC BLOOD PRESSURE: 80 MMHG | HEART RATE: 61 BPM

## 2020-12-10 DIAGNOSIS — I87.2 VENOUS INSUFFICIENCY OF LOWER EXTREMITY, UNSPECIFIED LATERALITY: ICD-10-CM

## 2020-12-10 DIAGNOSIS — Z87.2 HEALED ULCER OF FOOT ON EXAMINATION: ICD-10-CM

## 2020-12-10 DIAGNOSIS — I73.9 PAD (PERIPHERAL ARTERY DISEASE): Primary | ICD-10-CM

## 2020-12-10 DIAGNOSIS — Z98.890 STATUS POST CARDIAC CATHETERIZATION: ICD-10-CM

## 2020-12-10 DIAGNOSIS — B35.1 ONYCHOMYCOSIS: ICD-10-CM

## 2020-12-10 PROCEDURE — 99212 OFFICE O/P EST SF 10 MIN: CPT | Mod: 25

## 2020-12-10 PROCEDURE — 93970 EXTREMITY STUDY: CPT | Mod: 26,,, | Performed by: RADIOLOGY

## 2020-12-10 PROCEDURE — 99214 OFFICE O/P EST MOD 30 MIN: CPT | Mod: 25,,, | Performed by: STUDENT IN AN ORGANIZED HEALTH CARE EDUCATION/TRAINING PROGRAM

## 2020-12-10 PROCEDURE — 99214 PR OFFICE/OUTPT VISIT, EST, LEVL IV, 30-39 MIN: ICD-10-PCS | Mod: 25,,, | Performed by: STUDENT IN AN ORGANIZED HEALTH CARE EDUCATION/TRAINING PROGRAM

## 2020-12-10 PROCEDURE — 93970 EXTREMITY STUDY: CPT | Mod: TC

## 2020-12-10 PROCEDURE — 93970 US LOWER EXTREMITY VEINS BILATERAL INSUFFICIENCY: ICD-10-PCS | Mod: 26,,, | Performed by: RADIOLOGY

## 2020-12-10 PROCEDURE — 11721 DEBRIDE NAIL 6 OR MORE: CPT

## 2020-12-10 PROCEDURE — 11721 PR DEBRIDEMENT OF NAILS, 6 OR MORE: ICD-10-PCS | Mod: Q8,,, | Performed by: STUDENT IN AN ORGANIZED HEALTH CARE EDUCATION/TRAINING PROGRAM

## 2020-12-10 PROCEDURE — 11721 DEBRIDE NAIL 6 OR MORE: CPT | Mod: Q8,,, | Performed by: STUDENT IN AN ORGANIZED HEALTH CARE EDUCATION/TRAINING PROGRAM

## 2020-12-10 NOTE — PROGRESS NOTES
Subjective:       Patient ID: Blaine Multani is a 70 y.o. male.    Chief Complaint: No chief complaint on file.    12/10/2020: 70 year old male admit referred to wound care clinic by  for wound to left dorsal foot. Patient s/p angiogram with  on 11/23/2020  and referred to  for vascular prodecures.  Patient with history of HTN, PVD stents, venous insuffiencey.  Upon assessment wound between left 3rd and 4th toes healed. Patient reports wound opened about an month and a half ago, he applied a steroid cream from a dermatologist and wound closed. Wound reopend about 2 weeks ago and he did the same. Patient recently had venous ultra sound per  and states that  will contact him on as how to address procedures to open blockages in patient's left leg.  Doppler pulses present, KELLEE's Left leg 0.96, right 0.99. Patient normally wears compression stockings, did not apply them today.   assessed patient, trimmed patient's toenails x 10. Instructed patient if wound reopens apply betadine to wounds and not to use steroid creams due to causing delayed wound healing.  Patient verbalized understanding. Patient will follow up with  in office in 6 months for nail care or sooner if wound reopens. Patient verbalized understanding. Patient will follow up with 's office for procedures. Patient discharged from wound care clinic.     Review of Systems   Constitutional: Negative for activity change, chills, diaphoresis and fever.   HENT: Negative for congestion and hearing loss.    Respiratory: Negative for cough and shortness of breath.    Cardiovascular: Negative for leg swelling.   Gastrointestinal: Negative for nausea and vomiting.   Musculoskeletal: Positive for myalgias. Negative for back pain, gait problem and joint swelling.   Skin: Positive for wound. Negative for color change, pallor and rash.   Neurological: Positive for numbness. Negative for tremors,  speech difficulty and weakness.   Psychiatric/Behavioral: Negative for agitation and confusion. The patient is not nervous/anxious.        Objective:      Physical Exam  Constitutional:       General: He is not in acute distress.     Appearance: He is well-developed. He is not diaphoretic.   Cardiovascular:      Comments: Skin temperature is within normal limits. Toes are cool to touch and feet are warm proximally. Hair growth is diminished. Skin is mildly atrophic and with mild hyperpigmentation. Mild edema noted, ozzy.   Musculoskeletal:         General: Deformity present. No tenderness.      Comments: Adequate joint range of motion without pain, limitation, nor crepitation to bilateral feet and ankle joints. Muscle strength is 5/5 in all groups bilaterally.       Feet:      Right foot:      Skin integrity: Dry skin present. No blister, erythema or warmth.      Left foot:      Skin integrity: Dry skin present. No blister, erythema or warmth.   Lymphadenopathy:      Comments: Negative lymphadenopathy bilateral popliteal fossa and tarsal tunnel.    Skin:     General: Skin is warm and dry.      Coloration: Skin is not pale.      Findings: No abrasion, bruising, burn, erythema, laceration, petechiae or rash.      Nails: There is no clubbing.        Comments: Skin is warm and dry, bilaterally, no acute SOI noted, bilaterally, appears stable.     Nails are elongated, thickened by 2-4 mm's, dystrophic, and are darkened in coloration with subungual fungal debris.     No open wounds, macerations or hyperkeratotic lesions noted, bilateral lower extremity.            Neurological:      Mental Status: He is alert and oriented to person, place, and time.      Sensory: Sensory deficit present.      Motor: No abnormal muscle tone.      Comments: Light touch within normal limits bilaterally.    Psychiatric:         Behavior: Behavior normal.         Thought Content: Thought content normal.         Judgment: Judgment normal.          Assessment:       1. Venous insufficiency of lower extremity, unspecified laterality      [REMOVED]      Wound 12/10/20 1353 Ulceration Left dorsal Foot #1 (Removed)   12/10/20 1353    Pre-existing: Yes   Primary Wound Type: Ulceration   Side: Left   Orientation: dorsal   Location: Foot   Wound Number (optional): #1   Ankle-Brachial Index:    Pulses: doppler   Removal Indication and Assessment:    Wound Outcome: Healed   (Retired) Wound Type:    (Retired) Wound Length (cm):    (Retired) Wound Width (cm):    (Retired) Depth (cm):    Wound Description (Comments):    Removal Indications:    Removed 12/10/20 1405   Wound Image     12/10/20 1400   Dressing Appearance Open to air;Dry 12/10/20 1400   Appearance Epithelialization;Dry;Closed/resurfaced 12/10/20 1400   Tissue loss description Not applicable 12/10/20 1400   Wound Edges Rolled/closed 12/10/20 1400   Wound Length (cm) 0 cm 12/10/20 1400   Wound Width (cm) 0 cm 12/10/20 1400   Wound Depth (cm) 0 cm 12/10/20 1400   Wound Volume (cm^3) 0 cm^3 12/10/20 1400   Wound Surface Area (cm^2) 0 cm^2 12/10/20 1400   Care Cleansed with:;Soap and water 12/10/20 1400   Periwound Care Moisturizer applied 12/10/20 1400   Compression Other (see comments) 12/10/20 1400       Healed Wound Instructions:Your wound is healed, it is extremely fragile at this stage; protect it from friction, wash it gently and pat dry.  If the wound should re-open, please call [phone] for saurabhur instructions.  Follow up in 's office in 6 months.  Follow up with  for procedures as scheduled.         Plan:            Routine nail care per attached note.    No open wounds or signs of infection noted.     Shoe inspection. General Foot Education. Patient instructed on proper foot hygeine. We discussed wearing proper shoe gear, daily foot inspections, never walking without protective shoe gear, never putting sharp instruments to feet. Patient reminded of the importance of good  nutrition, weight loss if needed to help alleviate foot pressure/pain     Discussed general foot care:  Wear comfortable, proper fitting shoes. Wash feet daily. Dry well. After drying, apply moisturizer to feet (no lotion to webspaces). Inspect feet daily for skin breaks, blisters, swelling, or redness. Wear cotton socks (preferably white)  Change socks every day. Do NOT walk barefoot. Do NOT use heating pads or warm/hot water soaks. I discussed with the  patient  signs and symptoms of infection including redness, drainage, purulence, odor, pain, elevated BS, streaking, fever, chills, etc . Patient is to seek medical attention (ER or urgent care) if these symptoms occur      Healed Wound Instructions:Your wound is healed, it is extremely fragile at this stage; protect it from friction, wash it gently and pat dry.  If the wound should re-open, please call [phone] for furthur instructions.    Follow up in 's office in 6 months.    Follow up with  for procedures as scheduled.

## 2020-12-12 PROBLEM — I83.024: Status: ACTIVE | Noted: 2020-12-12

## 2020-12-12 PROBLEM — I87.2 VENOUS INSUFFICIENCY OF BOTH LOWER EXTREMITIES: Status: ACTIVE | Noted: 2020-12-12

## 2020-12-12 PROBLEM — I70.229 ATHEROSCLEROSIS OF NATIVE ARTERIES OF EXTREMITY WITH REST PAIN: Status: ACTIVE | Noted: 2017-11-09

## 2020-12-12 PROBLEM — L97.421: Status: ACTIVE | Noted: 2020-12-12

## 2020-12-12 NOTE — PATIENT INSTRUCTIONS
Chronic Venous Insufficiency: Treating Ulcers    If leg swelling because of chronic venous insufficiency isnt controlled, an open wound (ulcer) can form. Although ulcers vary in size and shape, they usually appear on the inside of the ankle.  Treating an ulcer  · Visit your healthcare provider. Ulcers need frequent medical care. Special dressings may be applied. You may be given antibiotics to fight infection.  · Your healthcare provider may prescribe medicines, such as aspirin or pentoxifylline, to help the ulcer heal.  · Your healthcare provider may prescribe compression hose to help with the swelling.   · Elevate your legs often to reduce swelling. The ulcer needs oxygen-rich blood to heal. This blood cant reach the ulcer until swelling is reduced.  When to call your healthcare provider  Seek immediate medical attention if:   · You have an increase in pain  · You develop a fever of 100.4°F (38°C) or higher, or as directed by your healthcare provider  · The area around the ulcer becomes red, tender, or both  · The ulcer oozes discolored fluid or smells bad  · Swelling increases suddenly or the dressing feels tight   Date Last Reviewed: 5/1/2016  © 7160-9402 [employer]. [address] [place] [address]. All rights reserved. This information is not intended as a substitute for professional medical care. Always follow your healthcare professional's instructions.        Taking Aspirin for Atherosclerosis       Aspirin is a medicine often prescribed to treat atherosclerosis. This condition affects your arteries. These are the blood vessels that carry blood away from your heart. Having atherosclerosis means youre at greater risk of a heart attack or stroke. Aspirin can help prevent these from happening.  How atherosclerosis affects your arteries   A fatty material (plaque) can build up in your arteries. This makes it harder for blood to flow through them. A blood clot can then form on the  plaque. This may block the artery, cutting off blood flow. This can cause conditions such as coronary artery disease (CAD) and peripheral arterial disease (PAD):  · CAD occurs when plaque builds up in the coronary artery. This artery supplies the heart with oxygen-rich blood.  · PAD occurs when plaque forms in leg arteries.  The same things that cause CAD and PAD can also cause plaque to form in other arteries in your body, such as those in the brain. When plaque occurs in any of these arteries, it raises your risk of heart attack or stroke.  What aspirin does  Aspirin is a blood-thinner (antiplatelet medicine). It helps keep blood clots from forming. This reduces the risk of blockage. Aspirin can be taken daily if you are at high risk of or have already had a heart attack or stroke. It is also used after a procedure called a stent placement. This is when a tiny wire mesh tube, or stent, is placed in an artery to keep it open. Aspirin helps prevent blood clots from forming on the stent.  Taking aspirin safely  Tell your healthcare provider about any medicines you are taking. This includes:  · All prescription medicines  · Over-the-counter medicines  · Herbs, vitamins, and other supplements  Also mention if you have a history of ulcers or bleeding problems. Ask whether you will need to stop taking aspirin before having surgery or dental work. Always take medicines as directed.  Tips for taking aspirin  · Have a routine. For example, take aspirin with the same meal each day.  · Dont take more than prescribed. A low dose gives the same benefit as a higher one, with a lower risk of side effects.  · Dont skip doses. Aspirin needs to be taken each day to work well.  · Keep track of what you take. A pillbox with days of the week can help, especially if you take several medicines. Or use a list or chart to keep track.  When to call your healthcare provider  Side effects of aspirin are not usually serious. If you do have  problems, changing your dose may help. Call your healthcare provider if you have any of the following:  · Excessive bruising (some bruising is normal)  · Nosebleeds, bleeding gums, or other excessive bleeding  · An upset stomach or stomach pain   Date Last Reviewed: 6/1/2016  © 3472-8137 [employer]. [address] [place] [address], [place] [address]. All rights reserved. This information is not intended as a substitute for professional medical care. Always follow your healthcare professional's instructions.        Leg Artery Emergencies: Critical Limb Ischemia (CLI)  Critical limb ischemia (CLI) is a condition that can occur over time when your leg arteries are damaged. It is a severe form of peripheral arterial disease (PAD). PAD is caused when leg arteries are narrowed, reducing blood flow. If blood flow to the toe, foot, or leg is completely blocked, the tissue begins to die (gangrene). If this happens, you need medical care right away to restore blood flow and possibly save the leg. But even with the best medical care, it might not be possible to save a severely affected limb.  When do you need emergency care?    CLI can get worse and cause an urgent problem. For example, if you have a wound, it may not heal. This can lead to gangrene. Go to the emergency room right away if you have any of these symptoms:  · A wound that is foul smelling, draining pus, or discolored  · Severe foot or leg pain that occurs suddenly without injury, especially if the foot or leg is cold or numb  Call your healthcare provider if:  · You have a cut or ulcer that does not heal  · You have foot or leg pain that happens when walking but goes away with rest. This may be a sign of blocked arteries.  How is critical limb ischemia diagnosed?  Certain tests may be done to find out if you have CLI. First your provider will carefully examine you, checking for pulses at several places. Other common tests include:  · Ankle-brachial index  (KELLEE). The blood pressure in your ankle is compared with the blood pressure in your arm.  · Duplex ultrasound. Harmless sound waves are used to create images of blood flow in your legs.  · Arteriography. X-ray dye (contrast medium) is injected into the artery using a thin, flexible tube (catheter). This allows blood vessels to be seen easily on X-rays.  How is critical limb ischemia treated?  Possible treatments for CLI include:  · Dissolving or removing a blood clot. To dissolve a clot, a tube (catheter) is put into an artery in your groin. Clot-busting medicine is put into the tube to dissolve the clot. Or surgery may be done to remove the clot. A cut (incision) is made in the artery at the blocked area. The clot is then removed.  · Angioplasty. A tiny, uninflated balloon is sent to the narrowed area by a catheter. It is then inflated to widen the artery. The balloon is deflated and removed.  · Stenting. After angioplasty, a tiny wire mesh tube (stent) may be put in the artery to help hold it open. The stent is also put in using a catheter.  · Endarterectomy. An incision is made in the artery at the blocked area. The material that blocks the artery is then removed from artery walls.  · Peripheral bypass surgery. A natural or artificial graft is used to bypass the blocked area.  · Amputation. If left untreated, the affected area may have to be removed (amputated).  How can critical limb ischemia emergencies be prevented?  Know the signs and symptoms of a leg artery emergency. If you have diabetes or poor blood circulation, check your feet daily for wounds, sores, blisters, and color changes. If you smoke, stop smoking.  Date Last Reviewed: 6/1/2016  © 5601-0826 [employer]. [address] [place] [address], [place] [address]. All rights reserved. This information is not intended as a substitute for professional medical care. Always follow your healthcare professional's instructions.

## 2020-12-14 ENCOUNTER — TELEPHONE (OUTPATIENT)
Dept: CARDIOLOGY | Facility: CLINIC | Age: 70
End: 2020-12-14

## 2020-12-14 NOTE — TELEPHONE ENCOUNTER
----- Message from Nelly Fermin sent at 12/14/2020  3:50 PM CST -----  Regarding: Results  Contact: [phone] /self  Type:  Test Results    Who Called:  self   Name of Test (Lab/Mammo/Etc):    Date of Test:  12/10  Ordering Provider:    Where the test was performed:  [facility]  Would the patient rather a call back or a response via [facility]?  call  Best Call Back Number:  [phone] /self  Additional Information:

## 2020-12-16 NOTE — PROCEDURES
"Routine Foot Care    Date/Time: 12/10/2020 8:34 PM  Performed by: Amy Marquez DPM  Authorized by: Amy Marquez DPM     Time out: Immediately prior to procedure a "time out" was called to verify the correct patient, procedure, equipment, support staff and site/side marked as required.    Consent Done?:  Yes (Verbal)  Hyperkeratotic Skin Lesions?: No      Nail Care Type:  Debride  Location(s): All  (Left 1st Toe, Left 3rd Toe, Left 2nd Toe, Left 4th Toe, Left 5th Toe, Right 1st Toe, Right 2nd Toe, Right 3rd Toe, Right 4th Toe and Right 5th Toe)  Patient tolerance:  Patient tolerated the procedure well with no immediate complications      "

## 2021-01-03 NOTE — PROGRESS NOTES
The patient tolerated exercise session well with no complaints    
Abdomen soft, mild LLQ ttp, no CVA ttp, no adnexal ttp, no guarding.

## 2021-01-13 ENCOUNTER — TELEPHONE (OUTPATIENT)
Dept: CARDIOLOGY | Facility: HOSPITAL | Age: 71
End: 2021-01-13

## 2021-01-13 ENCOUNTER — HOSPITAL ENCOUNTER (OUTPATIENT)
Facility: HOSPITAL | Age: 71
Discharge: HOME | End: 2021-01-16
Attending: EMERGENCY MEDICINE | Admitting: HOSPITALIST
Payer: MEDICARE

## 2021-01-13 DIAGNOSIS — R79.89 ELEVATED TROPONIN: ICD-10-CM

## 2021-01-13 DIAGNOSIS — R07.9 CHEST PAIN: ICD-10-CM

## 2021-01-13 DIAGNOSIS — M60.852 MYOSITIS OF LEFT THIGH, UNSPECIFIED MYOSITIS TYPE: ICD-10-CM

## 2021-01-13 DIAGNOSIS — L03.116 CELLULITIS OF LEFT LOWER EXTREMITY: ICD-10-CM

## 2021-01-13 DIAGNOSIS — M79.89 LEFT LEG SWELLING: ICD-10-CM

## 2021-01-13 DIAGNOSIS — R06.02 SHORTNESS OF BREATH: ICD-10-CM

## 2021-01-13 DIAGNOSIS — I50.9 DECOMPENSATED HEART FAILURE: Primary | ICD-10-CM

## 2021-01-13 PROBLEM — K04.7 DENTAL ABSCESS: Status: RESOLVED | Noted: 2020-06-27 | Resolved: 2021-01-13

## 2021-01-13 PROBLEM — I50.43 ACUTE ON CHRONIC COMBINED SYSTOLIC (CONGESTIVE) AND DIASTOLIC (CONGESTIVE) HEART FAILURE: Status: ACTIVE | Noted: 2021-01-13

## 2021-01-13 PROBLEM — I21.4 NSTEMI (NON-ST ELEVATED MYOCARDIAL INFARCTION): Status: RESOLVED | Noted: 2020-06-25 | Resolved: 2021-01-13

## 2021-01-13 LAB
ALBUMIN SERPL BCP-MCNC: 3.3 G/DL (ref 3.5–5.2)
ALP SERPL-CCNC: 186 U/L (ref 55–135)
ALT SERPL W/O P-5'-P-CCNC: 54 U/L (ref 10–44)
ANION GAP SERPL CALC-SCNC: 11 MMOL/L (ref 8–16)
AST SERPL-CCNC: 60 U/L (ref 10–40)
BASOPHILS # BLD AUTO: 0.03 K/UL (ref 0–0.2)
BASOPHILS NFR BLD: 0.3 % (ref 0–1.9)
BILIRUB SERPL-MCNC: 0.8 MG/DL (ref 0.1–1)
BNP SERPL-MCNC: 1032 PG/ML (ref 0–99)
BUN SERPL-MCNC: 24 MG/DL (ref 8–23)
CALCIUM SERPL-MCNC: 9.5 MG/DL (ref 8.7–10.5)
CHLORIDE SERPL-SCNC: 101 MMOL/L (ref 95–110)
CK SERPL-CCNC: 72 U/L (ref 20–200)
CO2 SERPL-SCNC: 26 MMOL/L (ref 23–29)
CREAT SERPL-MCNC: 1.1 MG/DL (ref 0.5–1.4)
CRP SERPL-MCNC: 178.5 MG/L (ref 0–8.2)
CTP QC/QA: YES
DIFFERENTIAL METHOD: ABNORMAL
EOSINOPHIL # BLD AUTO: 0.1 K/UL (ref 0–0.5)
EOSINOPHIL NFR BLD: 0.9 % (ref 0–8)
ERYTHROCYTE [DISTWIDTH] IN BLOOD BY AUTOMATED COUNT: 16.4 % (ref 11.5–14.5)
ERYTHROCYTE [SEDIMENTATION RATE] IN BLOOD BY WESTERGREN METHOD: 34 MM/HR (ref 0–23)
EST. GFR  (AFRICAN AMERICAN): >60 ML/MIN/1.73 M^2
EST. GFR  (NON AFRICAN AMERICAN): >60 ML/MIN/1.73 M^2
GLUCOSE SERPL-MCNC: 97 MG/DL (ref 70–110)
HCT VFR BLD AUTO: 35.2 % (ref 40–54)
HGB BLD-MCNC: 11.2 G/DL (ref 14–18)
IMM GRANULOCYTES # BLD AUTO: 0.06 K/UL (ref 0–0.04)
IMM GRANULOCYTES NFR BLD AUTO: 0.7 % (ref 0–0.5)
LYMPHOCYTES # BLD AUTO: 0.7 K/UL (ref 1–4.8)
LYMPHOCYTES NFR BLD: 8.4 % (ref 18–48)
MCH RBC QN AUTO: 30.9 PG (ref 27–31)
MCHC RBC AUTO-ENTMCNC: 31.8 G/DL (ref 32–36)
MCV RBC AUTO: 97 FL (ref 82–98)
MONOCYTES # BLD AUTO: 0.7 K/UL (ref 0.3–1)
MONOCYTES NFR BLD: 8.3 % (ref 4–15)
NEUTROPHILS # BLD AUTO: 7.1 K/UL (ref 1.8–7.7)
NEUTROPHILS NFR BLD: 81.4 % (ref 38–73)
NRBC BLD-RTO: 0 /100 WBC
PLATELET # BLD AUTO: 206 K/UL (ref 150–350)
PMV BLD AUTO: 10.4 FL (ref 9.2–12.9)
POTASSIUM SERPL-SCNC: 3.5 MMOL/L (ref 3.5–5.1)
PROT SERPL-MCNC: 7.5 G/DL (ref 6–8.4)
RBC # BLD AUTO: 3.62 M/UL (ref 4.6–6.2)
SARS-COV-2 RDRP RESP QL NAA+PROBE: NEGATIVE
SODIUM SERPL-SCNC: 138 MMOL/L (ref 136–145)
TROPONIN I SERPL DL<=0.01 NG/ML-MCNC: 0.04 NG/ML (ref 0–0.03)
TROPONIN I SERPL DL<=0.01 NG/ML-MCNC: 0.04 NG/ML (ref 0–0.03)
WBC # BLD AUTO: 8.68 K/UL (ref 3.9–12.7)

## 2021-01-13 PROCEDURE — 82550 ASSAY OF CK (CPK): CPT

## 2021-01-13 PROCEDURE — G0378 HOSPITAL OBSERVATION PER HR: HCPCS

## 2021-01-13 PROCEDURE — 94640 AIRWAY INHALATION TREATMENT: CPT

## 2021-01-13 PROCEDURE — 99285 PR EMERGENCY DEPT VISIT,LEVEL V: ICD-10-PCS | Mod: ,,, | Performed by: EMERGENCY MEDICINE

## 2021-01-13 PROCEDURE — 99285 EMERGENCY DEPT VISIT HI MDM: CPT | Mod: ,,, | Performed by: EMERGENCY MEDICINE

## 2021-01-13 PROCEDURE — 93005 ELECTROCARDIOGRAM TRACING: CPT

## 2021-01-13 PROCEDURE — 86140 C-REACTIVE PROTEIN: CPT

## 2021-01-13 PROCEDURE — 85652 RBC SED RATE AUTOMATED: CPT

## 2021-01-13 PROCEDURE — 93010 ELECTROCARDIOGRAM REPORT: CPT | Mod: ,,, | Performed by: INTERNAL MEDICINE

## 2021-01-13 PROCEDURE — 99220 PR INITIAL OBSERVATION CARE,LEVL III: ICD-10-PCS | Mod: ,,, | Performed by: PHYSICIAN ASSISTANT

## 2021-01-13 PROCEDURE — 25000242 PHARM REV CODE 250 ALT 637 W/ HCPCS: Performed by: PHYSICIAN ASSISTANT

## 2021-01-13 PROCEDURE — 84484 ASSAY OF TROPONIN QUANT: CPT | Mod: 91

## 2021-01-13 PROCEDURE — 96375 TX/PRO/DX INJ NEW DRUG ADDON: CPT

## 2021-01-13 PROCEDURE — 94761 N-INVAS EAR/PLS OXIMETRY MLT: CPT

## 2021-01-13 PROCEDURE — 99220 PR INITIAL OBSERVATION CARE,LEVL III: CPT | Mod: ,,, | Performed by: PHYSICIAN ASSISTANT

## 2021-01-13 PROCEDURE — 93010 EKG 12-LEAD: ICD-10-PCS | Mod: ,,, | Performed by: INTERNAL MEDICINE

## 2021-01-13 PROCEDURE — U0002 COVID-19 LAB TEST NON-CDC: HCPCS | Performed by: EMERGENCY MEDICINE

## 2021-01-13 PROCEDURE — 99285 EMERGENCY DEPT VISIT HI MDM: CPT | Mod: 25

## 2021-01-13 PROCEDURE — 80053 COMPREHEN METABOLIC PANEL: CPT

## 2021-01-13 PROCEDURE — 96374 THER/PROPH/DIAG INJ IV PUSH: CPT

## 2021-01-13 PROCEDURE — 85025 COMPLETE CBC W/AUTO DIFF WBC: CPT

## 2021-01-13 PROCEDURE — 63600175 PHARM REV CODE 636 W HCPCS: Performed by: PHYSICIAN ASSISTANT

## 2021-01-13 PROCEDURE — 83880 ASSAY OF NATRIURETIC PEPTIDE: CPT

## 2021-01-13 PROCEDURE — 25000003 PHARM REV CODE 250: Performed by: PHYSICIAN ASSISTANT

## 2021-01-13 RX ORDER — CARVEDILOL 12.5 MG/1
12.5 TABLET ORAL 2 TIMES DAILY WITH MEALS
Status: DISCONTINUED | OUTPATIENT
Start: 2021-01-14 | End: 2021-01-16 | Stop reason: HOSPADM

## 2021-01-13 RX ORDER — IPRATROPIUM BROMIDE AND ALBUTEROL SULFATE 2.5; .5 MG/3ML; MG/3ML
3 SOLUTION RESPIRATORY (INHALATION)
Status: COMPLETED | OUTPATIENT
Start: 2021-01-13 | End: 2021-01-13

## 2021-01-13 RX ORDER — IPRATROPIUM BROMIDE AND ALBUTEROL SULFATE 2.5; .5 MG/3ML; MG/3ML
3 SOLUTION RESPIRATORY (INHALATION) EVERY 4 HOURS PRN
Status: DISCONTINUED | OUTPATIENT
Start: 2021-01-14 | End: 2021-01-14

## 2021-01-13 RX ORDER — ONDANSETRON 4 MG/1
8 TABLET, ORALLY DISINTEGRATING ORAL EVERY 8 HOURS PRN
Status: DISCONTINUED | OUTPATIENT
Start: 2021-01-14 | End: 2021-01-16 | Stop reason: HOSPADM

## 2021-01-13 RX ORDER — ROSUVASTATIN CALCIUM 20 MG/1
40 TABLET, COATED ORAL NIGHTLY
Status: DISCONTINUED | OUTPATIENT
Start: 2021-01-14 | End: 2021-01-15

## 2021-01-13 RX ORDER — FAMOTIDINE 20 MG/1
20 TABLET, FILM COATED ORAL DAILY
Status: DISCONTINUED | OUTPATIENT
Start: 2021-01-14 | End: 2021-01-13

## 2021-01-13 RX ORDER — TALC
6 POWDER (GRAM) TOPICAL NIGHTLY PRN
Status: DISCONTINUED | OUTPATIENT
Start: 2021-01-14 | End: 2021-01-16 | Stop reason: HOSPADM

## 2021-01-13 RX ORDER — BENZONATATE 100 MG/1
200 CAPSULE ORAL 3 TIMES DAILY PRN
Status: DISCONTINUED | OUTPATIENT
Start: 2021-01-14 | End: 2021-01-16 | Stop reason: HOSPADM

## 2021-01-13 RX ORDER — FLUTICASONE FUROATE AND VILANTEROL 100; 25 UG/1; UG/1
1 POWDER RESPIRATORY (INHALATION) DAILY
Status: DISCONTINUED | OUTPATIENT
Start: 2021-01-14 | End: 2021-01-16 | Stop reason: HOSPADM

## 2021-01-13 RX ORDER — IBUPROFEN 200 MG
16 TABLET ORAL
Status: DISCONTINUED | OUTPATIENT
Start: 2021-01-14 | End: 2021-01-16 | Stop reason: HOSPADM

## 2021-01-13 RX ORDER — ISOSORBIDE DINITRATE 20 MG/1
40 TABLET ORAL 3 TIMES DAILY
Status: DISCONTINUED | OUTPATIENT
Start: 2021-01-14 | End: 2021-01-16 | Stop reason: HOSPADM

## 2021-01-13 RX ORDER — SODIUM CHLORIDE 0.9 % (FLUSH) 0.9 %
5 SYRINGE (ML) INJECTION
Status: DISCONTINUED | OUTPATIENT
Start: 2021-01-14 | End: 2021-01-16 | Stop reason: HOSPADM

## 2021-01-13 RX ORDER — AMLODIPINE BESYLATE 5 MG/1
5 TABLET ORAL DAILY
Status: DISCONTINUED | OUTPATIENT
Start: 2021-01-14 | End: 2021-01-16

## 2021-01-13 RX ORDER — ACETAMINOPHEN 325 MG/1
650 TABLET ORAL EVERY 4 HOURS PRN
Status: DISCONTINUED | OUTPATIENT
Start: 2021-01-14 | End: 2021-01-16 | Stop reason: HOSPADM

## 2021-01-13 RX ORDER — BISACODYL 10 MG
10 SUPPOSITORY, RECTAL RECTAL DAILY PRN
Status: DISCONTINUED | OUTPATIENT
Start: 2021-01-14 | End: 2021-01-16 | Stop reason: HOSPADM

## 2021-01-13 RX ORDER — CILOSTAZOL 50 MG/1
50 TABLET ORAL 2 TIMES DAILY
Status: DISCONTINUED | OUTPATIENT
Start: 2021-01-14 | End: 2021-01-16 | Stop reason: HOSPADM

## 2021-01-13 RX ORDER — FUROSEMIDE 10 MG/ML
40 INJECTION INTRAMUSCULAR; INTRAVENOUS
Status: COMPLETED | OUTPATIENT
Start: 2021-01-13 | End: 2021-01-13

## 2021-01-13 RX ORDER — CLOPIDOGREL BISULFATE 75 MG/1
75 TABLET ORAL DAILY
Status: DISCONTINUED | OUTPATIENT
Start: 2021-01-14 | End: 2021-01-16 | Stop reason: HOSPADM

## 2021-01-13 RX ORDER — HYDROCODONE BITARTRATE AND ACETAMINOPHEN 5; 325 MG/1; MG/1
1 TABLET ORAL
Status: COMPLETED | OUTPATIENT
Start: 2021-01-13 | End: 2021-01-13

## 2021-01-13 RX ORDER — HYDRALAZINE HYDROCHLORIDE 50 MG/1
100 TABLET, FILM COATED ORAL 3 TIMES DAILY
Status: DISCONTINUED | OUTPATIENT
Start: 2021-01-14 | End: 2021-01-16

## 2021-01-13 RX ORDER — LOSARTAN POTASSIUM 50 MG/1
100 TABLET ORAL DAILY
Status: DISCONTINUED | OUTPATIENT
Start: 2021-01-14 | End: 2021-01-16

## 2021-01-13 RX ORDER — IBUPROFEN 200 MG
24 TABLET ORAL
Status: DISCONTINUED | OUTPATIENT
Start: 2021-01-14 | End: 2021-01-16 | Stop reason: HOSPADM

## 2021-01-13 RX ORDER — POLYETHYLENE GLYCOL 3350 17 G/17G
17 POWDER, FOR SOLUTION ORAL DAILY
Status: DISCONTINUED | OUTPATIENT
Start: 2021-01-14 | End: 2021-01-16 | Stop reason: HOSPADM

## 2021-01-13 RX ORDER — PANTOPRAZOLE SODIUM 40 MG/1
40 TABLET, DELAYED RELEASE ORAL DAILY
Status: DISCONTINUED | OUTPATIENT
Start: 2021-01-14 | End: 2021-01-16 | Stop reason: HOSPADM

## 2021-01-13 RX ORDER — LEVALBUTEROL INHALATION SOLUTION 0.63 MG/3ML
0.63 SOLUTION RESPIRATORY (INHALATION) EVERY 8 HOURS PRN
Status: DISCONTINUED | OUTPATIENT
Start: 2021-01-14 | End: 2021-01-16 | Stop reason: HOSPADM

## 2021-01-13 RX ORDER — HYDROXYZINE HYDROCHLORIDE 25 MG/1
25 TABLET, FILM COATED ORAL 4 TIMES DAILY PRN
Status: DISCONTINUED | OUTPATIENT
Start: 2021-01-14 | End: 2021-01-16 | Stop reason: HOSPADM

## 2021-01-13 RX ORDER — CEFTRIAXONE 1 G/1
1 INJECTION, POWDER, FOR SOLUTION INTRAMUSCULAR; INTRAVENOUS
Status: COMPLETED | OUTPATIENT
Start: 2021-01-13 | End: 2021-01-13

## 2021-01-13 RX ORDER — GLUCAGON 1 MG
1 KIT INJECTION
Status: DISCONTINUED | OUTPATIENT
Start: 2021-01-14 | End: 2021-01-16 | Stop reason: HOSPADM

## 2021-01-13 RX ORDER — ASPIRIN 81 MG/1
81 TABLET ORAL DAILY
Status: DISCONTINUED | OUTPATIENT
Start: 2021-01-14 | End: 2021-01-16 | Stop reason: HOSPADM

## 2021-01-13 RX ORDER — MORPHINE SULFATE 2 MG/ML
6 INJECTION, SOLUTION INTRAMUSCULAR; INTRAVENOUS
Status: COMPLETED | OUTPATIENT
Start: 2021-01-13 | End: 2021-01-13

## 2021-01-13 RX ORDER — SPIRONOLACTONE 25 MG/1
25 TABLET ORAL DAILY
Status: DISCONTINUED | OUTPATIENT
Start: 2021-01-14 | End: 2021-01-16 | Stop reason: HOSPADM

## 2021-01-13 RX ORDER — SERTRALINE HYDROCHLORIDE 50 MG/1
50 TABLET, FILM COATED ORAL NIGHTLY
Status: DISCONTINUED | OUTPATIENT
Start: 2021-01-14 | End: 2021-01-16 | Stop reason: HOSPADM

## 2021-01-13 RX ORDER — ACETAMINOPHEN 500 MG
1000 TABLET ORAL EVERY 8 HOURS PRN
Status: DISCONTINUED | OUTPATIENT
Start: 2021-01-14 | End: 2021-01-14

## 2021-01-13 RX ADMIN — HYDROCODONE BITARTRATE AND ACETAMINOPHEN 1 TABLET: 5; 325 TABLET ORAL at 05:01

## 2021-01-13 RX ADMIN — CEFTRIAXONE SODIUM 1 G: 1 INJECTION, POWDER, FOR SOLUTION INTRAMUSCULAR; INTRAVENOUS at 10:01

## 2021-01-13 RX ADMIN — MORPHINE SULFATE 6 MG: 2 INJECTION, SOLUTION INTRAMUSCULAR; INTRAVENOUS at 08:01

## 2021-01-13 RX ADMIN — IPRATROPIUM BROMIDE AND ALBUTEROL SULFATE 3 ML: .5; 2.5 SOLUTION RESPIRATORY (INHALATION) at 07:01

## 2021-01-13 RX ADMIN — FUROSEMIDE 40 MG: 10 INJECTION, SOLUTION INTRAMUSCULAR; INTRAVENOUS at 07:01

## 2021-01-14 ENCOUNTER — TELEPHONE (OUTPATIENT)
Dept: CARDIOLOGY | Facility: CLINIC | Age: 71
End: 2021-01-14

## 2021-01-14 PROBLEM — E87.6 HYPOKALEMIA: Status: ACTIVE | Noted: 2021-01-14

## 2021-01-14 PROBLEM — L03.116 LEFT LEG CELLULITIS: Status: ACTIVE | Noted: 2021-01-14

## 2021-01-14 PROBLEM — I25.708 CORONARY ARTERY DISEASE OF BYPASS GRAFT OF NATIVE HEART WITH STABLE ANGINA PECTORIS: Status: ACTIVE | Noted: 2021-01-14

## 2021-01-14 PROBLEM — J45.20 MILD INTERMITTENT ASTHMA WITHOUT COMPLICATION: Status: ACTIVE | Noted: 2021-01-14

## 2021-01-14 LAB
ANION GAP SERPL CALC-SCNC: 13 MMOL/L (ref 8–16)
BASOPHILS # BLD AUTO: 0.03 K/UL (ref 0–0.2)
BASOPHILS NFR BLD: 0.4 % (ref 0–1.9)
BUN SERPL-MCNC: 23 MG/DL (ref 8–23)
CALCIUM SERPL-MCNC: 8.7 MG/DL (ref 8.7–10.5)
CHLORIDE SERPL-SCNC: 99 MMOL/L (ref 95–110)
CO2 SERPL-SCNC: 24 MMOL/L (ref 23–29)
CREAT SERPL-MCNC: 1.1 MG/DL (ref 0.5–1.4)
DIFFERENTIAL METHOD: ABNORMAL
EOSINOPHIL # BLD AUTO: 0.1 K/UL (ref 0–0.5)
EOSINOPHIL NFR BLD: 1.5 % (ref 0–8)
ERYTHROCYTE [DISTWIDTH] IN BLOOD BY AUTOMATED COUNT: 16.3 % (ref 11.5–14.5)
EST. GFR  (AFRICAN AMERICAN): >60 ML/MIN/1.73 M^2
EST. GFR  (NON AFRICAN AMERICAN): >60 ML/MIN/1.73 M^2
ESTIMATED AVG GLUCOSE: 105 MG/DL (ref 68–131)
GLUCOSE SERPL-MCNC: 90 MG/DL (ref 70–110)
HBA1C MFR BLD HPLC: 5.3 % (ref 4–5.6)
HCT VFR BLD AUTO: 31 % (ref 40–54)
HGB BLD-MCNC: 9.9 G/DL (ref 14–18)
IMM GRANULOCYTES # BLD AUTO: 0.04 K/UL (ref 0–0.04)
IMM GRANULOCYTES NFR BLD AUTO: 0.5 % (ref 0–0.5)
LYMPHOCYTES # BLD AUTO: 0.7 K/UL (ref 1–4.8)
LYMPHOCYTES NFR BLD: 8.8 % (ref 18–48)
MAGNESIUM SERPL-MCNC: 1.5 MG/DL (ref 1.6–2.6)
MCH RBC QN AUTO: 31.1 PG (ref 27–31)
MCHC RBC AUTO-ENTMCNC: 31.9 G/DL (ref 32–36)
MCV RBC AUTO: 98 FL (ref 82–98)
MONOCYTES # BLD AUTO: 0.8 K/UL (ref 0.3–1)
MONOCYTES NFR BLD: 9.9 % (ref 4–15)
NEUTROPHILS # BLD AUTO: 6 K/UL (ref 1.8–7.7)
NEUTROPHILS NFR BLD: 78.9 % (ref 38–73)
NRBC BLD-RTO: 0 /100 WBC
PHOSPHATE SERPL-MCNC: 3.9 MG/DL (ref 2.7–4.5)
PLATELET # BLD AUTO: 196 K/UL (ref 150–350)
PMV BLD AUTO: 10.5 FL (ref 9.2–12.9)
POTASSIUM SERPL-SCNC: 3.2 MMOL/L (ref 3.5–5.1)
PROCALCITONIN SERPL IA-MCNC: 0.22 NG/ML
RBC # BLD AUTO: 3.18 M/UL (ref 4.6–6.2)
SODIUM SERPL-SCNC: 136 MMOL/L (ref 136–145)
WBC # BLD AUTO: 7.58 K/UL (ref 3.9–12.7)

## 2021-01-14 PROCEDURE — 99226 PR SUBSEQUENT OBSERVATION CARE,LEVEL III: CPT | Mod: ,,, | Performed by: PHYSICIAN ASSISTANT

## 2021-01-14 PROCEDURE — 83735 ASSAY OF MAGNESIUM: CPT

## 2021-01-14 PROCEDURE — 25000003 PHARM REV CODE 250: Performed by: PHYSICIAN ASSISTANT

## 2021-01-14 PROCEDURE — 80048 BASIC METABOLIC PNL TOTAL CA: CPT

## 2021-01-14 PROCEDURE — 87040 BLOOD CULTURE FOR BACTERIA: CPT

## 2021-01-14 PROCEDURE — 99226 PR SUBSEQUENT OBSERVATION CARE,LEVEL III: ICD-10-PCS | Mod: ,,, | Performed by: PHYSICIAN ASSISTANT

## 2021-01-14 PROCEDURE — 25000242 PHARM REV CODE 250 ALT 637 W/ HCPCS: Performed by: PHYSICIAN ASSISTANT

## 2021-01-14 PROCEDURE — 84145 PROCALCITONIN (PCT): CPT

## 2021-01-14 PROCEDURE — G0378 HOSPITAL OBSERVATION PER HR: HCPCS

## 2021-01-14 PROCEDURE — 83036 HEMOGLOBIN GLYCOSYLATED A1C: CPT

## 2021-01-14 PROCEDURE — 36415 COLL VENOUS BLD VENIPUNCTURE: CPT

## 2021-01-14 PROCEDURE — 96376 TX/PRO/DX INJ SAME DRUG ADON: CPT | Performed by: EMERGENCY MEDICINE

## 2021-01-14 PROCEDURE — 94640 AIRWAY INHALATION TREATMENT: CPT

## 2021-01-14 PROCEDURE — 84100 ASSAY OF PHOSPHORUS: CPT

## 2021-01-14 PROCEDURE — 85025 COMPLETE CBC W/AUTO DIFF WBC: CPT

## 2021-01-14 PROCEDURE — 94761 N-INVAS EAR/PLS OXIMETRY MLT: CPT

## 2021-01-14 PROCEDURE — 63600175 PHARM REV CODE 636 W HCPCS: Performed by: PHYSICIAN ASSISTANT

## 2021-01-14 RX ORDER — OXYCODONE HYDROCHLORIDE 5 MG/1
5 TABLET ORAL EVERY 6 HOURS PRN
Status: DISCONTINUED | OUTPATIENT
Start: 2021-01-14 | End: 2021-01-16 | Stop reason: HOSPADM

## 2021-01-14 RX ORDER — LANOLIN ALCOHOL/MO/W.PET/CERES
400 CREAM (GRAM) TOPICAL ONCE
Status: COMPLETED | OUTPATIENT
Start: 2021-01-14 | End: 2021-01-14

## 2021-01-14 RX ORDER — CEFTRIAXONE 1 G/1
1 INJECTION, POWDER, FOR SOLUTION INTRAMUSCULAR; INTRAVENOUS
Status: DISCONTINUED | OUTPATIENT
Start: 2021-01-14 | End: 2021-01-16 | Stop reason: HOSPADM

## 2021-01-14 RX ORDER — OXYCODONE HYDROCHLORIDE 10 MG/1
10 TABLET ORAL EVERY 6 HOURS PRN
Status: DISCONTINUED | OUTPATIENT
Start: 2021-01-14 | End: 2021-01-16 | Stop reason: HOSPADM

## 2021-01-14 RX ORDER — FUROSEMIDE 10 MG/ML
40 INJECTION INTRAMUSCULAR; INTRAVENOUS 2 TIMES DAILY
Status: DISCONTINUED | OUTPATIENT
Start: 2021-01-14 | End: 2021-01-15

## 2021-01-14 RX ORDER — POTASSIUM CHLORIDE 20 MEQ/1
40 TABLET, EXTENDED RELEASE ORAL ONCE
Status: COMPLETED | OUTPATIENT
Start: 2021-01-14 | End: 2021-01-14

## 2021-01-14 RX ADMIN — FUROSEMIDE 40 MG: 10 INJECTION, SOLUTION INTRAMUSCULAR; INTRAVENOUS at 09:01

## 2021-01-14 RX ADMIN — ISOSORBIDE DINITRATE 40 MG: 20 TABLET ORAL at 09:01

## 2021-01-14 RX ADMIN — CILOSTAZOL 50 MG: 50 TABLET ORAL at 08:01

## 2021-01-14 RX ADMIN — SPIRONOLACTONE 25 MG: 25 TABLET, FILM COATED ORAL at 09:01

## 2021-01-14 RX ADMIN — SERTRALINE HYDROCHLORIDE 50 MG: 50 TABLET ORAL at 08:01

## 2021-01-14 RX ADMIN — OXYCODONE HYDROCHLORIDE 5 MG: 5 TABLET ORAL at 12:01

## 2021-01-14 RX ADMIN — ASPIRIN 81 MG: 81 TABLET, COATED ORAL at 09:01

## 2021-01-14 RX ADMIN — APIXABAN 5 MG: 5 TABLET, FILM COATED ORAL at 08:01

## 2021-01-14 RX ADMIN — FLUTICASONE FUROATE AND VILANTEROL TRIFENATATE 1 PUFF: 100; 25 POWDER RESPIRATORY (INHALATION) at 09:01

## 2021-01-14 RX ADMIN — APIXABAN 5 MG: 5 TABLET, FILM COATED ORAL at 09:01

## 2021-01-14 RX ADMIN — OXYCODONE HYDROCHLORIDE 10 MG: 10 TABLET ORAL at 06:01

## 2021-01-14 RX ADMIN — OXYCODONE HYDROCHLORIDE 10 MG: 10 TABLET ORAL at 02:01

## 2021-01-14 RX ADMIN — POLYETHYLENE GLYCOL 3350 17 G: 17 POWDER, FOR SOLUTION ORAL at 09:01

## 2021-01-14 RX ADMIN — ROSUVASTATIN CALCIUM 40 MG: 20 TABLET, FILM COATED ORAL at 08:01

## 2021-01-14 RX ADMIN — HYDRALAZINE HYDROCHLORIDE 100 MG: 50 TABLET, FILM COATED ORAL at 02:01

## 2021-01-14 RX ADMIN — Medication 400 MG: at 02:01

## 2021-01-14 RX ADMIN — CLOPIDOGREL 75 MG: 75 TABLET, FILM COATED ORAL at 09:01

## 2021-01-14 RX ADMIN — CEFTRIAXONE SODIUM 1 G: 1 INJECTION, POWDER, FOR SOLUTION INTRAMUSCULAR; INTRAVENOUS at 09:01

## 2021-01-14 RX ADMIN — LOSARTAN POTASSIUM 100 MG: 50 TABLET, FILM COATED ORAL at 09:01

## 2021-01-14 RX ADMIN — CARVEDILOL 12.5 MG: 12.5 TABLET, FILM COATED ORAL at 04:01

## 2021-01-14 RX ADMIN — OXYCODONE HYDROCHLORIDE 10 MG: 10 TABLET ORAL at 08:01

## 2021-01-14 RX ADMIN — MELATONIN TAB 3 MG 6 MG: 3 TAB at 01:01

## 2021-01-14 RX ADMIN — AMLODIPINE BESYLATE 5 MG: 5 TABLET ORAL at 09:01

## 2021-01-14 RX ADMIN — MELATONIN TAB 3 MG 6 MG: 3 TAB at 08:01

## 2021-01-14 RX ADMIN — ACETAMINOPHEN 650 MG: 325 TABLET ORAL at 05:01

## 2021-01-14 RX ADMIN — CILOSTAZOL 50 MG: 50 TABLET ORAL at 09:01

## 2021-01-14 RX ADMIN — HYDRALAZINE HYDROCHLORIDE 100 MG: 50 TABLET, FILM COATED ORAL at 09:01

## 2021-01-14 RX ADMIN — PANTOPRAZOLE SODIUM 40 MG: 40 TABLET, DELAYED RELEASE ORAL at 09:01

## 2021-01-14 RX ADMIN — CARVEDILOL 12.5 MG: 12.5 TABLET, FILM COATED ORAL at 09:01

## 2021-01-14 RX ADMIN — POTASSIUM CHLORIDE 40 MEQ: 1500 TABLET, EXTENDED RELEASE ORAL at 02:01

## 2021-01-14 RX ADMIN — ISOSORBIDE DINITRATE 40 MG: 20 TABLET ORAL at 02:01

## 2021-01-15 PROBLEM — N17.9 AKI (ACUTE KIDNEY INJURY): Status: ACTIVE | Noted: 2021-01-15

## 2021-01-15 PROBLEM — I73.9 PAD (PERIPHERAL ARTERY DISEASE): Status: ACTIVE | Noted: 2021-01-15

## 2021-01-15 PROBLEM — M60.852 MYOSITIS OF LEFT THIGH: Status: ACTIVE | Noted: 2021-01-15

## 2021-01-15 LAB
ANION GAP SERPL CALC-SCNC: 10 MMOL/L (ref 8–16)
ANION GAP SERPL CALC-SCNC: 8 MMOL/L (ref 8–16)
BASOPHILS # BLD AUTO: 0.03 K/UL (ref 0–0.2)
BASOPHILS NFR BLD: 0.5 % (ref 0–1.9)
BUN SERPL-MCNC: 35 MG/DL (ref 8–23)
BUN SERPL-MCNC: 36 MG/DL (ref 8–23)
CALCIUM SERPL-MCNC: 7.9 MG/DL (ref 8.7–10.5)
CALCIUM SERPL-MCNC: 8.2 MG/DL (ref 8.7–10.5)
CHLORIDE SERPL-SCNC: 98 MMOL/L (ref 95–110)
CHLORIDE SERPL-SCNC: 99 MMOL/L (ref 95–110)
CO2 SERPL-SCNC: 24 MMOL/L (ref 23–29)
CO2 SERPL-SCNC: 26 MMOL/L (ref 23–29)
CREAT SERPL-MCNC: 1.9 MG/DL (ref 0.5–1.4)
CREAT SERPL-MCNC: 2.1 MG/DL (ref 0.5–1.4)
DIFFERENTIAL METHOD: ABNORMAL
EOSINOPHIL # BLD AUTO: 0.2 K/UL (ref 0–0.5)
EOSINOPHIL NFR BLD: 2.3 % (ref 0–8)
ERYTHROCYTE [DISTWIDTH] IN BLOOD BY AUTOMATED COUNT: 16.1 % (ref 11.5–14.5)
EST. GFR  (AFRICAN AMERICAN): 35.8 ML/MIN/1.73 M^2
EST. GFR  (AFRICAN AMERICAN): 40.4 ML/MIN/1.73 M^2
EST. GFR  (NON AFRICAN AMERICAN): 31 ML/MIN/1.73 M^2
EST. GFR  (NON AFRICAN AMERICAN): 34.9 ML/MIN/1.73 M^2
GLUCOSE SERPL-MCNC: 112 MG/DL (ref 70–110)
GLUCOSE SERPL-MCNC: 113 MG/DL (ref 70–110)
HCT VFR BLD AUTO: 27.4 % (ref 40–54)
HGB BLD-MCNC: 8.7 G/DL (ref 14–18)
IMM GRANULOCYTES # BLD AUTO: 0.05 K/UL (ref 0–0.04)
IMM GRANULOCYTES NFR BLD AUTO: 0.8 % (ref 0–0.5)
LYMPHOCYTES # BLD AUTO: 0.6 K/UL (ref 1–4.8)
LYMPHOCYTES NFR BLD: 8.9 % (ref 18–48)
MAGNESIUM SERPL-MCNC: 1.9 MG/DL (ref 1.6–2.6)
MCH RBC QN AUTO: 30.6 PG (ref 27–31)
MCHC RBC AUTO-ENTMCNC: 31.8 G/DL (ref 32–36)
MCV RBC AUTO: 97 FL (ref 82–98)
MONOCYTES # BLD AUTO: 0.6 K/UL (ref 0.3–1)
MONOCYTES NFR BLD: 9.3 % (ref 4–15)
NEUTROPHILS # BLD AUTO: 5.1 K/UL (ref 1.8–7.7)
NEUTROPHILS NFR BLD: 78.2 % (ref 38–73)
NRBC BLD-RTO: 0 /100 WBC
PHOSPHATE SERPL-MCNC: 4.6 MG/DL (ref 2.7–4.5)
PLATELET # BLD AUTO: 198 K/UL (ref 150–350)
PMV BLD AUTO: 11 FL (ref 9.2–12.9)
POTASSIUM SERPL-SCNC: 3.6 MMOL/L (ref 3.5–5.1)
POTASSIUM SERPL-SCNC: 4.1 MMOL/L (ref 3.5–5.1)
RBC # BLD AUTO: 2.84 M/UL (ref 4.6–6.2)
SODIUM SERPL-SCNC: 132 MMOL/L (ref 136–145)
SODIUM SERPL-SCNC: 133 MMOL/L (ref 136–145)
WBC # BLD AUTO: 6.53 K/UL (ref 3.9–12.7)

## 2021-01-15 PROCEDURE — 94761 N-INVAS EAR/PLS OXIMETRY MLT: CPT

## 2021-01-15 PROCEDURE — 85025 COMPLETE CBC W/AUTO DIFF WBC: CPT

## 2021-01-15 PROCEDURE — 84100 ASSAY OF PHOSPHORUS: CPT

## 2021-01-15 PROCEDURE — 25000003 PHARM REV CODE 250: Performed by: PHYSICIAN ASSISTANT

## 2021-01-15 PROCEDURE — 86235 NUCLEAR ANTIGEN ANTIBODY: CPT

## 2021-01-15 PROCEDURE — 83516 IMMUNOASSAY NONANTIBODY: CPT

## 2021-01-15 PROCEDURE — 99226 PR SUBSEQUENT OBSERVATION CARE,LEVEL III: ICD-10-PCS | Mod: ,,, | Performed by: PHYSICIAN ASSISTANT

## 2021-01-15 PROCEDURE — G0378 HOSPITAL OBSERVATION PER HR: HCPCS

## 2021-01-15 PROCEDURE — 36415 COLL VENOUS BLD VENIPUNCTURE: CPT

## 2021-01-15 PROCEDURE — 99205 OFFICE O/P NEW HI 60 MIN: CPT | Mod: GC,,, | Performed by: STUDENT IN AN ORGANIZED HEALTH CARE EDUCATION/TRAINING PROGRAM

## 2021-01-15 PROCEDURE — 99205 PR OFFICE/OUTPT VISIT, NEW, LEVL V, 60-74 MIN: ICD-10-PCS | Mod: GC,,, | Performed by: STUDENT IN AN ORGANIZED HEALTH CARE EDUCATION/TRAINING PROGRAM

## 2021-01-15 PROCEDURE — 99226 PR SUBSEQUENT OBSERVATION CARE,LEVEL III: CPT | Mod: ,,, | Performed by: PHYSICIAN ASSISTANT

## 2021-01-15 PROCEDURE — 82085 ASSAY OF ALDOLASE: CPT

## 2021-01-15 PROCEDURE — 96376 TX/PRO/DX INJ SAME DRUG ADON: CPT | Performed by: EMERGENCY MEDICINE

## 2021-01-15 PROCEDURE — 80048 BASIC METABOLIC PNL TOTAL CA: CPT | Mod: 91

## 2021-01-15 PROCEDURE — 83520 IMMUNOASSAY QUANT NOS NONAB: CPT

## 2021-01-15 PROCEDURE — 63600175 PHARM REV CODE 636 W HCPCS: Performed by: PHYSICIAN ASSISTANT

## 2021-01-15 PROCEDURE — 83735 ASSAY OF MAGNESIUM: CPT

## 2021-01-15 PROCEDURE — 94640 AIRWAY INHALATION TREATMENT: CPT

## 2021-01-15 PROCEDURE — 80048 BASIC METABOLIC PNL TOTAL CA: CPT

## 2021-01-15 PROCEDURE — 83516 IMMUNOASSAY NONANTIBODY: CPT | Mod: 59

## 2021-01-15 RX ADMIN — HYDRALAZINE HYDROCHLORIDE 100 MG: 50 TABLET, FILM COATED ORAL at 09:01

## 2021-01-15 RX ADMIN — SODIUM CHLORIDE 500 ML: 0.9 INJECTION, SOLUTION INTRAVENOUS at 01:01

## 2021-01-15 RX ADMIN — POLYETHYLENE GLYCOL 3350 17 G: 17 POWDER, FOR SOLUTION ORAL at 08:01

## 2021-01-15 RX ADMIN — CARVEDILOL 12.5 MG: 12.5 TABLET, FILM COATED ORAL at 05:01

## 2021-01-15 RX ADMIN — CILOSTAZOL 50 MG: 50 TABLET ORAL at 09:01

## 2021-01-15 RX ADMIN — ISOSORBIDE DINITRATE 40 MG: 20 TABLET ORAL at 04:01

## 2021-01-15 RX ADMIN — ISOSORBIDE DINITRATE 40 MG: 20 TABLET ORAL at 09:01

## 2021-01-15 RX ADMIN — OXYCODONE HYDROCHLORIDE 10 MG: 10 TABLET ORAL at 05:01

## 2021-01-15 RX ADMIN — MELATONIN TAB 3 MG 6 MG: 3 TAB at 09:01

## 2021-01-15 RX ADMIN — HYDRALAZINE HYDROCHLORIDE 100 MG: 50 TABLET, FILM COATED ORAL at 04:01

## 2021-01-15 RX ADMIN — PANTOPRAZOLE SODIUM 40 MG: 40 TABLET, DELAYED RELEASE ORAL at 08:01

## 2021-01-15 RX ADMIN — SERTRALINE HYDROCHLORIDE 50 MG: 50 TABLET ORAL at 09:01

## 2021-01-15 RX ADMIN — FLUTICASONE FUROATE AND VILANTEROL TRIFENATATE 1 PUFF: 100; 25 POWDER RESPIRATORY (INHALATION) at 07:01

## 2021-01-15 RX ADMIN — CLOPIDOGREL 75 MG: 75 TABLET, FILM COATED ORAL at 08:01

## 2021-01-15 RX ADMIN — ASPIRIN 81 MG: 81 TABLET, COATED ORAL at 08:01

## 2021-01-15 RX ADMIN — HYDROXYZINE HYDROCHLORIDE 25 MG: 25 TABLET, FILM COATED ORAL at 12:01

## 2021-01-15 RX ADMIN — CILOSTAZOL 50 MG: 50 TABLET ORAL at 08:01

## 2021-01-15 RX ADMIN — APIXABAN 5 MG: 5 TABLET, FILM COATED ORAL at 09:01

## 2021-01-15 RX ADMIN — APIXABAN 5 MG: 5 TABLET, FILM COATED ORAL at 08:01

## 2021-01-15 RX ADMIN — OXYCODONE HYDROCHLORIDE 10 MG: 10 TABLET ORAL at 09:01

## 2021-01-15 RX ADMIN — CEFTRIAXONE SODIUM 1 G: 1 INJECTION, POWDER, FOR SOLUTION INTRAMUSCULAR; INTRAVENOUS at 09:01

## 2021-01-16 VITALS
RESPIRATION RATE: 20 BRPM | SYSTOLIC BLOOD PRESSURE: 122 MMHG | HEART RATE: 67 BPM | TEMPERATURE: 99 F | OXYGEN SATURATION: 95 % | HEIGHT: 72 IN | DIASTOLIC BLOOD PRESSURE: 66 MMHG | BODY MASS INDEX: 26.9 KG/M2 | WEIGHT: 198.63 LBS

## 2021-01-16 LAB
ANION GAP SERPL CALC-SCNC: 10 MMOL/L (ref 8–16)
BASOPHILS # BLD AUTO: 0.03 K/UL (ref 0–0.2)
BASOPHILS NFR BLD: 0.5 % (ref 0–1.9)
BUN SERPL-MCNC: 36 MG/DL (ref 8–23)
CALCIUM SERPL-MCNC: 8.1 MG/DL (ref 8.7–10.5)
CHLORIDE SERPL-SCNC: 98 MMOL/L (ref 95–110)
CO2 SERPL-SCNC: 22 MMOL/L (ref 23–29)
CREAT SERPL-MCNC: 1.5 MG/DL (ref 0.5–1.4)
DIFFERENTIAL METHOD: ABNORMAL
EOSINOPHIL # BLD AUTO: 0.1 K/UL (ref 0–0.5)
EOSINOPHIL NFR BLD: 2.4 % (ref 0–8)
ERYTHROCYTE [DISTWIDTH] IN BLOOD BY AUTOMATED COUNT: 16 % (ref 11.5–14.5)
EST. GFR  (AFRICAN AMERICAN): 53.8 ML/MIN/1.73 M^2
EST. GFR  (NON AFRICAN AMERICAN): 46.5 ML/MIN/1.73 M^2
GLUCOSE SERPL-MCNC: 95 MG/DL (ref 70–110)
HCT VFR BLD AUTO: 26.4 % (ref 40–54)
HGB BLD-MCNC: 8.4 G/DL (ref 14–18)
IMM GRANULOCYTES # BLD AUTO: 0.04 K/UL (ref 0–0.04)
IMM GRANULOCYTES NFR BLD AUTO: 0.7 % (ref 0–0.5)
LYMPHOCYTES # BLD AUTO: 0.6 K/UL (ref 1–4.8)
LYMPHOCYTES NFR BLD: 10.3 % (ref 18–48)
MAGNESIUM SERPL-MCNC: 1.9 MG/DL (ref 1.6–2.6)
MCH RBC QN AUTO: 30.5 PG (ref 27–31)
MCHC RBC AUTO-ENTMCNC: 31.8 G/DL (ref 32–36)
MCV RBC AUTO: 96 FL (ref 82–98)
MONOCYTES # BLD AUTO: 0.6 K/UL (ref 0.3–1)
MONOCYTES NFR BLD: 9.5 % (ref 4–15)
NEUTROPHILS # BLD AUTO: 4.5 K/UL (ref 1.8–7.7)
NEUTROPHILS NFR BLD: 76.6 % (ref 38–73)
NRBC BLD-RTO: 0 /100 WBC
PHOSPHATE SERPL-MCNC: 3.6 MG/DL (ref 2.7–4.5)
PLATELET # BLD AUTO: 220 K/UL (ref 150–350)
PMV BLD AUTO: 10.8 FL (ref 9.2–12.9)
POTASSIUM SERPL-SCNC: 3.7 MMOL/L (ref 3.5–5.1)
RBC # BLD AUTO: 2.75 M/UL (ref 4.6–6.2)
SODIUM SERPL-SCNC: 130 MMOL/L (ref 136–145)
WBC # BLD AUTO: 5.9 K/UL (ref 3.9–12.7)

## 2021-01-16 PROCEDURE — 99217 PR OBSERVATION CARE DISCHARGE: ICD-10-PCS | Mod: ,,, | Performed by: PHYSICIAN ASSISTANT

## 2021-01-16 PROCEDURE — 80048 BASIC METABOLIC PNL TOTAL CA: CPT

## 2021-01-16 PROCEDURE — 83735 ASSAY OF MAGNESIUM: CPT

## 2021-01-16 PROCEDURE — 97165 OT EVAL LOW COMPLEX 30 MIN: CPT

## 2021-01-16 PROCEDURE — 84100 ASSAY OF PHOSPHORUS: CPT

## 2021-01-16 PROCEDURE — G0378 HOSPITAL OBSERVATION PER HR: HCPCS

## 2021-01-16 PROCEDURE — 36415 COLL VENOUS BLD VENIPUNCTURE: CPT

## 2021-01-16 PROCEDURE — 25000003 PHARM REV CODE 250: Performed by: PHYSICIAN ASSISTANT

## 2021-01-16 PROCEDURE — 99217 PR OBSERVATION CARE DISCHARGE: CPT | Mod: ,,, | Performed by: PHYSICIAN ASSISTANT

## 2021-01-16 PROCEDURE — 97110 THERAPEUTIC EXERCISES: CPT

## 2021-01-16 PROCEDURE — 25000242 PHARM REV CODE 250 ALT 637 W/ HCPCS: Performed by: PHYSICIAN ASSISTANT

## 2021-01-16 PROCEDURE — 85025 COMPLETE CBC W/AUTO DIFF WBC: CPT

## 2021-01-16 RX ORDER — HYDROCODONE BITARTRATE AND ACETAMINOPHEN 5; 325 MG/1; MG/1
1 TABLET ORAL EVERY 6 HOURS PRN
Qty: 20 TABLET | Refills: 0 | Status: ON HOLD | OUTPATIENT
Start: 2021-01-16 | End: 2021-08-11 | Stop reason: HOSPADM

## 2021-01-16 RX ORDER — ROSUVASTATIN CALCIUM 40 MG/1
40 TABLET, COATED ORAL NIGHTLY
Status: ON HOLD
Start: 2021-01-16 | End: 2021-08-11 | Stop reason: SDUPTHER

## 2021-01-16 RX ORDER — CEPHALEXIN 500 MG/1
500 CAPSULE ORAL 4 TIMES DAILY
Qty: 20 CAPSULE | Refills: 0 | Status: SHIPPED | OUTPATIENT
Start: 2021-01-16 | End: 2021-01-21

## 2021-01-16 RX ADMIN — FLUTICASONE FUROATE AND VILANTEROL TRIFENATATE 1 PUFF: 100; 25 POWDER RESPIRATORY (INHALATION) at 09:01

## 2021-01-16 RX ADMIN — PANTOPRAZOLE SODIUM 40 MG: 40 TABLET, DELAYED RELEASE ORAL at 09:01

## 2021-01-16 RX ADMIN — ISOSORBIDE DINITRATE 40 MG: 20 TABLET ORAL at 09:01

## 2021-01-16 RX ADMIN — ISOSORBIDE DINITRATE 40 MG: 20 TABLET ORAL at 04:01

## 2021-01-16 RX ADMIN — SPIRONOLACTONE 25 MG: 25 TABLET, FILM COATED ORAL at 09:01

## 2021-01-16 RX ADMIN — CLOPIDOGREL 75 MG: 75 TABLET, FILM COATED ORAL at 09:01

## 2021-01-16 RX ADMIN — ASPIRIN 81 MG: 81 TABLET, COATED ORAL at 09:01

## 2021-01-16 RX ADMIN — APIXABAN 5 MG: 5 TABLET, FILM COATED ORAL at 09:01

## 2021-01-16 RX ADMIN — CARVEDILOL 12.5 MG: 12.5 TABLET, FILM COATED ORAL at 09:01

## 2021-01-16 RX ADMIN — CILOSTAZOL 50 MG: 50 TABLET ORAL at 09:01

## 2021-01-19 LAB
ALDOLASE SERPL-CCNC: 2.2 U/L (ref 1.2–7.6)
BACTERIA BLD CULT: NORMAL
BACTERIA BLD CULT: NORMAL

## 2021-01-21 LAB — ANTI-IBM ANTIBODY (ANTI-CN1A): <20 UNITS

## 2021-01-22 LAB — HMGCR ANTIBODY: 5 UNITS (ref 0–19)

## 2021-01-28 ENCOUNTER — OFFICE VISIT (OUTPATIENT)
Dept: CARDIOLOGY | Facility: CLINIC | Age: 71
End: 2021-01-28
Payer: MEDICARE

## 2021-01-28 ENCOUNTER — TELEPHONE (OUTPATIENT)
Dept: RHEUMATOLOGY | Facility: CLINIC | Age: 71
End: 2021-01-28

## 2021-01-28 VITALS
DIASTOLIC BLOOD PRESSURE: 65 MMHG | WEIGHT: 191.13 LBS | HEIGHT: 72 IN | SYSTOLIC BLOOD PRESSURE: 133 MMHG | BODY MASS INDEX: 25.89 KG/M2 | HEART RATE: 71 BPM | OXYGEN SATURATION: 96 %

## 2021-01-28 DIAGNOSIS — I73.9 PAD (PERIPHERAL ARTERY DISEASE): ICD-10-CM

## 2021-01-28 DIAGNOSIS — I10 ESSENTIAL HYPERTENSION: ICD-10-CM

## 2021-01-28 DIAGNOSIS — E78.5 DYSLIPIDEMIA: ICD-10-CM

## 2021-01-28 DIAGNOSIS — I25.5 CARDIOMYOPATHY, ISCHEMIC: Primary | ICD-10-CM

## 2021-01-28 DIAGNOSIS — M60.852 MYOSITIS OF LEFT THIGH, UNSPECIFIED MYOSITIS TYPE: ICD-10-CM

## 2021-01-28 PROBLEM — I25.810 CORONARY ARTERY DISEASE INVOLVING CORONARY BYPASS GRAFT OF NATIVE HEART WITHOUT ANGINA PECTORIS: Status: ACTIVE | Noted: 2021-01-14

## 2021-01-28 LAB
ANTI-PM/SCL AB: <20 UNITS
ANTI-SS-A 52 KD AB, IGG: 22 UNITS
EJ AB SER QL: NEGATIVE
ENA JO1 AB SER IA-ACNC: <20 UNITS
ENA SM+RNP AB SER IA-ACNC: <20 UNITS
FIBRILLARIN (U3 RNP): NEGATIVE
KU AB SER QL: NEGATIVE
MDA-5 (P140): <20 UNITS
MI2 AB SER QL: NEGATIVE
NXP-2 (P140): <20 UNITS
OJ AB SER QL: NEGATIVE
PL12 AB SER QL: NEGATIVE
PL7 AB SER QL: NEGATIVE
SRP AB SERPL QL: NEGATIVE
TIF1 GAMMA (P155/140): <20 UNITS
U2 SNRNP: NEGATIVE

## 2021-01-28 PROCEDURE — 99999 PR PBB SHADOW E&M-EST. PATIENT-LVL V: CPT | Mod: PBBFAC,,, | Performed by: INTERNAL MEDICINE

## 2021-01-28 PROCEDURE — 1159F PR MEDICATION LIST DOCUMENTED IN MEDICAL RECORD: ICD-10-PCS | Mod: S$GLB,,, | Performed by: INTERNAL MEDICINE

## 2021-01-28 PROCEDURE — 1125F PR PAIN SEVERITY QUANTIFIED, PAIN PRESENT: ICD-10-PCS | Mod: S$GLB,,, | Performed by: INTERNAL MEDICINE

## 2021-01-28 PROCEDURE — 3078F DIAST BP <80 MM HG: CPT | Mod: CPTII,S$GLB,, | Performed by: INTERNAL MEDICINE

## 2021-01-28 PROCEDURE — 3008F PR BODY MASS INDEX (BMI) DOCUMENTED: ICD-10-PCS | Mod: CPTII,S$GLB,, | Performed by: INTERNAL MEDICINE

## 2021-01-28 PROCEDURE — 99999 PR PBB SHADOW E&M-EST. PATIENT-LVL V: ICD-10-PCS | Mod: PBBFAC,,, | Performed by: INTERNAL MEDICINE

## 2021-01-28 PROCEDURE — 3078F PR MOST RECENT DIASTOLIC BLOOD PRESSURE < 80 MM HG: ICD-10-PCS | Mod: CPTII,S$GLB,, | Performed by: INTERNAL MEDICINE

## 2021-01-28 PROCEDURE — 99213 PR OFFICE/OUTPT VISIT, EST, LEVL III, 20-29 MIN: ICD-10-PCS | Mod: S$GLB,,, | Performed by: INTERNAL MEDICINE

## 2021-01-28 PROCEDURE — 1125F AMNT PAIN NOTED PAIN PRSNT: CPT | Mod: S$GLB,,, | Performed by: INTERNAL MEDICINE

## 2021-01-28 PROCEDURE — 3075F PR MOST RECENT SYSTOLIC BLOOD PRESS GE 130-139MM HG: ICD-10-PCS | Mod: CPTII,S$GLB,, | Performed by: INTERNAL MEDICINE

## 2021-01-28 PROCEDURE — 1159F MED LIST DOCD IN RCRD: CPT | Mod: S$GLB,,, | Performed by: INTERNAL MEDICINE

## 2021-01-28 PROCEDURE — 3075F SYST BP GE 130 - 139MM HG: CPT | Mod: CPTII,S$GLB,, | Performed by: INTERNAL MEDICINE

## 2021-01-28 PROCEDURE — 99213 OFFICE O/P EST LOW 20 MIN: CPT | Mod: S$GLB,,, | Performed by: INTERNAL MEDICINE

## 2021-01-28 PROCEDURE — 3008F BODY MASS INDEX DOCD: CPT | Mod: CPTII,S$GLB,, | Performed by: INTERNAL MEDICINE

## 2021-02-01 ENCOUNTER — OFFICE VISIT (OUTPATIENT)
Dept: CARDIOLOGY | Facility: CLINIC | Age: 71
End: 2021-02-01
Payer: MEDICARE

## 2021-02-01 VITALS
WEIGHT: 194 LBS | BODY MASS INDEX: 26.28 KG/M2 | DIASTOLIC BLOOD PRESSURE: 66 MMHG | OXYGEN SATURATION: 100 % | SYSTOLIC BLOOD PRESSURE: 128 MMHG | HEART RATE: 66 BPM | HEIGHT: 72 IN

## 2021-02-01 DIAGNOSIS — I83.024: ICD-10-CM

## 2021-02-01 DIAGNOSIS — E78.5 DYSLIPIDEMIA: ICD-10-CM

## 2021-02-01 DIAGNOSIS — M60.852 MYOSITIS OF LEFT THIGH, UNSPECIFIED MYOSITIS TYPE: ICD-10-CM

## 2021-02-01 DIAGNOSIS — I87.2 VENOUS INSUFFICIENCY OF LOWER EXTREMITY, UNSPECIFIED LATERALITY: Primary | ICD-10-CM

## 2021-02-01 DIAGNOSIS — L97.421: ICD-10-CM

## 2021-02-01 DIAGNOSIS — I25.810 CORONARY ARTERY DISEASE INVOLVING CORONARY BYPASS GRAFT OF NATIVE HEART WITHOUT ANGINA PECTORIS: ICD-10-CM

## 2021-02-01 DIAGNOSIS — I50.42 CHRONIC COMBINED SYSTOLIC AND DIASTOLIC CONGESTIVE HEART FAILURE: ICD-10-CM

## 2021-02-01 DIAGNOSIS — I73.9 PAD (PERIPHERAL ARTERY DISEASE): ICD-10-CM

## 2021-02-01 DIAGNOSIS — Z87.2 HEALED ULCER OF FOOT ON EXAMINATION: ICD-10-CM

## 2021-02-01 PROBLEM — I70.229 ATHEROSCLEROSIS OF NATIVE ARTERIES OF EXTREMITY WITH REST PAIN: Status: RESOLVED | Noted: 2017-11-09 | Resolved: 2021-02-01

## 2021-02-01 PROCEDURE — 3008F PR BODY MASS INDEX (BMI) DOCUMENTED: ICD-10-PCS | Mod: CPTII,S$GLB,, | Performed by: INTERNAL MEDICINE

## 2021-02-01 PROCEDURE — 3008F BODY MASS INDEX DOCD: CPT | Mod: CPTII,S$GLB,, | Performed by: INTERNAL MEDICINE

## 2021-02-01 PROCEDURE — 99215 PR OFFICE/OUTPT VISIT, EST, LEVL V, 40-54 MIN: ICD-10-PCS | Mod: S$GLB,,, | Performed by: INTERNAL MEDICINE

## 2021-02-01 PROCEDURE — 99999 PR PBB SHADOW E&M-EST. PATIENT-LVL V: ICD-10-PCS | Mod: PBBFAC,,, | Performed by: INTERNAL MEDICINE

## 2021-02-01 PROCEDURE — 3078F PR MOST RECENT DIASTOLIC BLOOD PRESSURE < 80 MM HG: ICD-10-PCS | Mod: CPTII,S$GLB,, | Performed by: INTERNAL MEDICINE

## 2021-02-01 PROCEDURE — 1101F PR PT FALLS ASSESS DOC 0-1 FALLS W/OUT INJ PAST YR: ICD-10-PCS | Mod: CPTII,S$GLB,, | Performed by: INTERNAL MEDICINE

## 2021-02-01 PROCEDURE — 3078F DIAST BP <80 MM HG: CPT | Mod: CPTII,S$GLB,, | Performed by: INTERNAL MEDICINE

## 2021-02-01 PROCEDURE — 1101F PT FALLS ASSESS-DOCD LE1/YR: CPT | Mod: CPTII,S$GLB,, | Performed by: INTERNAL MEDICINE

## 2021-02-01 PROCEDURE — 99215 OFFICE O/P EST HI 40 MIN: CPT | Mod: S$GLB,,, | Performed by: INTERNAL MEDICINE

## 2021-02-01 PROCEDURE — 3074F SYST BP LT 130 MM HG: CPT | Mod: CPTII,S$GLB,, | Performed by: INTERNAL MEDICINE

## 2021-02-01 PROCEDURE — 99999 PR PBB SHADOW E&M-EST. PATIENT-LVL V: CPT | Mod: PBBFAC,,, | Performed by: INTERNAL MEDICINE

## 2021-02-01 PROCEDURE — 1159F MED LIST DOCD IN RCRD: CPT | Mod: S$GLB,,, | Performed by: INTERNAL MEDICINE

## 2021-02-01 PROCEDURE — 3288F PR FALLS RISK ASSESSMENT DOCUMENTED: ICD-10-PCS | Mod: CPTII,S$GLB,, | Performed by: INTERNAL MEDICINE

## 2021-02-01 PROCEDURE — 3288F FALL RISK ASSESSMENT DOCD: CPT | Mod: CPTII,S$GLB,, | Performed by: INTERNAL MEDICINE

## 2021-02-01 PROCEDURE — 1125F AMNT PAIN NOTED PAIN PRSNT: CPT | Mod: S$GLB,,, | Performed by: INTERNAL MEDICINE

## 2021-02-01 PROCEDURE — 1159F PR MEDICATION LIST DOCUMENTED IN MEDICAL RECORD: ICD-10-PCS | Mod: S$GLB,,, | Performed by: INTERNAL MEDICINE

## 2021-02-01 PROCEDURE — 1125F PR PAIN SEVERITY QUANTIFIED, PAIN PRESENT: ICD-10-PCS | Mod: S$GLB,,, | Performed by: INTERNAL MEDICINE

## 2021-02-01 PROCEDURE — 3074F PR MOST RECENT SYSTOLIC BLOOD PRESSURE < 130 MM HG: ICD-10-PCS | Mod: CPTII,S$GLB,, | Performed by: INTERNAL MEDICINE

## 2021-02-08 ENCOUNTER — TELEPHONE (OUTPATIENT)
Dept: RHEUMATOLOGY | Facility: CLINIC | Age: 71
End: 2021-02-08

## 2021-02-18 ENCOUNTER — TELEPHONE (OUTPATIENT)
Dept: RHEUMATOLOGY | Facility: CLINIC | Age: 71
End: 2021-02-18

## 2021-02-19 ENCOUNTER — OFFICE VISIT (OUTPATIENT)
Dept: RHEUMATOLOGY | Facility: CLINIC | Age: 71
End: 2021-02-19
Payer: MEDICARE

## 2021-02-19 VITALS
WEIGHT: 200.63 LBS | HEIGHT: 72 IN | DIASTOLIC BLOOD PRESSURE: 77 MMHG | SYSTOLIC BLOOD PRESSURE: 133 MMHG | HEART RATE: 65 BPM | BODY MASS INDEX: 27.17 KG/M2

## 2021-02-19 DIAGNOSIS — D53.9 MACROCYTIC ANEMIA: ICD-10-CM

## 2021-02-19 DIAGNOSIS — M60.852 MYOSITIS OF LEFT THIGH, UNSPECIFIED MYOSITIS TYPE: ICD-10-CM

## 2021-02-19 DIAGNOSIS — I73.9 PAD (PERIPHERAL ARTERY DISEASE): ICD-10-CM

## 2021-02-19 DIAGNOSIS — I50.43 ACUTE ON CHRONIC COMBINED SYSTOLIC (CONGESTIVE) AND DIASTOLIC (CONGESTIVE) HEART FAILURE: Primary | ICD-10-CM

## 2021-02-19 PROCEDURE — 1125F PR PAIN SEVERITY QUANTIFIED, PAIN PRESENT: ICD-10-PCS | Mod: S$GLB,,, | Performed by: STUDENT IN AN ORGANIZED HEALTH CARE EDUCATION/TRAINING PROGRAM

## 2021-02-19 PROCEDURE — 99214 PR OFFICE/OUTPT VISIT, EST, LEVL IV, 30-39 MIN: ICD-10-PCS | Mod: S$GLB,,, | Performed by: STUDENT IN AN ORGANIZED HEALTH CARE EDUCATION/TRAINING PROGRAM

## 2021-02-19 PROCEDURE — 3078F PR MOST RECENT DIASTOLIC BLOOD PRESSURE < 80 MM HG: ICD-10-PCS | Mod: CPTII,S$GLB,, | Performed by: STUDENT IN AN ORGANIZED HEALTH CARE EDUCATION/TRAINING PROGRAM

## 2021-02-19 PROCEDURE — 3288F PR FALLS RISK ASSESSMENT DOCUMENTED: ICD-10-PCS | Mod: CPTII,S$GLB,, | Performed by: STUDENT IN AN ORGANIZED HEALTH CARE EDUCATION/TRAINING PROGRAM

## 2021-02-19 PROCEDURE — 99999 PR PBB SHADOW E&M-EST. PATIENT-LVL V: ICD-10-PCS | Mod: PBBFAC,,, | Performed by: STUDENT IN AN ORGANIZED HEALTH CARE EDUCATION/TRAINING PROGRAM

## 2021-02-19 PROCEDURE — 99999 PR PBB SHADOW E&M-EST. PATIENT-LVL V: CPT | Mod: PBBFAC,,, | Performed by: STUDENT IN AN ORGANIZED HEALTH CARE EDUCATION/TRAINING PROGRAM

## 2021-02-19 PROCEDURE — 1101F PT FALLS ASSESS-DOCD LE1/YR: CPT | Mod: CPTII,S$GLB,, | Performed by: STUDENT IN AN ORGANIZED HEALTH CARE EDUCATION/TRAINING PROGRAM

## 2021-02-19 PROCEDURE — 3078F DIAST BP <80 MM HG: CPT | Mod: CPTII,S$GLB,, | Performed by: STUDENT IN AN ORGANIZED HEALTH CARE EDUCATION/TRAINING PROGRAM

## 2021-02-19 PROCEDURE — 99214 OFFICE O/P EST MOD 30 MIN: CPT | Mod: S$GLB,,, | Performed by: STUDENT IN AN ORGANIZED HEALTH CARE EDUCATION/TRAINING PROGRAM

## 2021-02-19 PROCEDURE — 3288F FALL RISK ASSESSMENT DOCD: CPT | Mod: CPTII,S$GLB,, | Performed by: STUDENT IN AN ORGANIZED HEALTH CARE EDUCATION/TRAINING PROGRAM

## 2021-02-19 PROCEDURE — 1159F PR MEDICATION LIST DOCUMENTED IN MEDICAL RECORD: ICD-10-PCS | Mod: S$GLB,,, | Performed by: STUDENT IN AN ORGANIZED HEALTH CARE EDUCATION/TRAINING PROGRAM

## 2021-02-19 PROCEDURE — 1101F PR PT FALLS ASSESS DOC 0-1 FALLS W/OUT INJ PAST YR: ICD-10-PCS | Mod: CPTII,S$GLB,, | Performed by: STUDENT IN AN ORGANIZED HEALTH CARE EDUCATION/TRAINING PROGRAM

## 2021-02-19 PROCEDURE — 3008F BODY MASS INDEX DOCD: CPT | Mod: CPTII,S$GLB,, | Performed by: STUDENT IN AN ORGANIZED HEALTH CARE EDUCATION/TRAINING PROGRAM

## 2021-02-19 PROCEDURE — 3075F PR MOST RECENT SYSTOLIC BLOOD PRESS GE 130-139MM HG: ICD-10-PCS | Mod: CPTII,S$GLB,, | Performed by: STUDENT IN AN ORGANIZED HEALTH CARE EDUCATION/TRAINING PROGRAM

## 2021-02-19 PROCEDURE — 3075F SYST BP GE 130 - 139MM HG: CPT | Mod: CPTII,S$GLB,, | Performed by: STUDENT IN AN ORGANIZED HEALTH CARE EDUCATION/TRAINING PROGRAM

## 2021-02-19 PROCEDURE — 3008F PR BODY MASS INDEX (BMI) DOCUMENTED: ICD-10-PCS | Mod: CPTII,S$GLB,, | Performed by: STUDENT IN AN ORGANIZED HEALTH CARE EDUCATION/TRAINING PROGRAM

## 2021-02-19 PROCEDURE — 1159F MED LIST DOCD IN RCRD: CPT | Mod: S$GLB,,, | Performed by: STUDENT IN AN ORGANIZED HEALTH CARE EDUCATION/TRAINING PROGRAM

## 2021-02-19 PROCEDURE — 1125F AMNT PAIN NOTED PAIN PRSNT: CPT | Mod: S$GLB,,, | Performed by: STUDENT IN AN ORGANIZED HEALTH CARE EDUCATION/TRAINING PROGRAM

## 2021-02-19 RX ORDER — TRAZODONE HYDROCHLORIDE 100 MG/1
100 TABLET ORAL NIGHTLY
COMMUNITY

## 2021-02-19 RX ORDER — PREDNISONE 20 MG/1
20 TABLET ORAL 2 TIMES DAILY
Status: ON HOLD | COMMUNITY
End: 2021-08-11 | Stop reason: HOSPADM

## 2021-02-19 ASSESSMENT — ROUTINE ASSESSMENT OF PATIENT INDEX DATA (RAPID3)
MDHAQ FUNCTION SCORE: 0.5
AM STIFFNESS SCORE: 0, NO
PAIN SCORE: 3
TOTAL RAPID3 SCORE: 2.89
FATIGUE SCORE: 3.5
PSYCHOLOGICAL DISTRESS SCORE: 1.1
PATIENT GLOBAL ASSESSMENT SCORE: 4

## 2021-02-24 ENCOUNTER — LAB VISIT (OUTPATIENT)
Dept: LAB | Facility: HOSPITAL | Age: 71
End: 2021-02-24
Attending: STUDENT IN AN ORGANIZED HEALTH CARE EDUCATION/TRAINING PROGRAM
Payer: MEDICARE

## 2021-02-24 DIAGNOSIS — M60.852 MYOSITIS OF LEFT THIGH, UNSPECIFIED MYOSITIS TYPE: ICD-10-CM

## 2021-02-24 LAB
ALBUMIN SERPL BCP-MCNC: 3.4 G/DL (ref 3.5–5.2)
ALP SERPL-CCNC: 124 U/L (ref 55–135)
ALT SERPL W/O P-5'-P-CCNC: 49 U/L (ref 10–44)
ANION GAP SERPL CALC-SCNC: 6 MMOL/L (ref 8–16)
AST SERPL-CCNC: 44 U/L (ref 10–40)
BASOPHILS # BLD AUTO: 0.02 K/UL (ref 0–0.2)
BASOPHILS NFR BLD: 0.3 % (ref 0–1.9)
BILIRUB SERPL-MCNC: 0.7 MG/DL (ref 0.1–1)
BUN SERPL-MCNC: 28 MG/DL (ref 8–23)
CALCIUM SERPL-MCNC: 8.8 MG/DL (ref 8.7–10.5)
CHLORIDE SERPL-SCNC: 102 MMOL/L (ref 95–110)
CK SERPL-CCNC: 38 U/L (ref 20–200)
CO2 SERPL-SCNC: 30 MMOL/L (ref 23–29)
CREAT SERPL-MCNC: 1.2 MG/DL (ref 0.5–1.4)
CRP SERPL-MCNC: 12.2 MG/L (ref 0–8.2)
DIFFERENTIAL METHOD: ABNORMAL
EOSINOPHIL # BLD AUTO: 0 K/UL (ref 0–0.5)
EOSINOPHIL NFR BLD: 0.3 % (ref 0–8)
ERYTHROCYTE [DISTWIDTH] IN BLOOD BY AUTOMATED COUNT: 18.7 % (ref 11.5–14.5)
ERYTHROCYTE [SEDIMENTATION RATE] IN BLOOD BY WESTERGREN METHOD: 29 MM/HR (ref 0–23)
EST. GFR  (AFRICAN AMERICAN): >60 ML/MIN/1.73 M^2
EST. GFR  (NON AFRICAN AMERICAN): >60 ML/MIN/1.73 M^2
GLUCOSE SERPL-MCNC: 104 MG/DL (ref 70–110)
HCT VFR BLD AUTO: 33 % (ref 40–54)
HGB BLD-MCNC: 10.4 G/DL (ref 14–18)
IMM GRANULOCYTES # BLD AUTO: 0.04 K/UL (ref 0–0.04)
IMM GRANULOCYTES NFR BLD AUTO: 0.5 % (ref 0–0.5)
LYMPHOCYTES # BLD AUTO: 0.6 K/UL (ref 1–4.8)
LYMPHOCYTES NFR BLD: 7.5 % (ref 18–48)
MCH RBC QN AUTO: 30.1 PG (ref 27–31)
MCHC RBC AUTO-ENTMCNC: 31.5 G/DL (ref 32–36)
MCV RBC AUTO: 95 FL (ref 82–98)
MONOCYTES # BLD AUTO: 0.5 K/UL (ref 0.3–1)
MONOCYTES NFR BLD: 6.8 % (ref 4–15)
NEUTROPHILS # BLD AUTO: 6.6 K/UL (ref 1.8–7.7)
NEUTROPHILS NFR BLD: 84.6 % (ref 38–73)
NRBC BLD-RTO: 0 /100 WBC
PLATELET # BLD AUTO: 227 K/UL (ref 150–350)
PMV BLD AUTO: 9.9 FL (ref 9.2–12.9)
POTASSIUM SERPL-SCNC: 3.9 MMOL/L (ref 3.5–5.1)
PROT SERPL-MCNC: 7.1 G/DL (ref 6–8.4)
RBC # BLD AUTO: 3.46 M/UL (ref 4.6–6.2)
SODIUM SERPL-SCNC: 138 MMOL/L (ref 136–145)
WBC # BLD AUTO: 7.76 K/UL (ref 3.9–12.7)

## 2021-02-24 PROCEDURE — 82550 ASSAY OF CK (CPK): CPT

## 2021-02-24 PROCEDURE — 86140 C-REACTIVE PROTEIN: CPT

## 2021-02-24 PROCEDURE — 82085 ASSAY OF ALDOLASE: CPT

## 2021-02-24 PROCEDURE — 85025 COMPLETE CBC W/AUTO DIFF WBC: CPT

## 2021-02-24 PROCEDURE — 36415 COLL VENOUS BLD VENIPUNCTURE: CPT

## 2021-02-24 PROCEDURE — 0034U TPMT NUDT15 GENES: CPT

## 2021-02-24 PROCEDURE — 80053 COMPREHEN METABOLIC PANEL: CPT

## 2021-02-24 PROCEDURE — 85652 RBC SED RATE AUTOMATED: CPT

## 2021-02-25 ENCOUNTER — HOSPITAL ENCOUNTER (OUTPATIENT)
Dept: RADIOLOGY | Facility: HOSPITAL | Age: 71
Discharge: HOME | End: 2021-02-25
Attending: STUDENT IN AN ORGANIZED HEALTH CARE EDUCATION/TRAINING PROGRAM
Payer: MEDICARE

## 2021-02-25 DIAGNOSIS — M60.852 MYOSITIS OF LEFT THIGH, UNSPECIFIED MYOSITIS TYPE: ICD-10-CM

## 2021-02-25 PROCEDURE — 25500020 PHARM REV CODE 255: Performed by: STUDENT IN AN ORGANIZED HEALTH CARE EDUCATION/TRAINING PROGRAM

## 2021-02-25 PROCEDURE — 73720 MRI LWR EXTREMITY W/O&W/DYE: CPT | Mod: 26,LT,, | Performed by: RADIOLOGY

## 2021-02-25 PROCEDURE — 73720 MRI FEMUR W WO CONTRAST LEFT: ICD-10-PCS | Mod: 26,LT,, | Performed by: RADIOLOGY

## 2021-02-25 PROCEDURE — A9585 GADOBUTROL INJECTION: HCPCS | Performed by: STUDENT IN AN ORGANIZED HEALTH CARE EDUCATION/TRAINING PROGRAM

## 2021-02-25 PROCEDURE — 73720 MRI LWR EXTREMITY W/O&W/DYE: CPT | Mod: TC,LT

## 2021-02-25 RX ORDER — GADOBUTROL 604.72 MG/ML
10 INJECTION INTRAVENOUS
Status: COMPLETED | OUTPATIENT
Start: 2021-02-25 | End: 2021-02-25

## 2021-02-25 RX ADMIN — GADOBUTROL 10 ML: 604.72 INJECTION INTRAVENOUS at 07:02

## 2021-02-26 LAB
NUDT15 GENOTYPE: NORMAL
NUDT15 PHENOTYPE: NORMAL
TPMT ADDITIONAL INFORMATION: NORMAL
TPMT DISCLAIMER: NORMAL
TPMT GENOTYPE RESULT: NORMAL
TPMT INTERPRETATION: NORMAL
TPMT METHOD: NORMAL
TPMT PHENOTYPE: NORMAL
TPMT REVIEWED BY: NORMAL

## 2021-02-27 LAB — ALDOLASE SERPL-CCNC: 5.5 U/L (ref 1.2–7.6)

## 2021-02-28 ENCOUNTER — OFFICE VISIT (OUTPATIENT)
Dept: URGENT CARE | Facility: CLINIC | Age: 71
End: 2021-02-28
Payer: MEDICARE

## 2021-02-28 VITALS
WEIGHT: 195 LBS | RESPIRATION RATE: 19 BRPM | TEMPERATURE: 97 F | SYSTOLIC BLOOD PRESSURE: 152 MMHG | HEART RATE: 77 BPM | DIASTOLIC BLOOD PRESSURE: 79 MMHG | BODY MASS INDEX: 26.41 KG/M2 | OXYGEN SATURATION: 96 % | HEIGHT: 72 IN

## 2021-02-28 DIAGNOSIS — S61.411A LACERATION OF RIGHT HAND WITHOUT FOREIGN BODY, INITIAL ENCOUNTER: Primary | ICD-10-CM

## 2021-02-28 PROCEDURE — 99214 OFFICE O/P EST MOD 30 MIN: CPT | Mod: S$GLB,,, | Performed by: INTERNAL MEDICINE

## 2021-02-28 PROCEDURE — 3008F BODY MASS INDEX DOCD: CPT | Mod: CPTII,S$GLB,, | Performed by: INTERNAL MEDICINE

## 2021-02-28 PROCEDURE — 3008F PR BODY MASS INDEX (BMI) DOCUMENTED: ICD-10-PCS | Mod: CPTII,S$GLB,, | Performed by: INTERNAL MEDICINE

## 2021-02-28 PROCEDURE — 99214 PR OFFICE/OUTPT VISIT, EST, LEVL IV, 30-39 MIN: ICD-10-PCS | Mod: S$GLB,,, | Performed by: INTERNAL MEDICINE

## 2021-02-28 RX ORDER — MUPIROCIN 20 MG/G
OINTMENT TOPICAL
Qty: 22 G | Refills: 1 | Status: SHIPPED | OUTPATIENT
Start: 2021-02-28

## 2021-03-03 ENCOUNTER — OFFICE VISIT (OUTPATIENT)
Dept: SURGERY | Facility: CLINIC | Age: 71
End: 2021-03-03
Payer: MEDICARE

## 2021-03-03 VITALS
BODY MASS INDEX: 27.19 KG/M2 | HEART RATE: 72 BPM | WEIGHT: 200.75 LBS | HEIGHT: 72 IN | DIASTOLIC BLOOD PRESSURE: 67 MMHG | RESPIRATION RATE: 18 BRPM | SYSTOLIC BLOOD PRESSURE: 144 MMHG

## 2021-03-03 DIAGNOSIS — M60.852 MYOSITIS OF LEFT THIGH, UNSPECIFIED MYOSITIS TYPE: ICD-10-CM

## 2021-03-03 PROCEDURE — 3078F DIAST BP <80 MM HG: CPT | Mod: CPTII,S$GLB,, | Performed by: SURGERY

## 2021-03-03 PROCEDURE — 1101F PR PT FALLS ASSESS DOC 0-1 FALLS W/OUT INJ PAST YR: ICD-10-PCS | Mod: CPTII,S$GLB,, | Performed by: SURGERY

## 2021-03-03 PROCEDURE — 1159F MED LIST DOCD IN RCRD: CPT | Mod: S$GLB,,, | Performed by: SURGERY

## 2021-03-03 PROCEDURE — 1126F AMNT PAIN NOTED NONE PRSNT: CPT | Mod: S$GLB,,, | Performed by: SURGERY

## 2021-03-03 PROCEDURE — 1159F PR MEDICATION LIST DOCUMENTED IN MEDICAL RECORD: ICD-10-PCS | Mod: S$GLB,,, | Performed by: SURGERY

## 2021-03-03 PROCEDURE — 3008F PR BODY MASS INDEX (BMI) DOCUMENTED: ICD-10-PCS | Mod: CPTII,S$GLB,, | Performed by: SURGERY

## 2021-03-03 PROCEDURE — 3288F FALL RISK ASSESSMENT DOCD: CPT | Mod: CPTII,S$GLB,, | Performed by: SURGERY

## 2021-03-03 PROCEDURE — 99203 OFFICE O/P NEW LOW 30 MIN: CPT | Mod: S$GLB,,, | Performed by: SURGERY

## 2021-03-03 PROCEDURE — 3077F PR MOST RECENT SYSTOLIC BLOOD PRESSURE >= 140 MM HG: ICD-10-PCS | Mod: CPTII,S$GLB,, | Performed by: SURGERY

## 2021-03-03 PROCEDURE — 3008F BODY MASS INDEX DOCD: CPT | Mod: CPTII,S$GLB,, | Performed by: SURGERY

## 2021-03-03 PROCEDURE — 99999 PR PBB SHADOW E&M-EST. PATIENT-LVL V: ICD-10-PCS | Mod: PBBFAC,,, | Performed by: SURGERY

## 2021-03-03 PROCEDURE — 3077F SYST BP >= 140 MM HG: CPT | Mod: CPTII,S$GLB,, | Performed by: SURGERY

## 2021-03-03 PROCEDURE — 1126F PR PAIN SEVERITY QUANTIFIED, NO PAIN PRESENT: ICD-10-PCS | Mod: S$GLB,,, | Performed by: SURGERY

## 2021-03-03 PROCEDURE — 99203 PR OFFICE/OUTPT VISIT, NEW, LEVL III, 30-44 MIN: ICD-10-PCS | Mod: S$GLB,,, | Performed by: SURGERY

## 2021-03-03 PROCEDURE — 3288F PR FALLS RISK ASSESSMENT DOCUMENTED: ICD-10-PCS | Mod: CPTII,S$GLB,, | Performed by: SURGERY

## 2021-03-03 PROCEDURE — 99999 PR PBB SHADOW E&M-EST. PATIENT-LVL V: CPT | Mod: PBBFAC,,, | Performed by: SURGERY

## 2021-03-03 PROCEDURE — 3078F PR MOST RECENT DIASTOLIC BLOOD PRESSURE < 80 MM HG: ICD-10-PCS | Mod: CPTII,S$GLB,, | Performed by: SURGERY

## 2021-03-03 PROCEDURE — 1101F PT FALLS ASSESS-DOCD LE1/YR: CPT | Mod: CPTII,S$GLB,, | Performed by: SURGERY

## 2021-03-03 RX ORDER — METHYLPREDNISOLONE 4 MG/1
TABLET ORAL
COMMUNITY
Start: 2021-02-04 | End: 2021-04-25

## 2021-03-03 RX ORDER — ESCITALOPRAM OXALATE 10 MG/1
TABLET ORAL
Status: ON HOLD | COMMUNITY
End: 2021-08-11 | Stop reason: HOSPADM

## 2021-03-06 ENCOUNTER — LAB VISIT (OUTPATIENT)
Dept: INTERNAL MEDICINE | Facility: CLINIC | Age: 71
End: 2021-03-06
Payer: MEDICARE

## 2021-03-06 DIAGNOSIS — M60.852 MYOSITIS OF LEFT THIGH, UNSPECIFIED MYOSITIS TYPE: ICD-10-CM

## 2021-03-06 PROCEDURE — U0003 INFECTIOUS AGENT DETECTION BY NUCLEIC ACID (DNA OR RNA); SEVERE ACUTE RESPIRATORY SYNDROME CORONAVIRUS 2 (SARS-COV-2) (CORONAVIRUS DISEASE [COVID-19]), AMPLIFIED PROBE TECHNIQUE, MAKING USE OF HIGH THROUGHPUT TECHNOLOGIES AS DESCRIBED BY CMS-2020-01-R: HCPCS | Performed by: SURGERY

## 2021-03-06 PROCEDURE — U0005 INFEC AGEN DETEC AMPLI PROBE: HCPCS | Performed by: SURGERY

## 2021-03-07 ENCOUNTER — HOSPITAL ENCOUNTER (INPATIENT)
Facility: HOSPITAL | Age: 71
LOS: 2 days | Discharge: HOME | DRG: 314 | End: 2021-03-13
Attending: EMERGENCY MEDICINE | Admitting: EMERGENCY MEDICINE
Payer: MEDICARE

## 2021-03-07 DIAGNOSIS — G25.9 MOVEMENT DISORDER: ICD-10-CM

## 2021-03-07 DIAGNOSIS — M79.606 LEG PAIN: ICD-10-CM

## 2021-03-07 DIAGNOSIS — T82.7XXA: ICD-10-CM

## 2021-03-07 DIAGNOSIS — R07.9 CHEST PAIN: ICD-10-CM

## 2021-03-07 DIAGNOSIS — M79.652 LEFT THIGH PAIN: Primary | ICD-10-CM

## 2021-03-07 DIAGNOSIS — I87.2 VENOUS INSUFFICIENCY OF LEFT LOWER EXTREMITY: ICD-10-CM

## 2021-03-07 DIAGNOSIS — I73.9 PAD (PERIPHERAL ARTERY DISEASE): ICD-10-CM

## 2021-03-07 LAB
ALBUMIN SERPL BCP-MCNC: 3.3 G/DL (ref 3.5–5.2)
ALP SERPL-CCNC: 137 U/L (ref 55–135)
ALT SERPL W/O P-5'-P-CCNC: 38 U/L (ref 10–44)
ANION GAP SERPL CALC-SCNC: 13 MMOL/L (ref 8–16)
APTT BLDCRRT: 26.8 SEC (ref 21–32)
AST SERPL-CCNC: 38 U/L (ref 10–40)
BASOPHILS # BLD AUTO: 0.02 K/UL (ref 0–0.2)
BASOPHILS NFR BLD: 0.3 % (ref 0–1.9)
BILIRUB SERPL-MCNC: 0.9 MG/DL (ref 0.1–1)
BUN SERPL-MCNC: 30 MG/DL (ref 8–23)
CALCIUM SERPL-MCNC: 8.5 MG/DL (ref 8.7–10.5)
CHLORIDE SERPL-SCNC: 101 MMOL/L (ref 95–110)
CK SERPL-CCNC: 66 U/L (ref 20–200)
CO2 SERPL-SCNC: 21 MMOL/L (ref 23–29)
CREAT SERPL-MCNC: 1.3 MG/DL (ref 0.5–1.4)
DIFFERENTIAL METHOD: ABNORMAL
EOSINOPHIL # BLD AUTO: 0.1 K/UL (ref 0–0.5)
EOSINOPHIL NFR BLD: 0.8 % (ref 0–8)
ERYTHROCYTE [DISTWIDTH] IN BLOOD BY AUTOMATED COUNT: 19 % (ref 11.5–14.5)
EST. GFR  (AFRICAN AMERICAN): >60 ML/MIN/1.73 M^2
EST. GFR  (NON AFRICAN AMERICAN): 55.3 ML/MIN/1.73 M^2
GLUCOSE SERPL-MCNC: 88 MG/DL (ref 70–110)
HCT VFR BLD AUTO: 28.8 % (ref 40–54)
HGB BLD-MCNC: 9.2 G/DL (ref 14–18)
IMM GRANULOCYTES # BLD AUTO: 0.03 K/UL (ref 0–0.04)
IMM GRANULOCYTES NFR BLD AUTO: 0.4 % (ref 0–0.5)
INR PPP: 1.1 (ref 0.8–1.2)
LYMPHOCYTES # BLD AUTO: 1 K/UL (ref 1–4.8)
LYMPHOCYTES NFR BLD: 13.2 % (ref 18–48)
MCH RBC QN AUTO: 30.2 PG (ref 27–31)
MCHC RBC AUTO-ENTMCNC: 31.9 G/DL (ref 32–36)
MCV RBC AUTO: 94 FL (ref 82–98)
MONOCYTES # BLD AUTO: 0.7 K/UL (ref 0.3–1)
MONOCYTES NFR BLD: 9.6 % (ref 4–15)
NEUTROPHILS # BLD AUTO: 5.9 K/UL (ref 1.8–7.7)
NEUTROPHILS NFR BLD: 75.7 % (ref 38–73)
NRBC BLD-RTO: 0 /100 WBC
PLATELET # BLD AUTO: 173 K/UL (ref 150–350)
PMV BLD AUTO: 9.7 FL (ref 9.2–12.9)
POTASSIUM SERPL-SCNC: 3.7 MMOL/L (ref 3.5–5.1)
PROT SERPL-MCNC: 6.8 G/DL (ref 6–8.4)
PROTHROMBIN TIME: 11.7 SEC (ref 9–12.5)
RBC # BLD AUTO: 3.05 M/UL (ref 4.6–6.2)
SARS-COV-2 RNA RESP QL NAA+PROBE: NOT DETECTED
SODIUM SERPL-SCNC: 135 MMOL/L (ref 136–145)
WBC # BLD AUTO: 7.74 K/UL (ref 3.9–12.7)

## 2021-03-07 PROCEDURE — 80053 COMPREHEN METABOLIC PANEL: CPT | Performed by: EMERGENCY MEDICINE

## 2021-03-07 PROCEDURE — 85610 PROTHROMBIN TIME: CPT | Performed by: EMERGENCY MEDICINE

## 2021-03-07 PROCEDURE — 99285 PR EMERGENCY DEPT VISIT,LEVEL V: ICD-10-PCS | Mod: CS,,, | Performed by: EMERGENCY MEDICINE

## 2021-03-07 PROCEDURE — 99285 EMERGENCY DEPT VISIT HI MDM: CPT | Mod: 25

## 2021-03-07 PROCEDURE — 93005 ELECTROCARDIOGRAM TRACING: CPT

## 2021-03-07 PROCEDURE — 86803 HEPATITIS C AB TEST: CPT | Performed by: EMERGENCY MEDICINE

## 2021-03-07 PROCEDURE — 99285 EMERGENCY DEPT VISIT HI MDM: CPT | Mod: CS,,, | Performed by: EMERGENCY MEDICINE

## 2021-03-07 PROCEDURE — 93010 EKG 12-LEAD: ICD-10-PCS | Mod: ,,, | Performed by: INTERNAL MEDICINE

## 2021-03-07 PROCEDURE — 93010 ELECTROCARDIOGRAM REPORT: CPT | Mod: ,,, | Performed by: INTERNAL MEDICINE

## 2021-03-07 PROCEDURE — 85025 COMPLETE CBC W/AUTO DIFF WBC: CPT | Performed by: EMERGENCY MEDICINE

## 2021-03-07 PROCEDURE — 63600175 PHARM REV CODE 636 W HCPCS: Performed by: EMERGENCY MEDICINE

## 2021-03-07 PROCEDURE — 96374 THER/PROPH/DIAG INJ IV PUSH: CPT

## 2021-03-07 PROCEDURE — 85730 THROMBOPLASTIN TIME PARTIAL: CPT | Performed by: EMERGENCY MEDICINE

## 2021-03-07 PROCEDURE — 82550 ASSAY OF CK (CPK): CPT | Performed by: EMERGENCY MEDICINE

## 2021-03-07 RX ORDER — MORPHINE SULFATE 4 MG/ML
4 INJECTION, SOLUTION INTRAMUSCULAR; INTRAVENOUS
Status: COMPLETED | OUTPATIENT
Start: 2021-03-07 | End: 2021-03-07

## 2021-03-07 RX ADMIN — MORPHINE SULFATE 4 MG: 4 INJECTION INTRAVENOUS at 09:03

## 2021-03-08 PROBLEM — M79.606 LEG PAIN: Status: ACTIVE | Noted: 2021-03-08

## 2021-03-08 PROBLEM — M79.652 LEFT THIGH PAIN: Status: ACTIVE | Noted: 2021-03-08

## 2021-03-08 LAB
ALBUMIN SERPL BCP-MCNC: 3.1 G/DL (ref 3.5–5.2)
ALP SERPL-CCNC: 134 U/L (ref 55–135)
ALT SERPL W/O P-5'-P-CCNC: 38 U/L (ref 10–44)
ANION GAP SERPL CALC-SCNC: 8 MMOL/L (ref 8–16)
AST SERPL-CCNC: 38 U/L (ref 10–40)
BASOPHILS # BLD AUTO: 0.02 K/UL (ref 0–0.2)
BASOPHILS NFR BLD: 0.3 % (ref 0–1.9)
BILIRUB SERPL-MCNC: 1.1 MG/DL (ref 0.1–1)
BUN SERPL-MCNC: 27 MG/DL (ref 8–23)
CALCIUM SERPL-MCNC: 8.2 MG/DL (ref 8.7–10.5)
CHLORIDE SERPL-SCNC: 101 MMOL/L (ref 95–110)
CO2 SERPL-SCNC: 24 MMOL/L (ref 23–29)
CREAT SERPL-MCNC: 1.1 MG/DL (ref 0.5–1.4)
CRP SERPL-MCNC: 96.2 MG/L (ref 0–8.2)
CTP QC/QA: YES
DIFFERENTIAL METHOD: ABNORMAL
EOSINOPHIL # BLD AUTO: 0.1 K/UL (ref 0–0.5)
EOSINOPHIL NFR BLD: 0.8 % (ref 0–8)
ERYTHROCYTE [DISTWIDTH] IN BLOOD BY AUTOMATED COUNT: 19.3 % (ref 11.5–14.5)
ERYTHROCYTE [SEDIMENTATION RATE] IN BLOOD BY WESTERGREN METHOD: 65 MM/HR (ref 0–23)
EST. GFR  (AFRICAN AMERICAN): >60 ML/MIN/1.73 M^2
EST. GFR  (NON AFRICAN AMERICAN): >60 ML/MIN/1.73 M^2
GLUCOSE SERPL-MCNC: 98 MG/DL (ref 70–110)
HCT VFR BLD AUTO: 28.5 % (ref 40–54)
HCV AB SERPL QL IA: NEGATIVE
HGB BLD-MCNC: 9 G/DL (ref 14–18)
IMM GRANULOCYTES # BLD AUTO: 0.03 K/UL (ref 0–0.04)
IMM GRANULOCYTES NFR BLD AUTO: 0.5 % (ref 0–0.5)
LYMPHOCYTES # BLD AUTO: 0.8 K/UL (ref 1–4.8)
LYMPHOCYTES NFR BLD: 13 % (ref 18–48)
MAGNESIUM SERPL-MCNC: 2.3 MG/DL (ref 1.6–2.6)
MCH RBC QN AUTO: 29.8 PG (ref 27–31)
MCHC RBC AUTO-ENTMCNC: 31.6 G/DL (ref 32–36)
MCV RBC AUTO: 94 FL (ref 82–98)
MONOCYTES # BLD AUTO: 0.7 K/UL (ref 0.3–1)
MONOCYTES NFR BLD: 12 % (ref 4–15)
NEUTROPHILS # BLD AUTO: 4.5 K/UL (ref 1.8–7.7)
NEUTROPHILS NFR BLD: 73.4 % (ref 38–73)
NRBC BLD-RTO: 0 /100 WBC
PHOSPHATE SERPL-MCNC: 3.2 MG/DL (ref 2.7–4.5)
PLATELET # BLD AUTO: 184 K/UL (ref 150–350)
PMV BLD AUTO: 10.5 FL (ref 9.2–12.9)
POTASSIUM SERPL-SCNC: 3.9 MMOL/L (ref 3.5–5.1)
PROT SERPL-MCNC: 6.6 G/DL (ref 6–8.4)
RBC # BLD AUTO: 3.02 M/UL (ref 4.6–6.2)
SARS-COV-2 RDRP RESP QL NAA+PROBE: NEGATIVE
SODIUM SERPL-SCNC: 133 MMOL/L (ref 136–145)
WBC # BLD AUTO: 6.08 K/UL (ref 3.9–12.7)

## 2021-03-08 PROCEDURE — 97116 GAIT TRAINING THERAPY: CPT

## 2021-03-08 PROCEDURE — 36415 COLL VENOUS BLD VENIPUNCTURE: CPT | Performed by: PHYSICIAN ASSISTANT

## 2021-03-08 PROCEDURE — 63600175 PHARM REV CODE 636 W HCPCS: Performed by: EMERGENCY MEDICINE

## 2021-03-08 PROCEDURE — 96372 THER/PROPH/DIAG INJ SC/IM: CPT | Mod: 59

## 2021-03-08 PROCEDURE — 83735 ASSAY OF MAGNESIUM: CPT | Performed by: PHYSICIAN ASSISTANT

## 2021-03-08 PROCEDURE — G0378 HOSPITAL OBSERVATION PER HR: HCPCS

## 2021-03-08 PROCEDURE — 99220 PR INITIAL OBSERVATION CARE,LEVL III: ICD-10-PCS | Mod: ,,, | Performed by: PHYSICIAN ASSISTANT

## 2021-03-08 PROCEDURE — 99223 PR INITIAL HOSPITAL CARE,LEVL III: ICD-10-PCS | Mod: GC,,, | Performed by: INTERNAL MEDICINE

## 2021-03-08 PROCEDURE — 25000003 PHARM REV CODE 250: Performed by: EMERGENCY MEDICINE

## 2021-03-08 PROCEDURE — 97165 OT EVAL LOW COMPLEX 30 MIN: CPT

## 2021-03-08 PROCEDURE — 99223 1ST HOSP IP/OBS HIGH 75: CPT | Mod: GC,,, | Performed by: INTERNAL MEDICINE

## 2021-03-08 PROCEDURE — 99220 PR INITIAL OBSERVATION CARE,LEVL III: CPT | Mod: ,,, | Performed by: PHYSICIAN ASSISTANT

## 2021-03-08 PROCEDURE — 96376 TX/PRO/DX INJ SAME DRUG ADON: CPT

## 2021-03-08 PROCEDURE — 85652 RBC SED RATE AUTOMATED: CPT | Performed by: PHYSICIAN ASSISTANT

## 2021-03-08 PROCEDURE — 97530 THERAPEUTIC ACTIVITIES: CPT

## 2021-03-08 PROCEDURE — 25000003 PHARM REV CODE 250: Performed by: INTERNAL MEDICINE

## 2021-03-08 PROCEDURE — 97161 PT EVAL LOW COMPLEX 20 MIN: CPT

## 2021-03-08 PROCEDURE — U0002 COVID-19 LAB TEST NON-CDC: HCPCS | Performed by: EMERGENCY MEDICINE

## 2021-03-08 PROCEDURE — 85025 COMPLETE CBC W/AUTO DIFF WBC: CPT | Performed by: PHYSICIAN ASSISTANT

## 2021-03-08 PROCEDURE — 25000003 PHARM REV CODE 250: Performed by: PHYSICIAN ASSISTANT

## 2021-03-08 PROCEDURE — 63600175 PHARM REV CODE 636 W HCPCS: Performed by: INTERNAL MEDICINE

## 2021-03-08 PROCEDURE — 80053 COMPREHEN METABOLIC PANEL: CPT | Performed by: PHYSICIAN ASSISTANT

## 2021-03-08 PROCEDURE — 84100 ASSAY OF PHOSPHORUS: CPT | Performed by: PHYSICIAN ASSISTANT

## 2021-03-08 PROCEDURE — 96375 TX/PRO/DX INJ NEW DRUG ADDON: CPT

## 2021-03-08 PROCEDURE — 86140 C-REACTIVE PROTEIN: CPT | Performed by: PHYSICIAN ASSISTANT

## 2021-03-08 RX ORDER — FUROSEMIDE 10 MG/ML
40 INJECTION INTRAMUSCULAR; INTRAVENOUS ONCE
Status: COMPLETED | OUTPATIENT
Start: 2021-03-08 | End: 2021-03-08

## 2021-03-08 RX ORDER — MORPHINE SULFATE 4 MG/ML
4 INJECTION, SOLUTION INTRAMUSCULAR; INTRAVENOUS
Status: COMPLETED | OUTPATIENT
Start: 2021-03-08 | End: 2021-03-08

## 2021-03-08 RX ORDER — ROSUVASTATIN CALCIUM 20 MG/1
40 TABLET, COATED ORAL NIGHTLY
Status: DISCONTINUED | OUTPATIENT
Start: 2021-03-08 | End: 2021-03-13 | Stop reason: HOSPADM

## 2021-03-08 RX ORDER — FUROSEMIDE 10 MG/ML
80 INJECTION INTRAMUSCULAR; INTRAVENOUS ONCE
Status: DISCONTINUED | OUTPATIENT
Start: 2021-03-08 | End: 2021-03-08

## 2021-03-08 RX ORDER — ACETAMINOPHEN 325 MG/1
650 TABLET ORAL
Status: COMPLETED | OUTPATIENT
Start: 2021-03-08 | End: 2021-03-08

## 2021-03-08 RX ORDER — AMLODIPINE BESYLATE 5 MG/1
5 TABLET ORAL DAILY
Status: DISCONTINUED | OUTPATIENT
Start: 2021-03-08 | End: 2021-03-13 | Stop reason: HOSPADM

## 2021-03-08 RX ORDER — ACETAMINOPHEN 325 MG/1
650 TABLET ORAL EVERY 4 HOURS PRN
Status: DISCONTINUED | OUTPATIENT
Start: 2021-03-08 | End: 2021-03-13 | Stop reason: HOSPADM

## 2021-03-08 RX ORDER — IPRATROPIUM BROMIDE AND ALBUTEROL SULFATE 2.5; .5 MG/3ML; MG/3ML
3 SOLUTION RESPIRATORY (INHALATION) EVERY 6 HOURS PRN
Status: DISCONTINUED | OUTPATIENT
Start: 2021-03-08 | End: 2021-03-13 | Stop reason: HOSPADM

## 2021-03-08 RX ORDER — SPIRONOLACTONE 25 MG/1
25 TABLET ORAL DAILY
Status: DISCONTINUED | OUTPATIENT
Start: 2021-03-08 | End: 2021-03-13 | Stop reason: HOSPADM

## 2021-03-08 RX ORDER — AMOXICILLIN 250 MG
1 CAPSULE ORAL 2 TIMES DAILY
Status: DISCONTINUED | OUTPATIENT
Start: 2021-03-08 | End: 2021-03-13 | Stop reason: HOSPADM

## 2021-03-08 RX ORDER — HYDROCODONE BITARTRATE AND ACETAMINOPHEN 5; 325 MG/1; MG/1
1 TABLET ORAL EVERY 6 HOURS PRN
Status: DISCONTINUED | OUTPATIENT
Start: 2021-03-08 | End: 2021-03-13 | Stop reason: HOSPADM

## 2021-03-08 RX ORDER — FAMOTIDINE 20 MG/1
20 TABLET, FILM COATED ORAL DAILY
Status: DISCONTINUED | OUTPATIENT
Start: 2021-03-08 | End: 2021-03-08

## 2021-03-08 RX ORDER — IBUPROFEN 200 MG
16 TABLET ORAL
Status: DISCONTINUED | OUTPATIENT
Start: 2021-03-08 | End: 2021-03-13 | Stop reason: HOSPADM

## 2021-03-08 RX ORDER — LANOLIN ALCOHOL/MO/W.PET/CERES
1000 CREAM (GRAM) TOPICAL DAILY
Status: DISCONTINUED | OUTPATIENT
Start: 2021-03-09 | End: 2021-03-13 | Stop reason: HOSPADM

## 2021-03-08 RX ORDER — FUROSEMIDE 40 MG/1
80 TABLET ORAL DAILY
Status: DISCONTINUED | OUTPATIENT
Start: 2021-03-09 | End: 2021-03-10

## 2021-03-08 RX ORDER — LOSARTAN POTASSIUM 25 MG/1
100 TABLET ORAL DAILY
Status: DISCONTINUED | OUTPATIENT
Start: 2021-03-08 | End: 2021-03-13 | Stop reason: HOSPADM

## 2021-03-08 RX ORDER — SODIUM CHLORIDE 0.9 % (FLUSH) 0.9 %
10 SYRINGE (ML) INJECTION
Status: DISCONTINUED | OUTPATIENT
Start: 2021-03-08 | End: 2021-03-13 | Stop reason: HOSPADM

## 2021-03-08 RX ORDER — GLUCAGON 1 MG
1 KIT INJECTION
Status: DISCONTINUED | OUTPATIENT
Start: 2021-03-08 | End: 2021-03-13 | Stop reason: HOSPADM

## 2021-03-08 RX ORDER — ONDANSETRON 8 MG/1
8 TABLET, ORALLY DISINTEGRATING ORAL EVERY 8 HOURS PRN
Status: DISCONTINUED | OUTPATIENT
Start: 2021-03-08 | End: 2021-03-13 | Stop reason: HOSPADM

## 2021-03-08 RX ORDER — TALC
6 POWDER (GRAM) TOPICAL NIGHTLY PRN
Status: DISCONTINUED | OUTPATIENT
Start: 2021-03-08 | End: 2021-03-13 | Stop reason: HOSPADM

## 2021-03-08 RX ORDER — IBUPROFEN 200 MG
24 TABLET ORAL
Status: DISCONTINUED | OUTPATIENT
Start: 2021-03-08 | End: 2021-03-13 | Stop reason: HOSPADM

## 2021-03-08 RX ORDER — ISOSORBIDE DINITRATE 20 MG/1
40 TABLET ORAL 3 TIMES DAILY
Status: DISCONTINUED | OUTPATIENT
Start: 2021-03-08 | End: 2021-03-13 | Stop reason: HOSPADM

## 2021-03-08 RX ORDER — FUROSEMIDE 40 MG/1
40 TABLET ORAL ONCE
Status: DISCONTINUED | OUTPATIENT
Start: 2021-03-08 | End: 2021-03-08

## 2021-03-08 RX ORDER — HYDROCODONE BITARTRATE AND ACETAMINOPHEN 10; 325 MG/1; MG/1
1 TABLET ORAL EVERY 6 HOURS PRN
Status: DISCONTINUED | OUTPATIENT
Start: 2021-03-08 | End: 2021-03-13 | Stop reason: HOSPADM

## 2021-03-08 RX ORDER — HYDRALAZINE HYDROCHLORIDE 50 MG/1
100 TABLET, FILM COATED ORAL 3 TIMES DAILY
Status: DISCONTINUED | OUTPATIENT
Start: 2021-03-08 | End: 2021-03-13 | Stop reason: HOSPADM

## 2021-03-08 RX ORDER — MORPHINE SULFATE 2 MG/ML
2 INJECTION, SOLUTION INTRAMUSCULAR; INTRAVENOUS EVERY 4 HOURS PRN
Status: DISCONTINUED | OUTPATIENT
Start: 2021-03-08 | End: 2021-03-13 | Stop reason: HOSPADM

## 2021-03-08 RX ORDER — CLOPIDOGREL BISULFATE 75 MG/1
75 TABLET ORAL DAILY
Status: DISCONTINUED | OUTPATIENT
Start: 2021-03-08 | End: 2021-03-13 | Stop reason: HOSPADM

## 2021-03-08 RX ORDER — ENOXAPARIN SODIUM 100 MG/ML
40 INJECTION SUBCUTANEOUS EVERY 24 HOURS
Status: DISCONTINUED | OUTPATIENT
Start: 2021-03-08 | End: 2021-03-08

## 2021-03-08 RX ORDER — FUROSEMIDE 40 MG/1
40 TABLET ORAL DAILY
Status: DISCONTINUED | OUTPATIENT
Start: 2021-03-08 | End: 2021-03-08

## 2021-03-08 RX ORDER — CARVEDILOL 12.5 MG/1
12.5 TABLET ORAL 2 TIMES DAILY WITH MEALS
Status: DISCONTINUED | OUTPATIENT
Start: 2021-03-08 | End: 2021-03-13 | Stop reason: HOSPADM

## 2021-03-08 RX ORDER — SERTRALINE HYDROCHLORIDE 50 MG/1
50 TABLET, FILM COATED ORAL NIGHTLY
Status: DISCONTINUED | OUTPATIENT
Start: 2021-03-08 | End: 2021-03-13 | Stop reason: HOSPADM

## 2021-03-08 RX ADMIN — SPIRONOLACTONE 25 MG: 25 TABLET ORAL at 09:03

## 2021-03-08 RX ADMIN — CARVEDILOL 12.5 MG: 12.5 TABLET, FILM COATED ORAL at 09:03

## 2021-03-08 RX ADMIN — ISOSORBIDE DINITRATE 40 MG: 20 TABLET ORAL at 08:03

## 2021-03-08 RX ADMIN — FAMOTIDINE 20 MG: 20 TABLET ORAL at 09:03

## 2021-03-08 RX ADMIN — APIXABAN 5 MG: 5 TABLET, FILM COATED ORAL at 08:03

## 2021-03-08 RX ADMIN — AMLODIPINE BESYLATE 5 MG: 5 TABLET ORAL at 09:03

## 2021-03-08 RX ADMIN — LOSARTAN POTASSIUM 100 MG: 25 TABLET, FILM COATED ORAL at 09:03

## 2021-03-08 RX ADMIN — ISOSORBIDE DINITRATE 40 MG: 20 TABLET ORAL at 09:03

## 2021-03-08 RX ADMIN — MELATONIN TAB 3 MG 6 MG: 3 TAB at 08:03

## 2021-03-08 RX ADMIN — MORPHINE SULFATE 4 MG: 4 INJECTION INTRAVENOUS at 12:03

## 2021-03-08 RX ADMIN — DOCUSATE SODIUM 50MG AND SENNOSIDES 8.6MG 1 TABLET: 8.6; 5 TABLET, FILM COATED ORAL at 09:03

## 2021-03-08 RX ADMIN — HYDROCODONE BITARTRATE AND ACETAMINOPHEN 1 TABLET: 10; 325 TABLET ORAL at 05:03

## 2021-03-08 RX ADMIN — MORPHINE SULFATE 2 MG: 2 INJECTION, SOLUTION INTRAMUSCULAR; INTRAVENOUS at 02:03

## 2021-03-08 RX ADMIN — ENOXAPARIN SODIUM 40 MG: 40 INJECTION SUBCUTANEOUS at 05:03

## 2021-03-08 RX ADMIN — FUROSEMIDE 40 MG: 10 INJECTION, SOLUTION INTRAMUSCULAR; INTRAVENOUS at 07:03

## 2021-03-08 RX ADMIN — HYDRALAZINE HYDROCHLORIDE 100 MG: 50 TABLET ORAL at 09:03

## 2021-03-08 RX ADMIN — ACETAMINOPHEN 650 MG: 325 TABLET ORAL at 02:03

## 2021-03-08 RX ADMIN — CARVEDILOL 12.5 MG: 12.5 TABLET, FILM COATED ORAL at 05:03

## 2021-03-08 RX ADMIN — DOCUSATE SODIUM 50MG AND SENNOSIDES 8.6MG 1 TABLET: 8.6; 5 TABLET, FILM COATED ORAL at 08:03

## 2021-03-08 RX ADMIN — HYDROCODONE BITARTRATE AND ACETAMINOPHEN 1 TABLET: 5; 325 TABLET ORAL at 10:03

## 2021-03-08 RX ADMIN — CLOPIDOGREL 75 MG: 75 TABLET, FILM COATED ORAL at 09:03

## 2021-03-08 RX ADMIN — SERTRALINE HYDROCHLORIDE 50 MG: 50 TABLET ORAL at 08:03

## 2021-03-08 RX ADMIN — ROSUVASTATIN CALCIUM 40 MG: 20 TABLET, FILM COATED ORAL at 08:03

## 2021-03-08 RX ADMIN — FUROSEMIDE 40 MG: 40 TABLET ORAL at 09:03

## 2021-03-08 RX ADMIN — MELATONIN TAB 3 MG 6 MG: 3 TAB at 02:03

## 2021-03-09 LAB
ALBUMIN SERPL BCP-MCNC: 2.9 G/DL (ref 3.5–5.2)
ANION GAP SERPL CALC-SCNC: 8 MMOL/L (ref 8–16)
BASOPHILS # BLD AUTO: 0.01 K/UL (ref 0–0.2)
BASOPHILS NFR BLD: 0.2 % (ref 0–1.9)
BNP SERPL-MCNC: 361 PG/ML (ref 0–99)
BUN SERPL-MCNC: 27 MG/DL (ref 8–23)
CALCIUM SERPL-MCNC: 8.2 MG/DL (ref 8.7–10.5)
CHLORIDE SERPL-SCNC: 101 MMOL/L (ref 95–110)
CO2 SERPL-SCNC: 25 MMOL/L (ref 23–29)
CREAT SERPL-MCNC: 1.1 MG/DL (ref 0.5–1.4)
DIFFERENTIAL METHOD: ABNORMAL
EOSINOPHIL # BLD AUTO: 0.1 K/UL (ref 0–0.5)
EOSINOPHIL NFR BLD: 0.9 % (ref 0–8)
ERYTHROCYTE [DISTWIDTH] IN BLOOD BY AUTOMATED COUNT: 19.3 % (ref 11.5–14.5)
EST. GFR  (AFRICAN AMERICAN): >60 ML/MIN/1.73 M^2
EST. GFR  (NON AFRICAN AMERICAN): >60 ML/MIN/1.73 M^2
GLUCOSE SERPL-MCNC: 88 MG/DL (ref 70–110)
HCT VFR BLD AUTO: 26.4 % (ref 40–54)
HGB BLD-MCNC: 8.3 G/DL (ref 14–18)
IMM GRANULOCYTES # BLD AUTO: 0.02 K/UL (ref 0–0.04)
IMM GRANULOCYTES NFR BLD AUTO: 0.3 % (ref 0–0.5)
LYMPHOCYTES # BLD AUTO: 0.7 K/UL (ref 1–4.8)
LYMPHOCYTES NFR BLD: 10.4 % (ref 18–48)
MCH RBC QN AUTO: 30 PG (ref 27–31)
MCHC RBC AUTO-ENTMCNC: 31.4 G/DL (ref 32–36)
MCV RBC AUTO: 95 FL (ref 82–98)
MONOCYTES # BLD AUTO: 0.6 K/UL (ref 0.3–1)
MONOCYTES NFR BLD: 8.7 % (ref 4–15)
NEUTROPHILS # BLD AUTO: 5 K/UL (ref 1.8–7.7)
NEUTROPHILS NFR BLD: 79.5 % (ref 38–73)
NRBC BLD-RTO: 0 /100 WBC
PHOSPHATE SERPL-MCNC: 3.4 MG/DL (ref 2.7–4.5)
PHOSPHATE SERPL-MCNC: 3.4 MG/DL (ref 2.7–4.5)
PLATELET # BLD AUTO: 155 K/UL (ref 150–350)
PMV BLD AUTO: 10 FL (ref 9.2–12.9)
POTASSIUM SERPL-SCNC: 3.5 MMOL/L (ref 3.5–5.1)
RBC # BLD AUTO: 2.77 M/UL (ref 4.6–6.2)
SODIUM SERPL-SCNC: 134 MMOL/L (ref 136–145)
WBC # BLD AUTO: 6.32 K/UL (ref 3.9–12.7)

## 2021-03-09 PROCEDURE — 25000003 PHARM REV CODE 250: Performed by: EMERGENCY MEDICINE

## 2021-03-09 PROCEDURE — 36415 COLL VENOUS BLD VENIPUNCTURE: CPT | Performed by: INTERNAL MEDICINE

## 2021-03-09 PROCEDURE — 87040 BLOOD CULTURE FOR BACTERIA: CPT | Performed by: INTERNAL MEDICINE

## 2021-03-09 PROCEDURE — 25000003 PHARM REV CODE 250: Performed by: INTERNAL MEDICINE

## 2021-03-09 PROCEDURE — 99499 UNLISTED E&M SERVICE: CPT | Mod: ,,, | Performed by: SURGERY

## 2021-03-09 PROCEDURE — 99226 PR SUBSEQUENT OBSERVATION CARE,LEVEL III: CPT | Mod: ,,, | Performed by: INTERNAL MEDICINE

## 2021-03-09 PROCEDURE — 80069 RENAL FUNCTION PANEL: CPT | Performed by: INTERNAL MEDICINE

## 2021-03-09 PROCEDURE — 85025 COMPLETE CBC W/AUTO DIFF WBC: CPT | Performed by: PHYSICIAN ASSISTANT

## 2021-03-09 PROCEDURE — 25000003 PHARM REV CODE 250: Performed by: NURSE PRACTITIONER

## 2021-03-09 PROCEDURE — 99223 PR INITIAL HOSPITAL CARE,LEVL III: ICD-10-PCS | Mod: GC,,, | Performed by: SURGERY

## 2021-03-09 PROCEDURE — 99226 PR SUBSEQUENT OBSERVATION CARE,LEVEL III: ICD-10-PCS | Mod: ,,, | Performed by: INTERNAL MEDICINE

## 2021-03-09 PROCEDURE — 93971 EXTREMITY STUDY: CPT | Performed by: SURGERY

## 2021-03-09 PROCEDURE — 99223 1ST HOSP IP/OBS HIGH 75: CPT | Mod: GC,,, | Performed by: SURGERY

## 2021-03-09 PROCEDURE — 99499 NO LOS: ICD-10-PCS | Mod: ,,, | Performed by: SURGERY

## 2021-03-09 PROCEDURE — 83880 ASSAY OF NATRIURETIC PEPTIDE: CPT | Performed by: INTERNAL MEDICINE

## 2021-03-09 PROCEDURE — 93922 UPR/L XTREMITY ART 2 LEVELS: CPT | Performed by: SURGERY

## 2021-03-09 PROCEDURE — 25000003 PHARM REV CODE 250: Performed by: PHYSICIAN ASSISTANT

## 2021-03-09 PROCEDURE — G0378 HOSPITAL OBSERVATION PER HR: HCPCS

## 2021-03-09 RX ORDER — TRAZODONE HYDROCHLORIDE 50 MG/1
50 TABLET ORAL NIGHTLY PRN
Status: DISCONTINUED | OUTPATIENT
Start: 2021-03-09 | End: 2021-03-13 | Stop reason: HOSPADM

## 2021-03-09 RX ORDER — POTASSIUM CHLORIDE 20 MEQ/1
20 TABLET, EXTENDED RELEASE ORAL 2 TIMES DAILY WITH MEALS
Status: DISCONTINUED | OUTPATIENT
Start: 2021-03-09 | End: 2021-03-10

## 2021-03-09 RX ADMIN — ISOSORBIDE DINITRATE 40 MG: 20 TABLET ORAL at 09:03

## 2021-03-09 RX ADMIN — CARVEDILOL 12.5 MG: 12.5 TABLET, FILM COATED ORAL at 05:03

## 2021-03-09 RX ADMIN — APIXABAN 5 MG: 5 TABLET, FILM COATED ORAL at 09:03

## 2021-03-09 RX ADMIN — FUROSEMIDE 80 MG: 40 TABLET ORAL at 08:03

## 2021-03-09 RX ADMIN — MELATONIN TAB 3 MG 6 MG: 3 TAB at 11:03

## 2021-03-09 RX ADMIN — ISOSORBIDE DINITRATE 40 MG: 20 TABLET ORAL at 08:03

## 2021-03-09 RX ADMIN — HYDROCODONE BITARTRATE AND ACETAMINOPHEN 1 TABLET: 5; 325 TABLET ORAL at 09:03

## 2021-03-09 RX ADMIN — ISOSORBIDE DINITRATE 40 MG: 20 TABLET ORAL at 03:03

## 2021-03-09 RX ADMIN — CLOPIDOGREL 75 MG: 75 TABLET, FILM COATED ORAL at 08:03

## 2021-03-09 RX ADMIN — DOCUSATE SODIUM 50MG AND SENNOSIDES 8.6MG 1 TABLET: 8.6; 5 TABLET, FILM COATED ORAL at 08:03

## 2021-03-09 RX ADMIN — TRAZODONE HYDROCHLORIDE 50 MG: 50 TABLET ORAL at 01:03

## 2021-03-09 RX ADMIN — POTASSIUM CHLORIDE 20 MEQ: 1500 TABLET, EXTENDED RELEASE ORAL at 05:03

## 2021-03-09 RX ADMIN — ROSUVASTATIN CALCIUM 40 MG: 20 TABLET, FILM COATED ORAL at 09:03

## 2021-03-09 RX ADMIN — CYANOCOBALAMIN TAB 1000 MCG 1000 MCG: 1000 TAB at 08:03

## 2021-03-09 RX ADMIN — HYDRALAZINE HYDROCHLORIDE 100 MG: 50 TABLET ORAL at 08:03

## 2021-03-09 RX ADMIN — CARVEDILOL 12.5 MG: 12.5 TABLET, FILM COATED ORAL at 08:03

## 2021-03-09 RX ADMIN — SPIRONOLACTONE 25 MG: 25 TABLET ORAL at 08:03

## 2021-03-09 RX ADMIN — TRAZODONE HYDROCHLORIDE 50 MG: 50 TABLET ORAL at 09:03

## 2021-03-09 RX ADMIN — AMLODIPINE BESYLATE 5 MG: 5 TABLET ORAL at 08:03

## 2021-03-09 RX ADMIN — LOSARTAN POTASSIUM 100 MG: 25 TABLET, FILM COATED ORAL at 08:03

## 2021-03-09 RX ADMIN — HYDRALAZINE HYDROCHLORIDE 100 MG: 50 TABLET ORAL at 03:03

## 2021-03-09 RX ADMIN — SERTRALINE HYDROCHLORIDE 50 MG: 50 TABLET ORAL at 09:03

## 2021-03-09 RX ADMIN — APIXABAN 5 MG: 5 TABLET, FILM COATED ORAL at 08:03

## 2021-03-10 PROBLEM — G25.9 MOVEMENT DISORDER: Status: ACTIVE | Noted: 2021-03-10

## 2021-03-10 LAB
ALBUMIN SERPL BCP-MCNC: 2.7 G/DL (ref 3.5–5.2)
ANION GAP SERPL CALC-SCNC: 8 MMOL/L (ref 8–16)
ANION GAP SERPL CALC-SCNC: 8 MMOL/L (ref 8–16)
BASOPHILS # BLD AUTO: 0.01 K/UL (ref 0–0.2)
BASOPHILS NFR BLD: 0.2 % (ref 0–1.9)
BUN SERPL-MCNC: 23 MG/DL (ref 8–23)
BUN SERPL-MCNC: 25 MG/DL (ref 8–23)
CALCIUM SERPL-MCNC: 8 MG/DL (ref 8.7–10.5)
CALCIUM SERPL-MCNC: 8.3 MG/DL (ref 8.7–10.5)
CHLORIDE SERPL-SCNC: 102 MMOL/L (ref 95–110)
CHLORIDE SERPL-SCNC: 103 MMOL/L (ref 95–110)
CO2 SERPL-SCNC: 26 MMOL/L (ref 23–29)
CO2 SERPL-SCNC: 27 MMOL/L (ref 23–29)
CORTIS SERPL-MCNC: 8.5 UG/DL
CREAT SERPL-MCNC: 1.3 MG/DL (ref 0.5–1.4)
CREAT SERPL-MCNC: 1.3 MG/DL (ref 0.5–1.4)
DIFFERENTIAL METHOD: ABNORMAL
EOSINOPHIL # BLD AUTO: 0.1 K/UL (ref 0–0.5)
EOSINOPHIL NFR BLD: 1.1 % (ref 0–8)
ERYTHROCYTE [DISTWIDTH] IN BLOOD BY AUTOMATED COUNT: 19.7 % (ref 11.5–14.5)
EST. GFR  (AFRICAN AMERICAN): >60 ML/MIN/1.73 M^2
EST. GFR  (AFRICAN AMERICAN): >60 ML/MIN/1.73 M^2
EST. GFR  (NON AFRICAN AMERICAN): 55.3 ML/MIN/1.73 M^2
EST. GFR  (NON AFRICAN AMERICAN): 55.3 ML/MIN/1.73 M^2
GLUCOSE SERPL-MCNC: 102 MG/DL (ref 70–110)
GLUCOSE SERPL-MCNC: 94 MG/DL (ref 70–110)
HCT VFR BLD AUTO: 26.3 % (ref 40–54)
HGB BLD-MCNC: 8.5 G/DL (ref 14–18)
IMM GRANULOCYTES # BLD AUTO: 0.02 K/UL (ref 0–0.04)
IMM GRANULOCYTES NFR BLD AUTO: 0.4 % (ref 0–0.5)
LYMPHOCYTES # BLD AUTO: 0.8 K/UL (ref 1–4.8)
LYMPHOCYTES NFR BLD: 14.6 % (ref 18–48)
MAGNESIUM SERPL-MCNC: 2.1 MG/DL (ref 1.6–2.6)
MCH RBC QN AUTO: 30.7 PG (ref 27–31)
MCHC RBC AUTO-ENTMCNC: 32.3 G/DL (ref 32–36)
MCV RBC AUTO: 95 FL (ref 82–98)
MONOCYTES # BLD AUTO: 0.5 K/UL (ref 0.3–1)
MONOCYTES NFR BLD: 9.5 % (ref 4–15)
NEUTROPHILS # BLD AUTO: 4.1 K/UL (ref 1.8–7.7)
NEUTROPHILS NFR BLD: 74.2 % (ref 38–73)
NRBC BLD-RTO: 0 /100 WBC
PHOSPHATE SERPL-MCNC: 3.1 MG/DL (ref 2.7–4.5)
PHOSPHATE SERPL-MCNC: 3.1 MG/DL (ref 2.7–4.5)
PHOSPHATE SERPL-MCNC: 3.4 MG/DL (ref 2.7–4.5)
PLATELET # BLD AUTO: 150 K/UL (ref 150–350)
PMV BLD AUTO: 10.5 FL (ref 9.2–12.9)
POTASSIUM SERPL-SCNC: 4.2 MMOL/L (ref 3.5–5.1)
POTASSIUM SERPL-SCNC: 4.2 MMOL/L (ref 3.5–5.1)
RBC # BLD AUTO: 2.77 M/UL (ref 4.6–6.2)
SODIUM SERPL-SCNC: 136 MMOL/L (ref 136–145)
SODIUM SERPL-SCNC: 138 MMOL/L (ref 136–145)
T4 FREE SERPL-MCNC: 0.97 NG/DL (ref 0.71–1.51)
TSH SERPL DL<=0.005 MIU/L-ACNC: 2.95 UIU/ML (ref 0.4–4)
WBC # BLD AUTO: 5.56 K/UL (ref 3.9–12.7)

## 2021-03-10 PROCEDURE — 63600175 PHARM REV CODE 636 W HCPCS: Performed by: INTERNAL MEDICINE

## 2021-03-10 PROCEDURE — 87040 BLOOD CULTURE FOR BACTERIA: CPT | Performed by: INTERNAL MEDICINE

## 2021-03-10 PROCEDURE — 99204 OFFICE O/P NEW MOD 45 MIN: CPT | Mod: GC,,, | Performed by: STUDENT IN AN ORGANIZED HEALTH CARE EDUCATION/TRAINING PROGRAM

## 2021-03-10 PROCEDURE — 85025 COMPLETE CBC W/AUTO DIFF WBC: CPT | Performed by: PHYSICIAN ASSISTANT

## 2021-03-10 PROCEDURE — 99225 PR SUBSEQUENT OBSERVATION CARE,LEVEL II: CPT | Mod: ,,, | Performed by: INTERNAL MEDICINE

## 2021-03-10 PROCEDURE — 99231 SBSQ HOSP IP/OBS SF/LOW 25: CPT | Mod: GC,,, | Performed by: SURGERY

## 2021-03-10 PROCEDURE — 84100 ASSAY OF PHOSPHORUS: CPT | Performed by: INTERNAL MEDICINE

## 2021-03-10 PROCEDURE — 99225 PR SUBSEQUENT OBSERVATION CARE,LEVEL II: ICD-10-PCS | Mod: ,,, | Performed by: INTERNAL MEDICINE

## 2021-03-10 PROCEDURE — 84443 ASSAY THYROID STIM HORMONE: CPT | Performed by: INTERNAL MEDICINE

## 2021-03-10 PROCEDURE — 25000003 PHARM REV CODE 250: Performed by: PHYSICIAN ASSISTANT

## 2021-03-10 PROCEDURE — 25000003 PHARM REV CODE 250: Performed by: EMERGENCY MEDICINE

## 2021-03-10 PROCEDURE — 25000003 PHARM REV CODE 250: Performed by: NURSE PRACTITIONER

## 2021-03-10 PROCEDURE — 82533 TOTAL CORTISOL: CPT | Performed by: INTERNAL MEDICINE

## 2021-03-10 PROCEDURE — 25000003 PHARM REV CODE 250: Performed by: INTERNAL MEDICINE

## 2021-03-10 PROCEDURE — 80048 BASIC METABOLIC PNL TOTAL CA: CPT | Performed by: INTERNAL MEDICINE

## 2021-03-10 PROCEDURE — 99222 1ST HOSP IP/OBS MODERATE 55: CPT | Mod: GC,,, | Performed by: PSYCHIATRY & NEUROLOGY

## 2021-03-10 PROCEDURE — 99204 PR OFFICE/OUTPT VISIT, NEW, LEVL IV, 45-59 MIN: ICD-10-PCS | Mod: GC,,, | Performed by: STUDENT IN AN ORGANIZED HEALTH CARE EDUCATION/TRAINING PROGRAM

## 2021-03-10 PROCEDURE — 83735 ASSAY OF MAGNESIUM: CPT | Performed by: INTERNAL MEDICINE

## 2021-03-10 PROCEDURE — 99222 PR INITIAL HOSPITAL CARE,LEVL II: ICD-10-PCS | Mod: GC,,, | Performed by: PSYCHIATRY & NEUROLOGY

## 2021-03-10 PROCEDURE — 99231 PR SUBSEQUENT HOSPITAL CARE,LEVL I: ICD-10-PCS | Mod: GC,,, | Performed by: SURGERY

## 2021-03-10 PROCEDURE — G0378 HOSPITAL OBSERVATION PER HR: HCPCS

## 2021-03-10 PROCEDURE — 84439 ASSAY OF FREE THYROXINE: CPT | Performed by: INTERNAL MEDICINE

## 2021-03-10 PROCEDURE — 36415 COLL VENOUS BLD VENIPUNCTURE: CPT | Performed by: INTERNAL MEDICINE

## 2021-03-10 PROCEDURE — 80069 RENAL FUNCTION PANEL: CPT | Performed by: INTERNAL MEDICINE

## 2021-03-10 PROCEDURE — 96375 TX/PRO/DX INJ NEW DRUG ADDON: CPT

## 2021-03-10 RX ORDER — NYSTATIN AND TRIAMCINOLONE ACETONIDE 100000; 1 [USP'U]/G; MG/G
OINTMENT TOPICAL 2 TIMES DAILY
Status: DISCONTINUED | OUTPATIENT
Start: 2021-03-10 | End: 2021-03-10

## 2021-03-10 RX ORDER — NYSTATIN AND TRIAMCINOLONE ACETONIDE 100000; 1 [USP'U]/G; MG/G
OINTMENT TOPICAL 2 TIMES DAILY
Status: DISCONTINUED | OUTPATIENT
Start: 2021-03-10 | End: 2021-03-13 | Stop reason: HOSPADM

## 2021-03-10 RX ORDER — FUROSEMIDE 40 MG/1
40 TABLET ORAL DAILY
Status: DISCONTINUED | OUTPATIENT
Start: 2021-03-11 | End: 2021-03-13 | Stop reason: HOSPADM

## 2021-03-10 RX ORDER — CEFTRIAXONE 1 G/1
1 INJECTION, POWDER, FOR SOLUTION INTRAMUSCULAR; INTRAVENOUS
Status: DISCONTINUED | OUTPATIENT
Start: 2021-03-10 | End: 2021-03-13 | Stop reason: HOSPADM

## 2021-03-10 RX ADMIN — VANCOMYCIN HYDROCHLORIDE 1750 MG: 10 INJECTION, POWDER, LYOPHILIZED, FOR SOLUTION INTRAVENOUS at 10:03

## 2021-03-10 RX ADMIN — NYSTATIN AND TRIAMCINOLONE ACETONIDE: 100000; 1 OINTMENT TOPICAL at 09:03

## 2021-03-10 RX ADMIN — CEFTRIAXONE 1 G: 1 INJECTION, POWDER, FOR SOLUTION INTRAMUSCULAR; INTRAVENOUS at 10:03

## 2021-03-10 RX ADMIN — TRAZODONE HYDROCHLORIDE 50 MG: 50 TABLET ORAL at 09:03

## 2021-03-10 RX ADMIN — AMLODIPINE BESYLATE 5 MG: 5 TABLET ORAL at 10:03

## 2021-03-10 RX ADMIN — POTASSIUM CHLORIDE 20 MEQ: 1500 TABLET, EXTENDED RELEASE ORAL at 10:03

## 2021-03-10 RX ADMIN — CARVEDILOL 12.5 MG: 12.5 TABLET, FILM COATED ORAL at 10:03

## 2021-03-10 RX ADMIN — APIXABAN 5 MG: 5 TABLET, FILM COATED ORAL at 10:03

## 2021-03-10 RX ADMIN — SERTRALINE HYDROCHLORIDE 50 MG: 50 TABLET ORAL at 09:03

## 2021-03-10 RX ADMIN — CYANOCOBALAMIN TAB 1000 MCG 1000 MCG: 1000 TAB at 10:03

## 2021-03-10 RX ADMIN — ISOSORBIDE DINITRATE 40 MG: 20 TABLET ORAL at 09:03

## 2021-03-10 RX ADMIN — FUROSEMIDE 80 MG: 40 TABLET ORAL at 10:03

## 2021-03-10 RX ADMIN — ROSUVASTATIN CALCIUM 40 MG: 20 TABLET, FILM COATED ORAL at 09:03

## 2021-03-10 RX ADMIN — ISOSORBIDE DINITRATE 40 MG: 20 TABLET ORAL at 10:03

## 2021-03-10 RX ADMIN — ISOSORBIDE DINITRATE 40 MG: 20 TABLET ORAL at 02:03

## 2021-03-10 RX ADMIN — SPIRONOLACTONE 25 MG: 25 TABLET ORAL at 10:03

## 2021-03-10 RX ADMIN — MELATONIN TAB 3 MG 6 MG: 3 TAB at 09:03

## 2021-03-10 RX ADMIN — LOSARTAN POTASSIUM 100 MG: 25 TABLET, FILM COATED ORAL at 10:03

## 2021-03-10 RX ADMIN — HYDRALAZINE HYDROCHLORIDE 100 MG: 50 TABLET ORAL at 09:03

## 2021-03-10 RX ADMIN — APIXABAN 5 MG: 5 TABLET, FILM COATED ORAL at 09:03

## 2021-03-10 RX ADMIN — CARVEDILOL 12.5 MG: 12.5 TABLET, FILM COATED ORAL at 04:03

## 2021-03-10 RX ADMIN — HYDRALAZINE HYDROCHLORIDE 100 MG: 50 TABLET ORAL at 10:03

## 2021-03-10 RX ADMIN — DOCUSATE SODIUM 50MG AND SENNOSIDES 8.6MG 1 TABLET: 8.6; 5 TABLET, FILM COATED ORAL at 09:03

## 2021-03-10 RX ADMIN — CLOPIDOGREL 75 MG: 75 TABLET, FILM COATED ORAL at 10:03

## 2021-03-10 RX ADMIN — NYSTATIN AND TRIAMCINOLONE ACETONIDE: 100000; 1 OINTMENT TOPICAL at 04:03

## 2021-03-10 RX ADMIN — HYDRALAZINE HYDROCHLORIDE 100 MG: 50 TABLET ORAL at 02:03

## 2021-03-11 LAB
ALBUMIN SERPL BCP-MCNC: 2.7 G/DL (ref 3.5–5.2)
ANION GAP SERPL CALC-SCNC: 9 MMOL/L (ref 8–16)
BASOPHILS # BLD AUTO: 0.01 K/UL (ref 0–0.2)
BASOPHILS NFR BLD: 0.2 % (ref 0–1.9)
BUN SERPL-MCNC: 21 MG/DL (ref 8–23)
CALCIUM SERPL-MCNC: 8.1 MG/DL (ref 8.7–10.5)
CHLORIDE SERPL-SCNC: 104 MMOL/L (ref 95–110)
CO2 SERPL-SCNC: 24 MMOL/L (ref 23–29)
CREAT SERPL-MCNC: 1.1 MG/DL (ref 0.5–1.4)
DIFFERENTIAL METHOD: ABNORMAL
EOSINOPHIL # BLD AUTO: 0.1 K/UL (ref 0–0.5)
EOSINOPHIL NFR BLD: 1.4 % (ref 0–8)
ERYTHROCYTE [DISTWIDTH] IN BLOOD BY AUTOMATED COUNT: 19.5 % (ref 11.5–14.5)
EST. GFR  (AFRICAN AMERICAN): >60 ML/MIN/1.73 M^2
EST. GFR  (NON AFRICAN AMERICAN): >60 ML/MIN/1.73 M^2
GLUCOSE SERPL-MCNC: 109 MG/DL (ref 70–110)
HCT VFR BLD AUTO: 25.6 % (ref 40–54)
HGB BLD-MCNC: 8.2 G/DL (ref 14–18)
IMM GRANULOCYTES # BLD AUTO: 0.03 K/UL (ref 0–0.04)
IMM GRANULOCYTES NFR BLD AUTO: 0.5 % (ref 0–0.5)
LYMPHOCYTES # BLD AUTO: 0.6 K/UL (ref 1–4.8)
LYMPHOCYTES NFR BLD: 10.1 % (ref 18–48)
MCH RBC QN AUTO: 30.5 PG (ref 27–31)
MCHC RBC AUTO-ENTMCNC: 32 G/DL (ref 32–36)
MCV RBC AUTO: 95 FL (ref 82–98)
MONOCYTES # BLD AUTO: 0.5 K/UL (ref 0.3–1)
MONOCYTES NFR BLD: 8.5 % (ref 4–15)
NEUTROPHILS # BLD AUTO: 4.6 K/UL (ref 1.8–7.7)
NEUTROPHILS NFR BLD: 79.3 % (ref 38–73)
NRBC BLD-RTO: 0 /100 WBC
PHOSPHATE SERPL-MCNC: 3.3 MG/DL (ref 2.7–4.5)
PHOSPHATE SERPL-MCNC: 3.3 MG/DL (ref 2.7–4.5)
PLATELET # BLD AUTO: 167 K/UL (ref 150–350)
PMV BLD AUTO: 10.1 FL (ref 9.2–12.9)
POTASSIUM SERPL-SCNC: 3.8 MMOL/L (ref 3.5–5.1)
RBC # BLD AUTO: 2.69 M/UL (ref 4.6–6.2)
SODIUM SERPL-SCNC: 137 MMOL/L (ref 136–145)
WBC # BLD AUTO: 5.85 K/UL (ref 3.9–12.7)

## 2021-03-11 PROCEDURE — 99233 PR SUBSEQUENT HOSPITAL CARE,LEVL III: ICD-10-PCS | Mod: GC,,, | Performed by: STUDENT IN AN ORGANIZED HEALTH CARE EDUCATION/TRAINING PROGRAM

## 2021-03-11 PROCEDURE — 99233 SBSQ HOSP IP/OBS HIGH 50: CPT | Mod: GC,,, | Performed by: STUDENT IN AN ORGANIZED HEALTH CARE EDUCATION/TRAINING PROGRAM

## 2021-03-11 PROCEDURE — 25000003 PHARM REV CODE 250: Performed by: NURSE PRACTITIONER

## 2021-03-11 PROCEDURE — 63600175 PHARM REV CODE 636 W HCPCS: Performed by: INTERNAL MEDICINE

## 2021-03-11 PROCEDURE — 80069 RENAL FUNCTION PANEL: CPT | Performed by: INTERNAL MEDICINE

## 2021-03-11 PROCEDURE — 25000003 PHARM REV CODE 250: Performed by: INTERNAL MEDICINE

## 2021-03-11 PROCEDURE — 99232 PR SUBSEQUENT HOSPITAL CARE,LEVL II: ICD-10-PCS | Mod: GC,,, | Performed by: PSYCHIATRY & NEUROLOGY

## 2021-03-11 PROCEDURE — 11000001 HC ACUTE MED/SURG PRIVATE ROOM

## 2021-03-11 PROCEDURE — 99225 PR SUBSEQUENT OBSERVATION CARE,LEVEL II: CPT | Mod: ,,, | Performed by: INTERNAL MEDICINE

## 2021-03-11 PROCEDURE — 99225 PR SUBSEQUENT OBSERVATION CARE,LEVEL II: ICD-10-PCS | Mod: ,,, | Performed by: INTERNAL MEDICINE

## 2021-03-11 PROCEDURE — 99232 SBSQ HOSP IP/OBS MODERATE 35: CPT | Mod: GC,,, | Performed by: PSYCHIATRY & NEUROLOGY

## 2021-03-11 PROCEDURE — 25000003 PHARM REV CODE 250: Performed by: EMERGENCY MEDICINE

## 2021-03-11 PROCEDURE — 25000003 PHARM REV CODE 250: Performed by: PHYSICIAN ASSISTANT

## 2021-03-11 PROCEDURE — 36415 COLL VENOUS BLD VENIPUNCTURE: CPT | Performed by: INTERNAL MEDICINE

## 2021-03-11 PROCEDURE — 96376 TX/PRO/DX INJ SAME DRUG ADON: CPT

## 2021-03-11 PROCEDURE — 85025 COMPLETE CBC W/AUTO DIFF WBC: CPT | Performed by: PHYSICIAN ASSISTANT

## 2021-03-11 RX ADMIN — HYDRALAZINE HYDROCHLORIDE 100 MG: 50 TABLET ORAL at 09:03

## 2021-03-11 RX ADMIN — AMLODIPINE BESYLATE 5 MG: 5 TABLET ORAL at 09:03

## 2021-03-11 RX ADMIN — CLOPIDOGREL 75 MG: 75 TABLET, FILM COATED ORAL at 09:03

## 2021-03-11 RX ADMIN — CYANOCOBALAMIN TAB 1000 MCG 1000 MCG: 1000 TAB at 09:03

## 2021-03-11 RX ADMIN — SPIRONOLACTONE 25 MG: 25 TABLET ORAL at 09:03

## 2021-03-11 RX ADMIN — FUROSEMIDE 40 MG: 40 TABLET ORAL at 10:03

## 2021-03-11 RX ADMIN — APIXABAN 5 MG: 5 TABLET, FILM COATED ORAL at 08:03

## 2021-03-11 RX ADMIN — NYSTATIN AND TRIAMCINOLONE ACETONIDE: 100000; 1 OINTMENT TOPICAL at 08:03

## 2021-03-11 RX ADMIN — LOSARTAN POTASSIUM 100 MG: 25 TABLET, FILM COATED ORAL at 09:03

## 2021-03-11 RX ADMIN — NYSTATIN AND TRIAMCINOLONE ACETONIDE: 100000; 1 OINTMENT TOPICAL at 10:03

## 2021-03-11 RX ADMIN — CEFTRIAXONE 1 G: 1 INJECTION, POWDER, FOR SOLUTION INTRAMUSCULAR; INTRAVENOUS at 09:03

## 2021-03-11 RX ADMIN — CARVEDILOL 12.5 MG: 12.5 TABLET, FILM COATED ORAL at 04:03

## 2021-03-11 RX ADMIN — SERTRALINE HYDROCHLORIDE 50 MG: 50 TABLET ORAL at 08:03

## 2021-03-11 RX ADMIN — HYDRALAZINE HYDROCHLORIDE 100 MG: 50 TABLET ORAL at 03:03

## 2021-03-11 RX ADMIN — ISOSORBIDE DINITRATE 40 MG: 20 TABLET ORAL at 08:03

## 2021-03-11 RX ADMIN — ISOSORBIDE DINITRATE 40 MG: 20 TABLET ORAL at 10:03

## 2021-03-11 RX ADMIN — DOCUSATE SODIUM 50MG AND SENNOSIDES 8.6MG 1 TABLET: 8.6; 5 TABLET, FILM COATED ORAL at 08:03

## 2021-03-11 RX ADMIN — ROSUVASTATIN CALCIUM 40 MG: 20 TABLET, FILM COATED ORAL at 08:03

## 2021-03-11 RX ADMIN — CARVEDILOL 12.5 MG: 12.5 TABLET, FILM COATED ORAL at 09:03

## 2021-03-11 RX ADMIN — MELATONIN TAB 3 MG 6 MG: 3 TAB at 08:03

## 2021-03-11 RX ADMIN — VANCOMYCIN HYDROCHLORIDE 1500 MG: 1.5 INJECTION, POWDER, LYOPHILIZED, FOR SOLUTION INTRAVENOUS at 10:03

## 2021-03-11 RX ADMIN — APIXABAN 5 MG: 5 TABLET, FILM COATED ORAL at 09:03

## 2021-03-11 RX ADMIN — HYDRALAZINE HYDROCHLORIDE 100 MG: 50 TABLET ORAL at 08:03

## 2021-03-11 RX ADMIN — ISOSORBIDE DINITRATE 40 MG: 20 TABLET ORAL at 03:03

## 2021-03-11 RX ADMIN — TRAZODONE HYDROCHLORIDE 50 MG: 50 TABLET ORAL at 08:03

## 2021-03-12 PROBLEM — M48.02 CERVICAL SPINAL STENOSIS: Status: ACTIVE | Noted: 2021-03-12

## 2021-03-12 LAB
ALBUMIN SERPL BCP-MCNC: 2.7 G/DL (ref 3.5–5.2)
ANION GAP SERPL CALC-SCNC: 8 MMOL/L (ref 8–16)
BASOPHILS # BLD AUTO: 0.01 K/UL (ref 0–0.2)
BASOPHILS NFR BLD: 0.2 % (ref 0–1.9)
BUN SERPL-MCNC: 20 MG/DL (ref 8–23)
CALCIUM SERPL-MCNC: 8.5 MG/DL (ref 8.7–10.5)
CHLORIDE SERPL-SCNC: 105 MMOL/L (ref 95–110)
CO2 SERPL-SCNC: 26 MMOL/L (ref 23–29)
CREAT SERPL-MCNC: 1.1 MG/DL (ref 0.5–1.4)
CRP SERPL-MCNC: 52.7 MG/L (ref 0–8.2)
DIFFERENTIAL METHOD: ABNORMAL
EOSINOPHIL # BLD AUTO: 0.1 K/UL (ref 0–0.5)
EOSINOPHIL NFR BLD: 1.9 % (ref 0–8)
ERYTHROCYTE [DISTWIDTH] IN BLOOD BY AUTOMATED COUNT: 19.9 % (ref 11.5–14.5)
ERYTHROCYTE [SEDIMENTATION RATE] IN BLOOD BY WESTERGREN METHOD: 80 MM/HR (ref 0–23)
EST. GFR  (AFRICAN AMERICAN): >60 ML/MIN/1.73 M^2
EST. GFR  (NON AFRICAN AMERICAN): >60 ML/MIN/1.73 M^2
GLUCOSE SERPL-MCNC: 96 MG/DL (ref 70–110)
HCT VFR BLD AUTO: 27.7 % (ref 40–54)
HGB BLD-MCNC: 8.8 G/DL (ref 14–18)
IMM GRANULOCYTES # BLD AUTO: 0.02 K/UL (ref 0–0.04)
IMM GRANULOCYTES NFR BLD AUTO: 0.3 % (ref 0–0.5)
LYMPHOCYTES # BLD AUTO: 0.8 K/UL (ref 1–4.8)
LYMPHOCYTES NFR BLD: 12.2 % (ref 18–48)
MCH RBC QN AUTO: 30.7 PG (ref 27–31)
MCHC RBC AUTO-ENTMCNC: 31.8 G/DL (ref 32–36)
MCV RBC AUTO: 97 FL (ref 82–98)
MONOCYTES # BLD AUTO: 0.7 K/UL (ref 0.3–1)
MONOCYTES NFR BLD: 11.1 % (ref 4–15)
NEUTROPHILS # BLD AUTO: 4.6 K/UL (ref 1.8–7.7)
NEUTROPHILS NFR BLD: 74.3 % (ref 38–73)
NRBC BLD-RTO: 0 /100 WBC
PHOSPHATE SERPL-MCNC: 4 MG/DL (ref 2.7–4.5)
PHOSPHATE SERPL-MCNC: 4 MG/DL (ref 2.7–4.5)
PLATELET # BLD AUTO: 172 K/UL (ref 150–350)
PMV BLD AUTO: 10.7 FL (ref 9.2–12.9)
POTASSIUM SERPL-SCNC: 5.3 MMOL/L (ref 3.5–5.1)
RBC # BLD AUTO: 2.87 M/UL (ref 4.6–6.2)
SODIUM SERPL-SCNC: 139 MMOL/L (ref 136–145)
VANCOMYCIN TROUGH SERPL-MCNC: 12.1 UG/ML (ref 10–22)
WBC # BLD AUTO: 6.23 K/UL (ref 3.9–12.7)

## 2021-03-12 PROCEDURE — 80202 ASSAY OF VANCOMYCIN: CPT | Performed by: INTERNAL MEDICINE

## 2021-03-12 PROCEDURE — 25000003 PHARM REV CODE 250: Performed by: INTERNAL MEDICINE

## 2021-03-12 PROCEDURE — 86140 C-REACTIVE PROTEIN: CPT | Performed by: STUDENT IN AN ORGANIZED HEALTH CARE EDUCATION/TRAINING PROGRAM

## 2021-03-12 PROCEDURE — 11000001 HC ACUTE MED/SURG PRIVATE ROOM

## 2021-03-12 PROCEDURE — 25000003 PHARM REV CODE 250: Performed by: EMERGENCY MEDICINE

## 2021-03-12 PROCEDURE — U0005 INFEC AGEN DETEC AMPLI PROBE: HCPCS | Performed by: STUDENT IN AN ORGANIZED HEALTH CARE EDUCATION/TRAINING PROGRAM

## 2021-03-12 PROCEDURE — 85652 RBC SED RATE AUTOMATED: CPT | Performed by: STUDENT IN AN ORGANIZED HEALTH CARE EDUCATION/TRAINING PROGRAM

## 2021-03-12 PROCEDURE — 99232 PR SUBSEQUENT HOSPITAL CARE,LEVL II: ICD-10-PCS | Mod: ,,, | Performed by: INTERNAL MEDICINE

## 2021-03-12 PROCEDURE — 95718 PR EEG, W/VIDEO, CONT RECORD, I&R, 2-12 HRS: ICD-10-PCS | Mod: ,,, | Performed by: PSYCHIATRY & NEUROLOGY

## 2021-03-12 PROCEDURE — 85025 COMPLETE CBC W/AUTO DIFF WBC: CPT | Performed by: PHYSICIAN ASSISTANT

## 2021-03-12 PROCEDURE — 36415 COLL VENOUS BLD VENIPUNCTURE: CPT | Performed by: STUDENT IN AN ORGANIZED HEALTH CARE EDUCATION/TRAINING PROGRAM

## 2021-03-12 PROCEDURE — 80069 RENAL FUNCTION PANEL: CPT | Performed by: INTERNAL MEDICINE

## 2021-03-12 PROCEDURE — 95711 VEEG 2-12 HR UNMONITORED: CPT

## 2021-03-12 PROCEDURE — 25000003 PHARM REV CODE 250: Performed by: PHYSICIAN ASSISTANT

## 2021-03-12 PROCEDURE — 95700 EEG CONT REC W/VID EEG TECH: CPT

## 2021-03-12 PROCEDURE — 63600175 PHARM REV CODE 636 W HCPCS: Performed by: INTERNAL MEDICINE

## 2021-03-12 PROCEDURE — 95718 EEG PHYS/QHP 2-12 HR W/VEEG: CPT | Mod: ,,, | Performed by: PSYCHIATRY & NEUROLOGY

## 2021-03-12 PROCEDURE — 36415 COLL VENOUS BLD VENIPUNCTURE: CPT | Performed by: INTERNAL MEDICINE

## 2021-03-12 PROCEDURE — 99232 SBSQ HOSP IP/OBS MODERATE 35: CPT | Mod: ,,, | Performed by: INTERNAL MEDICINE

## 2021-03-12 PROCEDURE — U0003 INFECTIOUS AGENT DETECTION BY NUCLEIC ACID (DNA OR RNA); SEVERE ACUTE RESPIRATORY SYNDROME CORONAVIRUS 2 (SARS-COV-2) (CORONAVIRUS DISEASE [COVID-19]), AMPLIFIED PROBE TECHNIQUE, MAKING USE OF HIGH THROUGHPUT TECHNOLOGIES AS DESCRIBED BY CMS-2020-01-R: HCPCS | Performed by: STUDENT IN AN ORGANIZED HEALTH CARE EDUCATION/TRAINING PROGRAM

## 2021-03-12 PROCEDURE — 25000003 PHARM REV CODE 250: Performed by: NURSE PRACTITIONER

## 2021-03-12 RX ADMIN — CYANOCOBALAMIN TAB 1000 MCG 1000 MCG: 1000 TAB at 09:03

## 2021-03-12 RX ADMIN — CARVEDILOL 12.5 MG: 12.5 TABLET, FILM COATED ORAL at 05:03

## 2021-03-12 RX ADMIN — MELATONIN TAB 3 MG 6 MG: 3 TAB at 08:03

## 2021-03-12 RX ADMIN — TRAZODONE HYDROCHLORIDE 50 MG: 50 TABLET ORAL at 08:03

## 2021-03-12 RX ADMIN — ISOSORBIDE DINITRATE 40 MG: 20 TABLET ORAL at 03:03

## 2021-03-12 RX ADMIN — NYSTATIN AND TRIAMCINOLONE ACETONIDE: 100000; 1 OINTMENT TOPICAL at 08:03

## 2021-03-12 RX ADMIN — ROSUVASTATIN CALCIUM 40 MG: 20 TABLET, FILM COATED ORAL at 08:03

## 2021-03-12 RX ADMIN — LOSARTAN POTASSIUM 100 MG: 25 TABLET, FILM COATED ORAL at 09:03

## 2021-03-12 RX ADMIN — APIXABAN 5 MG: 5 TABLET, FILM COATED ORAL at 08:03

## 2021-03-12 RX ADMIN — ISOSORBIDE DINITRATE 40 MG: 20 TABLET ORAL at 09:03

## 2021-03-12 RX ADMIN — ISOSORBIDE DINITRATE 40 MG: 20 TABLET ORAL at 08:03

## 2021-03-12 RX ADMIN — HYDRALAZINE HYDROCHLORIDE 100 MG: 50 TABLET ORAL at 09:03

## 2021-03-12 RX ADMIN — DOCUSATE SODIUM 50MG AND SENNOSIDES 8.6MG 1 TABLET: 8.6; 5 TABLET, FILM COATED ORAL at 08:03

## 2021-03-12 RX ADMIN — AMLODIPINE BESYLATE 5 MG: 5 TABLET ORAL at 09:03

## 2021-03-12 RX ADMIN — CLOPIDOGREL 75 MG: 75 TABLET, FILM COATED ORAL at 09:03

## 2021-03-12 RX ADMIN — FUROSEMIDE 40 MG: 40 TABLET ORAL at 09:03

## 2021-03-12 RX ADMIN — CARVEDILOL 12.5 MG: 12.5 TABLET, FILM COATED ORAL at 09:03

## 2021-03-12 RX ADMIN — SERTRALINE HYDROCHLORIDE 50 MG: 50 TABLET ORAL at 08:03

## 2021-03-12 RX ADMIN — CEFTRIAXONE 1 G: 1 INJECTION, POWDER, FOR SOLUTION INTRAMUSCULAR; INTRAVENOUS at 09:03

## 2021-03-12 RX ADMIN — HYDRALAZINE HYDROCHLORIDE 100 MG: 50 TABLET ORAL at 08:03

## 2021-03-12 RX ADMIN — APIXABAN 5 MG: 5 TABLET, FILM COATED ORAL at 09:03

## 2021-03-12 RX ADMIN — NYSTATIN AND TRIAMCINOLONE ACETONIDE: 100000; 1 OINTMENT TOPICAL at 09:03

## 2021-03-12 RX ADMIN — SPIRONOLACTONE 25 MG: 25 TABLET ORAL at 09:03

## 2021-03-12 RX ADMIN — VANCOMYCIN HYDROCHLORIDE 1500 MG: 1.5 INJECTION, POWDER, LYOPHILIZED, FOR SOLUTION INTRAVENOUS at 10:03

## 2021-03-12 RX ADMIN — DOCUSATE SODIUM 50MG AND SENNOSIDES 8.6MG 1 TABLET: 8.6; 5 TABLET, FILM COATED ORAL at 09:03

## 2021-03-13 VITALS
TEMPERATURE: 98 F | HEIGHT: 71 IN | DIASTOLIC BLOOD PRESSURE: 65 MMHG | BODY MASS INDEX: 28 KG/M2 | HEART RATE: 66 BPM | RESPIRATION RATE: 15 BRPM | OXYGEN SATURATION: 97 % | SYSTOLIC BLOOD PRESSURE: 114 MMHG | WEIGHT: 200 LBS

## 2021-03-13 LAB
ALBUMIN SERPL BCP-MCNC: 2.8 G/DL (ref 3.5–5.2)
AMMONIA PLAS-SCNC: 30 UMOL/L (ref 10–50)
ANION GAP SERPL CALC-SCNC: 8 MMOL/L (ref 8–16)
BASOPHILS # BLD AUTO: 0.02 K/UL (ref 0–0.2)
BASOPHILS NFR BLD: 0.3 % (ref 0–1.9)
BUN SERPL-MCNC: 22 MG/DL (ref 8–23)
CALCIUM SERPL-MCNC: 9 MG/DL (ref 8.7–10.5)
CHLORIDE SERPL-SCNC: 103 MMOL/L (ref 95–110)
CO2 SERPL-SCNC: 27 MMOL/L (ref 23–29)
CREAT SERPL-MCNC: 1.3 MG/DL (ref 0.5–1.4)
DIFFERENTIAL METHOD: ABNORMAL
EOSINOPHIL # BLD AUTO: 0.1 K/UL (ref 0–0.5)
EOSINOPHIL NFR BLD: 1.3 % (ref 0–8)
ERYTHROCYTE [DISTWIDTH] IN BLOOD BY AUTOMATED COUNT: 19.7 % (ref 11.5–14.5)
EST. GFR  (AFRICAN AMERICAN): >60 ML/MIN/1.73 M^2
EST. GFR  (NON AFRICAN AMERICAN): 55.3 ML/MIN/1.73 M^2
GLUCOSE SERPL-MCNC: 91 MG/DL (ref 70–110)
HCT VFR BLD AUTO: 28 % (ref 40–54)
HGB BLD-MCNC: 8.8 G/DL (ref 14–18)
IMM GRANULOCYTES # BLD AUTO: 0.02 K/UL (ref 0–0.04)
IMM GRANULOCYTES NFR BLD AUTO: 0.3 % (ref 0–0.5)
LYMPHOCYTES # BLD AUTO: 0.8 K/UL (ref 1–4.8)
LYMPHOCYTES NFR BLD: 12.2 % (ref 18–48)
MCH RBC QN AUTO: 30.2 PG (ref 27–31)
MCHC RBC AUTO-ENTMCNC: 31.4 G/DL (ref 32–36)
MCV RBC AUTO: 96 FL (ref 82–98)
MONOCYTES # BLD AUTO: 0.7 K/UL (ref 0.3–1)
MONOCYTES NFR BLD: 10.5 % (ref 4–15)
NEUTROPHILS # BLD AUTO: 4.8 K/UL (ref 1.8–7.7)
NEUTROPHILS NFR BLD: 75.4 % (ref 38–73)
NRBC BLD-RTO: 0 /100 WBC
PHOSPHATE SERPL-MCNC: 4.7 MG/DL (ref 2.7–4.5)
PHOSPHATE SERPL-MCNC: 4.7 MG/DL (ref 2.7–4.5)
PLATELET # BLD AUTO: 175 K/UL (ref 150–350)
PMV BLD AUTO: 10.7 FL (ref 9.2–12.9)
POTASSIUM SERPL-SCNC: 4.6 MMOL/L (ref 3.5–5.1)
RBC # BLD AUTO: 2.91 M/UL (ref 4.6–6.2)
SARS-COV-2 RNA RESP QL NAA+PROBE: NOT DETECTED
SODIUM SERPL-SCNC: 138 MMOL/L (ref 136–145)
WBC # BLD AUTO: 6.38 K/UL (ref 3.9–12.7)

## 2021-03-13 PROCEDURE — 82140 ASSAY OF AMMONIA: CPT | Performed by: PSYCHIATRY & NEUROLOGY

## 2021-03-13 PROCEDURE — 25000003 PHARM REV CODE 250: Performed by: INTERNAL MEDICINE

## 2021-03-13 PROCEDURE — 80069 RENAL FUNCTION PANEL: CPT | Performed by: INTERNAL MEDICINE

## 2021-03-13 PROCEDURE — 63600175 PHARM REV CODE 636 W HCPCS: Performed by: INTERNAL MEDICINE

## 2021-03-13 PROCEDURE — 36415 COLL VENOUS BLD VENIPUNCTURE: CPT | Performed by: INTERNAL MEDICINE

## 2021-03-13 PROCEDURE — 99223 1ST HOSP IP/OBS HIGH 75: CPT | Mod: ,,, | Performed by: ORTHOPAEDIC SURGERY

## 2021-03-13 PROCEDURE — 99232 PR SUBSEQUENT HOSPITAL CARE,LEVL II: ICD-10-PCS | Mod: ,,, | Performed by: INTERNAL MEDICINE

## 2021-03-13 PROCEDURE — 25000003 PHARM REV CODE 250: Performed by: PHYSICIAN ASSISTANT

## 2021-03-13 PROCEDURE — 99232 SBSQ HOSP IP/OBS MODERATE 35: CPT | Mod: ,,, | Performed by: INTERNAL MEDICINE

## 2021-03-13 PROCEDURE — 85025 COMPLETE CBC W/AUTO DIFF WBC: CPT | Performed by: PHYSICIAN ASSISTANT

## 2021-03-13 PROCEDURE — 99223 PR INITIAL HOSPITAL CARE,LEVL III: ICD-10-PCS | Mod: ,,, | Performed by: ORTHOPAEDIC SURGERY

## 2021-03-13 RX ORDER — DOXYCYCLINE HYCLATE 100 MG
100 TABLET ORAL EVERY 12 HOURS
Qty: 20 TABLET | Refills: 0 | Status: SHIPPED | OUTPATIENT
Start: 2021-03-13 | End: 2021-03-23

## 2021-03-13 RX ORDER — CEFPODOXIME PROXETIL 100 MG/1
100 TABLET, FILM COATED ORAL EVERY 12 HOURS
Qty: 20 TABLET | Refills: 0 | Status: SHIPPED | OUTPATIENT
Start: 2021-03-13 | End: 2021-03-23

## 2021-03-13 RX ADMIN — FUROSEMIDE 40 MG: 40 TABLET ORAL at 08:03

## 2021-03-13 RX ADMIN — CLOPIDOGREL 75 MG: 75 TABLET, FILM COATED ORAL at 08:03

## 2021-03-13 RX ADMIN — CEFTRIAXONE 1 G: 1 INJECTION, POWDER, FOR SOLUTION INTRAMUSCULAR; INTRAVENOUS at 10:03

## 2021-03-13 RX ADMIN — LOSARTAN POTASSIUM 100 MG: 25 TABLET, FILM COATED ORAL at 08:03

## 2021-03-13 RX ADMIN — SPIRONOLACTONE 25 MG: 25 TABLET ORAL at 08:03

## 2021-03-13 RX ADMIN — VANCOMYCIN HYDROCHLORIDE 1500 MG: 1.5 INJECTION, POWDER, LYOPHILIZED, FOR SOLUTION INTRAVENOUS at 10:03

## 2021-03-13 RX ADMIN — CYANOCOBALAMIN TAB 1000 MCG 1000 MCG: 1000 TAB at 08:03

## 2021-03-13 RX ADMIN — ISOSORBIDE DINITRATE 40 MG: 20 TABLET ORAL at 08:03

## 2021-03-13 RX ADMIN — NYSTATIN AND TRIAMCINOLONE ACETONIDE: 100000; 1 OINTMENT TOPICAL at 08:03

## 2021-03-13 RX ADMIN — APIXABAN 5 MG: 5 TABLET, FILM COATED ORAL at 08:03

## 2021-03-14 LAB — BACTERIA BLD CULT: NORMAL

## 2021-03-15 ENCOUNTER — PATIENT OUTREACH (OUTPATIENT)
Dept: ADMINISTRATIVE | Facility: CLINIC | Age: 71
End: 2021-03-15

## 2021-03-15 LAB — BACTERIA BLD CULT: NORMAL

## 2021-04-01 ENCOUNTER — TELEPHONE (OUTPATIENT)
Dept: ORTHOPEDICS | Facility: CLINIC | Age: 71
End: 2021-04-01

## 2021-04-01 DIAGNOSIS — M50.30 DDD (DEGENERATIVE DISC DISEASE), CERVICAL: Primary | ICD-10-CM

## 2021-04-05 ENCOUNTER — HOSPITAL ENCOUNTER (OUTPATIENT)
Dept: RADIOLOGY | Facility: HOSPITAL | Age: 71
Discharge: HOME | End: 2021-04-05
Attending: PHYSICIAN ASSISTANT
Payer: MEDICARE

## 2021-04-05 ENCOUNTER — OFFICE VISIT (OUTPATIENT)
Dept: ORTHOPEDICS | Facility: CLINIC | Age: 71
End: 2021-04-05
Payer: MEDICARE

## 2021-04-05 VITALS — BODY MASS INDEX: 28 KG/M2 | WEIGHT: 200 LBS | HEIGHT: 71 IN

## 2021-04-05 DIAGNOSIS — T82.7XXD INFECTION OF VASCULAR BYPASS GRAFT, SUBSEQUENT ENCOUNTER: ICD-10-CM

## 2021-04-05 DIAGNOSIS — M48.02 CERVICAL SPINAL STENOSIS: Primary | ICD-10-CM

## 2021-04-05 DIAGNOSIS — M50.30 DDD (DEGENERATIVE DISC DISEASE), CERVICAL: ICD-10-CM

## 2021-04-05 PROCEDURE — 3008F BODY MASS INDEX DOCD: CPT | Mod: CPTII,S$GLB,, | Performed by: PHYSICIAN ASSISTANT

## 2021-04-05 PROCEDURE — 72040 XR CERVICAL SPINE FLEXION  AND EXTENSION ONLY: ICD-10-PCS | Mod: 26,,, | Performed by: RADIOLOGY

## 2021-04-05 PROCEDURE — 1101F PR PT FALLS ASSESS DOC 0-1 FALLS W/OUT INJ PAST YR: ICD-10-PCS | Mod: CPTII,S$GLB,, | Performed by: PHYSICIAN ASSISTANT

## 2021-04-05 PROCEDURE — 72040 X-RAY EXAM NECK SPINE 2-3 VW: CPT | Mod: 26,,, | Performed by: RADIOLOGY

## 2021-04-05 PROCEDURE — 1101F PT FALLS ASSESS-DOCD LE1/YR: CPT | Mod: CPTII,S$GLB,, | Performed by: PHYSICIAN ASSISTANT

## 2021-04-05 PROCEDURE — 1159F PR MEDICATION LIST DOCUMENTED IN MEDICAL RECORD: ICD-10-PCS | Mod: S$GLB,,, | Performed by: PHYSICIAN ASSISTANT

## 2021-04-05 PROCEDURE — 99999 PR PBB SHADOW E&M-EST. PATIENT-LVL II: CPT | Mod: PBBFAC,,, | Performed by: PHYSICIAN ASSISTANT

## 2021-04-05 PROCEDURE — 99999 PR PBB SHADOW E&M-EST. PATIENT-LVL II: ICD-10-PCS | Mod: PBBFAC,,, | Performed by: PHYSICIAN ASSISTANT

## 2021-04-05 PROCEDURE — 1159F MED LIST DOCD IN RCRD: CPT | Mod: S$GLB,,, | Performed by: PHYSICIAN ASSISTANT

## 2021-04-05 PROCEDURE — 72040 X-RAY EXAM NECK SPINE 2-3 VW: CPT | Mod: TC

## 2021-04-05 PROCEDURE — 1125F AMNT PAIN NOTED PAIN PRSNT: CPT | Mod: S$GLB,,, | Performed by: PHYSICIAN ASSISTANT

## 2021-04-05 PROCEDURE — 3008F PR BODY MASS INDEX (BMI) DOCUMENTED: ICD-10-PCS | Mod: CPTII,S$GLB,, | Performed by: PHYSICIAN ASSISTANT

## 2021-04-05 PROCEDURE — 99214 PR OFFICE/OUTPT VISIT, EST, LEVL IV, 30-39 MIN: ICD-10-PCS | Mod: S$GLB,,, | Performed by: PHYSICIAN ASSISTANT

## 2021-04-05 PROCEDURE — 99214 OFFICE O/P EST MOD 30 MIN: CPT | Mod: S$GLB,,, | Performed by: PHYSICIAN ASSISTANT

## 2021-04-05 PROCEDURE — 3288F FALL RISK ASSESSMENT DOCD: CPT | Mod: CPTII,S$GLB,, | Performed by: PHYSICIAN ASSISTANT

## 2021-04-05 PROCEDURE — 3288F PR FALLS RISK ASSESSMENT DOCUMENTED: ICD-10-PCS | Mod: CPTII,S$GLB,, | Performed by: PHYSICIAN ASSISTANT

## 2021-04-05 PROCEDURE — 1125F PR PAIN SEVERITY QUANTIFIED, PAIN PRESENT: ICD-10-PCS | Mod: S$GLB,,, | Performed by: PHYSICIAN ASSISTANT

## 2021-04-06 ENCOUNTER — TELEPHONE (OUTPATIENT)
Dept: INTERNAL MEDICINE | Facility: CLINIC | Age: 71
End: 2021-04-06

## 2021-04-22 ENCOUNTER — OFFICE VISIT (OUTPATIENT)
Dept: CARDIOLOGY | Facility: CLINIC | Age: 71
End: 2021-04-22
Payer: MEDICARE

## 2021-04-22 VITALS
WEIGHT: 202.63 LBS | OXYGEN SATURATION: 99 % | BODY MASS INDEX: 28.37 KG/M2 | SYSTOLIC BLOOD PRESSURE: 154 MMHG | HEIGHT: 71 IN | HEART RATE: 73 BPM | DIASTOLIC BLOOD PRESSURE: 84 MMHG

## 2021-04-22 DIAGNOSIS — I25.5 CARDIOMYOPATHY, ISCHEMIC: Primary | ICD-10-CM

## 2021-04-22 DIAGNOSIS — I50.42 CHRONIC COMBINED SYSTOLIC AND DIASTOLIC CONGESTIVE HEART FAILURE: ICD-10-CM

## 2021-04-22 DIAGNOSIS — I25.810 CORONARY ARTERY DISEASE INVOLVING CORONARY BYPASS GRAFT OF NATIVE HEART WITHOUT ANGINA PECTORIS: ICD-10-CM

## 2021-04-22 DIAGNOSIS — E78.5 DYSLIPIDEMIA: ICD-10-CM

## 2021-04-22 DIAGNOSIS — I48.0 PAROXYSMAL ATRIAL FIBRILLATION: ICD-10-CM

## 2021-04-22 DIAGNOSIS — I73.9 PAD (PERIPHERAL ARTERY DISEASE): ICD-10-CM

## 2021-04-22 DIAGNOSIS — I10 ESSENTIAL HYPERTENSION: ICD-10-CM

## 2021-04-22 PROCEDURE — 3008F PR BODY MASS INDEX (BMI) DOCUMENTED: ICD-10-PCS | Mod: CPTII,S$GLB,, | Performed by: INTERNAL MEDICINE

## 2021-04-22 PROCEDURE — 99213 PR OFFICE/OUTPT VISIT, EST, LEVL III, 20-29 MIN: ICD-10-PCS | Mod: S$GLB,,, | Performed by: INTERNAL MEDICINE

## 2021-04-22 PROCEDURE — 99213 OFFICE O/P EST LOW 20 MIN: CPT | Mod: S$GLB,,, | Performed by: INTERNAL MEDICINE

## 2021-04-22 PROCEDURE — 1159F MED LIST DOCD IN RCRD: CPT | Mod: S$GLB,,, | Performed by: INTERNAL MEDICINE

## 2021-04-22 PROCEDURE — 1126F PR PAIN SEVERITY QUANTIFIED, NO PAIN PRESENT: ICD-10-PCS | Mod: S$GLB,,, | Performed by: INTERNAL MEDICINE

## 2021-04-22 PROCEDURE — 1126F AMNT PAIN NOTED NONE PRSNT: CPT | Mod: S$GLB,,, | Performed by: INTERNAL MEDICINE

## 2021-04-22 PROCEDURE — 3008F BODY MASS INDEX DOCD: CPT | Mod: CPTII,S$GLB,, | Performed by: INTERNAL MEDICINE

## 2021-04-22 PROCEDURE — 99999 PR PBB SHADOW E&M-EST. PATIENT-LVL V: ICD-10-PCS | Mod: PBBFAC,,, | Performed by: INTERNAL MEDICINE

## 2021-04-22 PROCEDURE — 1159F PR MEDICATION LIST DOCUMENTED IN MEDICAL RECORD: ICD-10-PCS | Mod: S$GLB,,, | Performed by: INTERNAL MEDICINE

## 2021-04-22 PROCEDURE — 99999 PR PBB SHADOW E&M-EST. PATIENT-LVL V: CPT | Mod: PBBFAC,,, | Performed by: INTERNAL MEDICINE

## 2021-04-22 RX ORDER — DOXYCYCLINE 100 MG/1
100 CAPSULE ORAL 2 TIMES DAILY
Status: ON HOLD | COMMUNITY
Start: 2021-03-17 | End: 2021-08-11 | Stop reason: SDUPTHER

## 2021-04-22 RX ORDER — METHOCARBAMOL 750 MG/1
750 TABLET, FILM COATED ORAL
Status: ON HOLD | COMMUNITY
Start: 2021-04-05 | End: 2021-08-11 | Stop reason: HOSPADM

## 2021-08-07 ENCOUNTER — HOSPITAL ENCOUNTER (INPATIENT)
Facility: HOSPITAL | Age: 71
LOS: 4 days | Discharge: HOME | DRG: 291 | End: 2021-08-11
Attending: EMERGENCY MEDICINE | Admitting: INTERNAL MEDICINE
Payer: MEDICARE

## 2021-08-07 DIAGNOSIS — I25.810 CORONARY ARTERY DISEASE INVOLVING CORONARY BYPASS GRAFT OF NATIVE HEART WITHOUT ANGINA PECTORIS: ICD-10-CM

## 2021-08-07 DIAGNOSIS — L97.921 NONHEALING ULCER OF LEFT LOWER EXTREMITY LIMITED TO BREAKDOWN OF SKIN: ICD-10-CM

## 2021-08-07 DIAGNOSIS — R06.02 SHORTNESS OF BREATH: ICD-10-CM

## 2021-08-07 DIAGNOSIS — I50.9 ACUTE EXACERBATION OF CONGESTIVE HEART FAILURE: ICD-10-CM

## 2021-08-07 DIAGNOSIS — I21.4 NSTEMI (NON-ST ELEVATED MYOCARDIAL INFARCTION): ICD-10-CM

## 2021-08-07 DIAGNOSIS — I50.20 HEART FAILURE WITH REDUCED EJECTION FRACTION: ICD-10-CM

## 2021-08-07 DIAGNOSIS — J81.0 ACUTE PULMONARY EDEMA: Primary | ICD-10-CM

## 2021-08-07 DIAGNOSIS — I50.33 ACUTE ON CHRONIC DIASTOLIC (CONGESTIVE) HEART FAILURE: ICD-10-CM

## 2021-08-07 LAB
ALBUMIN SERPL BCP-MCNC: 3.2 G/DL (ref 3.5–5.2)
ALLENS TEST: ABNORMAL
ALP SERPL-CCNC: 173 U/L (ref 55–135)
ALT SERPL W/O P-5'-P-CCNC: 65 U/L (ref 10–44)
ANION GAP SERPL CALC-SCNC: 15 MMOL/L (ref 8–16)
AST SERPL-CCNC: 58 U/L (ref 10–40)
BASOPHILS # BLD AUTO: 0.01 K/UL (ref 0–0.2)
BASOPHILS NFR BLD: 0.1 % (ref 0–1.9)
BILIRUB SERPL-MCNC: 1.1 MG/DL (ref 0.1–1)
BNP SERPL-MCNC: 1261 PG/ML (ref 0–99)
BUN SERPL-MCNC: 28 MG/DL (ref 8–23)
CALCIUM SERPL-MCNC: 9.1 MG/DL (ref 8.7–10.5)
CHLORIDE SERPL-SCNC: 103 MMOL/L (ref 95–110)
CO2 SERPL-SCNC: 17 MMOL/L (ref 23–29)
CREAT SERPL-MCNC: 1.1 MG/DL (ref 0.5–1.4)
CTP QC/QA: YES
DELSYS: ABNORMAL
DIFFERENTIAL METHOD: ABNORMAL
EOSINOPHIL # BLD AUTO: 0 K/UL (ref 0–0.5)
EOSINOPHIL NFR BLD: 0.1 % (ref 0–8)
EP: 10
ERYTHROCYTE [DISTWIDTH] IN BLOOD BY AUTOMATED COUNT: 17.3 % (ref 11.5–14.5)
EST. GFR  (AFRICAN AMERICAN): >60 ML/MIN/1.73 M^2
EST. GFR  (NON AFRICAN AMERICAN): >60 ML/MIN/1.73 M^2
FIO2: 60
GLUCOSE SERPL-MCNC: 80 MG/DL (ref 70–110)
HCO3 UR-SCNC: 18.6 MMOL/L (ref 24–28)
HCT VFR BLD AUTO: 31.5 % (ref 40–54)
HGB BLD-MCNC: 9.5 G/DL (ref 14–18)
IMM GRANULOCYTES # BLD AUTO: 0.04 K/UL (ref 0–0.04)
IMM GRANULOCYTES NFR BLD AUTO: 0.4 % (ref 0–0.5)
IP: 15
LYMPHOCYTES # BLD AUTO: 0.9 K/UL (ref 1–4.8)
LYMPHOCYTES NFR BLD: 9.6 % (ref 18–48)
MCH RBC QN AUTO: 26.3 PG (ref 27–31)
MCHC RBC AUTO-ENTMCNC: 30.2 G/DL (ref 32–36)
MCV RBC AUTO: 87 FL (ref 82–98)
MODE: ABNORMAL
MONOCYTES # BLD AUTO: 0.8 K/UL (ref 0.3–1)
MONOCYTES NFR BLD: 8.9 % (ref 4–15)
NEUTROPHILS # BLD AUTO: 7.5 K/UL (ref 1.8–7.7)
NEUTROPHILS NFR BLD: 80.9 % (ref 38–73)
NRBC BLD-RTO: 0 /100 WBC
PCO2 BLDA: 40.7 MMHG (ref 35–45)
PH SMN: 7.27 [PH] (ref 7.35–7.45)
PLATELET # BLD AUTO: 358 K/UL (ref 150–450)
PMV BLD AUTO: 10 FL (ref 9.2–12.9)
PO2 BLDA: 13 MMHG (ref 40–60)
POC BE: -8 MMOL/L
POC SATURATED O2: 12 % (ref 95–100)
POC TCO2: 20 MMOL/L (ref 24–29)
POTASSIUM SERPL-SCNC: 5 MMOL/L (ref 3.5–5.1)
PROT SERPL-MCNC: 7.6 G/DL (ref 6–8.4)
RBC # BLD AUTO: 3.61 M/UL (ref 4.6–6.2)
SAMPLE: ABNORMAL
SARS-COV-2 RDRP RESP QL NAA+PROBE: NEGATIVE
SITE: ABNORMAL
SODIUM SERPL-SCNC: 135 MMOL/L (ref 136–145)
TROPONIN I SERPL DL<=0.01 NG/ML-MCNC: 0.04 NG/ML (ref 0–0.03)
WBC # BLD AUTO: 9.25 K/UL (ref 3.9–12.7)

## 2021-08-07 PROCEDURE — 99900035 HC TECH TIME PER 15 MIN (STAT)

## 2021-08-07 PROCEDURE — 94660 CPAP INITIATION&MGMT: CPT

## 2021-08-07 PROCEDURE — 99291 PR CRITICAL CARE, E/M 30-74 MINUTES: ICD-10-PCS | Mod: CS,,, | Performed by: EMERGENCY MEDICINE

## 2021-08-07 PROCEDURE — U0002 COVID-19 LAB TEST NON-CDC: HCPCS | Performed by: EMERGENCY MEDICINE

## 2021-08-07 PROCEDURE — 85025 COMPLETE CBC W/AUTO DIFF WBC: CPT | Performed by: EMERGENCY MEDICINE

## 2021-08-07 PROCEDURE — 93005 ELECTROCARDIOGRAM TRACING: CPT

## 2021-08-07 PROCEDURE — 99291 CRITICAL CARE FIRST HOUR: CPT | Mod: 25

## 2021-08-07 PROCEDURE — 96375 TX/PRO/DX INJ NEW DRUG ADDON: CPT

## 2021-08-07 PROCEDURE — 84484 ASSAY OF TROPONIN QUANT: CPT | Performed by: EMERGENCY MEDICINE

## 2021-08-07 PROCEDURE — 83880 ASSAY OF NATRIURETIC PEPTIDE: CPT | Performed by: EMERGENCY MEDICINE

## 2021-08-07 PROCEDURE — 80047 BASIC METABLC PNL IONIZED CA: CPT

## 2021-08-07 PROCEDURE — 93010 EKG 12-LEAD: ICD-10-PCS | Mod: ,,, | Performed by: INTERNAL MEDICINE

## 2021-08-07 PROCEDURE — 63600175 PHARM REV CODE 636 W HCPCS: Performed by: EMERGENCY MEDICINE

## 2021-08-07 PROCEDURE — 99291 CRITICAL CARE FIRST HOUR: CPT | Mod: CS,,, | Performed by: EMERGENCY MEDICINE

## 2021-08-07 PROCEDURE — 63600175 PHARM REV CODE 636 W HCPCS

## 2021-08-07 PROCEDURE — 80053 COMPREHEN METABOLIC PANEL: CPT | Performed by: EMERGENCY MEDICINE

## 2021-08-07 PROCEDURE — 99292 CRITICAL CARE ADDL 30 MIN: CPT | Mod: ,,, | Performed by: EMERGENCY MEDICINE

## 2021-08-07 PROCEDURE — 27000190 HC CPAP FULL FACE MASK W/VALVE

## 2021-08-07 PROCEDURE — 93010 ELECTROCARDIOGRAM REPORT: CPT | Mod: ,,, | Performed by: INTERNAL MEDICINE

## 2021-08-07 PROCEDURE — 82803 BLOOD GASES ANY COMBINATION: CPT

## 2021-08-07 PROCEDURE — 96374 THER/PROPH/DIAG INJ IV PUSH: CPT

## 2021-08-07 PROCEDURE — 12000002 HC ACUTE/MED SURGE SEMI-PRIVATE ROOM

## 2021-08-07 PROCEDURE — 99292 PR CRITICAL CARE, ADDL 30 MIN: ICD-10-PCS | Mod: ,,, | Performed by: EMERGENCY MEDICINE

## 2021-08-07 RX ORDER — FUROSEMIDE 10 MG/ML
80 INJECTION INTRAMUSCULAR; INTRAVENOUS
Status: DISCONTINUED | OUTPATIENT
Start: 2021-08-08 | End: 2021-08-09

## 2021-08-07 RX ORDER — FUROSEMIDE 10 MG/ML
80 INJECTION INTRAMUSCULAR; INTRAVENOUS
Status: COMPLETED | OUTPATIENT
Start: 2021-08-07 | End: 2021-08-07

## 2021-08-07 RX ORDER — LORAZEPAM 2 MG/ML
INJECTION INTRAMUSCULAR
Status: COMPLETED
Start: 2021-08-07 | End: 2021-08-07

## 2021-08-07 RX ORDER — SODIUM CHLORIDE 0.9 % (FLUSH) 0.9 %
10 SYRINGE (ML) INJECTION
Status: DISCONTINUED | OUTPATIENT
Start: 2021-08-08 | End: 2021-08-11 | Stop reason: HOSPADM

## 2021-08-07 RX ORDER — LORAZEPAM 2 MG/ML
1 INJECTION INTRAMUSCULAR
Status: COMPLETED | OUTPATIENT
Start: 2021-08-07 | End: 2021-08-07

## 2021-08-07 RX ORDER — HEPARIN SODIUM,PORCINE/D5W 25000/250
0-40 INTRAVENOUS SOLUTION INTRAVENOUS CONTINUOUS
Status: DISCONTINUED | OUTPATIENT
Start: 2021-08-08 | End: 2021-08-10

## 2021-08-07 RX ADMIN — FUROSEMIDE 80 MG: 10 INJECTION, SOLUTION INTRAMUSCULAR; INTRAVENOUS at 08:08

## 2021-08-07 RX ADMIN — LORAZEPAM 1 MG: 2 INJECTION INTRAMUSCULAR at 08:08

## 2021-08-07 RX ADMIN — LORAZEPAM 1 MG: 2 INJECTION INTRAMUSCULAR; INTRAVENOUS at 08:08

## 2021-08-08 PROBLEM — K21.9 GERD (GASTROESOPHAGEAL REFLUX DISEASE): Status: ACTIVE | Noted: 2021-08-08

## 2021-08-08 PROBLEM — I50.33 ACUTE ON CHRONIC DIASTOLIC (CONGESTIVE) HEART FAILURE: Status: ACTIVE | Noted: 2021-01-13

## 2021-08-08 LAB
ALBUMIN SERPL BCP-MCNC: 2.8 G/DL (ref 3.5–5.2)
ALP SERPL-CCNC: 146 U/L (ref 55–135)
ALT SERPL W/O P-5'-P-CCNC: 68 U/L (ref 10–44)
ANION GAP SERPL CALC-SCNC: 9 MMOL/L (ref 8–16)
APTT BLDCRRT: 27.6 SEC (ref 21–32)
APTT BLDCRRT: 34.6 SEC (ref 21–32)
APTT BLDCRRT: 42 SEC (ref 21–32)
APTT BLDCRRT: 47.3 SEC (ref 21–32)
APTT BLDCRRT: 58.9 SEC (ref 21–32)
ASCENDING AORTA: 3.54 CM
AST SERPL-CCNC: 62 U/L (ref 10–40)
AV INDEX (PROSTH): 0.5
AV MEAN GRADIENT: 10 MMHG
AV PEAK GRADIENT: 14 MMHG
AV VALVE AREA: 2.44 CM2
AV VELOCITY RATIO: 0.52
BASOPHILS # BLD AUTO: 0.02 K/UL (ref 0–0.2)
BASOPHILS NFR BLD: 0.2 % (ref 0–1.9)
BILIRUB SERPL-MCNC: 1.2 MG/DL (ref 0.1–1)
BILIRUB UR QL STRIP: NEGATIVE
BSA FOR ECHO PROCEDURE: 2.14 M2
BUN SERPL-MCNC: 28 MG/DL (ref 8–23)
CALCIUM SERPL-MCNC: 8.5 MG/DL (ref 8.7–10.5)
CHLORIDE SERPL-SCNC: 103 MMOL/L (ref 95–110)
CLARITY UR REFRACT.AUTO: CLEAR
CO2 SERPL-SCNC: 25 MMOL/L (ref 23–29)
COLOR UR AUTO: NORMAL
CREAT SERPL-MCNC: 0.9 MG/DL (ref 0.5–1.4)
CRP SERPL-MCNC: 78 MG/L (ref 0–8.2)
CV ECHO LV RWT: 0.35 CM
DIFFERENTIAL METHOD: ABNORMAL
DOP CALC AO PEAK VEL: 1.9 M/S
DOP CALC AO VTI: 41.56 CM
DOP CALC LVOT AREA: 4.9 CM2
DOP CALC LVOT DIAMETER: 2.5 CM
DOP CALC LVOT PEAK VEL: 0.99 M/S
DOP CALC LVOT STROKE VOLUME: 101.56 CM3
DOP CALCLVOT PEAK VEL VTI: 20.7 CM
E WAVE DECELERATION TIME: 136.71 MSEC
E/A RATIO: 2.72
E/E' RATIO: 18 M/S
ECHO LV POSTERIOR WALL: 0.89 CM (ref 0.6–1.1)
EJECTION FRACTION: 55 %
EOSINOPHIL # BLD AUTO: 0 K/UL (ref 0–0.5)
EOSINOPHIL NFR BLD: 0.5 % (ref 0–8)
ERYTHROCYTE [DISTWIDTH] IN BLOOD BY AUTOMATED COUNT: 17.1 % (ref 11.5–14.5)
ERYTHROCYTE [SEDIMENTATION RATE] IN BLOOD BY WESTERGREN METHOD: 104 MM/HR (ref 0–23)
EST. GFR  (AFRICAN AMERICAN): >60 ML/MIN/1.73 M^2
EST. GFR  (NON AFRICAN AMERICAN): >60 ML/MIN/1.73 M^2
FRACTIONAL SHORTENING: 32 % (ref 28–44)
GLUCOSE SERPL-MCNC: 95 MG/DL (ref 70–110)
GLUCOSE UR QL STRIP: NEGATIVE
HCT VFR BLD AUTO: 25.5 % (ref 40–54)
HGB BLD-MCNC: 8 G/DL (ref 14–18)
HGB UR QL STRIP: NEGATIVE
IMM GRANULOCYTES # BLD AUTO: 0.04 K/UL (ref 0–0.04)
IMM GRANULOCYTES NFR BLD AUTO: 0.5 % (ref 0–0.5)
INR PPP: 1.2 (ref 0.8–1.2)
INTERVENTRICULAR SEPTUM: 1.28 CM (ref 0.6–1.1)
IVRT: 77.07 MSEC
KETONES UR QL STRIP: NEGATIVE
LA MAJOR: 6.28 CM
LA MINOR: 6.48 CM
LA WIDTH: 5.33 CM
LEFT ATRIUM SIZE: 4.59 CM
LEFT ATRIUM VOLUME INDEX MOD: 38.5 ML/M2
LEFT ATRIUM VOLUME INDEX: 62.6 ML/M2
LEFT ATRIUM VOLUME MOD: 81.6 CM3
LEFT ATRIUM VOLUME: 132.64 CM3
LEFT INTERNAL DIMENSION IN SYSTOLE: 3.45 CM (ref 2.1–4)
LEFT VENTRICLE DIASTOLIC VOLUME INDEX: 57.67 ML/M2
LEFT VENTRICLE DIASTOLIC VOLUME: 122.27 ML
LEFT VENTRICLE MASS INDEX: 98 G/M2
LEFT VENTRICLE SYSTOLIC VOLUME INDEX: 23.1 ML/M2
LEFT VENTRICLE SYSTOLIC VOLUME: 49.02 ML
LEFT VENTRICULAR INTERNAL DIMENSION IN DIASTOLE: 5.07 CM (ref 3.5–6)
LEFT VENTRICULAR MASS: 207.92 G
LEUKOCYTE ESTERASE UR QL STRIP: NEGATIVE
LV LATERAL E/E' RATIO: 16.71 M/S
LV SEPTAL E/E' RATIO: 19.5 M/S
LYMPHOCYTES # BLD AUTO: 0.7 K/UL (ref 1–4.8)
LYMPHOCYTES NFR BLD: 7.6 % (ref 18–48)
MAGNESIUM SERPL-MCNC: 2 MG/DL (ref 1.6–2.6)
MCH RBC QN AUTO: 26.2 PG (ref 27–31)
MCHC RBC AUTO-ENTMCNC: 31.4 G/DL (ref 32–36)
MCV RBC AUTO: 84 FL (ref 82–98)
MONOCYTES # BLD AUTO: 0.8 K/UL (ref 0.3–1)
MONOCYTES NFR BLD: 8.9 % (ref 4–15)
MV PEAK A VEL: 0.43 M/S
MV PEAK E VEL: 1.17 M/S
MV STENOSIS PRESSURE HALF TIME: 39.65 MS
MV VALVE AREA P 1/2 METHOD: 5.55 CM2
NEUTROPHILS # BLD AUTO: 7.3 K/UL (ref 1.8–7.7)
NEUTROPHILS NFR BLD: 82.3 % (ref 38–73)
NITRITE UR QL STRIP: NEGATIVE
NRBC BLD-RTO: 0 /100 WBC
PH UR STRIP: 6 [PH] (ref 5–8)
PHOSPHATE SERPL-MCNC: 3.8 MG/DL (ref 2.7–4.5)
PISA TR MAX VEL: 2.84 M/S
PLATELET # BLD AUTO: 267 K/UL (ref 150–450)
PMV BLD AUTO: 10.2 FL (ref 9.2–12.9)
POTASSIUM SERPL-SCNC: 3.7 MMOL/L (ref 3.5–5.1)
PROCALCITONIN SERPL IA-MCNC: 0.34 NG/ML
PROT SERPL-MCNC: 6.4 G/DL (ref 6–8.4)
PROT UR QL STRIP: NEGATIVE
PROTHROMBIN TIME: 12.9 SEC (ref 9–12.5)
QEF: 53 %
RA MAJOR: 5.84 CM
RA PRESSURE: 8 MMHG
RA WIDTH: 4.95 CM
RBC # BLD AUTO: 3.05 M/UL (ref 4.6–6.2)
RIGHT VENTRICULAR END-DIASTOLIC DIMENSION: 4.07 CM
RV TISSUE DOPPLER FREE WALL SYSTOLIC VELOCITY 1 (APICAL 4 CHAMBER VIEW): 10.38 CM/S
SINUS: 3.67 CM
SODIUM SERPL-SCNC: 137 MMOL/L (ref 136–145)
SP GR UR STRIP: 1 (ref 1–1.03)
STJ: 2.44 CM
TDI LATERAL: 0.07 M/S
TDI SEPTAL: 0.06 M/S
TDI: 0.07 M/S
TR MAX PG: 32 MMHG
TRICUSPID ANNULAR PLANE SYSTOLIC EXCURSION: 1.47 CM
TROPONIN I SERPL DL<=0.01 NG/ML-MCNC: 0.05 NG/ML (ref 0–0.03)
TROPONIN I SERPL DL<=0.01 NG/ML-MCNC: 0.07 NG/ML (ref 0–0.03)
TV REST PULMONARY ARTERY PRESSURE: 40 MMHG
URN SPEC COLLECT METH UR: NORMAL
WBC # BLD AUTO: 8.85 K/UL (ref 3.9–12.7)

## 2021-08-08 PROCEDURE — 99223 PR INITIAL HOSPITAL CARE,LEVL III: ICD-10-PCS | Mod: 25,GC,, | Performed by: INTERNAL MEDICINE

## 2021-08-08 PROCEDURE — 81003 URINALYSIS AUTO W/O SCOPE: CPT | Performed by: INTERNAL MEDICINE

## 2021-08-08 PROCEDURE — 25000003 PHARM REV CODE 250: Performed by: INTERNAL MEDICINE

## 2021-08-08 PROCEDURE — 25000242 PHARM REV CODE 250 ALT 637 W/ HCPCS: Performed by: INTERNAL MEDICINE

## 2021-08-08 PROCEDURE — 84484 ASSAY OF TROPONIN QUANT: CPT | Performed by: INTERNAL MEDICINE

## 2021-08-08 PROCEDURE — 25000003 PHARM REV CODE 250

## 2021-08-08 PROCEDURE — 99222 1ST HOSP IP/OBS MODERATE 55: CPT | Mod: ,,, | Performed by: INTERNAL MEDICINE

## 2021-08-08 PROCEDURE — 93010 EKG 12-LEAD: ICD-10-PCS | Mod: ,,, | Performed by: INTERNAL MEDICINE

## 2021-08-08 PROCEDURE — 63600175 PHARM REV CODE 636 W HCPCS: Performed by: STUDENT IN AN ORGANIZED HEALTH CARE EDUCATION/TRAINING PROGRAM

## 2021-08-08 PROCEDURE — 84145 PROCALCITONIN (PCT): CPT | Performed by: INTERNAL MEDICINE

## 2021-08-08 PROCEDURE — 20600001 HC STEP DOWN PRIVATE ROOM

## 2021-08-08 PROCEDURE — 85610 PROTHROMBIN TIME: CPT | Performed by: INTERNAL MEDICINE

## 2021-08-08 PROCEDURE — 99223 1ST HOSP IP/OBS HIGH 75: CPT | Mod: 25,GC,, | Performed by: INTERNAL MEDICINE

## 2021-08-08 PROCEDURE — 94640 AIRWAY INHALATION TREATMENT: CPT

## 2021-08-08 PROCEDURE — 85730 THROMBOPLASTIN TIME PARTIAL: CPT | Mod: 91 | Performed by: INTERNAL MEDICINE

## 2021-08-08 PROCEDURE — 83735 ASSAY OF MAGNESIUM: CPT | Performed by: INTERNAL MEDICINE

## 2021-08-08 PROCEDURE — 99222 PR INITIAL HOSPITAL CARE,LEVL II: ICD-10-PCS | Mod: ,,, | Performed by: INTERNAL MEDICINE

## 2021-08-08 PROCEDURE — 36415 COLL VENOUS BLD VENIPUNCTURE: CPT | Performed by: INTERNAL MEDICINE

## 2021-08-08 PROCEDURE — 63600175 PHARM REV CODE 636 W HCPCS: Performed by: INTERNAL MEDICINE

## 2021-08-08 PROCEDURE — 93010 ELECTROCARDIOGRAM REPORT: CPT | Mod: ,,, | Performed by: INTERNAL MEDICINE

## 2021-08-08 PROCEDURE — 85730 THROMBOPLASTIN TIME PARTIAL: CPT | Performed by: INTERNAL MEDICINE

## 2021-08-08 PROCEDURE — 86140 C-REACTIVE PROTEIN: CPT | Performed by: INTERNAL MEDICINE

## 2021-08-08 PROCEDURE — 85025 COMPLETE CBC W/AUTO DIFF WBC: CPT | Performed by: INTERNAL MEDICINE

## 2021-08-08 PROCEDURE — 84100 ASSAY OF PHOSPHORUS: CPT | Performed by: INTERNAL MEDICINE

## 2021-08-08 PROCEDURE — 85652 RBC SED RATE AUTOMATED: CPT | Performed by: INTERNAL MEDICINE

## 2021-08-08 PROCEDURE — 93005 ELECTROCARDIOGRAM TRACING: CPT

## 2021-08-08 PROCEDURE — 80053 COMPREHEN METABOLIC PANEL: CPT | Performed by: INTERNAL MEDICINE

## 2021-08-08 PROCEDURE — 87040 BLOOD CULTURE FOR BACTERIA: CPT | Mod: 59 | Performed by: INTERNAL MEDICINE

## 2021-08-08 RX ORDER — METHOCARBAMOL 750 MG/1
750 TABLET, FILM COATED ORAL 3 TIMES DAILY PRN
Status: DISCONTINUED | OUTPATIENT
Start: 2021-08-08 | End: 2021-08-11 | Stop reason: HOSPADM

## 2021-08-08 RX ORDER — HYDRALAZINE HYDROCHLORIDE 50 MG/1
100 TABLET, FILM COATED ORAL 3 TIMES DAILY
Status: DISCONTINUED | OUTPATIENT
Start: 2021-08-08 | End: 2021-08-11 | Stop reason: HOSPADM

## 2021-08-08 RX ORDER — LOSARTAN POTASSIUM 50 MG/1
100 TABLET ORAL DAILY
Status: DISCONTINUED | OUTPATIENT
Start: 2021-08-08 | End: 2021-08-11 | Stop reason: HOSPADM

## 2021-08-08 RX ORDER — CARVEDILOL 12.5 MG/1
12.5 TABLET ORAL 2 TIMES DAILY WITH MEALS
Status: DISCONTINUED | OUTPATIENT
Start: 2021-08-08 | End: 2021-08-11 | Stop reason: HOSPADM

## 2021-08-08 RX ORDER — DOXYCYCLINE 50 MG/1
100 CAPSULE ORAL 2 TIMES DAILY
Status: DISCONTINUED | OUTPATIENT
Start: 2021-08-08 | End: 2021-08-11 | Stop reason: HOSPADM

## 2021-08-08 RX ORDER — SPIRONOLACTONE 25 MG/1
25 TABLET ORAL DAILY
Status: DISCONTINUED | OUTPATIENT
Start: 2021-08-08 | End: 2021-08-11 | Stop reason: HOSPADM

## 2021-08-08 RX ORDER — HYDROCODONE BITARTRATE AND ACETAMINOPHEN 5; 325 MG/1; MG/1
1 TABLET ORAL EVERY 6 HOURS PRN
Status: DISCONTINUED | OUTPATIENT
Start: 2021-08-08 | End: 2021-08-11 | Stop reason: HOSPADM

## 2021-08-08 RX ORDER — ATORVASTATIN CALCIUM 20 MG/1
80 TABLET, FILM COATED ORAL NIGHTLY
Status: DISCONTINUED | OUTPATIENT
Start: 2021-08-08 | End: 2021-08-11 | Stop reason: HOSPADM

## 2021-08-08 RX ORDER — IPRATROPIUM BROMIDE AND ALBUTEROL SULFATE 2.5; .5 MG/3ML; MG/3ML
3 SOLUTION RESPIRATORY (INHALATION) EVERY 6 HOURS PRN
Status: DISCONTINUED | OUTPATIENT
Start: 2021-08-08 | End: 2021-08-11 | Stop reason: HOSPADM

## 2021-08-08 RX ORDER — PANTOPRAZOLE SODIUM 40 MG/1
40 TABLET, DELAYED RELEASE ORAL DAILY
Status: DISCONTINUED | OUTPATIENT
Start: 2021-08-08 | End: 2021-08-11 | Stop reason: HOSPADM

## 2021-08-08 RX ORDER — FLUTICASONE FUROATE AND VILANTEROL 100; 25 UG/1; UG/1
1 POWDER RESPIRATORY (INHALATION) DAILY
Status: DISCONTINUED | OUTPATIENT
Start: 2021-08-08 | End: 2021-08-11 | Stop reason: HOSPADM

## 2021-08-08 RX ORDER — ASPIRIN 81 MG/1
81 TABLET ORAL DAILY
Status: DISCONTINUED | OUTPATIENT
Start: 2021-08-08 | End: 2021-08-11 | Stop reason: HOSPADM

## 2021-08-08 RX ORDER — CLOPIDOGREL BISULFATE 75 MG/1
75 TABLET ORAL DAILY
Status: DISCONTINUED | OUTPATIENT
Start: 2021-08-08 | End: 2021-08-11 | Stop reason: HOSPADM

## 2021-08-08 RX ORDER — ESCITALOPRAM OXALATE 10 MG/1
10 TABLET ORAL DAILY
Status: DISCONTINUED | OUTPATIENT
Start: 2021-08-08 | End: 2021-08-11 | Stop reason: HOSPADM

## 2021-08-08 RX ORDER — TRAZODONE HYDROCHLORIDE 50 MG/1
100 TABLET ORAL NIGHTLY
Status: DISCONTINUED | OUTPATIENT
Start: 2021-08-08 | End: 2021-08-11 | Stop reason: HOSPADM

## 2021-08-08 RX ORDER — ALBUTEROL SULFATE 90 UG/1
2 AEROSOL, METERED RESPIRATORY (INHALATION) EVERY 6 HOURS PRN
Status: DISCONTINUED | OUTPATIENT
Start: 2021-08-08 | End: 2021-08-11 | Stop reason: HOSPADM

## 2021-08-08 RX ORDER — ISOSORBIDE DINITRATE 20 MG/1
40 TABLET ORAL 3 TIMES DAILY
Status: DISCONTINUED | OUTPATIENT
Start: 2021-08-08 | End: 2021-08-11 | Stop reason: HOSPADM

## 2021-08-08 RX ORDER — PREDNISONE 20 MG/1
20 TABLET ORAL 2 TIMES DAILY
Status: DISCONTINUED | OUTPATIENT
Start: 2021-08-08 | End: 2021-08-09

## 2021-08-08 RX ORDER — KETOROLAC TROMETHAMINE 15 MG/ML
15 INJECTION, SOLUTION INTRAMUSCULAR; INTRAVENOUS ONCE
Status: COMPLETED | OUTPATIENT
Start: 2021-08-08 | End: 2021-08-08

## 2021-08-08 RX ADMIN — ISOSORBIDE DINITRATE 40 MG: 20 TABLET ORAL at 08:08

## 2021-08-08 RX ADMIN — DOXYCYCLINE 100 MG: 50 CAPSULE ORAL at 01:08

## 2021-08-08 RX ADMIN — METHOCARBAMOL 750 MG: 750 TABLET ORAL at 06:08

## 2021-08-08 RX ADMIN — HYDROCODONE BITARTRATE AND ACETAMINOPHEN 1 TABLET: 5; 325 TABLET ORAL at 11:08

## 2021-08-08 RX ADMIN — ISOSORBIDE DINITRATE 40 MG: 20 TABLET ORAL at 03:08

## 2021-08-08 RX ADMIN — TRAZODONE HYDROCHLORIDE 100 MG: 50 TABLET ORAL at 01:08

## 2021-08-08 RX ADMIN — ATORVASTATIN CALCIUM 80 MG: 20 TABLET, FILM COATED ORAL at 01:08

## 2021-08-08 RX ADMIN — KETOROLAC TROMETHAMINE 15 MG: 15 INJECTION, SOLUTION INTRAMUSCULAR; INTRAVENOUS at 08:08

## 2021-08-08 RX ADMIN — PREDNISONE 20 MG: 20 TABLET ORAL at 08:08

## 2021-08-08 RX ADMIN — CLOPIDOGREL 75 MG: 75 TABLET, FILM COATED ORAL at 08:08

## 2021-08-08 RX ADMIN — HEPARIN SODIUM AND DEXTROSE 12 UNITS/KG/HR: 10000; 5 INJECTION INTRAVENOUS at 01:08

## 2021-08-08 RX ADMIN — ASPIRIN 81 MG: 81 TABLET, COATED ORAL at 08:08

## 2021-08-08 RX ADMIN — FUROSEMIDE 80 MG: 10 INJECTION, SOLUTION INTRAMUSCULAR; INTRAVENOUS at 03:08

## 2021-08-08 RX ADMIN — TRAZODONE HYDROCHLORIDE 100 MG: 50 TABLET ORAL at 08:08

## 2021-08-08 RX ADMIN — FUROSEMIDE 80 MG: 10 INJECTION, SOLUTION INTRAMUSCULAR; INTRAVENOUS at 06:08

## 2021-08-08 RX ADMIN — CARVEDILOL 12.5 MG: 25 TABLET, FILM COATED ORAL at 04:08

## 2021-08-08 RX ADMIN — METHOCARBAMOL 750 MG: 750 TABLET ORAL at 01:08

## 2021-08-08 RX ADMIN — ESCITALOPRAM OXALATE 10 MG: 10 TABLET ORAL at 08:08

## 2021-08-08 RX ADMIN — CARVEDILOL 12.5 MG: 25 TABLET, FILM COATED ORAL at 08:08

## 2021-08-08 RX ADMIN — HEPARIN SODIUM AND DEXTROSE 14 UNITS/KG/HR: 10000; 5 INJECTION INTRAVENOUS at 03:08

## 2021-08-08 RX ADMIN — SPIRONOLACTONE 25 MG: 25 TABLET ORAL at 08:08

## 2021-08-08 RX ADMIN — ATORVASTATIN CALCIUM 80 MG: 20 TABLET, FILM COATED ORAL at 08:08

## 2021-08-08 RX ADMIN — LOSARTAN POTASSIUM 100 MG: 50 TABLET, FILM COATED ORAL at 08:08

## 2021-08-08 RX ADMIN — FLUTICASONE FUROATE AND VILANTEROL TRIFENATATE 1 PUFF: 100; 25 POWDER RESPIRATORY (INHALATION) at 11:08

## 2021-08-08 RX ADMIN — DOXYCYCLINE 100 MG: 50 CAPSULE ORAL at 08:08

## 2021-08-08 RX ADMIN — HYDRALAZINE HYDROCHLORIDE 100 MG: 50 TABLET, FILM COATED ORAL at 08:08

## 2021-08-08 RX ADMIN — HYDROCODONE BITARTRATE AND ACETAMINOPHEN 1 TABLET: 5; 325 TABLET ORAL at 06:08

## 2021-08-08 RX ADMIN — PANTOPRAZOLE SODIUM 40 MG: 40 TABLET, DELAYED RELEASE ORAL at 08:08

## 2021-08-08 RX ADMIN — PREDNISONE 20 MG: 20 TABLET ORAL at 01:08

## 2021-08-09 ENCOUNTER — RESEARCH ENCOUNTER (OUTPATIENT)
Dept: RESEARCH | Facility: HOSPITAL | Age: 71
End: 2021-08-09

## 2021-08-09 PROBLEM — M62.838 MUSCLE SPASM: Status: ACTIVE | Noted: 2021-08-09

## 2021-08-09 PROBLEM — I48.21 PERMANENT ATRIAL FIBRILLATION: Status: ACTIVE | Noted: 2020-06-26

## 2021-08-09 LAB
ALBUMIN SERPL BCP-MCNC: 2.3 G/DL (ref 3.5–5.2)
ALP SERPL-CCNC: 113 U/L (ref 55–135)
ALT SERPL W/O P-5'-P-CCNC: 43 U/L (ref 10–44)
ANION GAP SERPL CALC-SCNC: 9 MMOL/L (ref 8–16)
APTT BLDCRRT: 44.7 SEC (ref 21–32)
AST SERPL-CCNC: 25 U/L (ref 10–40)
BASOPHILS # BLD AUTO: 0 K/UL (ref 0–0.2)
BASOPHILS NFR BLD: 0 % (ref 0–1.9)
BILIRUB SERPL-MCNC: 0.8 MG/DL (ref 0.1–1)
BUN SERPL-MCNC: 34 MG/DL (ref 8–23)
CALCIUM SERPL-MCNC: 8.1 MG/DL (ref 8.7–10.5)
CHLORIDE SERPL-SCNC: 104 MMOL/L (ref 95–110)
CO2 SERPL-SCNC: 24 MMOL/L (ref 23–29)
CREAT SERPL-MCNC: 1.3 MG/DL (ref 0.5–1.4)
DIFFERENTIAL METHOD: ABNORMAL
EOSINOPHIL # BLD AUTO: 0 K/UL (ref 0–0.5)
EOSINOPHIL NFR BLD: 0 % (ref 0–8)
ERYTHROCYTE [DISTWIDTH] IN BLOOD BY AUTOMATED COUNT: 17.3 % (ref 11.5–14.5)
EST. GFR  (AFRICAN AMERICAN): >60 ML/MIN/1.73 M^2
EST. GFR  (NON AFRICAN AMERICAN): 55.3 ML/MIN/1.73 M^2
GLUCOSE SERPL-MCNC: 114 MG/DL (ref 70–110)
HCT VFR BLD AUTO: 25.1 % (ref 40–54)
HGB BLD-MCNC: 8 G/DL (ref 14–18)
IMM GRANULOCYTES # BLD AUTO: 0.04 K/UL (ref 0–0.04)
IMM GRANULOCYTES NFR BLD AUTO: 0.4 % (ref 0–0.5)
LYMPHOCYTES # BLD AUTO: 0.5 K/UL (ref 1–4.8)
LYMPHOCYTES NFR BLD: 5.9 % (ref 18–48)
MAGNESIUM SERPL-MCNC: 2 MG/DL (ref 1.6–2.6)
MCH RBC QN AUTO: 26.8 PG (ref 27–31)
MCHC RBC AUTO-ENTMCNC: 31.9 G/DL (ref 32–36)
MCV RBC AUTO: 84 FL (ref 82–98)
MONOCYTES # BLD AUTO: 0.5 K/UL (ref 0.3–1)
MONOCYTES NFR BLD: 5.3 % (ref 4–15)
NEUTROPHILS # BLD AUTO: 7.9 K/UL (ref 1.8–7.7)
NEUTROPHILS NFR BLD: 88.4 % (ref 38–73)
NRBC BLD-RTO: 0 /100 WBC
PLATELET # BLD AUTO: 305 K/UL (ref 150–450)
PMV BLD AUTO: 10.2 FL (ref 9.2–12.9)
POTASSIUM SERPL-SCNC: 3.7 MMOL/L (ref 3.5–5.1)
PROT SERPL-MCNC: 5.5 G/DL (ref 6–8.4)
RBC # BLD AUTO: 2.99 M/UL (ref 4.6–6.2)
SODIUM SERPL-SCNC: 137 MMOL/L (ref 136–145)
WBC # BLD AUTO: 8.92 K/UL (ref 3.9–12.7)

## 2021-08-09 PROCEDURE — 63600175 PHARM REV CODE 636 W HCPCS: Performed by: INTERNAL MEDICINE

## 2021-08-09 PROCEDURE — 94640 AIRWAY INHALATION TREATMENT: CPT

## 2021-08-09 PROCEDURE — 99232 SBSQ HOSP IP/OBS MODERATE 35: CPT | Mod: GC,,, | Performed by: INTERNAL MEDICINE

## 2021-08-09 PROCEDURE — 85025 COMPLETE CBC W/AUTO DIFF WBC: CPT | Performed by: INTERNAL MEDICINE

## 2021-08-09 PROCEDURE — 20600001 HC STEP DOWN PRIVATE ROOM

## 2021-08-09 PROCEDURE — 80053 COMPREHEN METABOLIC PANEL: CPT | Performed by: INTERNAL MEDICINE

## 2021-08-09 PROCEDURE — 85730 THROMBOPLASTIN TIME PARTIAL: CPT | Performed by: INTERNAL MEDICINE

## 2021-08-09 PROCEDURE — 36415 COLL VENOUS BLD VENIPUNCTURE: CPT | Performed by: INTERNAL MEDICINE

## 2021-08-09 PROCEDURE — 25000003 PHARM REV CODE 250

## 2021-08-09 PROCEDURE — 99232 PR SUBSEQUENT HOSPITAL CARE,LEVL II: ICD-10-PCS | Mod: GC,,, | Performed by: INTERNAL MEDICINE

## 2021-08-09 PROCEDURE — 25000003 PHARM REV CODE 250: Performed by: INTERNAL MEDICINE

## 2021-08-09 PROCEDURE — 83735 ASSAY OF MAGNESIUM: CPT | Performed by: INTERNAL MEDICINE

## 2021-08-09 RX ORDER — POTASSIUM CHLORIDE 20 MEQ/1
40 TABLET, EXTENDED RELEASE ORAL 2 TIMES DAILY
Status: DISCONTINUED | OUTPATIENT
Start: 2021-08-09 | End: 2021-08-09

## 2021-08-09 RX ORDER — DIAZEPAM 10 MG/2ML
5 INJECTION INTRAMUSCULAR EVERY 6 HOURS PRN
Status: DISCONTINUED | OUTPATIENT
Start: 2021-08-09 | End: 2021-08-09

## 2021-08-09 RX ORDER — POTASSIUM CHLORIDE 20 MEQ/1
40 TABLET, EXTENDED RELEASE ORAL 2 TIMES DAILY
Status: COMPLETED | OUTPATIENT
Start: 2021-08-09 | End: 2021-08-09

## 2021-08-09 RX ORDER — FUROSEMIDE 80 MG/1
80 TABLET ORAL DAILY
Status: DISCONTINUED | OUTPATIENT
Start: 2021-08-10 | End: 2021-08-11 | Stop reason: HOSPADM

## 2021-08-09 RX ADMIN — ISOSORBIDE DINITRATE 40 MG: 20 TABLET ORAL at 09:08

## 2021-08-09 RX ADMIN — CLOPIDOGREL 75 MG: 75 TABLET, FILM COATED ORAL at 08:08

## 2021-08-09 RX ADMIN — HYDROCODONE BITARTRATE AND ACETAMINOPHEN 1 TABLET: 5; 325 TABLET ORAL at 09:08

## 2021-08-09 RX ADMIN — ESCITALOPRAM OXALATE 10 MG: 10 TABLET ORAL at 08:08

## 2021-08-09 RX ADMIN — METHOCARBAMOL 750 MG: 750 TABLET ORAL at 02:08

## 2021-08-09 RX ADMIN — CARVEDILOL 12.5 MG: 25 TABLET, FILM COATED ORAL at 05:08

## 2021-08-09 RX ADMIN — METHOCARBAMOL 750 MG: 750 TABLET ORAL at 09:08

## 2021-08-09 RX ADMIN — ISOSORBIDE DINITRATE 40 MG: 20 TABLET ORAL at 02:08

## 2021-08-09 RX ADMIN — DOXYCYCLINE 100 MG: 50 CAPSULE ORAL at 09:08

## 2021-08-09 RX ADMIN — HYDROCODONE BITARTRATE AND ACETAMINOPHEN 1 TABLET: 5; 325 TABLET ORAL at 02:08

## 2021-08-09 RX ADMIN — TRAZODONE HYDROCHLORIDE 100 MG: 50 TABLET ORAL at 09:08

## 2021-08-09 RX ADMIN — ASPIRIN 81 MG: 81 TABLET, COATED ORAL at 08:08

## 2021-08-09 RX ADMIN — HYDRALAZINE HYDROCHLORIDE 100 MG: 50 TABLET, FILM COATED ORAL at 09:08

## 2021-08-09 RX ADMIN — CARVEDILOL 12.5 MG: 25 TABLET, FILM COATED ORAL at 08:08

## 2021-08-09 RX ADMIN — HYDROCODONE BITARTRATE AND ACETAMINOPHEN 1 TABLET: 5; 325 TABLET ORAL at 05:08

## 2021-08-09 RX ADMIN — LOSARTAN POTASSIUM 100 MG: 50 TABLET, FILM COATED ORAL at 08:08

## 2021-08-09 RX ADMIN — POTASSIUM CHLORIDE 40 MEQ: 1500 TABLET, EXTENDED RELEASE ORAL at 09:08

## 2021-08-09 RX ADMIN — HEPARIN SODIUM AND DEXTROSE 14 UNITS/KG/HR: 10000; 5 INJECTION INTRAVENOUS at 11:08

## 2021-08-09 RX ADMIN — ATORVASTATIN CALCIUM 80 MG: 20 TABLET, FILM COATED ORAL at 09:08

## 2021-08-09 RX ADMIN — FLUTICASONE FUROATE AND VILANTEROL TRIFENATATE 1 PUFF: 100; 25 POWDER RESPIRATORY (INHALATION) at 12:08

## 2021-08-09 RX ADMIN — HYDRALAZINE HYDROCHLORIDE 100 MG: 50 TABLET, FILM COATED ORAL at 02:08

## 2021-08-09 RX ADMIN — SPIRONOLACTONE 25 MG: 25 TABLET ORAL at 08:08

## 2021-08-09 RX ADMIN — DOXYCYCLINE 100 MG: 50 CAPSULE ORAL at 08:08

## 2021-08-09 RX ADMIN — METHOCARBAMOL 750 MG: 750 TABLET ORAL at 05:08

## 2021-08-09 RX ADMIN — HYDRALAZINE HYDROCHLORIDE 100 MG: 50 TABLET, FILM COATED ORAL at 08:08

## 2021-08-09 RX ADMIN — ISOSORBIDE DINITRATE 40 MG: 20 TABLET ORAL at 08:08

## 2021-08-09 RX ADMIN — PANTOPRAZOLE SODIUM 40 MG: 40 TABLET, DELAYED RELEASE ORAL at 08:08

## 2021-08-09 RX ADMIN — POTASSIUM CHLORIDE 40 MEQ: 1500 TABLET, EXTENDED RELEASE ORAL at 08:08

## 2021-08-09 RX ADMIN — PREDNISONE 20 MG: 20 TABLET ORAL at 08:08

## 2021-08-09 RX ADMIN — FUROSEMIDE 80 MG: 10 INJECTION, SOLUTION INTRAMUSCULAR; INTRAVENOUS at 05:08

## 2021-08-10 LAB
ANION GAP SERPL CALC-SCNC: 7 MMOL/L (ref 8–16)
APTT BLDCRRT: 59.7 SEC (ref 21–32)
BASOPHILS # BLD AUTO: 0.01 K/UL (ref 0–0.2)
BASOPHILS NFR BLD: 0.1 % (ref 0–1.9)
BUN SERPL-MCNC: 37 MG/DL (ref 8–23)
CALCIUM SERPL-MCNC: 8.1 MG/DL (ref 8.7–10.5)
CHLORIDE SERPL-SCNC: 104 MMOL/L (ref 95–110)
CO2 SERPL-SCNC: 25 MMOL/L (ref 23–29)
CREAT SERPL-MCNC: 1.1 MG/DL (ref 0.5–1.4)
DIFFERENTIAL METHOD: ABNORMAL
EOSINOPHIL # BLD AUTO: 0 K/UL (ref 0–0.5)
EOSINOPHIL NFR BLD: 0 % (ref 0–8)
ERYTHROCYTE [DISTWIDTH] IN BLOOD BY AUTOMATED COUNT: 17.7 % (ref 11.5–14.5)
EST. GFR  (AFRICAN AMERICAN): >60 ML/MIN/1.73 M^2
EST. GFR  (NON AFRICAN AMERICAN): >60 ML/MIN/1.73 M^2
GLUCOSE SERPL-MCNC: 97 MG/DL (ref 70–110)
HCT VFR BLD AUTO: 25.3 % (ref 40–54)
HGB BLD-MCNC: 8 G/DL (ref 14–18)
IMM GRANULOCYTES # BLD AUTO: 0.05 K/UL (ref 0–0.04)
IMM GRANULOCYTES NFR BLD AUTO: 0.5 % (ref 0–0.5)
LYMPHOCYTES # BLD AUTO: 1 K/UL (ref 1–4.8)
LYMPHOCYTES NFR BLD: 10.9 % (ref 18–48)
MAGNESIUM SERPL-MCNC: 2.1 MG/DL (ref 1.6–2.6)
MCH RBC QN AUTO: 26.1 PG (ref 27–31)
MCHC RBC AUTO-ENTMCNC: 31.6 G/DL (ref 32–36)
MCV RBC AUTO: 82 FL (ref 82–98)
MONOCYTES # BLD AUTO: 0.7 K/UL (ref 0.3–1)
MONOCYTES NFR BLD: 7.5 % (ref 4–15)
NEUTROPHILS # BLD AUTO: 7.7 K/UL (ref 1.8–7.7)
NEUTROPHILS NFR BLD: 81 % (ref 38–73)
NRBC BLD-RTO: 0 /100 WBC
PLATELET # BLD AUTO: 307 K/UL (ref 150–450)
PMV BLD AUTO: 10 FL (ref 9.2–12.9)
POTASSIUM SERPL-SCNC: 4.3 MMOL/L (ref 3.5–5.1)
RBC # BLD AUTO: 3.07 M/UL (ref 4.6–6.2)
SARS-COV-2 RNA RESP QL NAA+PROBE: NOT DETECTED
SODIUM SERPL-SCNC: 136 MMOL/L (ref 136–145)
WBC # BLD AUTO: 9.55 K/UL (ref 3.9–12.7)

## 2021-08-10 PROCEDURE — 25000003 PHARM REV CODE 250

## 2021-08-10 PROCEDURE — 20600001 HC STEP DOWN PRIVATE ROOM

## 2021-08-10 PROCEDURE — 99232 PR SUBSEQUENT HOSPITAL CARE,LEVL II: ICD-10-PCS | Mod: GC,,, | Performed by: INTERNAL MEDICINE

## 2021-08-10 PROCEDURE — 80048 BASIC METABOLIC PNL TOTAL CA: CPT | Performed by: INTERNAL MEDICINE

## 2021-08-10 PROCEDURE — 85025 COMPLETE CBC W/AUTO DIFF WBC: CPT | Performed by: INTERNAL MEDICINE

## 2021-08-10 PROCEDURE — 25000003 PHARM REV CODE 250: Performed by: HOSPITALIST

## 2021-08-10 PROCEDURE — 63600175 PHARM REV CODE 636 W HCPCS: Performed by: INTERNAL MEDICINE

## 2021-08-10 PROCEDURE — 83735 ASSAY OF MAGNESIUM: CPT | Performed by: INTERNAL MEDICINE

## 2021-08-10 PROCEDURE — U0003 INFECTIOUS AGENT DETECTION BY NUCLEIC ACID (DNA OR RNA); SEVERE ACUTE RESPIRATORY SYNDROME CORONAVIRUS 2 (SARS-COV-2) (CORONAVIRUS DISEASE [COVID-19]), AMPLIFIED PROBE TECHNIQUE, MAKING USE OF HIGH THROUGHPUT TECHNOLOGIES AS DESCRIBED BY CMS-2020-01-R: HCPCS

## 2021-08-10 PROCEDURE — 85730 THROMBOPLASTIN TIME PARTIAL: CPT | Performed by: INTERNAL MEDICINE

## 2021-08-10 PROCEDURE — U0005 INFEC AGEN DETEC AMPLI PROBE: HCPCS

## 2021-08-10 PROCEDURE — 25000003 PHARM REV CODE 250: Performed by: INTERNAL MEDICINE

## 2021-08-10 PROCEDURE — 63600175 PHARM REV CODE 636 W HCPCS

## 2021-08-10 PROCEDURE — 36415 COLL VENOUS BLD VENIPUNCTURE: CPT | Performed by: INTERNAL MEDICINE

## 2021-08-10 PROCEDURE — 94660 CPAP INITIATION&MGMT: CPT

## 2021-08-10 PROCEDURE — 99900035 HC TECH TIME PER 15 MIN (STAT)

## 2021-08-10 PROCEDURE — 99232 SBSQ HOSP IP/OBS MODERATE 35: CPT | Mod: GC,,, | Performed by: INTERNAL MEDICINE

## 2021-08-10 PROCEDURE — 94640 AIRWAY INHALATION TREATMENT: CPT

## 2021-08-10 PROCEDURE — 94761 N-INVAS EAR/PLS OXIMETRY MLT: CPT

## 2021-08-10 RX ORDER — TALC
6 POWDER (GRAM) TOPICAL NIGHTLY PRN
Status: DISCONTINUED | OUTPATIENT
Start: 2021-08-10 | End: 2021-08-11 | Stop reason: HOSPADM

## 2021-08-10 RX ORDER — KETOROLAC TROMETHAMINE 15 MG/ML
15 INJECTION, SOLUTION INTRAMUSCULAR; INTRAVENOUS EVERY 6 HOURS PRN
Status: DISCONTINUED | OUTPATIENT
Start: 2021-08-10 | End: 2021-08-11

## 2021-08-10 RX ADMIN — HEPARIN SODIUM AND DEXTROSE 14 UNITS/KG/HR: 10000; 5 INJECTION INTRAVENOUS at 06:08

## 2021-08-10 RX ADMIN — HYDRALAZINE HYDROCHLORIDE 100 MG: 50 TABLET, FILM COATED ORAL at 08:08

## 2021-08-10 RX ADMIN — APIXABAN 5 MG: 5 TABLET, FILM COATED ORAL at 08:08

## 2021-08-10 RX ADMIN — ASPIRIN 81 MG: 81 TABLET, COATED ORAL at 08:08

## 2021-08-10 RX ADMIN — FUROSEMIDE 80 MG: 80 TABLET ORAL at 08:08

## 2021-08-10 RX ADMIN — HYDROCODONE BITARTRATE AND ACETAMINOPHEN 1 TABLET: 5; 325 TABLET ORAL at 09:08

## 2021-08-10 RX ADMIN — ATORVASTATIN CALCIUM 80 MG: 20 TABLET, FILM COATED ORAL at 08:08

## 2021-08-10 RX ADMIN — PANTOPRAZOLE SODIUM 40 MG: 40 TABLET, DELAYED RELEASE ORAL at 08:08

## 2021-08-10 RX ADMIN — ISOSORBIDE DINITRATE 40 MG: 20 TABLET ORAL at 08:08

## 2021-08-10 RX ADMIN — LOSARTAN POTASSIUM 100 MG: 50 TABLET, FILM COATED ORAL at 08:08

## 2021-08-10 RX ADMIN — METHOCARBAMOL 750 MG: 750 TABLET ORAL at 04:08

## 2021-08-10 RX ADMIN — SPIRONOLACTONE 25 MG: 25 TABLET ORAL at 08:08

## 2021-08-10 RX ADMIN — HYDROCODONE BITARTRATE AND ACETAMINOPHEN 1 TABLET: 5; 325 TABLET ORAL at 04:08

## 2021-08-10 RX ADMIN — CLOPIDOGREL 75 MG: 75 TABLET, FILM COATED ORAL at 08:08

## 2021-08-10 RX ADMIN — TRAZODONE HYDROCHLORIDE 100 MG: 50 TABLET ORAL at 09:08

## 2021-08-10 RX ADMIN — CARVEDILOL 12.5 MG: 25 TABLET, FILM COATED ORAL at 08:08

## 2021-08-10 RX ADMIN — KETOROLAC TROMETHAMINE 15 MG: 15 INJECTION, SOLUTION INTRAMUSCULAR; INTRAVENOUS at 12:08

## 2021-08-10 RX ADMIN — CARVEDILOL 12.5 MG: 25 TABLET, FILM COATED ORAL at 06:08

## 2021-08-10 RX ADMIN — HYDRALAZINE HYDROCHLORIDE 100 MG: 50 TABLET, FILM COATED ORAL at 04:08

## 2021-08-10 RX ADMIN — METHOCARBAMOL 750 MG: 750 TABLET ORAL at 02:08

## 2021-08-10 RX ADMIN — ISOSORBIDE DINITRATE 40 MG: 20 TABLET ORAL at 04:08

## 2021-08-10 RX ADMIN — MELATONIN TAB 3 MG 6 MG: 3 TAB at 01:08

## 2021-08-10 RX ADMIN — DOXYCYCLINE 100 MG: 50 CAPSULE ORAL at 08:08

## 2021-08-10 RX ADMIN — FLUTICASONE FUROATE AND VILANTEROL TRIFENATATE 1 PUFF: 100; 25 POWDER RESPIRATORY (INHALATION) at 08:08

## 2021-08-10 RX ADMIN — KETOROLAC TROMETHAMINE 15 MG: 15 INJECTION, SOLUTION INTRAMUSCULAR; INTRAVENOUS at 07:08

## 2021-08-10 RX ADMIN — ESCITALOPRAM OXALATE 10 MG: 10 TABLET ORAL at 08:08

## 2021-08-11 VITALS
WEIGHT: 195.75 LBS | OXYGEN SATURATION: 99 % | HEART RATE: 67 BPM | HEIGHT: 71 IN | SYSTOLIC BLOOD PRESSURE: 115 MMHG | DIASTOLIC BLOOD PRESSURE: 70 MMHG | BODY MASS INDEX: 27.4 KG/M2 | RESPIRATION RATE: 12 BRPM | TEMPERATURE: 98 F

## 2021-08-11 PROBLEM — D64.9 NORMOCYTIC ANEMIA: Status: ACTIVE | Noted: 2021-08-11

## 2021-08-11 LAB
ANION GAP SERPL CALC-SCNC: 11 MMOL/L (ref 8–16)
BASOPHILS # BLD AUTO: 0.01 K/UL (ref 0–0.2)
BASOPHILS NFR BLD: 0.1 % (ref 0–1.9)
BUN SERPL-MCNC: 37 MG/DL (ref 8–23)
CALCIUM SERPL-MCNC: 8.3 MG/DL (ref 8.7–10.5)
CHLORIDE SERPL-SCNC: 105 MMOL/L (ref 95–110)
CO2 SERPL-SCNC: 22 MMOL/L (ref 23–29)
CREAT SERPL-MCNC: 1.4 MG/DL (ref 0.5–1.4)
DIFFERENTIAL METHOD: ABNORMAL
EOSINOPHIL # BLD AUTO: 0.1 K/UL (ref 0–0.5)
EOSINOPHIL NFR BLD: 1 % (ref 0–8)
ERYTHROCYTE [DISTWIDTH] IN BLOOD BY AUTOMATED COUNT: 17.6 % (ref 11.5–14.5)
EST. GFR  (AFRICAN AMERICAN): 58.4 ML/MIN/1.73 M^2
EST. GFR  (NON AFRICAN AMERICAN): 50.5 ML/MIN/1.73 M^2
GLUCOSE SERPL-MCNC: 76 MG/DL (ref 70–110)
HCT VFR BLD AUTO: 23.6 % (ref 40–54)
HGB BLD-MCNC: 7.4 G/DL (ref 14–18)
IMM GRANULOCYTES # BLD AUTO: 0.03 K/UL (ref 0–0.04)
IMM GRANULOCYTES NFR BLD AUTO: 0.4 % (ref 0–0.5)
LYMPHOCYTES # BLD AUTO: 1 K/UL (ref 1–4.8)
LYMPHOCYTES NFR BLD: 14.1 % (ref 18–48)
MAGNESIUM SERPL-MCNC: 1.8 MG/DL (ref 1.6–2.6)
MCH RBC QN AUTO: 25.8 PG (ref 27–31)
MCHC RBC AUTO-ENTMCNC: 31.4 G/DL (ref 32–36)
MCV RBC AUTO: 82 FL (ref 82–98)
MONOCYTES # BLD AUTO: 0.7 K/UL (ref 0.3–1)
MONOCYTES NFR BLD: 9.6 % (ref 4–15)
NEUTROPHILS # BLD AUTO: 5.1 K/UL (ref 1.8–7.7)
NEUTROPHILS NFR BLD: 74.8 % (ref 38–73)
NRBC BLD-RTO: 0 /100 WBC
PLATELET # BLD AUTO: 250 K/UL (ref 150–450)
PMV BLD AUTO: 10.1 FL (ref 9.2–12.9)
POTASSIUM SERPL-SCNC: 4.7 MMOL/L (ref 3.5–5.1)
RBC # BLD AUTO: 2.87 M/UL (ref 4.6–6.2)
SODIUM SERPL-SCNC: 138 MMOL/L (ref 136–145)
WBC # BLD AUTO: 6.87 K/UL (ref 3.9–12.7)

## 2021-08-11 PROCEDURE — 25000003 PHARM REV CODE 250

## 2021-08-11 PROCEDURE — 85025 COMPLETE CBC W/AUTO DIFF WBC: CPT | Performed by: INTERNAL MEDICINE

## 2021-08-11 PROCEDURE — 99232 SBSQ HOSP IP/OBS MODERATE 35: CPT | Mod: GC,,, | Performed by: INTERNAL MEDICINE

## 2021-08-11 PROCEDURE — 36415 COLL VENOUS BLD VENIPUNCTURE: CPT | Performed by: INTERNAL MEDICINE

## 2021-08-11 PROCEDURE — 99900035 HC TECH TIME PER 15 MIN (STAT)

## 2021-08-11 PROCEDURE — 94640 AIRWAY INHALATION TREATMENT: CPT

## 2021-08-11 PROCEDURE — 63600175 PHARM REV CODE 636 W HCPCS

## 2021-08-11 PROCEDURE — 99232 PR SUBSEQUENT HOSPITAL CARE,LEVL II: ICD-10-PCS | Mod: GC,,, | Performed by: INTERNAL MEDICINE

## 2021-08-11 PROCEDURE — 25000003 PHARM REV CODE 250: Performed by: INTERNAL MEDICINE

## 2021-08-11 PROCEDURE — 94761 N-INVAS EAR/PLS OXIMETRY MLT: CPT

## 2021-08-11 PROCEDURE — 83735 ASSAY OF MAGNESIUM: CPT | Performed by: INTERNAL MEDICINE

## 2021-08-11 PROCEDURE — 80048 BASIC METABOLIC PNL TOTAL CA: CPT | Performed by: INTERNAL MEDICINE

## 2021-08-11 RX ORDER — TRAMADOL HYDROCHLORIDE 50 MG/1
50 TABLET ORAL EVERY 6 HOURS PRN
Status: DISCONTINUED | OUTPATIENT
Start: 2021-08-11 | End: 2021-08-11 | Stop reason: HOSPADM

## 2021-08-11 RX ORDER — PANTOPRAZOLE SODIUM 40 MG/1
40 TABLET, DELAYED RELEASE ORAL DAILY
Qty: 30 TABLET | Refills: 0 | Status: SHIPPED | OUTPATIENT
Start: 2021-08-12 | End: 2021-09-11

## 2021-08-11 RX ORDER — ROSUVASTATIN CALCIUM 40 MG/1
40 TABLET, COATED ORAL NIGHTLY
Start: 2021-08-11

## 2021-08-11 RX ORDER — MAGNESIUM SULFATE HEPTAHYDRATE 40 MG/ML
2 INJECTION, SOLUTION INTRAVENOUS ONCE
Status: COMPLETED | OUTPATIENT
Start: 2021-08-11 | End: 2021-08-11

## 2021-08-11 RX ORDER — DOXYCYCLINE 100 MG/1
100 CAPSULE ORAL 2 TIMES DAILY
Start: 2021-08-11

## 2021-08-11 RX ORDER — TORSEMIDE 20 MG/1
20 TABLET ORAL DAILY
Qty: 30 TABLET | Refills: 11 | Status: SHIPPED | OUTPATIENT
Start: 2021-08-11 | End: 2022-08-11

## 2021-08-11 RX ADMIN — FUROSEMIDE 80 MG: 80 TABLET ORAL at 09:08

## 2021-08-11 RX ADMIN — CARVEDILOL 12.5 MG: 25 TABLET, FILM COATED ORAL at 04:08

## 2021-08-11 RX ADMIN — LOSARTAN POTASSIUM 100 MG: 50 TABLET, FILM COATED ORAL at 09:08

## 2021-08-11 RX ADMIN — HYDRALAZINE HYDROCHLORIDE 100 MG: 50 TABLET, FILM COATED ORAL at 02:08

## 2021-08-11 RX ADMIN — HYDROCODONE BITARTRATE AND ACETAMINOPHEN 1 TABLET: 5; 325 TABLET ORAL at 09:08

## 2021-08-11 RX ADMIN — CARVEDILOL 12.5 MG: 25 TABLET, FILM COATED ORAL at 09:08

## 2021-08-11 RX ADMIN — ESCITALOPRAM OXALATE 10 MG: 10 TABLET ORAL at 09:08

## 2021-08-11 RX ADMIN — HYDRALAZINE HYDROCHLORIDE 100 MG: 50 TABLET, FILM COATED ORAL at 09:08

## 2021-08-11 RX ADMIN — ISOSORBIDE DINITRATE 40 MG: 20 TABLET ORAL at 09:08

## 2021-08-11 RX ADMIN — METHOCARBAMOL 750 MG: 750 TABLET ORAL at 01:08

## 2021-08-11 RX ADMIN — PANTOPRAZOLE SODIUM 40 MG: 40 TABLET, DELAYED RELEASE ORAL at 09:08

## 2021-08-11 RX ADMIN — DOXYCYCLINE 100 MG: 50 CAPSULE ORAL at 09:08

## 2021-08-11 RX ADMIN — ASPIRIN 81 MG: 81 TABLET, COATED ORAL at 09:08

## 2021-08-11 RX ADMIN — MAGNESIUM SULFATE 2 G: 2 INJECTION INTRAVENOUS at 09:08

## 2021-08-11 RX ADMIN — APIXABAN 5 MG: 5 TABLET, FILM COATED ORAL at 09:08

## 2021-08-11 RX ADMIN — FLUTICASONE FUROATE AND VILANTEROL TRIFENATATE 1 PUFF: 100; 25 POWDER RESPIRATORY (INHALATION) at 11:08

## 2021-08-11 RX ADMIN — SPIRONOLACTONE 25 MG: 25 TABLET ORAL at 09:08

## 2021-08-11 RX ADMIN — ISOSORBIDE DINITRATE 40 MG: 20 TABLET ORAL at 02:08

## 2021-08-11 RX ADMIN — CLOPIDOGREL 75 MG: 75 TABLET, FILM COATED ORAL at 09:08

## 2021-08-11 RX ADMIN — KETOROLAC TROMETHAMINE 15 MG: 15 INJECTION, SOLUTION INTRAMUSCULAR; INTRAVENOUS at 01:08

## 2021-08-13 ENCOUNTER — PATIENT OUTREACH (OUTPATIENT)
Dept: ADMINISTRATIVE | Facility: CLINIC | Age: 71
End: 2021-08-13

## 2021-08-13 LAB
BACTERIA BLD CULT: NORMAL
BACTERIA BLD CULT: NORMAL

## 2021-09-16 ENCOUNTER — DOCUMENTATION ONLY (OUTPATIENT)
Dept: HEMATOLOGY/ONCOLOGY | Facility: CLINIC | Age: 71
End: 2021-09-16

## 2021-11-08 PROBLEM — J81.0 ACUTE PULMONARY EDEMA: Status: RESOLVED | Noted: 2021-08-07 | Resolved: 2021-11-08

## 2022-08-22 NOTE — PLAN OF CARE
Patient remained free of falls, trauma, injury, and skin breakdown.  VSS; pt complaint of pain- PRN medications administered.  R & L groin sites frequently assessed; R remains soft and left has maintained same size and shape.  RIJ site remains WNL.  Fall precautions maintained; education provided.  Plan of care reviewed; patient verbalized understanding. All questions and concerns addressed; will continue to monitor.     [FreeTextEntry1] :                    Well appearing and nourished with no obvious deformities or distress.\par \par Eyes: \par No conjunctival injection and no xanthelasmas.\par HEENT: \par Normocephalic.Normal oral mucosa. No pallor or cyanosis\par Neck: \par No jugular venous distension. with normal A and V wave forms. No palpable adenopathy.\par Cardiovascular: \par Normal rate and rhythm with normal S1, S2 and a grade 1/6 systolic murmur. Distal arterial pulses are normal. No significant peripheral edema.\par Pulmonary: \par Lungs are clear to auscultation and percussion. Normal respiratory pattern without any accessory muscle use\par Abdomen: \par Soft, non-tender ; no palpable organomegaly or masses.\par Extremities:\par No digital clubbing, cyanosis or ischemic changes.\par Skin: \par No skin lesions, rashes, ulcers or xanthomas.\par Psychiatric: \par Alert and oriented to person, place and time. Appropriate mood and affect.

## 2023-04-11 NOTE — CARE UPDATE
CCU update plan of care  6/29/20, 12:00 PM       Nipride increased to 0.6 mcg/kg/min around 9 AM this morning. Impella at P3.     Hemodynamics repeated around noon:  CVP 13, SVO2 45  CO 4.9, CI 2.3, SVR 1200     Plan:  - Increase Nipride to 0.9 mcg/kg/min  - Lasix 40 mg IV push x 1  - Repeat hemodynamics at 3 PM   - Continue Impella at P3    Juliane Latif MD   Cardiology Fellow, PGY-5        Discussed with Dr Cruz        Glycopyrrolate Counseling:  I discussed with the patient the risks of glycopyrrolate including but not limited to skin rash, drowsiness, dry mouth, difficulty urinating, and blurred vision.

## (undated) DEVICE — GUIDEWIRE EMERALD 150CM PTFE

## (undated) DEVICE — GUIDEWIRE ROTAWIRE 330X0.014CM

## (undated) DEVICE — SEE MEDLINE ITEM 146417

## (undated) DEVICE — BURR ADVANCER ROTAPRO 1.5X135

## (undated) DEVICE — DEVICE MYNX GRIP 6/7F

## (undated) DEVICE — GUIDEWIRE SUPRA CORE 035 190CM

## (undated) DEVICE — SEE MEDLINE ITEM 156894

## (undated) DEVICE — CATH DIAG ANG 4FX120 SLIP

## (undated) DEVICE — CATH ANG PIGTAIL 4FR INFINITY

## (undated) DEVICE — SEE MEDLINE ITEM 157187

## (undated) DEVICE — KIT MICROINTRO 4F .018X40X7CM

## (undated) DEVICE — CATH TREK RX 4.0MM X 20MM

## (undated) DEVICE — INTRODUCER RX PSI KIT 8.5 FR

## (undated) DEVICE — GUIDEWIRE STF .035X180CM ANG

## (undated) DEVICE — CATH NC QUANTUM APEX MR 3.5X12

## (undated) DEVICE — FLUID DELIVERY SET WITH FILTER

## (undated) DEVICE — SHEATH INTRODUCER 4FR 11CM

## (undated) DEVICE — KIT GLIDESHEATH SLEND 6FR 10CM

## (undated) DEVICE — CATH ULTRAVERSE 035 8X40X130

## (undated) DEVICE — GUIDEWIRE SUPRA CORE 035 300CM

## (undated) DEVICE — CATH BLLN FG APEX MR 4.00X20MM

## (undated) DEVICE — CATH SWAN GANZ 8FR HEP FREE

## (undated) DEVICE — CATH NC QUANTUM APEX MR 4.5X8

## (undated) DEVICE — CATH BLLN FG APEX MR 3.00X15MM

## (undated) DEVICE — KIT MICROPUNCTURE 4 FR

## (undated) DEVICE — CATH IMPULSE 5FR PIGTAIL 125CM

## (undated) DEVICE — SPIKE CONTRAST CONTROLLER

## (undated) DEVICE — STOPCOCK 3-WAY

## (undated) DEVICE — TRAY PICC CENTRAL LINE DRSNG

## (undated) DEVICE — CATH BLLN FG APEX MR 4.00X15MM

## (undated) DEVICE — SEE MEDLINE ITEM 157148

## (undated) DEVICE — VISIPAQUE 320 200ML +PK

## (undated) DEVICE — WIRE DOC EXTENSION

## (undated) DEVICE — KIT CUSTOM MANIFOLD

## (undated) DEVICE — CATH ULTRAVERSE 035 7X40X130

## (undated) DEVICE — SYR MED RAD 150ML

## (undated) DEVICE — CATH TREK RX 4.0MM X 15MM

## (undated) DEVICE — CATH MICRO CORSAIR PRO 135CM

## (undated) DEVICE — CATH INFINITI JUDKINS JR4

## (undated) DEVICE — SHEATH INTRODUCER 6FR 11CM

## (undated) DEVICE — PENUMBRA ENGINE CANISTER

## (undated) DEVICE — CATH BLLN FG APEX MR 2.50X20MM

## (undated) DEVICE — OMNIPAQUE 350 200ML

## (undated) DEVICE — SHEATH INTRODUCER 8FR 11CM

## (undated) DEVICE — SUT MCRYL PLUS 4-0 PS2 27IN

## (undated) DEVICE — HEMOSTAT VASC BAND REG 24CM

## (undated) DEVICE — SHEATH INTRODUCER 7FR 11CM

## (undated) DEVICE — SUT VICRYL PLUS 3-0 SH 18IN

## (undated) DEVICE — ELECTRODE REM PLYHSV RETURN 9

## (undated) DEVICE — DEVICE PERCLOSE SUT CLSR 6FR

## (undated) DEVICE — GUIDE LAUNCHER 6FR EBU 3.5

## (undated) DEVICE — SHEATH SHUTTLE 7FR 90CM

## (undated) DEVICE — Device

## (undated) DEVICE — TRAY MINOR GEN SURG

## (undated) DEVICE — CATH AL1 4FR

## (undated) DEVICE — GUIDE WIRE WHOLLY FLOPPY

## (undated) DEVICE — COVER BAND BAG 40 X 40

## (undated) DEVICE — DRAPE THYROID WITH ARMBOARD

## (undated) DEVICE — WIRE GUIDE SAFE-T-J .035 260CM

## (undated) DEVICE — CATH IMPELLA CP 14F 4.7X65MM

## (undated) DEVICE — KIT PROBE COVER WITH GEL

## (undated) DEVICE — SUT 2-0 12-18IN SILK

## (undated) DEVICE — GUIDE WIRE BMW 014 X190

## (undated) DEVICE — CATH ANGIO GUIDEZILLA 6FR

## (undated) DEVICE — SHEATH 6FR 35CM

## (undated) DEVICE — GUIDEWIRE .021X260CM J-3MM TIP

## (undated) DEVICE — PROTECTION STATION PLUS

## (undated) DEVICE — INFLATOR ENCORE 26 BLLN INFL

## (undated) DEVICE — CATH EAGLE EYE PLATINUM